# Patient Record
Sex: FEMALE | Race: WHITE | Employment: OTHER | URBAN - METROPOLITAN AREA
[De-identification: names, ages, dates, MRNs, and addresses within clinical notes are randomized per-mention and may not be internally consistent; named-entity substitution may affect disease eponyms.]

---

## 2017-01-11 RX ORDER — LEVOTHYROXINE SODIUM 0.1 MG/1
TABLET ORAL
Qty: 90 TABLET | Refills: 1 | Status: SHIPPED | OUTPATIENT
Start: 2017-01-11 | End: 2017-11-09 | Stop reason: SDUPTHER

## 2017-03-03 DIAGNOSIS — I10 ESSENTIAL HYPERTENSION, BENIGN: ICD-10-CM

## 2017-03-03 RX ORDER — LOSARTAN POTASSIUM 100 MG/1
TABLET ORAL
Qty: 90 TABLET | Refills: 1 | Status: SHIPPED | OUTPATIENT
Start: 2017-03-03 | End: 2017-11-09 | Stop reason: SDUPTHER

## 2017-07-11 RX ORDER — LEVOTHYROXINE SODIUM 0.1 MG/1
TABLET ORAL
Qty: 90 TABLET | Refills: 1 | OUTPATIENT
Start: 2017-07-11

## 2017-11-09 ENCOUNTER — OFFICE VISIT (OUTPATIENT)
Dept: FAMILY MEDICINE CLINIC | Age: 62
End: 2017-11-09
Payer: COMMERCIAL

## 2017-11-09 VITALS
SYSTOLIC BLOOD PRESSURE: 120 MMHG | HEIGHT: 66 IN | DIASTOLIC BLOOD PRESSURE: 80 MMHG | WEIGHT: 206 LBS | BODY MASS INDEX: 33.11 KG/M2

## 2017-11-09 DIAGNOSIS — G89.29 CHRONIC BILATERAL LOW BACK PAIN WITHOUT SCIATICA: ICD-10-CM

## 2017-11-09 DIAGNOSIS — G45.8 OTHER SPECIFIED TRANSIENT CEREBRAL ISCHEMIAS: ICD-10-CM

## 2017-11-09 DIAGNOSIS — B96.89 ACUTE BACTERIAL SINUSITIS: ICD-10-CM

## 2017-11-09 DIAGNOSIS — M54.50 CHRONIC BILATERAL LOW BACK PAIN WITHOUT SCIATICA: ICD-10-CM

## 2017-11-09 DIAGNOSIS — F41.1 ANXIETY STATE: ICD-10-CM

## 2017-11-09 DIAGNOSIS — J01.90 ACUTE BACTERIAL SINUSITIS: ICD-10-CM

## 2017-11-09 DIAGNOSIS — K21.9 GASTROESOPHAGEAL REFLUX DISEASE WITHOUT ESOPHAGITIS: ICD-10-CM

## 2017-11-09 DIAGNOSIS — Z12.31 VISIT FOR SCREENING MAMMOGRAM: ICD-10-CM

## 2017-11-09 DIAGNOSIS — I10 ESSENTIAL HYPERTENSION, BENIGN: Primary | ICD-10-CM

## 2017-11-09 DIAGNOSIS — E03.8 OTHER SPECIFIED HYPOTHYROIDISM: ICD-10-CM

## 2017-11-09 PROCEDURE — 99214 OFFICE O/P EST MOD 30 MIN: CPT | Performed by: FAMILY MEDICINE

## 2017-11-09 RX ORDER — MELOXICAM 15 MG/1
15 TABLET ORAL DAILY PRN
COMMUNITY
End: 2018-06-19 | Stop reason: ALTCHOICE

## 2017-11-09 RX ORDER — LOSARTAN POTASSIUM 100 MG/1
TABLET ORAL
Qty: 90 TABLET | Refills: 1 | Status: SHIPPED | OUTPATIENT
Start: 2017-11-09 | End: 2018-05-08 | Stop reason: SDUPTHER

## 2017-11-09 RX ORDER — LEVOTHYROXINE SODIUM 0.1 MG/1
TABLET ORAL
Qty: 90 TABLET | Refills: 1 | Status: SHIPPED | OUTPATIENT
Start: 2017-11-09 | End: 2018-05-08 | Stop reason: SDUPTHER

## 2017-11-09 RX ORDER — AMOXICILLIN AND CLAVULANATE POTASSIUM 500; 125 MG/1; MG/1
1 TABLET, FILM COATED ORAL 2 TIMES DAILY
Qty: 20 TABLET | Refills: 0 | Status: SHIPPED | OUTPATIENT
Start: 2017-11-09 | End: 2017-11-19

## 2017-11-09 RX ORDER — LORAZEPAM 0.5 MG/1
TABLET ORAL
Qty: 30 TABLET | Refills: 2 | Status: SHIPPED | OUTPATIENT
Start: 2017-11-09 | End: 2018-06-19 | Stop reason: SDUPTHER

## 2017-11-09 ASSESSMENT — ENCOUNTER SYMPTOMS
COUGH: 0
SINUS PRESSURE: 1
NAUSEA: 0
BACK PAIN: 1
CONSTIPATION: 0
EYE REDNESS: 0
EYE DISCHARGE: 0
SHORTNESS OF BREATH: 0
SORE THROAT: 0
HEARTBURN: 1

## 2017-11-09 ASSESSMENT — PATIENT HEALTH QUESTIONNAIRE - PHQ9
SUM OF ALL RESPONSES TO PHQ9 QUESTIONS 1 & 2: 0
SUM OF ALL RESPONSES TO PHQ QUESTIONS 1-9: 0
1. LITTLE INTEREST OR PLEASURE IN DOING THINGS: 0
2. FEELING DOWN, DEPRESSED OR HOPELESS: 0

## 2017-11-09 NOTE — PROGRESS NOTES
symptoms include headaches. Pertinent negatives include no chest pain, numbness, paresis, paresthesias or weight loss. Treatments tried: Pt uses a compound medication from [employer]. Anxiety - pt states she has had anxiety off and on for years. Pt states she has taken the ativan off and on for years. Pt does not take it every day but likes to have ativan \"on hand' because she just needs it at times. Like right now her mother was diagnosed with breast cancer and so pt is more stressed/anxious. Hypothyroidism- Chronic/stable, Patient denies changes in weight,bowels,palpitations. Energy is good Last TSH     TIA - pt had a small stroke in 2012 and 2014. When pt had both Episodes she had sudden loss of speech and Lt arm numbness and her BP gets high and irregular. Pt has had none of these symptoms since. No slurred speech, no numbness. Pt now on the plavix for ever. Screening mammogram - pt denies breast pain/mass. Needs order for mammogram  Past Medical History:     Past Medical History:   Diagnosis Date    Cerebrovascular disease     mini stroke may 2012    Chronic back pain     GERD (gastroesophageal reflux disease)     Hypertension     Hypothyroidism     Osteopenia 1/4/2016      Reviewed all health maintenance requirements and ordered appropriate tests  Health Maintenance Due   Topic Date Due    Hepatitis C screen  1955    HIV screen  11/30/1970    DTaP/Tdap/Td vaccine (1 - Tdap) 11/30/1974    Breast cancer screen  08/05/2017    Flu vaccine (1) 09/01/2017       Past Surgical History:     Past Surgical History:   Procedure Laterality Date    APPENDECTOMY      BREAST BIOPSY      CHOLECYSTECTOMY      HYSTERECTOMY      still Rt ovary present        Medications:       Prior to Admission medications    Medication Sig Start Date End Date Taking?  Authorizing Provider   RaNITidine HCl (ZANTAC 75 PO) Take by mouth daily   Yes Historical Provider, MD   meloxicam (MOBIC) 15 MG tablet Take 15 mg by Negative for difficulty urinating and hematuria. Musculoskeletal: Positive for back pain. Skin: Negative for pallor and rash. Neurological: Positive for headaches. Negative for numbness and paresthesias. Psychiatric/Behavioral: Negative for confusion. The patient is nervous/anxious. Physical Exam:     Physical Exam   Constitutional: She is oriented to person, place, and time. She appears well-developed and well-nourished. HENT:   Head: Normocephalic and atraumatic. Right Ear: Tympanic membrane normal.   Left Ear: Tympanic membrane normal.   Nose: Mucosal edema present. Right sinus exhibits frontal sinus tenderness. Left sinus exhibits frontal sinus tenderness. Mouth/Throat: Oropharynx is clear and moist and mucous membranes are normal.   Eyes: Conjunctivae are normal. Pupils are equal, round, and reactive to light. Right eye exhibits no discharge. Left eye exhibits no discharge. Neck: Neck supple. No thyromegaly present. Cardiovascular: Normal rate, regular rhythm and normal heart sounds. No murmur heard. Pulmonary/Chest: Effort normal and breath sounds normal. No respiratory distress. She has no wheezes. Abdominal: Soft. Bowel sounds are normal. She exhibits no distension. There is no tenderness. Musculoskeletal: She exhibits no edema. Lymphadenopathy:     She has no cervical adenopathy. Neurological: She is alert and oriented to person, place, and time. Skin: Skin is warm and dry. No rash noted. No erythema. Psychiatric: She has a normal mood and affect. Her behavior is normal.   Vitals reviewed.       Vitals:  /80   Ht 5' 5.5\" (1.664 m)   Wt 206 lb (93.4 kg)   BMI 33.76 kg/m²       Data:     Lab Results   Component Value Date     02/27/2016    K 4.3 02/27/2016     02/27/2016    CO2 26 02/27/2016    BUN 11 02/27/2016    CREATININE 0.68 02/27/2016    GLUCOSE 91 02/27/2016    GLUCOSE 97 01/25/2012    PROT 6.9 02/27/2016    LABALBU 4.1 02/27/2016 LABALBU 4.4 01/25/2012    BILITOT 0.38 02/27/2016    ALKPHOS 83 02/27/2016    AST 14 02/27/2016    ALT 11 02/27/2016     Lab Results   Component Value Date    WBC 4.8 02/21/2015    RBC 4.51 02/21/2015    RBC 4.92 01/25/2012    HGB 13.1 02/21/2015    HCT 38.9 02/21/2015    MCV 86.3 02/21/2015    MCH 29.1 02/21/2015    MCHC 33.7 02/21/2015    RDW 15.0 02/21/2015     02/21/2015     01/25/2012    MPV NOT REPORTED 02/21/2015     Lab Results   Component Value Date    TSH 2.59 02/27/2016     Lab Results   Component Value Date    CHOL 174 02/27/2016    HDL 67 02/27/2016          Assessment/Plan:       1. Essential hypertension, benign  Stable on the cozaar  - losartan (COZAAR) 100 MG tablet; TAKE 1 TABLET DAILY  Dispense: 90 tablet; Refill: 1  - Lipid Panel; Future  - Comprehensive Metabolic Panel; Future    2. Gastroesophageal reflux disease without esophagitis  Stable on zantac  - Lipid Panel; Future  - Comprehensive Metabolic Panel; Future    3. Acute bacterial sinusitis  Take all antibiotics (augmentin and pt states she can only take the 500mg), increase rest and fluids. F/U 4-5d if not better or sooner if worse. All questions answered. 4. Chronic bilateral low back pain without sciatica  Pt to continue compound cream    5. Anxiety state  Pt may have ativan to use only as needed. Patient counseled on the addictive nature of this medication. Patient has signed a medication contract. OAR[place] report reviewed every 3 months. 6. Other specified hypothyroidism  Stable on the synthroid  - TSH without Reflex; Future    7. Other specified transient cerebral ischemias  Stable on plavix and keep BP controlled. 8. Visit for screening mammogram  Order given  - TY DIGITAL SCREEN W CAD BILATERAL; Future        Venessa received counseling on the following healthy behaviors: nutrition and exercise  Reviewed prior labs and health maintenance  Continue current medications, diet and exercise.   Discussed use,

## 2017-11-09 NOTE — PATIENT INSTRUCTIONS
SURVEY:    You may be receiving a survey from [employer] regarding your visit today. Please complete the survey to enable us to provide the highest quality of care to you and your family. If you cannot score us a very good on any question, please call the office to discuss how we could of made your experience a very good one. Thank you. DATE: MEDICATIONS TAKEN TODAY? BLOOD PRESSURE READING: HEART RATE/PULSE: BLOOD SUGAR #/FASTING?  COMMENTS

## 2017-11-24 ENCOUNTER — HOSPITAL ENCOUNTER (OUTPATIENT)
Age: 62
Discharge: HOME | End: 2017-11-24
Payer: COMMERCIAL

## 2017-11-24 ENCOUNTER — HOSPITAL ENCOUNTER (OUTPATIENT)
Dept: MAMMOGRAPHY | Age: 62
Discharge: HOME | End: 2017-11-24
Payer: COMMERCIAL

## 2017-11-24 DIAGNOSIS — Z12.31 VISIT FOR SCREENING MAMMOGRAM: ICD-10-CM

## 2017-11-24 DIAGNOSIS — K21.9 GASTROESOPHAGEAL REFLUX DISEASE WITHOUT ESOPHAGITIS: ICD-10-CM

## 2017-11-24 DIAGNOSIS — E03.8 OTHER SPECIFIED HYPOTHYROIDISM: ICD-10-CM

## 2017-11-24 DIAGNOSIS — I10 ESSENTIAL HYPERTENSION, BENIGN: ICD-10-CM

## 2017-11-24 LAB
ALBUMIN SERPL-MCNC: 3.9 G/DL (ref 3.5–5.2)
ALBUMIN/GLOBULIN RATIO: NORMAL (ref 1–2.5)
ALP BLD-CCNC: 65 U/L (ref 35–104)
ALT SERPL-CCNC: 10 U/L (ref 5–33)
ANION GAP SERPL CALCULATED.3IONS-SCNC: 10 MMOL/L (ref 9–17)
AST SERPL-CCNC: 15 U/L
BILIRUB SERPL-MCNC: 0.39 MG/DL (ref 0.3–1.2)
BUN BLDV-MCNC: 13 MG/DL (ref 8–23)
BUN/CREAT BLD: 19 (ref 9–20)
CALCIUM SERPL-MCNC: 9.1 MG/DL (ref 8.6–10.4)
CHLORIDE BLD-SCNC: 105 MMOL/L (ref 98–107)
CHOLESTEROL/HDL RATIO: 2.2
CHOLESTEROL: 158 MG/DL
CO2: 26 MMOL/L (ref 20–31)
CREAT SERPL-MCNC: 0.67 MG/DL (ref 0.5–0.9)
GFR AFRICAN AMERICAN: >60 ML/MIN
GFR NON-AFRICAN AMERICAN: >60 ML/MIN
GFR SERPL CREATININE-BSD FRML MDRD: NORMAL ML/MIN/{1.73_M2}
GFR SERPL CREATININE-BSD FRML MDRD: NORMAL ML/MIN/{1.73_M2}
GLUCOSE BLD-MCNC: 95 MG/DL (ref 70–99)
HDLC SERPL-MCNC: 73 MG/DL
LDL CHOLESTEROL: 65 MG/DL (ref 0–130)
PATIENT FASTING?: YES
POTASSIUM SERPL-SCNC: 4.4 MMOL/L (ref 3.7–5.3)
SODIUM BLD-SCNC: 141 MMOL/L (ref 135–144)
TOTAL PROTEIN: 6.5 G/DL (ref 6.4–8.3)
TRIGL SERPL-MCNC: 99 MG/DL
TSH SERPL DL<=0.05 MIU/L-ACNC: 4.21 MIU/L (ref 0.3–5)
VLDLC SERPL CALC-MCNC: NORMAL MG/DL (ref 1–30)

## 2017-11-24 PROCEDURE — G0202 SCR MAMMO BI INCL CAD: HCPCS

## 2017-11-24 PROCEDURE — 80053 COMPREHEN METABOLIC PANEL: CPT

## 2017-11-24 PROCEDURE — 80061 LIPID PANEL: CPT

## 2017-11-24 PROCEDURE — 84443 ASSAY THYROID STIM HORMONE: CPT

## 2017-11-24 PROCEDURE — 36415 COLL VENOUS BLD VENIPUNCTURE: CPT

## 2018-02-07 ENCOUNTER — OFFICE VISIT (OUTPATIENT)
Dept: FAMILY MEDICINE CLINIC | Age: 63
End: 2018-02-07
Payer: COMMERCIAL

## 2018-02-07 VITALS
DIASTOLIC BLOOD PRESSURE: 82 MMHG | HEART RATE: 74 BPM | WEIGHT: 205 LBS | OXYGEN SATURATION: 96 % | TEMPERATURE: 97.6 F | SYSTOLIC BLOOD PRESSURE: 126 MMHG | BODY MASS INDEX: 33.59 KG/M2

## 2018-02-07 DIAGNOSIS — B34.9 ACUTE BRONCHOSPASM DUE TO VIRAL INFECTION: ICD-10-CM

## 2018-02-07 DIAGNOSIS — J98.01 ACUTE BRONCHOSPASM DUE TO VIRAL INFECTION: ICD-10-CM

## 2018-02-07 DIAGNOSIS — J06.9 VIRAL URI WITH COUGH: Primary | ICD-10-CM

## 2018-02-07 PROCEDURE — 99213 OFFICE O/P EST LOW 20 MIN: CPT | Performed by: NURSE PRACTITIONER

## 2018-02-07 RX ORDER — GUAIFENESIN 600 MG/1
600 TABLET, EXTENDED RELEASE ORAL 2 TIMES DAILY
Qty: 14 TABLET | Refills: 0 | Status: SHIPPED | OUTPATIENT
Start: 2018-02-07 | End: 2018-02-14

## 2018-02-07 RX ORDER — MONTELUKAST SODIUM 10 MG/1
TABLET ORAL
COMMUNITY
Start: 2018-01-22 | End: 2018-02-24

## 2018-02-07 RX ORDER — ALBUTEROL SULFATE 90 UG/1
2 AEROSOL, METERED RESPIRATORY (INHALATION) EVERY 6 HOURS PRN
Qty: 1 INHALER | Refills: 3 | Status: SHIPPED | OUTPATIENT
Start: 2018-02-07 | End: 2018-11-19

## 2018-02-07 ASSESSMENT — ENCOUNTER SYMPTOMS: COUGH: 1

## 2018-02-07 NOTE — PROGRESS NOTES
HPI Notes    Name: Kevin Ray  : 1955         Chief Complaint:     Chief Complaint   Patient presents with    Cough     Patient complains of cough that started 2 days ago, started as a dry cough now she it is more productive.  Pharyngitis     Patient complains of sore throat started yesterday, she thinks from the drainage.  Shortness of Breath     Patient complains of shortness of breath. History of Present Illness:        Cough   This is a new problem. The current episode started in the past 7 days (4 days ago). The problem has been waxing and waning. The cough is productive of sputum. Associated symptoms include ear congestion, nasal congestion and postnasal drip. Pertinent negatives include no chest pain, chills, fever or shortness of breath. The symptoms are aggravated by lying down. Past Medical History:     Past Medical History:   Diagnosis Date    Cerebrovascular disease     mini stroke may 2012    Chronic back pain     GERD (gastroesophageal reflux disease)     Hypertension     Hypothyroidism     Osteopenia 2016      Reviewed all health maintenance requirements and ordered appropriate tests  Health Maintenance Due   Topic Date Due    Hepatitis C screen  1955    HIV screen  1970    DTaP/Tdap/Td vaccine (1 - Tdap) 1974       Past Surgical History:     Past Surgical History:   Procedure Laterality Date    APPENDECTOMY      BREAST BIOPSY      CHOLECYSTECTOMY      HYSTERECTOMY      still Rt ovary present        Medications:       Prior to Admission medications    Medication Sig Start Date End Date Taking?  Authorizing Provider   albuterol sulfate HFA (PROAIR HFA) 108 (90 Base) MCG/ACT inhaler Inhale 2 puffs into the lungs every 6 hours as needed for Wheezing 18  Yes Kolton Lerma CNP   guaiFENesin (MUCINEX) 600 MG extended release tablet Take 1 tablet by mouth 2 times daily for 7 days 18 Yes Kolton Lerma CNP antibiotics. However, pt insisting on antibiotic. Will treat with albuterol inhaler, mucinex, and zpack. Increase rest and water intake  May use warm tea and honey for sore throat  May gargle salt water for sore throat  May use saline nose spray for nasal congestion    Patient verbalizes understanding and agreement with plan. All questions answered. If symptoms do not resolve or worsen, return to office. Completed Refills   Requested Prescriptions     Signed Prescriptions Disp Refills    albuterol sulfate HFA (PROAIR HFA) 108 (90 Base) MCG/ACT inhaler 1 Inhaler 3     Sig: Inhale 2 puffs into the lungs every 6 hours as needed for Wheezing    guaiFENesin (MUCINEX) 600 MG extended release tablet 14 tablet 0     Sig: Take 1 tablet by mouth 2 times daily for 7 days     Return if symptoms worsen or fail to improve. Orders Placed This Encounter   Medications    albuterol sulfate HFA (PROAIR HFA) 108 (90 Base) MCG/ACT inhaler     Sig: Inhale 2 puffs into the lungs every 6 hours as needed for Wheezing     Dispense:  1 Inhaler     Refill:  3    guaiFENesin (MUCINEX) 600 MG extended release tablet     Sig: Take 1 tablet by mouth 2 times daily for 7 days     Dispense:  14 tablet     Refill:  0     No orders of the defined types were placed in this encounter. Patient Instructions     SURVEY:    You may be receiving a survey from [employer] regarding your visit today. Please complete the survey to enable us to provide the highest quality of care to you and your family. If you cannot score us a very good on any question, please call the office to discuss how we could of made your experience a very good one. Thank you.       Electronically signed by Mabel Shepherd CNP on 2/8/2018 at 2:22 PM           Completed Refills   Requested Prescriptions     Signed Prescriptions Disp Refills    albuterol sulfate HFA (PROAIR HFA) 108 (90 Base) MCG/ACT inhaler 1 Inhaler 3     Sig: Inhale 2 puffs into the lungs every 6 hours as needed for Wheezing    guaiFENesin (MUCINEX) 600 MG extended release tablet 14 tablet 0     Sig: Take 1 tablet by mouth 2 times daily for 7 days         Venessa received counseling on the following healthy behaviors: nutrition and medication adherence  Reviewed prior labs and health maintenance. Continue current medications, diet and exercise. Discussed use, benefit, and side effects of prescribed medications. Barriers to medication compliance addressed. Patient given educational materials - see patient instructions. All patient questions answered. Patient voiced understanding.

## 2018-02-08 ENCOUNTER — TELEPHONE (OUTPATIENT)
Dept: FAMILY MEDICINE CLINIC | Age: 63
End: 2018-02-08

## 2018-02-08 RX ORDER — AZITHROMYCIN 250 MG/1
TABLET, FILM COATED ORAL
Qty: 1 PACKET | Refills: 0 | Status: SHIPPED | OUTPATIENT
Start: 2018-02-08 | End: 2018-02-24 | Stop reason: ALTCHOICE

## 2018-02-08 ASSESSMENT — ENCOUNTER SYMPTOMS
VOMITING: 0
NAUSEA: 0
SPUTUM PRODUCTION: 0
DIARRHEA: 0
SHORTNESS OF BREATH: 0

## 2018-02-08 NOTE — TELEPHONE ENCOUNTER
Last OV yesterday, said she had a viral infection and told to take sudafed. She is much worse today, no voice,feels horrible. She said she wonders if you could do a test to decide if it is viral or bacterial.  She thinks she needs something more than sudafed. Please advise.   VINCENT Beckham

## 2018-02-24 ENCOUNTER — OFFICE VISIT (OUTPATIENT)
Dept: PRIMARY CARE CLINIC | Age: 63
End: 2018-02-24
Payer: COMMERCIAL

## 2018-02-24 ENCOUNTER — TELEPHONE (OUTPATIENT)
Dept: PRIMARY CARE CLINIC | Age: 63
End: 2018-02-24

## 2018-02-24 VITALS
TEMPERATURE: 98.7 F | WEIGHT: 205 LBS | HEART RATE: 94 BPM | DIASTOLIC BLOOD PRESSURE: 84 MMHG | SYSTOLIC BLOOD PRESSURE: 126 MMHG | BODY MASS INDEX: 33.59 KG/M2 | OXYGEN SATURATION: 99 %

## 2018-02-24 DIAGNOSIS — J01.00 ACUTE MAXILLARY SINUSITIS, RECURRENCE NOT SPECIFIED: Primary | ICD-10-CM

## 2018-02-24 PROCEDURE — 99213 OFFICE O/P EST LOW 20 MIN: CPT | Performed by: NURSE PRACTITIONER

## 2018-02-24 RX ORDER — PREDNISONE 20 MG/1
20 TABLET ORAL DAILY
Qty: 3 TABLET | Refills: 0 | Status: SHIPPED | OUTPATIENT
Start: 2018-02-24 | End: 2018-02-27

## 2018-02-24 RX ORDER — ECHINACEA PURPUREA EXTRACT 125 MG
1 TABLET ORAL PRN
Qty: 1 BOTTLE | Refills: 0 | Status: SHIPPED | OUTPATIENT
Start: 2018-02-24 | End: 2018-06-19 | Stop reason: ALTCHOICE

## 2018-02-24 RX ORDER — AMOXICILLIN AND CLAVULANATE POTASSIUM 875; 125 MG/1; MG/1
1 TABLET, FILM COATED ORAL 2 TIMES DAILY
Qty: 20 TABLET | Refills: 0 | Status: SHIPPED | OUTPATIENT
Start: 2018-02-24 | End: 2018-03-06

## 2018-02-24 ASSESSMENT — ENCOUNTER SYMPTOMS
SORE THROAT: 0
COUGH: 1
STRIDOR: 0
FACIAL SWELLING: 0
CHEST TIGHTNESS: 0
SINUS PAIN: 1
WHEEZING: 0
TROUBLE SWALLOWING: 0
RHINORRHEA: 1
SINUS PRESSURE: 1

## 2018-02-24 NOTE — PROGRESS NOTES
tablet 1    losartan (COZAAR) 100 MG tablet TAKE 1 TABLET DAILY 90 tablet 1    UNABLE TO FIND Gabapentin-Ketoprofen amitriptyline HCL 10-10-2% neuroserum liquid  Apply small amt to affected area and rub for 10 minutes. Repeat 2-3 times daily 15 mL 1    LORazepam (ATIVAN) 0.5 MG tablet Take 1 tablet twice daily as needed for anxiety. . 30 tablet 2    meloxicam (MOBIC) 15 MG tablet Take 15 mg by mouth daily as needed for Pain      clopidogrel (PLAVIX) 75 MG tablet Take 1 tablet by mouth daily. 30 tablet 0    meclizine (ANTIVERT) 25 MG tablet Take 1 tablet by mouth 3 times daily as needed. 30 tablet 2    folic acid (FOLVITE) 1 MG tablet Take 1 mg by mouth daily.  Ergocalciferol (VITAMIN D2 PO) Take 1.25 mg by mouth twice a week.  albuterol sulfate HFA (PROAIR HFA) 108 (90 Base) MCG/ACT inhaler Inhale 2 puffs into the lungs every 6 hours as needed for Wheezing 1 Inhaler 3    Loratadine (CLARITIN) 5 MG CHEW Take 1 tablet by mouth daily 30 tablet 0     No current facility-administered medications for this visit. Allergies   Allergen Reactions    Flonase [Fluticasone Propionate]      Smelled smoke with medication.  Other Hives     Contrast for a stress test       Subjective:      Review of Systems   Constitutional: Positive for chills. Negative for activity change, fatigue and fever. HENT: Positive for congestion, postnasal drip, rhinorrhea, sinus pain and sinus pressure. Negative for ear discharge, ear pain, facial swelling, sore throat and trouble swallowing. Respiratory: Positive for cough. Negative for chest tightness, wheezing and stridor. Cardiovascular: Negative. Skin: Negative. Neurological: Negative. Hematological: Negative. Objective:     Physical Exam   Constitutional:   Appears to be of stated age with warm, dry skin; normal coloration without rash of the exposed skin.   Patient is well-appearing, well-hydrated, nontoxic, comfortable, alert and oriented, pleasant and talkative without apparent distress. They are oriented for age with age appropriate behavior. HENT:   Head: Normocephalic. Right Ear: Tympanic membrane normal.   Left Ear: Tympanic membrane is injected (and appears cloudy. There is no bulging. There is no erythema. ). Nose: Mucosal edema and rhinorrhea present. Left sinus exhibits maxillary sinus tenderness. Mouth/Throat: Uvula is midline and mucous membranes are normal. Oropharyngeal exudate (pale thin yellow noted to posterior oropharyngeal wall) present. Neck: Neck supple. Cardiovascular: Normal rate and regular rhythm. No lower extremity peripheral edema. Pulmonary/Chest: Effort normal and breath sounds normal. She has no wheezes. She has no rales. Frequent cough on exam that is congested and productive of mucus. Lymphadenopathy:     She has no cervical adenopathy. Nursing note and vitals reviewed. /84   Pulse 94   Temp 98.7 °F (37.1 °C) (Oral)   Wt 205 lb (93 kg)   SpO2 99%   BMI 33.59 kg/m²     Assessment:     1. Acute maxillary sinusitis, recurrence not specified  amoxicillin-clavulanate (AUGMENTIN) 875-125 MG per tablet    sodium chloride (ALTAMIST SPRAY) 0.65 % nasal spray       Plan:   Zak Aguirre was seen today for sinusitis. Diagnoses and all orders for this visit:    Acute maxillary sinusitis, recurrence not specified  -     amoxicillin-clavulanate (AUGMENTIN) 875-125 MG per tablet; Take 1 tablet by mouth 2 times daily for 10 days  -     sodium chloride (ALTAMIST SPRAY) 0.65 % nasal spray; 1 spray by Nasal route as needed for Congestion      Will recommend Augmentin and saline nasal spray and throat lozenges with supportive care including increased rest and hydration. NP reviewed  administration, expected effects and possible side effects and have encouraged a probiotic of choice. Questions answered. They verbalized understanding and agreement. Patient instructions written and verbal given.

## 2018-02-24 NOTE — TELEPHONE ENCOUNTER
Abiel De Leon called back wanting to know if Dave Ewa could call in a steroid for her also. She said she is leaving for [place] on Wednesday and her niece mentioned she might need a steroid. Her niece had the same thing she had and needed a steroid to get over it. Please let Abiel De Leon know. Shakeel    Next Visit Date:  No future appointments.     Health Maintenance   Topic Date Due    Hepatitis C screen  1955    HIV screen  11/30/1970    DTaP/Tdap/Td vaccine (1 - Tdap) 11/30/1974    Shingles Vaccine (1 of 2 - 2 Dose Series) 11/30/2005    TSH testing  11/24/2018    Potassium monitoring  11/24/2018    Creatinine monitoring  11/24/2018    Breast cancer screen  11/24/2019    Colon cancer screen colonoscopy  06/28/2021    Lipid screen  11/24/2022    Flu vaccine  Completed       No results found for: LABA1C          ( goal A1C is < 7)   No results found for: LABMICR  LDL Cholesterol (mg/dL)   Date Value   11/24/2017 65       (goal LDL is <100)   AST (U/L)   Date Value   11/24/2017 15     ALT (U/L)   Date Value   11/24/2017 10     BUN (mg/dL)   Date Value   11/24/2017 13     BP Readings from Last 3 Encounters:   02/24/18 126/84   02/07/18 126/82   11/09/17 120/80          (goal 120/80)    All Future Testing planned in [facility]  Lab Frequency Next Occurrence               Patient Active Problem List:     Anxiety state     Hypothyroidism     Essential hypertension, benign     Esophageal reflux     Carpal tunnel syndrome     TIA (transient ischemic attack)     Well woman exam with routine gynecological exam     Chronic back pain     Osteopenia

## 2018-02-24 NOTE — PATIENT INSTRUCTIONS
SURVEY:    You may be receiving a survey from [employer] regarding your visit today. Please complete the survey to enable us to provide the highest quality of care to you and your family. If you cannot score us a very good on any question, please call the office to discuss how we could of made your experience a very good one. Thank you. Patient Education        Sinusitis: Care Instructions  Your Care Instructions    Sinusitis is an infection of the lining of the sinus cavities in your head. Sinusitis often follows a cold. It causes pain and pressure in your head and face. In most cases, sinusitis gets better on its own in 1 to 2 weeks. But some mild symptoms may last for several weeks. Sometimes antibiotics are needed. Follow-up care is a key part of your treatment and safety. Be sure to make and go to all appointments, and call your doctor if you are having problems. It's also a good idea to know your test results and keep a list of the medicines you take. How can you care for yourself at home? · Take an over-the-counter pain medicine, such as acetaminophen (Tylenol), ibuprofen (Advil, Motrin), or naproxen (Aleve). Read and follow all instructions on the label. · If the doctor prescribed antibiotics, take them as directed. Do not stop taking them just because you feel better. You need to take the full course of antibiotics. · Be careful when taking over-the-counter cold or flu medicines and Tylenol at the same time. Many of these medicines have acetaminophen, which is Tylenol. Read the labels to make sure that you are not taking more than the recommended dose. Too much acetaminophen (Tylenol) can be harmful. · Breathe warm, moist air from a steamy shower, a hot bath, or a sink filled with hot water. Avoid cold, dry air. Using a humidifier in your home may help. Follow the directions for cleaning the machine.   · Use saline (saltwater) nasal washes to help keep your nasal passages open and wash is used to treat many different infections caused by bacteria, such as sinusitis, pneumonia, ear infections, bronchitis, urinary tract infections, and infections of the skin. Amoxicillin and clavulanate potassium may also be used for purposes not listed in this medication guide. What should I discuss with my healthcare provider before taking amoxicillin and clavulanate potassium? Do not use this medication if you are allergic to amoxicillin or clavulanate potassium, or if you have ever had liver problems caused by this medication. Do not use if you are allergic to any other penicillin antibiotic, such as amoxicillin (Amoxil, Augmentin, Dispermox, Moxatag), ampicillin (Principen, Unasyn), dicloxacillin (Dycill, Dynapen), oxacillin (Bactocill), or penicillin (Bicillin L-A, PC Pen VK, Pfizerpen)), and others. To make sure you can safely take this medicine, tell your doctor if you have any of these other conditions:  · liver disease (or a history of hepatitis or jaundice);  · kidney disease;  · mononucleosis; or  · if you are allergic to a cephalosporin antibiotic, such as cefdinir (Omnicef), cefprozil (Cefzil), cefuroxime (Ceftin), cephalexin (Keflex), and others. FDA pregnancy category B. This medication is not expected to be harmful to an unborn baby. Tell your doctor if you are pregnant or plan to become pregnant during treatment. Amoxicillin and clavulanate potassium can make birth control pills less effective. Ask your doctor about using a non-hormone method of birth control (such as a condom, diaphragm, spermicide) to prevent pregnancy while taking amoxicillin and clavulanate potassium. Amoxicillin and clavulanate potassium can pass into breast milk and may harm a nursing baby. Do not use this medication without telling your doctor if you are breast-feeding a baby. The liquid and chewable tablet forms of this medication may contain phenylalanine.  Talk to your doctor before using these forms of amoxicillin and clavulanate potassium if you have phenylketonuria (PKU). How should I take amoxicillin and clavulanate potassium? Take exactly as prescribed by your doctor. Do not take in larger or smaller amounts or for longer than recommended. Follow the directions on your prescription label. If you switch from one tablet form to another (regular, chewable, or extended-release tablet), take only the new tablet form and strength prescribed for you. The strength of clavulanate potassium is not the same among the different tablet forms, even though the amount of amoxicillin may be the same as in the tablet you were using before. This medicine may not be as effective or could be harmful if you do not use the exact tablet form your doctor has prescribed. Take this medicine with a full glass of water. Take the medicine at the start of a meal to reduce stomach upset. Take the medicine at the same time each day. The Augmentin tablet should be swallowed whole. The Augmentin Chewable tablet must be chewed before swallowing. Do not swallow a chewable tablet whole. Do not crush or chew the Augmentin XR (extended-release) tablet. Swallow the pill whole, or break the pill in half and take both halves one at a time. If you have trouble swallowing a whole or half pill, talk with your doctor about using another form of amoxicillin and clavulanate potassium. Shake the liquid form of this medicine well just before you measure a dose. To be sure you get the correct dose, measure the liquid with a marked measuring spoon or medicine cup, not with a regular table spoon. If you do not have a dose-measuring device, ask your pharmacist for one. Take this medication for the full prescribed length of time. Your symptoms may improve before the infection is completely cleared. Skipping doses may also increase your risk of further infection that is resistant to antibiotics.  Amoxicillin and clavulanate potassium will not treat a viral

## 2018-03-16 ENCOUNTER — TELEPHONE (OUTPATIENT)
Dept: FAMILY MEDICINE CLINIC | Age: 63
End: 2018-03-16

## 2018-03-16 NOTE — TELEPHONE ENCOUNTER
Patient called and stated that our office will be getting a refill request from [facility] on Gabapentin-Ketoprofen Amitriptline HCL 10-10-2% W/V Neuroserum Liquid. Patient is in need of this to be refilled. [employer].    Health Maintenance   Topic Date Due    Hepatitis C screen  1955    HIV screen  11/30/1970    DTaP/Tdap/Td vaccine (1 - Tdap) 11/30/1974    Shingles Vaccine (1 of 2 - 2 Dose Series) 11/30/2005    TSH testing  11/24/2018    Potassium monitoring  11/24/2018    Creatinine monitoring  11/24/2018    Breast cancer screen  11/24/2019    Colon cancer screen colonoscopy  06/28/2021    Lipid screen  11/24/2022    Flu vaccine  Completed             (applicable per patient's age: Cancer Screenings, Depression Screening, Fall Risk Screening, Immunizations)    LDL Cholesterol (mg/dL)   Date Value   11/24/2017 65     AST (U/L)   Date Value   11/24/2017 15     ALT (U/L)   Date Value   11/24/2017 10     BUN (mg/dL)   Date Value   11/24/2017 13      (goal A1C is < 7)   (goal LDL is <100) need 30-50% reduction from baseline     BP Readings from Last 3 Encounters:   02/24/18 126/84   02/07/18 126/82   11/09/17 120/80    (goal /80)      All Future Testing planned in [facility]:  Lab Frequency Next Occurrence       Next Visit Date:  No future appointments.          Patient Active Problem List:     Anxiety state     Hypothyroidism     Essential hypertension, benign     Esophageal reflux     Carpal tunnel syndrome     TIA (transient ischemic attack)     Well woman exam with routine gynecological exam     Chronic back pain     Osteopenia

## 2018-05-07 ENCOUNTER — TELEPHONE (OUTPATIENT)
Dept: FAMILY MEDICINE CLINIC | Age: 63
End: 2018-05-07

## 2018-05-07 DIAGNOSIS — I10 ESSENTIAL HYPERTENSION, BENIGN: ICD-10-CM

## 2018-05-08 RX ORDER — LEVOTHYROXINE SODIUM 0.1 MG/1
TABLET ORAL
Qty: 90 TABLET | Refills: 1 | Status: SHIPPED | OUTPATIENT
Start: 2018-05-08 | End: 2018-10-09 | Stop reason: SDUPTHER

## 2018-05-08 RX ORDER — LOSARTAN POTASSIUM 100 MG/1
TABLET ORAL
Qty: 90 TABLET | Refills: 1 | Status: SHIPPED | OUTPATIENT
Start: 2018-05-08 | End: 2018-10-09 | Stop reason: SDUPTHER

## 2018-06-19 ENCOUNTER — OFFICE VISIT (OUTPATIENT)
Dept: FAMILY MEDICINE CLINIC | Age: 63
End: 2018-06-19
Payer: COMMERCIAL

## 2018-06-19 VITALS
TEMPERATURE: 97.8 F | DIASTOLIC BLOOD PRESSURE: 70 MMHG | SYSTOLIC BLOOD PRESSURE: 112 MMHG | OXYGEN SATURATION: 98 % | HEART RATE: 78 BPM

## 2018-06-19 DIAGNOSIS — E03.8 OTHER SPECIFIED HYPOTHYROIDISM: ICD-10-CM

## 2018-06-19 DIAGNOSIS — I10 ESSENTIAL HYPERTENSION, BENIGN: ICD-10-CM

## 2018-06-19 DIAGNOSIS — K21.9 GASTROESOPHAGEAL REFLUX DISEASE WITHOUT ESOPHAGITIS: Primary | ICD-10-CM

## 2018-06-19 DIAGNOSIS — F41.1 ANXIETY STATE: ICD-10-CM

## 2018-06-19 PROCEDURE — 99214 OFFICE O/P EST MOD 30 MIN: CPT | Performed by: NURSE PRACTITIONER

## 2018-06-19 RX ORDER — LORAZEPAM 0.5 MG/1
TABLET ORAL
Qty: 30 TABLET | Refills: 2 | Status: SHIPPED | OUTPATIENT
Start: 2018-06-19 | End: 2019-06-24 | Stop reason: SDUPTHER

## 2018-06-19 ASSESSMENT — ENCOUNTER SYMPTOMS
HEARTBURN: 0
ABDOMINAL PAIN: 0
ORTHOPNEA: 0
SHORTNESS OF BREATH: 0
BELCHING: 0

## 2018-07-18 ENCOUNTER — OFFICE VISIT (OUTPATIENT)
Dept: FAMILY MEDICINE CLINIC | Age: 63
End: 2018-07-18
Payer: COMMERCIAL

## 2018-07-18 VITALS
SYSTOLIC BLOOD PRESSURE: 122 MMHG | DIASTOLIC BLOOD PRESSURE: 84 MMHG | HEART RATE: 88 BPM | BODY MASS INDEX: 35.4 KG/M2 | WEIGHT: 216 LBS | OXYGEN SATURATION: 96 %

## 2018-07-18 DIAGNOSIS — M25.561 CHRONIC PAIN OF RIGHT KNEE: ICD-10-CM

## 2018-07-18 DIAGNOSIS — Z01.818 PRE-OPERATIVE CLEARANCE: Primary | ICD-10-CM

## 2018-07-18 DIAGNOSIS — G89.29 CHRONIC PAIN OF RIGHT KNEE: ICD-10-CM

## 2018-07-18 DIAGNOSIS — R07.0 THROAT PAIN IN ADULT: ICD-10-CM

## 2018-07-18 DIAGNOSIS — F41.1 ANXIETY STATE: ICD-10-CM

## 2018-07-18 PROCEDURE — 99214 OFFICE O/P EST MOD 30 MIN: CPT | Performed by: FAMILY MEDICINE

## 2018-07-18 ASSESSMENT — ENCOUNTER SYMPTOMS
VOMITING: 0
DIARRHEA: 0
CONSTIPATION: 0
BLOOD IN STOOL: 0
SORE THROAT: 1
EYE REDNESS: 0
ABDOMINAL PAIN: 0
EYE DISCHARGE: 0
SHORTNESS OF BREATH: 0
SWOLLEN GLANDS: 0
NAUSEA: 0
COUGH: 0

## 2018-07-18 NOTE — PROGRESS NOTES
arthralgias. Negative for joint swelling, myalgias and neck pain. Knee pain   Skin: Negative for pallor and rash. Neurological: Negative for dizziness, tingling, tremors, light-headedness, numbness and headaches. Psychiatric/Behavioral: Negative for sleep disturbance. The patient is nervous/anxious. Physical Exam:     Physical Exam   Constitutional: She is oriented to person, place, and time. She appears well-developed and well-nourished. HENT:   Head: Normocephalic and atraumatic. Right Ear: Tympanic membrane and ear canal normal.   Left Ear: Tympanic membrane and ear canal normal.   Nose: No mucosal edema or sinus tenderness. Mouth/Throat: Oropharynx is clear and moist. No oropharyngeal exudate, posterior oropharyngeal edema, posterior oropharyngeal erythema or tonsillar abscesses. Eyes: Conjunctivae are normal. Pupils are equal, round, and reactive to light. Neck: Neck supple. No thyromegaly present. Cardiovascular: Normal rate and regular rhythm. No murmur heard. Pulmonary/Chest: Effort normal and breath sounds normal.   Abdominal: Soft. Bowel sounds are normal. There is no tenderness. Musculoskeletal: She exhibits no edema. Right knee: She exhibits no swelling and no effusion. Tenderness found. Medial joint line tenderness noted. Neurological: She is alert and oriented to person, place, and time. Skin: Skin is warm and dry. No rash noted. Vitals reviewed.       Vitals:  /84   Pulse 88   Wt 216 lb (98 kg)   SpO2 96%   BMI 35.40 kg/m²       Data:     Lab Results   Component Value Date     11/24/2017    K 4.4 11/24/2017     11/24/2017    CO2 26 11/24/2017    BUN 13 11/24/2017    CREATININE 0.67 11/24/2017    GLUCOSE 95 11/24/2017    GLUCOSE 97 01/25/2012    PROT 6.5 11/24/2017    LABALBU 3.9 11/24/2017    LABALBU 4.4 01/25/2012    BILITOT 0.39 11/24/2017    ALKPHOS 65 11/24/2017    AST 15 11/24/2017    ALT 10 11/24/2017     Lab Results Component Value Date    WBC 4.8 02/21/2015    RBC 4.51 02/21/2015    RBC 4.92 01/25/2012    HGB 13.1 02/21/2015    HCT 38.9 02/21/2015    MCV 86.3 02/21/2015    MCH 29.1 02/21/2015    MCHC 33.7 02/21/2015    RDW 15.0 02/21/2015     02/21/2015     01/25/2012    MPV NOT REPORTED 02/21/2015     Lab Results   Component Value Date    TSH 4.21 11/24/2017     Lab Results   Component Value Date    CHOL 158 11/24/2017    HDL 73 11/24/2017          Assessment/Plan:       1. Pre-operative clearance  Pt is cleared for surgery 8/7/18 for Rt Total knee with Dr Megan Benson in [place]. Pre op test tomorrow in [place] per Dr Emily Jones. 2. Chronic pain of right knee  See #1    3. Throat pain in adult  PE is WNL and may just be some post nasal drainage. Recommend elevate head of bed at night    4. Anxiety state  Pt may take ativan as needed until surgery. If pt continues with anxiety after surgery the will need to see me for daily medication              No Follow-up on file.       Electronically signed by Sher Pires MD on 7/18/2018 at 2:26 PM

## 2018-07-31 ENCOUNTER — NURSE ONLY (OUTPATIENT)
Dept: FAMILY MEDICINE CLINIC | Age: 63
End: 2018-07-31
Payer: COMMERCIAL

## 2018-07-31 DIAGNOSIS — R73.9 HYPERGLYCEMIA: Primary | ICD-10-CM

## 2018-07-31 LAB — HBA1C MFR BLD: 5.4 %

## 2018-07-31 PROCEDURE — 83036 HEMOGLOBIN GLYCOSYLATED A1C: CPT | Performed by: FAMILY MEDICINE

## 2018-08-20 ENCOUNTER — HOSPITAL ENCOUNTER (OUTPATIENT)
Dept: PHYSICAL THERAPY | Age: 63
Setting detail: THERAPIES SERIES
Discharge: HOME | End: 2018-08-20
Payer: COMMERCIAL

## 2018-08-20 PROCEDURE — 97110 THERAPEUTIC EXERCISES: CPT

## 2018-08-20 PROCEDURE — 97162 PT EVAL MOD COMPLEX 30 MIN: CPT

## 2018-08-20 ASSESSMENT — PAIN DESCRIPTION - ORIENTATION: ORIENTATION: RIGHT

## 2018-08-20 ASSESSMENT — PAIN DESCRIPTION - LOCATION: LOCATION: KNEE

## 2018-08-20 ASSESSMENT — PAIN SCALES - GENERAL: PAINLEVEL_OUTOF10: 5

## 2018-08-20 NOTE — OP NOTE
PT/OT INITIAL EVALUATION REPORT   Katherine Miller Date: 8/20/2018     Insurance Company: _____________  Patient Name: French Saleh                                           Patient ID #:  ____________________   Date of Birth / Age: 1955/ 58 y.o. Date Of Injury:  /  /   Date Of Surgery: 8 /11 / 18  ICD-9 Code(s): _________________ Diagnosis: S/P right TKA  Referring Practitioner: Dr Linda Camarena  Referring Physician ID #:_______________   Therapy Office: [address] [place] [address]           Discipline: [x]PT / []OT       OBJECTIVE FINDINGS    Involved Region: []Left / [x]Right / []N/A     Strength ( 0-5 )       Range of Motion   Motion  /  Grade     PROM    AROM   Strength RLE  Strength RLE: Exception  R Hip Flexion: 4-/5  R Hip ABduction: 4-/5  R Knee Flexion: 3-/5  R Knee Extension: 3-/5  R Ankle Dorsiflexion: 5/5 PROM RLE (degrees)  RLE PROM: Exceptions  R Knee Flexion 0-145: 3-90  R Knee Extension 0: -3                       AROM RLE (degrees)  RLE AROM: Exceptions  R Knee Flexion 0-145: 3-83  R Knee Extension 0: -3                         How / Where Injury Occurred:     Work Related? []Yes  [x]No      Pertinent History: Additional Pertinent Hx: 8/11/18 had [facility] right knee TKA. Home the same day. Had home care PT x2 wks. Prior to suregry had limp but no device. Hx- OA B knees, OA in right hip, low back chronic pain with sciatica, hysterectomt, appendectomy, gallbladder removed, breast surgery. 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     Pain:  Pain Scale:  5 /10  Nature: [x]constant / []intermittent / []localized / []radiating     Functional Deficits / Additional Information:   Exam: LEFS score 14/8= 83% impaired mobility , walking with w.walker with limp; off work    Specific Treatment Plan:  [] HP/CP     [] Electrical Stimulation   [x]  Therapeutic Exercise   [x]  Gait Training  [] Traction   [] Ultrasound                   [] Massage                        []  Work Conditioning   [] Aquatics  [] Back Education            []  Therapeutic Activity [x]  Manual Therapy  [x] Pt Education/HEP   []  Anodyne    Treatment Goals:   Short term goals  Time Frame for Short term goals: 9  Short term goal 1: Increase ROM right knee 100 degrees to get in and out of car without difficulty  Short term goal 2:  Increase strength right knee extension 4/5 to progress gait  Long term goals  Time Frame for Long term goals : 25  Long term goal 1: Gait independent without device with minimal gait deviation  Long term goal 2: Patient to have increase ROM right knee flexion 110 degrees to alternate feet up and down stairs WFL  Long term goal 3: Improve functional mobility 50/80  or better on LEFS    Projected Frequency / Duration of Treatment   Days: 3 times per week Weeks: 6 weeks    Therapist Signature:         Printed Therapist Name and License #:Tosha Walker, PT    Copyright 2012 Magalie Lopez        Created: 9/99 / Revised: 1/12

## 2018-08-20 NOTE — PROGRESS NOTES
Phone: [address] [employer] [employer]         Fax: [phone]                      Outpatient Physical Therapy                                                                      Evaluation    Date: 2018  Patient: Darwin Moreno  : 1955  ACCT #: [de-identified]    Referring Practitioner: Dr Kristopher Wise    Referral Date : 18    Diagnosis: S/P right TKA    Treatment Diagnosis: Right knee pain S/P TKA  Onset Date: 18  PT Insurance Information: [employer]/ [place]  Total # of Visits Approved: 18 Per Physician Order  Total # of Visits to Date: 1     Subjective     Additional Pertinent Hx: 18 had suregery right knee TKA. Home the same day. Had home care PT x2 wks. Prior to suregry had limp but no device. Hx- OA B knees, OA in right hip, low back chronic pain with sciatica, hysterectomt, appendectomy, gallbladder removed, breast surgery. 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   Pain Screening  Patient Currently in Pain: Yes  Pain Assessment  Pain Assessment: 0-10  Pain Level: 5 (with pain meds)  Pain Location: Knee  Pain Orientation: Right  Social/Functional History  Lives With: Spouse  Type of Home: House  Occupation: Full time employment  Type of occupation: office work       Objective           Strength RLE  Strength RLE: Exception  R Hip Flexion: 4-/5  R Hip ABduction: 4-/5  R Knee Flexion: 3-/5  R Knee Extension: 3-/5  R Ankle Dorsiflexion: 5/5  AROM RLE (degrees)  RLE AROM: Exceptions  R Knee Flexion 0-145: 3-83  R Knee Extension 0: -3       AROM RLE (degrees)  RLE AROM: Exceptions  R Knee Flexion 0-145: 3-83  R Knee Extension 0: -3        PROM RLE (degrees)  RLE PROM: Exceptions  R Knee Flexion 0-145: 3-90  R Knee Extension 0: -3                 Ambulation 1  Device: Rolling Walker  Quality of Gait: anyalgic, limp             Assessment  Body structures, Functions, Activity limitations: Decreased functional mobility , Decreased ROM, Decreased strength  Prognosis: Good  Decision Making: Medium Complexity  History: as above  Exam: LEFS score 14/8= 83% impaired mobility    Clinical Presentation:  [] Stable/Uncomplicated  [x] Evolving [] Unstable/Unpredictable  The Following Comorbities will impact the patients progression and Plan of Care:   [] Diabetes    [] Stroke  [] Cardiac Disease/Pacemaker [x] Previous Orthopedic Injury/Surgery  [] Seizure Disorder   [] WB Restrictions  [x] Obesity    [] Neuropathy        Education:   Patient Education: On POC and using cold/ ice pack on knee 3-4xdaily, and continue HEP 2-3xdaily       Goals  Short term goals  Time Frame for Short term goals: 9  Short term goal 1: Increase ROM right knee 100 degrees to get in and out of car without difficulty  Short term goal 2:  Increase strength right knee extension 4/5 to progress gait    Long term goals  Time Frame for Long term goals : 25  Long term goal 1: Gait independent without device with minimal gait deviation  Long term goal 2: Patient to have increase ROM right knee flexion 110 degrees to alternate feet up and down stairs WFL  Long term goal 3: Improve functional mobility 50/80  or better on LEFS    Patient's Goal:     regain normal walking and mobility with decrease knee pain    Timed Code Treatment Minutes: 15 Minutes  Total Treatment Time: 45     Time In: 10:00  Time Out: 10:45    Berna Gilbert  8/20/2018

## 2018-08-22 ENCOUNTER — HOSPITAL ENCOUNTER (OUTPATIENT)
Dept: PHYSICAL THERAPY | Age: 63
Setting detail: THERAPIES SERIES
Discharge: HOME | End: 2018-08-22
Payer: COMMERCIAL

## 2018-08-22 PROCEDURE — 97110 THERAPEUTIC EXERCISES: CPT

## 2018-08-22 PROCEDURE — 97140 MANUAL THERAPY 1/> REGIONS: CPT

## 2018-08-22 ASSESSMENT — PAIN SCALES - GENERAL: PAINLEVEL_OUTOF10: 4

## 2018-08-22 ASSESSMENT — PAIN DESCRIPTION - LOCATION: LOCATION: KNEE

## 2018-08-22 ASSESSMENT — PAIN DESCRIPTION - ORIENTATION: ORIENTATION: RIGHT

## 2018-08-24 ENCOUNTER — HOSPITAL ENCOUNTER (OUTPATIENT)
Dept: PHYSICAL THERAPY | Age: 63
Setting detail: THERAPIES SERIES
Discharge: HOME | End: 2018-08-24
Payer: COMMERCIAL

## 2018-08-24 PROCEDURE — 97110 THERAPEUTIC EXERCISES: CPT

## 2018-08-24 ASSESSMENT — PAIN DESCRIPTION - ORIENTATION: ORIENTATION: RIGHT

## 2018-08-24 ASSESSMENT — PAIN SCALES - GENERAL: PAINLEVEL_OUTOF10: 7

## 2018-08-24 ASSESSMENT — PAIN DESCRIPTION - LOCATION: LOCATION: KNEE

## 2018-08-24 NOTE — PROGRESS NOTES
Phone: [phone]      Fax: [phone]                            Outpatient Physical Therapy                                                                            Daily Note    Date: 2018  Patient Name: Domenic Magallon        MRN: [id number]   ACCT#:  [de-identified]  : 1955  (58 y.o.)    Referring Practitioner: Dr Jaren Schneider    Referral Date : 18    Diagnosis: S/P right TKA  Treatment Diagnosis: Right knee pain S/P TKA    Onset Date: 18  PT Insurance Information: BX[employer]/ [place]  Total # of Visits Approved: 18 Per Physician Order  Total # of Visits to Date: 3    Pre-Treatment Pain:  7/10     Assessment  Assessment: Patient arrived stating increased pain and swelling since last visit requesting hands off approach. Issued new POC for flexion with education. Patient reports physician instructed her not to have knee pulled on or pushed. Patient reports taking self off pain meds d/t side affects of shaking is and taking Tylenol to control pain. Suggested to pt to contact physician for a different pain medication. Did not complete PROM on pt as pt refused PROM. Chart Reviewed: Yes    Plan  Plan: Continue with current plan    Exercises/Modalities/Manual:  See DocFlow Sheet    Education:   Patient Education: On POC and using cold/ ice pack on knee 3-4xdaily, and continue HEP 2-3xdaily       Goals  (Total # of Visits to Date: 3)   Short Term Goals - Time Frame for Short term goals: 9  Short term goal 1: Increase ROM right knee 100 degrees to get in and out of car without difficulty  Short term goal 2:  Increase strength right knee extension 4/5 to progress gait             Long Term Goals - Time Frame for Long term goals : 25  Long term goal 1: Gait independent without device with minimal gait deviation  Long term goal 2: Patient to have increase ROM right knee flexion 110 degrees to alternate feet up and down stairs WFL  Long term goal 3: Improve functional mobility 50/80  or better on LEFS          Post Treatment Pain:  7/10      Time In: 1250  Time Out: 1325  Timed Code Treatment Minutes: 35 Minutes  Total Treatment Time: Alcides Lloyd Number: [id number]    Date: 8/24/2018

## 2018-08-27 ENCOUNTER — HOSPITAL ENCOUNTER (OUTPATIENT)
Dept: PHYSICAL THERAPY | Age: 63
Setting detail: THERAPIES SERIES
Discharge: HOME | End: 2018-08-27
Payer: COMMERCIAL

## 2018-08-27 PROCEDURE — 97110 THERAPEUTIC EXERCISES: CPT

## 2018-08-27 ASSESSMENT — PAIN DESCRIPTION - LOCATION: LOCATION: KNEE

## 2018-08-27 ASSESSMENT — PAIN SCALES - GENERAL: PAINLEVEL_OUTOF10: 5

## 2018-08-27 ASSESSMENT — PAIN DESCRIPTION - ORIENTATION: ORIENTATION: RIGHT

## 2018-08-27 NOTE — PROGRESS NOTES
Phone: [address]      Fax: [phone]                            Outpatient Physical Therapy                                                                            Daily Note    Date: 2018  Patient Name: Carmenza Lopez        MRN: [id number]   ACCT#:  [de-identified]  : 1955  (58 y.o.)    Referring Practitioner: Dr Jeremiah Ingram    Referral Date : 18    Diagnosis: S/P right TKA  Treatment Diagnosis: Right knee pain S/P TKA    Onset Date: 18  PT Insurance Information: BX[employer]/ [place]  Total # of Visits Approved: 18 Per Physician Order  Total # of Visits to Date: 4    Pre-Treatment Pain:  5/10     Assessment  Assessment: Patient arrived stating completing new HEP experiencing good tolerance and understanding. Continued with having pt complete stretching independantly. AAROM knee flexion with heel slide = 91 degrees. AROM knee extension = 4 degrees from neutral.  Chart Reviewed: Yes    Plan  Plan: Continue with current plan    Exercises/Modalities/Manual:  See DocFlow Sheet    Education:   Patient Education: On POC and using cold/ ice pack on knee 3-4xdaily, and continue HEP 2-3xdaily       Goals  (Total # of Visits to Date: 4)   Short Term Goals - Time Frame for Short term goals: 9  Short term goal 1: Increase ROM right knee 100 degrees to get in and out of car without difficulty  Short term goal 2:  Increase strength right knee extension 4/5 to progress gait             Long Term Goals - Time Frame for Long term goals : 25  Long term goal 1: Gait independent without device with minimal gait deviation  Long term goal 2: Patient to have increase ROM right knee flexion 110 degrees to alternate feet up and down stairs WFL  Long term goal 3: Improve functional mobility 50/80  or better on LEFS          Post Treatment Pain:  5/10    Time In: 1033  Time Out: 1109  Timed Code Treatment Minutes: 36 Minutes  Total Treatment Time: 35 Minutes    [facility] License Number: [id number]    Date: 8/27/2018

## 2018-08-29 ENCOUNTER — HOSPITAL ENCOUNTER (OUTPATIENT)
Dept: PHYSICAL THERAPY | Age: 63
Setting detail: THERAPIES SERIES
Discharge: HOME | End: 2018-08-29
Payer: COMMERCIAL

## 2018-08-29 PROCEDURE — 97110 THERAPEUTIC EXERCISES: CPT

## 2018-08-29 ASSESSMENT — PAIN DESCRIPTION - ORIENTATION: ORIENTATION: RIGHT

## 2018-08-29 ASSESSMENT — PAIN DESCRIPTION - LOCATION: LOCATION: KNEE

## 2018-08-29 ASSESSMENT — PAIN SCALES - GENERAL: PAINLEVEL_OUTOF10: 5

## 2018-08-30 ENCOUNTER — HOSPITAL ENCOUNTER (OUTPATIENT)
Dept: PHYSICAL THERAPY | Age: 63
Setting detail: THERAPIES SERIES
Discharge: HOME | End: 2018-08-30
Payer: COMMERCIAL

## 2018-08-30 PROCEDURE — 97110 THERAPEUTIC EXERCISES: CPT

## 2018-08-30 ASSESSMENT — PAIN DESCRIPTION - LOCATION: LOCATION: KNEE

## 2018-08-30 ASSESSMENT — PAIN SCALES - GENERAL: PAINLEVEL_OUTOF10: 5

## 2018-08-30 ASSESSMENT — PAIN DESCRIPTION - ORIENTATION: ORIENTATION: RIGHT

## 2018-08-30 NOTE — PROGRESS NOTES
Phone: [address]      Fax: [phone]                            Outpatient Physical Therapy                                                                            Daily Note    Date: 2018  Patient Name: Naya Dominguez        MRN: [id number]   ACCT#:  [de-identified]  : 1955  (58 y.o.)    Referring Practitioner: Dr Mike Winslow    Referral Date : 18    Diagnosis: S/P right TKA  Treatment Diagnosis: Right knee pain S/P TKA    Onset Date: 18  PT Insurance Information: BXCBS/ [place]  Total # of Visits Approved: 18 Per Physician Order  Total # of Visits to Date: 6    Pre-Treatment Pain:  5/10     Assessment  Assessment: Introduced ambulation with SC this date with pt completing step to gait pattern. Patient did well but is aprehensive. AAROM flexion with heel slide = 94 degrees. Chart Reviewed: Yes    Plan  Plan: Continue with current plan    Exercises/Modalities/Manual:  See DocFlow Sheet    Education:           Goals  (Total # of Visits to Date: 6)   Short Term Goals - Time Frame for Short term goals: 9  Short term goal 1: Increase ROM right knee 100 degrees to get in and out of car without difficulty  Short term goal 2:  Increase strength right knee extension 4/5 to progress gait             Long Term Goals - Time Frame for Long term goals : 25  Long term goal 1: Gait independent without device with minimal gait deviation  Long term goal 2: Patient to have increase ROM right knee flexion 110 degrees to alternate feet up and down stairs WFL  Long term goal 3: Improve functional mobility 50/80  or better on LEFS          Post Treatment Pain:  3/10    Time In: 1623  Time Out: 1709  Timed Code Treatment Minutes: 46 Minutes  Total Treatment Time: 2250 [facility] Number: PTA    Date: 2018

## 2018-08-31 ENCOUNTER — APPOINTMENT (OUTPATIENT)
Dept: PHYSICAL THERAPY | Age: 63
End: 2018-08-31
Payer: COMMERCIAL

## 2018-09-04 ENCOUNTER — HOSPITAL ENCOUNTER (OUTPATIENT)
Dept: PHYSICAL THERAPY | Age: 63
Setting detail: THERAPIES SERIES
Discharge: HOME | End: 2018-09-04
Payer: COMMERCIAL

## 2018-09-04 PROCEDURE — 97110 THERAPEUTIC EXERCISES: CPT

## 2018-09-04 ASSESSMENT — PAIN SCALES - GENERAL: PAINLEVEL_OUTOF10: 4

## 2018-09-05 ENCOUNTER — HOSPITAL ENCOUNTER (OUTPATIENT)
Dept: PHYSICAL THERAPY | Age: 63
Setting detail: THERAPIES SERIES
Discharge: HOME | End: 2018-09-05
Payer: COMMERCIAL

## 2018-09-05 PROCEDURE — 97110 THERAPEUTIC EXERCISES: CPT

## 2018-09-05 ASSESSMENT — PAIN DESCRIPTION - ORIENTATION: ORIENTATION: RIGHT

## 2018-09-05 ASSESSMENT — PAIN DESCRIPTION - LOCATION: LOCATION: KNEE

## 2018-09-05 ASSESSMENT — PAIN SCALES - GENERAL: PAINLEVEL_OUTOF10: 5

## 2018-09-07 ENCOUNTER — HOSPITAL ENCOUNTER (OUTPATIENT)
Dept: PHYSICAL THERAPY | Age: 63
Setting detail: THERAPIES SERIES
Discharge: HOME | End: 2018-09-07
Payer: COMMERCIAL

## 2018-09-07 PROCEDURE — 97110 THERAPEUTIC EXERCISES: CPT

## 2018-09-07 PROCEDURE — 97116 GAIT TRAINING THERAPY: CPT

## 2018-09-07 ASSESSMENT — PAIN DESCRIPTION - LOCATION: LOCATION: KNEE

## 2018-09-07 ASSESSMENT — PAIN SCALES - GENERAL: PAINLEVEL_OUTOF10: 4

## 2018-09-07 ASSESSMENT — PAIN DESCRIPTION - ORIENTATION: ORIENTATION: RIGHT

## 2018-09-07 NOTE — PROGRESS NOTES
Phone: [address]      Fax: [phone]                            Outpatient Physical Therapy                                                                            Daily Note    Date: 2018  Patient Name: Ludmila Silvestre        MRN: [id number]   ACCT#:  [de-identified]  : 1955  (58 y.o.)    Referring Practitioner: Dr Jose M Baker    Referral Date : 18    Diagnosis: S/P right TKA  Treatment Diagnosis: Right knee pain S/P TKA    Onset Date: 18  PT Insurance Information: BXCBS/ [place]  Total # of Visits Approved: 18 Per Physician Order  Total # of Visits to Date: 9    Pre-Treatment Pain:  4/10     Assessment  Assessment: AAROM heel slide knee flexion = 98 degrees,  0 degrees AROM extension Continued with working on use of SC as patient continues to be aprehensive. Patient with c/o hip pain this date which can interfer with progress. Chart Reviewed: Yes    Plan  Plan: Continue with current plan    Exercises/Modalities/Manual:  See DocFlow Sheet    Education:           Goals  (Total # of Visits to Date: 5)   Short Term Goals - Time Frame for Short term goals: 9  Short term goal 1: Increase ROM right knee 100 degrees to get in and out of car without difficulty  Short term goal 2:  Increase strength right knee extension 4/5 to progress gait Met             Long Term Goals - Time Frame for Long term goals : 25  Long term goal 1: Gait independent without device with minimal gait deviation  Long term goal 2: Patient to have increase ROM right knee flexion 110 degrees to alternate feet up and down stairs WFL  Long term goal 3: Improve functional mobility 50/80  or better on LEFS          Post Treatment Pain:  10    Time In: 1118  Time Out: 1159  Timed Code Treatment Minutes: 41 Minutes  Total Treatment Time: 41 Minutes    [facility] License Number: [id number]    Date: 2018

## 2018-09-10 ENCOUNTER — HOSPITAL ENCOUNTER (OUTPATIENT)
Dept: PHYSICAL THERAPY | Age: 63
Setting detail: THERAPIES SERIES
Discharge: HOME | End: 2018-09-10
Payer: COMMERCIAL

## 2018-09-10 PROCEDURE — 97110 THERAPEUTIC EXERCISES: CPT

## 2018-09-10 ASSESSMENT — PAIN DESCRIPTION - ORIENTATION: ORIENTATION: RIGHT

## 2018-09-10 ASSESSMENT — PAIN SCALES - GENERAL: PAINLEVEL_OUTOF10: 5

## 2018-09-10 ASSESSMENT — PAIN DESCRIPTION - LOCATION: LOCATION: KNEE

## 2018-09-12 ENCOUNTER — APPOINTMENT (OUTPATIENT)
Dept: PHYSICAL THERAPY | Age: 63
End: 2018-09-12
Payer: COMMERCIAL

## 2018-09-14 ENCOUNTER — HOSPITAL ENCOUNTER (OUTPATIENT)
Dept: PHYSICAL THERAPY | Age: 63
Setting detail: THERAPIES SERIES
Discharge: HOME | End: 2018-09-14
Payer: COMMERCIAL

## 2018-09-14 PROCEDURE — 97110 THERAPEUTIC EXERCISES: CPT

## 2018-09-14 ASSESSMENT — PAIN DESCRIPTION - LOCATION: LOCATION: KNEE

## 2018-09-14 ASSESSMENT — PAIN DESCRIPTION - ORIENTATION: ORIENTATION: RIGHT

## 2018-09-14 ASSESSMENT — PAIN SCALES - GENERAL: PAINLEVEL_OUTOF10: 4

## 2018-09-14 NOTE — PROGRESS NOTES
to have increase ROM right knee flexion 110 degrees to alternate feet up and down stairs WFL  Long term goal 3: Improve functional mobility 50/80  or better on LEFS          Post Treatment Pain: 3 /10    Time In: 10:25   Time Out : 11:10        Timed Code Treatment Minutes: 45 Minutes  Total Treatment Time: 1125 [facility] License Number: [id number]    Date: 9/14/2018

## 2018-09-14 NOTE — OP NOTE
F U N C T I O N A L   P R O G R E S S   C H A R T    Member: Ethan Sheppard    Member ID #    Diagnosis: S/P right TKA Insurance Company:   Referring Practitioner: Dr Juvenal Perry  Referring Physican ID#   Therapy Office:[facility]  Date fo Birth / Age: 1955/ 58 y.o. [] Clinical Update Treating Clinician: Angy Madrid  ICD-9 (s):   Discipline:  [x]PT / []OT  Diagnosis:Right knee pain, S/P right TKA  [x] Additional  Involved Side:  []Left / [x] Right / [] N/A Date of Injury:  __/__/___       Visits Request       Date of Surgery:  _8_/_11_/_18__       Date:   9/14/2018 Date:   Total Number of Visits To Date Total # of Visits to Date: 6  (Total Requested:9 additional visits)    Pain Scale      /10 4/10 right knee with medication    Gait  walking with w.walker with limp        AROM                        AROM RLE (degrees)  RLE AROM: Exceptions  R Knee Flexion 0-145: 0-100  R Knee Extension 0: 0                      Strength      Strength RLE  Strength RLE: Exception  R Hip Flexion: 4/5  R Hip ABduction: 4/5  R Knee Flexion: 4-/5  R Knee Extension: 4-/5  R Ankle Dorsiflexion: 5/5                       Proprioceptive/ Neurological Deficits     Functional Limitations / Additional Comments             Patient still using step to / non-alternating pattern on stair steps. Patient off work due to decrease mobility.       Copyright [address], [place]      Created: 9/99 / Revised 1/12

## 2018-09-17 ENCOUNTER — APPOINTMENT (OUTPATIENT)
Dept: PHYSICAL THERAPY | Age: 63
End: 2018-09-17
Payer: COMMERCIAL

## 2018-09-19 ENCOUNTER — APPOINTMENT (OUTPATIENT)
Dept: PHYSICAL THERAPY | Age: 63
End: 2018-09-19
Payer: COMMERCIAL

## 2018-09-20 ENCOUNTER — HOSPITAL ENCOUNTER (OUTPATIENT)
Dept: PHYSICAL THERAPY | Age: 63
Setting detail: THERAPIES SERIES
Discharge: HOME | End: 2018-09-20
Payer: COMMERCIAL

## 2018-09-20 PROCEDURE — 97110 THERAPEUTIC EXERCISES: CPT

## 2018-09-20 ASSESSMENT — PAIN SCALES - GENERAL: PAINLEVEL_OUTOF10: 3

## 2018-09-20 NOTE — DISCHARGE SUMMARY
Phone: [address]             Fax: [phone]                            Outpatient Physical Therapy                                                                    Discharge Summary    Patient: Jose Beckham  : 1955  ACCT #: [de-identified]   Referring Practitioner: Dr Junior Alex      Diagnosis: S/P right TKA  Date Treatment Initiated: 18  Date of Last Treatment: 18    PT Visit Information  Onset Date: 18  PT Insurance Information: BCBS/ [place]  Total # of Visits Approved: 18  Total # of Visits to Date: 12  Plan of Care/Certification Expiration Date: 18    Frequency/Duration  Days: 3 times per week  Weeks: 6 weeks    Treatment Received  [] HP/CP [] Electrical Stimulation   [x]  Therapeutic Exercise   [x]  Gait Training  [] Traction   [] Ultrasound                   []  Massage                        []  Work Conditioning   [] Aquatics  [] Back Education            [x]  Patient Education/HEP []  Manual Therapy    Assessment  Assessment: Rates pain today as 3-4/10. Patient is ambulating with rollator walker; has completed gait training with cane several times but patient does not feel confident with cane. Patient strength right knee extension 4/5. AROM Right knee is 0-102 degrees. LEFS score is 20/80 today. Per insurance coverage, this is patient's last visit. Pain Level: 3     Ambulation :  Device: Rolling Walker  Quality of Gait: antalgic    Reason for Discharge  [] Poor Follow Through [x] Completion of Prescribed Sessions/ No further insurance coverage    []  Optimal Function Achieved   []  Patient Discharged Self  [] Goals Achieved    Comments:   Thank you for this referral      Nahun Carnes, PT   Date: 2018

## 2018-09-20 NOTE — PROGRESS NOTES
Phone: [address]      Fax: [phone]                            Outpatient Physical Therapy                                                                            Daily Note    Date: 2018  Patient Name: Joseline To        MRN: [id number]   ACCT#:  [de-identified]  : 1955  (58 y.o.)    Referring Practitioner: Dr Roxy Novoa    Referral Date : 18    Diagnosis: S/P right TKA  Treatment Diagnosis: Right knee pain S/P TKA    Onset Date: 18  PT Insurance Information: BCBS/ [place]  Total # of Visits Approved: 18 Per Physician Order  Total # of Visits to Date: 12    Pre-Treatment Pain:  3-4/10     Assessment  Assessment: Rates pain today as 3-4/10. Ambulating with rollator walker. Patient able to tolerate session well but does c/o pain in her R hip with hurdles. AROM is 102 degrees. LEFS score is 20/80 today. Per insurance coverage, this is patient's last visit. She is scheduled to see physician tomorrow. Chart Reviewed: Yes    Plan  Plan: Continue with current plan    Exercises/Modalities/Manual:  See DocFlow Sheet    Education:          Goals  (Total # of Visits to Date: 15)  Short Term Goals - Time Frame for Short term goals: 9  Short term goal 1: Increase ROM right knee 100 degrees to get in and out of car without difficulty-MET  Short term goal 2:  Increase strength right knee extension 4/5 to progress gait -Met             Long Term Goals - Time Frame for Long term goals : 25  Long term goal 1: Gait independent without device with minimal gait deviation-NOT MET  Long term goal 2: Patient to have increase ROM right knee flexion 110 degrees to alternate feet up and down stairs WFL-NOT MET  Long term goal 3: Improve functional mobility 50/80  or better on LEFS-NOT MET          Post Treatment Pain:  2-3/10    Time In: 1117    Time Out : 1200        Timed Code Treatment Minutes: 43 Minutes  Total Treatment Time: Claire Tracy

## 2018-09-21 ENCOUNTER — APPOINTMENT (OUTPATIENT)
Dept: PHYSICAL THERAPY | Age: 63
End: 2018-09-21
Payer: COMMERCIAL

## 2018-10-09 DIAGNOSIS — I10 ESSENTIAL HYPERTENSION, BENIGN: ICD-10-CM

## 2018-10-09 RX ORDER — LOSARTAN POTASSIUM 100 MG/1
TABLET ORAL
Qty: 90 TABLET | Refills: 1 | Status: SHIPPED | OUTPATIENT
Start: 2018-10-09 | End: 2019-04-12 | Stop reason: SDUPTHER

## 2018-10-09 RX ORDER — LEVOTHYROXINE SODIUM 0.1 MG/1
TABLET ORAL
Qty: 90 TABLET | Refills: 1 | Status: SHIPPED | OUTPATIENT
Start: 2018-10-09 | End: 2019-04-12 | Stop reason: SDUPTHER

## 2018-11-15 ENCOUNTER — OFFICE VISIT (OUTPATIENT)
Dept: FAMILY MEDICINE CLINIC | Age: 63
End: 2018-11-15
Payer: COMMERCIAL

## 2018-11-15 VITALS
BODY MASS INDEX: 34.86 KG/M2 | HEART RATE: 78 BPM | HEIGHT: 66 IN | DIASTOLIC BLOOD PRESSURE: 80 MMHG | SYSTOLIC BLOOD PRESSURE: 130 MMHG | OXYGEN SATURATION: 98 %

## 2018-11-15 DIAGNOSIS — F32.89 OTHER DEPRESSION: Primary | ICD-10-CM

## 2018-11-15 PROCEDURE — 99213 OFFICE O/P EST LOW 20 MIN: CPT | Performed by: FAMILY MEDICINE

## 2018-11-15 RX ORDER — DULOXETIN HYDROCHLORIDE 30 MG/1
30 CAPSULE, DELAYED RELEASE ORAL DAILY
Qty: 30 CAPSULE | Refills: 1 | Status: SHIPPED | OUTPATIENT
Start: 2018-11-15 | End: 2018-11-21 | Stop reason: SINTOL

## 2018-11-15 RX ORDER — TRAMADOL HYDROCHLORIDE 50 MG/1
TABLET ORAL
Refills: 0 | COMMUNITY
Start: 2018-10-31 | End: 2018-12-13

## 2018-11-15 ASSESSMENT — ENCOUNTER SYMPTOMS
COUGH: 0
EYE DISCHARGE: 0
VOMITING: 0
SHORTNESS OF BREATH: 0
EYE REDNESS: 0
DIARRHEA: 0

## 2018-11-15 NOTE — PATIENT INSTRUCTIONS
SURVEY:    You may be receiving a survey from [employer] regarding your visit today. Please complete the survey to enable us to provide the highest quality of care to you and your family. If you cannot score us a very good on any question, please call the office to discuss how we could have made your experience a very good one. Thank you.

## 2018-11-15 NOTE — PROGRESS NOTES
HPI Notes    Name: Tricia Lemos  : 1955        Chief Complaint:     Chief Complaint   Patient presents with    Depression     Pt feeling down/depressed after surgery and recent passing of her father. History of Present Illness:     Tricia Lemos is a 58 y.o.  female who presents with Depression (Pt feeling down/depressed after surgery and recent passing of her father.)      HPI  Depression - Pt had her surgery to replace the Rt knee on . Pt still having pain in Rt knee. Pt is still having a lot of pain at night and only sleeps well if she takes her ultram at night. Pt usually takes the tylenol in daytime but pain is worse at night. Pt can't get comfortable. Pt also lost her dad in September. Pt will cry at the \"drop of a hat\". Pt has gone back to work which has helped to be around others but work is stressful too. Past Medical History:     Past Medical History:   Diagnosis Date    Cerebrovascular disease     mini stroke may 2012    Chronic back pain     GERD (gastroesophageal reflux disease)     Hypertension     Hypothyroidism     Osteoarthritis     knee and back    Osteopenia 2016      Reviewed all health maintenance requirements and ordered appropriate tests  Health Maintenance Due   Topic Date Due    Hepatitis C screen  1955    HIV screen  1970    DTaP/Tdap/Td vaccine (1 - Tdap) 1974    Shingles Vaccine (1 of 2 - 2 Dose Series) 2005    TSH testing  2018    Potassium monitoring  2018    Creatinine monitoring  2018       Past Surgical History:     Past Surgical History:   Procedure Laterality Date    APPENDECTOMY      BREAST BIOPSY      CHOLECYSTECTOMY      HYSTERECTOMY      still Rt ovary present    JOINT REPLACEMENT  2018    Rt knee        Medications:       Prior to Admission medications    Medication Sig Start Date End Date Taking?  Authorizing Provider   traMADol (ULTRAM) 50 MG tablet take 1 to 2 tablets by mouth every 4 to 6 hours if needed 10/31/18  Yes Historical Provider, MD   DULoxetine (CYMBALTA) 30 MG extended release capsule Take 1 capsule by mouth daily 11/15/18  Yes Ollie Baxter MD   losartan (COZAAR) 100 MG tablet TAKE 1 TABLET DAILY 10/9/18  Yes Ollie Baxter MD   levothyroxine (SYNTHROID) 100 MCG tablet take 1 tablet by mouth once daily 10/9/18  Yes Ollie Baxter MD   RaNITidine HCl (ZANTAC 75 PO) Take by mouth daily   Yes Historical Provider, MD   UNABLE TO FIND Gabapentin-Ketoprofen amitriptyline HCL 10-10-2% neuroserum liquid  Apply small amt to affected area and rub for 10 minutes. Repeat 2-3 times daily 11/9/17  Yes Ollie Baxter MD   clopidogrel (PLAVIX) 75 MG tablet Take 1 tablet by mouth daily. 2/21/15  Yes Kervin Braun MD   folic acid (FOLVITE) 1 MG tablet Take 1 mg by mouth daily. Yes Historical Provider, MD   Ergocalciferol (VITAMIN D2 PO) Take 1.25 mg by mouth twice a week. Yes Historical Provider, MD   albuterol sulfate HFA (PROAIR HFA) 108 (90 Base) MCG/ACT inhaler Inhale 2 puffs into the lungs every 6 hours as needed for Wheezing 2/7/18   JOSE Vargas CNP   Loratadine (CLARITIN) 5 MG CHEW Take 1 tablet by mouth daily 0/2/67   JOSE Nogueira CNP        Allergies:       Flonase [fluticasone propionate] and Other    Social History:     Tobacco:    reports that she has never smoked. She has never used smokeless tobacco.  Alcohol:      reports that she does not drink alcohol. Drug Use:  reports that she does not use drugs. Family History:     Family History   Problem Relation Age of Onset    Diabetes Mother     COPD Mother     Diabetes Father        Review of Systems:       Review of Systems   Constitutional: Negative for appetite change and fever. Eyes: Negative for discharge and redness. Respiratory: Negative for cough and shortness of breath. Gastrointestinal: Negative for diarrhea and vomiting. her  pt will try cymbalta to see if helps her moods and the achiness she still has in the Rt knee from surgery. Reviewed with pt some side effects of dizziness or fatigue. Ester in 3-4wks to see if helping or need to increase medication or continue the same or change    Return for depression.       Electronically signed by Rosalee Miramontes MD on 11/15/2018 at 9:31 AM

## 2018-11-19 ENCOUNTER — HOSPITAL ENCOUNTER (EMERGENCY)
Age: 63
Discharge: HOME | End: 2018-11-19
Attending: FAMILY MEDICINE
Payer: COMMERCIAL

## 2018-11-19 VITALS
DIASTOLIC BLOOD PRESSURE: 84 MMHG | HEART RATE: 106 BPM | RESPIRATION RATE: 18 BRPM | TEMPERATURE: 97.7 F | SYSTOLIC BLOOD PRESSURE: 150 MMHG | OXYGEN SATURATION: 100 %

## 2018-11-19 DIAGNOSIS — R51.9 ACUTE NONINTRACTABLE HEADACHE, UNSPECIFIED HEADACHE TYPE: ICD-10-CM

## 2018-11-19 DIAGNOSIS — I10 ACCELERATED HYPERTENSION: Primary | ICD-10-CM

## 2018-11-19 LAB
ABSOLUTE EOS #: 0.2 K/UL (ref 0–0.4)
ABSOLUTE IMMATURE GRANULOCYTE: ABNORMAL K/UL (ref 0–0.3)
ABSOLUTE LYMPH #: 1.2 K/UL (ref 1–4.8)
ABSOLUTE MONO #: 0.7 K/UL (ref 0–1)
ANION GAP SERPL CALCULATED.3IONS-SCNC: 12 MMOL/L (ref 9–17)
BASOPHILS # BLD: 1 % (ref 0–2)
BASOPHILS ABSOLUTE: 0 K/UL (ref 0–0.2)
BUN BLDV-MCNC: 11 MG/DL (ref 8–23)
BUN/CREAT BLD: 15 (ref 9–20)
CALCIUM SERPL-MCNC: 8.7 MG/DL (ref 8.6–10.4)
CHLORIDE BLD-SCNC: 101 MMOL/L (ref 98–107)
CO2: 23 MMOL/L (ref 20–31)
CREAT SERPL-MCNC: 0.74 MG/DL (ref 0.5–0.9)
DIFFERENTIAL TYPE: YES
EKG ATRIAL RATE: 89 BPM
EKG P AXIS: 71 DEGREES
EKG P-R INTERVAL: 146 MS
EKG Q-T INTERVAL: 390 MS
EKG QRS DURATION: 78 MS
EKG QTC CALCULATION (BAZETT): 474 MS
EKG R AXIS: 47 DEGREES
EKG T AXIS: 38 DEGREES
EKG VENTRICULAR RATE: 89 BPM
EOSINOPHILS RELATIVE PERCENT: 4 % (ref 0–5)
GFR AFRICAN AMERICAN: >60 ML/MIN
GFR NON-AFRICAN AMERICAN: >60 ML/MIN
GFR SERPL CREATININE-BSD FRML MDRD: ABNORMAL ML/MIN/{1.73_M2}
GFR SERPL CREATININE-BSD FRML MDRD: ABNORMAL ML/MIN/{1.73_M2}
GLUCOSE BLD-MCNC: 103 MG/DL (ref 70–99)
HCT VFR BLD CALC: 36.7 % (ref 36–46)
HEMOGLOBIN: 12.1 G/DL (ref 12–16)
IMMATURE GRANULOCYTES: ABNORMAL %
LYMPHOCYTES # BLD: 25 % (ref 15–40)
MCH RBC QN AUTO: 27.9 PG (ref 26–34)
MCHC RBC AUTO-ENTMCNC: 32.9 G/DL (ref 31–37)
MCV RBC AUTO: 84.9 FL (ref 80–100)
MONOCYTES # BLD: 14 % (ref 4–8)
NRBC AUTOMATED: ABNORMAL PER 100 WBC
PDW BLD-RTO: 15.3 % (ref 12.1–15.2)
PLATELET # BLD: 258 K/UL (ref 140–450)
PLATELET ESTIMATE: ABNORMAL
PMV BLD AUTO: ABNORMAL FL (ref 6–12)
POTASSIUM SERPL-SCNC: 4 MMOL/L (ref 3.7–5.3)
RBC # BLD: 4.32 M/UL (ref 4–5.2)
RBC # BLD: ABNORMAL 10*6/UL
SEG NEUTROPHILS: 56 % (ref 47–75)
SEGMENTED NEUTROPHILS ABSOLUTE COUNT: 2.8 K/UL (ref 2.5–7)
SODIUM BLD-SCNC: 136 MMOL/L (ref 135–144)
TROPONIN INTERP: NORMAL
TROPONIN T: <0.03 NG/ML
TSH SERPL DL<=0.05 MIU/L-ACNC: 4.51 MIU/L (ref 0.3–5)
WBC # BLD: 4.9 K/UL (ref 3.5–11)
WBC # BLD: ABNORMAL 10*3/UL

## 2018-11-19 PROCEDURE — 2500000003 HC RX 250 WO HCPCS: Performed by: FAMILY MEDICINE

## 2018-11-19 PROCEDURE — 96375 TX/PRO/DX INJ NEW DRUG ADDON: CPT

## 2018-11-19 PROCEDURE — 96374 THER/PROPH/DIAG INJ IV PUSH: CPT

## 2018-11-19 PROCEDURE — 6360000002 HC RX W HCPCS: Performed by: FAMILY MEDICINE

## 2018-11-19 PROCEDURE — 84443 ASSAY THYROID STIM HORMONE: CPT

## 2018-11-19 PROCEDURE — 99283 EMERGENCY DEPT VISIT LOW MDM: CPT

## 2018-11-19 PROCEDURE — 93005 ELECTROCARDIOGRAM TRACING: CPT

## 2018-11-19 PROCEDURE — 80048 BASIC METABOLIC PNL TOTAL CA: CPT

## 2018-11-19 PROCEDURE — 85025 COMPLETE CBC W/AUTO DIFF WBC: CPT

## 2018-11-19 PROCEDURE — 84484 ASSAY OF TROPONIN QUANT: CPT

## 2018-11-19 RX ORDER — LABETALOL HYDROCHLORIDE 5 MG/ML
10 INJECTION, SOLUTION INTRAVENOUS ONCE
Status: COMPLETED | OUTPATIENT
Start: 2018-11-19 | End: 2018-11-19

## 2018-11-19 RX ORDER — KETOROLAC TROMETHAMINE 30 MG/ML
15 INJECTION, SOLUTION INTRAMUSCULAR; INTRAVENOUS ONCE
Status: COMPLETED | OUTPATIENT
Start: 2018-11-19 | End: 2018-11-19

## 2018-11-19 RX ORDER — RANITIDINE 150 MG/1
150 TABLET ORAL 2 TIMES DAILY
Refills: 0 | COMMUNITY
Start: 2018-10-10 | End: 2018-12-13 | Stop reason: SDUPTHER

## 2018-11-19 RX ORDER — DIPHENHYDRAMINE HYDROCHLORIDE 50 MG/ML
12.5 INJECTION INTRAMUSCULAR; INTRAVENOUS ONCE
Status: COMPLETED | OUTPATIENT
Start: 2018-11-19 | End: 2018-11-19

## 2018-11-19 RX ADMIN — DIPHENHYDRAMINE HYDROCHLORIDE 12.5 MG: 50 INJECTION, SOLUTION INTRAMUSCULAR; INTRAVENOUS at 19:58

## 2018-11-19 RX ADMIN — LABETALOL HYDROCHLORIDE 10 MG: 5 INJECTION, SOLUTION INTRAVENOUS at 20:34

## 2018-11-19 RX ADMIN — KETOROLAC TROMETHAMINE 15 MG: 30 INJECTION INTRAMUSCULAR; INTRAVENOUS at 19:58

## 2018-11-19 ASSESSMENT — PAIN DESCRIPTION - LOCATION: LOCATION: HEAD

## 2018-11-19 ASSESSMENT — PAIN DESCRIPTION - FREQUENCY: FREQUENCY: INTERMITTENT

## 2018-11-19 ASSESSMENT — PAIN SCALES - GENERAL
PAINLEVEL_OUTOF10: 7
PAINLEVEL_OUTOF10: 7

## 2018-11-19 ASSESSMENT — PAIN DESCRIPTION - ORIENTATION: ORIENTATION: ANTERIOR

## 2018-11-19 ASSESSMENT — PAIN DESCRIPTION - DESCRIPTORS: DESCRIPTORS: ACHING

## 2018-11-19 ASSESSMENT — PAIN DESCRIPTION - PAIN TYPE: TYPE: ACUTE PAIN

## 2018-11-19 ASSESSMENT — PAIN DESCRIPTION - ONSET: ONSET: ON-GOING

## 2018-11-21 ENCOUNTER — TELEPHONE (OUTPATIENT)
Dept: FAMILY MEDICINE CLINIC | Age: 63
End: 2018-11-21

## 2018-11-21 ENCOUNTER — OFFICE VISIT (OUTPATIENT)
Dept: FAMILY MEDICINE CLINIC | Age: 63
End: 2018-11-21
Payer: COMMERCIAL

## 2018-11-21 VITALS
DIASTOLIC BLOOD PRESSURE: 100 MMHG | HEIGHT: 66 IN | BODY MASS INDEX: 35.03 KG/M2 | SYSTOLIC BLOOD PRESSURE: 160 MMHG | WEIGHT: 218 LBS | OXYGEN SATURATION: 99 % | HEART RATE: 93 BPM

## 2018-11-21 DIAGNOSIS — R51.9 FRONTAL HEADACHE: ICD-10-CM

## 2018-11-21 DIAGNOSIS — I10 ESSENTIAL HYPERTENSION, BENIGN: Primary | ICD-10-CM

## 2018-11-21 DIAGNOSIS — F41.1 ANXIETY STATE: ICD-10-CM

## 2018-11-21 PROCEDURE — 99214 OFFICE O/P EST MOD 30 MIN: CPT | Performed by: FAMILY MEDICINE

## 2018-11-21 RX ORDER — HYDROCHLOROTHIAZIDE 25 MG/1
25 TABLET ORAL EVERY MORNING
Qty: 30 TABLET | Refills: 5 | Status: SHIPPED | OUTPATIENT
Start: 2018-11-21 | End: 2018-12-13

## 2018-11-21 ASSESSMENT — PATIENT HEALTH QUESTIONNAIRE - PHQ9
2. FEELING DOWN, DEPRESSED OR HOPELESS: 0
SUM OF ALL RESPONSES TO PHQ QUESTIONS 1-9: 0
SUM OF ALL RESPONSES TO PHQ9 QUESTIONS 1 & 2: 0
SUM OF ALL RESPONSES TO PHQ QUESTIONS 1-9: 0
1. LITTLE INTEREST OR PLEASURE IN DOING THINGS: 0

## 2018-11-21 ASSESSMENT — ENCOUNTER SYMPTOMS
GASTROINTESTINAL NEGATIVE: 1
RESPIRATORY NEGATIVE: 1

## 2018-11-21 NOTE — PATIENT INSTRUCTIONS
SURVEY:    You may be receiving a survey from [employer] regarding your visit today. Please complete the survey to enable us to provide the highest quality of care to you and your family. If you cannot score us as very good on any question, please call the office to discuss how we could have made your experience exceptional.     Thank you.

## 2018-12-13 ENCOUNTER — OFFICE VISIT (OUTPATIENT)
Dept: FAMILY MEDICINE CLINIC | Age: 63
End: 2018-12-13
Payer: COMMERCIAL

## 2018-12-13 VITALS
HEART RATE: 88 BPM | WEIGHT: 218 LBS | SYSTOLIC BLOOD PRESSURE: 160 MMHG | DIASTOLIC BLOOD PRESSURE: 100 MMHG | OXYGEN SATURATION: 98 % | BODY MASS INDEX: 35.19 KG/M2

## 2018-12-13 DIAGNOSIS — K21.9 GASTROESOPHAGEAL REFLUX DISEASE WITHOUT ESOPHAGITIS: ICD-10-CM

## 2018-12-13 DIAGNOSIS — F41.1 ANXIETY STATE: ICD-10-CM

## 2018-12-13 DIAGNOSIS — I10 ESSENTIAL HYPERTENSION, BENIGN: Primary | ICD-10-CM

## 2018-12-13 DIAGNOSIS — E03.8 OTHER SPECIFIED HYPOTHYROIDISM: ICD-10-CM

## 2018-12-13 PROCEDURE — 99213 OFFICE O/P EST LOW 20 MIN: CPT | Performed by: FAMILY MEDICINE

## 2018-12-13 RX ORDER — RANITIDINE 150 MG/1
150 TABLET ORAL 2 TIMES DAILY
Qty: 180 TABLET | Refills: 1 | Status: SHIPPED | OUTPATIENT
Start: 2018-12-13 | End: 2019-05-27 | Stop reason: SDUPTHER

## 2018-12-13 ASSESSMENT — ENCOUNTER SYMPTOMS
VOMITING: 0
CHOKING: 0
EYE REDNESS: 0
NAUSEA: 0
BLOOD IN STOOL: 0
COUGH: 0
DIARRHEA: 0
SORE THROAT: 0
CONSTIPATION: 0
EYE DISCHARGE: 0
HEARTBURN: 0
ABDOMINAL PAIN: 0
SHORTNESS OF BREATH: 0

## 2018-12-13 NOTE — PROGRESS NOTES
HPI Notes    Name: Kia Morales  : 1955        Chief Complaint:     Chief Complaint   Patient presents with    Hypertension    Gastroesophageal Reflux    Hypothyroidism     18  TSH - 4.51    Anxiety     18 Pt saw Dr Cecy Jean Baptiste for ER follow up. Dx - HTN, Anxiety, Pt unable to tolerate HCTZ Pt will continue on Losartan       History of Present Illness:     Kia Morales is a 61 y.o.  female who presents with Hypertension; Gastroesophageal Reflux; Hypothyroidism (18  TSH - 4.51); and Anxiety (18 Pt saw Dr Cecy Jean Baptiste for ER follow up. Dx - HTN, Anxiety, Pt unable to tolerate HCTZ Pt will continue on Losartan)      Hypertension   This is a chronic problem. The current episode started more than 1 year ago. The problem has been waxing and waning since onset. Condition status: BP was high mid to late November. Pt states she thought she had a \"bug\" from  and about 10d after her BP was high. Then at home her BP seemed to go back to normal but she hasn't taken it at home for a few days b/c had gone normal &felt better. Pertinent negatives include no chest pain, headaches, neck pain, palpitations or shortness of breath. Agents associated with hypertension include thyroid hormones. Risk factors for coronary artery disease include stress (Pt is still stressed some. Pt has dentist appt today and taking her mom shopping today too. ). Past treatments include angiotensin blockers and diuretics (Pt never took the HCTZ as she felt better. ). Gastroesophageal Reflux   She reports no abdominal pain, no chest pain, no choking, no coughing, no dysphagia, no heartburn, no nausea or no sore throat. This is a chronic problem. The current episode started more than 1 year ago. The problem has been unchanged. Pertinent negatives include no fatigue, melena or weight loss. She has tried a histamine-2 antagonist for the symptoms.      Hypothyroidism- Chronic/stable, Patient denies round, and reactive to light. Conjunctivae are normal. Right eye exhibits no discharge. Left eye exhibits no discharge. Neck: Neck supple. No thyromegaly present. Cardiovascular: Normal rate, regular rhythm and normal heart sounds. No murmur heard. Pulmonary/Chest: Effort normal and breath sounds normal. No respiratory distress. She has no wheezes. Abdominal: Soft. Bowel sounds are normal. She exhibits no distension. There is no tenderness. Musculoskeletal: She exhibits no edema. Lymphadenopathy:     She has no cervical adenopathy. Neurological: She is alert and oriented to person, place, and time. Skin: Skin is warm and dry. No rash noted. Psychiatric: She has a normal mood and affect. Her behavior is normal.   Vitals reviewed. Vitals:  BP (!) 160/100 (Site: Left Upper Arm)   Pulse 88   Wt 218 lb (98.9 kg)   SpO2 98%   BMI 35.19 kg/m²       Data:     Lab Results   Component Value Date     11/19/2018    K 4.0 11/19/2018     11/19/2018    CO2 23 11/19/2018    BUN 11 11/19/2018    CREATININE 0.74 11/19/2018    GLUCOSE 103 11/19/2018    GLUCOSE 97 01/25/2012    PROT 6.5 11/24/2017    LABALBU 3.9 11/24/2017    LABALBU 4.4 01/25/2012    BILITOT 0.39 11/24/2017    ALKPHOS 65 11/24/2017    AST 15 11/24/2017    ALT 10 11/24/2017     Lab Results   Component Value Date    WBC 4.9 11/19/2018    RBC 4.32 11/19/2018    RBC 4.92 01/25/2012    HGB 12.1 11/19/2018    HCT 36.7 11/19/2018    MCV 84.9 11/19/2018    MCH 27.9 11/19/2018    MCHC 32.9 11/19/2018    RDW 15.3 11/19/2018     11/19/2018     01/25/2012    MPV NOT REPORTED 11/19/2018     Lab Results   Component Value Date    TSH 4.51 11/19/2018     Lab Results   Component Value Date    CHOL 158 11/24/2017    HDL 73 11/24/2017    LABA1C 5.4 07/31/2018          Assessment/Plan:       1. Essential hypertension, benign  Pt to continue on cozaar and ADD the HCTZ 25mg and higher potassium in diet and mirian in 1mos.  Pt to ck BP at

## 2019-01-14 ENCOUNTER — OFFICE VISIT (OUTPATIENT)
Dept: FAMILY MEDICINE CLINIC | Age: 64
End: 2019-01-14
Payer: COMMERCIAL

## 2019-01-14 VITALS
WEIGHT: 211 LBS | BODY MASS INDEX: 34.06 KG/M2 | DIASTOLIC BLOOD PRESSURE: 80 MMHG | OXYGEN SATURATION: 96 % | SYSTOLIC BLOOD PRESSURE: 118 MMHG | HEART RATE: 94 BPM | TEMPERATURE: 98.1 F

## 2019-01-14 DIAGNOSIS — I10 ESSENTIAL HYPERTENSION, BENIGN: Primary | ICD-10-CM

## 2019-01-14 DIAGNOSIS — H92.01 OTALGIA OF RIGHT EAR: ICD-10-CM

## 2019-01-14 PROCEDURE — 99213 OFFICE O/P EST LOW 20 MIN: CPT | Performed by: FAMILY MEDICINE

## 2019-01-14 RX ORDER — HYDROCHLOROTHIAZIDE 25 MG/1
25 TABLET ORAL EVERY MORNING
Qty: 90 TABLET | Refills: 1 | Status: SHIPPED | OUTPATIENT
Start: 2019-01-14 | End: 2019-06-25 | Stop reason: SDUPTHER

## 2019-01-14 ASSESSMENT — ENCOUNTER SYMPTOMS
WHEEZING: 0
ABDOMINAL PAIN: 0
VOMITING: 0
SORE THROAT: 0
SHORTNESS OF BREATH: 0
EYE DISCHARGE: 0
EYE REDNESS: 0
FACIAL SWELLING: 0
COUGH: 0
DIARRHEA: 0

## 2019-02-05 ENCOUNTER — OFFICE VISIT (OUTPATIENT)
Dept: PRIMARY CARE CLINIC | Age: 64
End: 2019-02-05
Payer: COMMERCIAL

## 2019-02-05 VITALS
HEART RATE: 82 BPM | OXYGEN SATURATION: 100 % | SYSTOLIC BLOOD PRESSURE: 124 MMHG | TEMPERATURE: 97.9 F | WEIGHT: 215 LBS | DIASTOLIC BLOOD PRESSURE: 78 MMHG | BODY MASS INDEX: 34.7 KG/M2

## 2019-02-05 DIAGNOSIS — H65.93 MIDDLE EAR EFFUSION, BILATERAL: Primary | ICD-10-CM

## 2019-02-05 PROCEDURE — 99213 OFFICE O/P EST LOW 20 MIN: CPT | Performed by: NURSE PRACTITIONER

## 2019-02-05 RX ORDER — ERGOCALCIFEROL 1.25 MG/1
50000 CAPSULE ORAL
COMMUNITY
Start: 2019-01-24

## 2019-02-05 RX ORDER — DULOXETIN HYDROCHLORIDE 30 MG/1
CAPSULE, DELAYED RELEASE ORAL
Refills: 0 | COMMUNITY
Start: 2018-11-15 | End: 2019-02-16 | Stop reason: ALTCHOICE

## 2019-02-05 ASSESSMENT — ENCOUNTER SYMPTOMS
RHINORRHEA: 0
SORE THROAT: 0
DIARRHEA: 0
SINUS PAIN: 0
SINUS PRESSURE: 0
VOMITING: 0
COUGH: 0

## 2019-02-06 ASSESSMENT — ENCOUNTER SYMPTOMS: NAUSEA: 0

## 2019-02-16 ENCOUNTER — OFFICE VISIT (OUTPATIENT)
Dept: PRIMARY CARE CLINIC | Age: 64
End: 2019-02-16
Payer: COMMERCIAL

## 2019-02-16 VITALS
HEART RATE: 90 BPM | DIASTOLIC BLOOD PRESSURE: 80 MMHG | SYSTOLIC BLOOD PRESSURE: 130 MMHG | OXYGEN SATURATION: 100 % | TEMPERATURE: 98.1 F

## 2019-02-16 DIAGNOSIS — J01.40 ACUTE NON-RECURRENT PANSINUSITIS: Primary | ICD-10-CM

## 2019-02-16 LAB
INFLUENZA A ANTIBODY: NORMAL
INFLUENZA B ANTIBODY: NORMAL

## 2019-02-16 PROCEDURE — 87804 INFLUENZA ASSAY W/OPTIC: CPT | Performed by: NURSE PRACTITIONER

## 2019-02-16 PROCEDURE — 99213 OFFICE O/P EST LOW 20 MIN: CPT | Performed by: NURSE PRACTITIONER

## 2019-02-16 RX ORDER — AMOXICILLIN AND CLAVULANATE POTASSIUM 875; 125 MG/1; MG/1
1 TABLET, FILM COATED ORAL 2 TIMES DAILY
Qty: 20 TABLET | Refills: 0 | Status: SHIPPED | OUTPATIENT
Start: 2019-02-16 | End: 2019-02-26

## 2019-02-16 RX ORDER — PREDNISONE 20 MG/1
TABLET ORAL
Qty: 6 TABLET | Refills: 0 | Status: SHIPPED | OUTPATIENT
Start: 2019-02-16 | End: 2019-02-22 | Stop reason: ALTCHOICE

## 2019-02-16 RX ORDER — BENZONATATE 100 MG/1
100 CAPSULE ORAL 3 TIMES DAILY PRN
Qty: 21 CAPSULE | Refills: 0 | Status: SHIPPED | OUTPATIENT
Start: 2019-02-16 | End: 2019-02-23

## 2019-02-16 ASSESSMENT — ENCOUNTER SYMPTOMS
WHEEZING: 0
SINUS PRESSURE: 1
SINUS PAIN: 1
VOMITING: 0
NAUSEA: 0
SHORTNESS OF BREATH: 0
DIARRHEA: 0
COUGH: 1
SORE THROAT: 1
RHINORRHEA: 1

## 2019-02-22 ENCOUNTER — OFFICE VISIT (OUTPATIENT)
Dept: PRIMARY CARE CLINIC | Age: 64
End: 2019-02-22
Payer: COMMERCIAL

## 2019-02-22 ENCOUNTER — HOSPITAL ENCOUNTER (EMERGENCY)
Age: 64
Discharge: HOME | End: 2019-02-22
Attending: FAMILY MEDICINE
Payer: COMMERCIAL

## 2019-02-22 ENCOUNTER — APPOINTMENT (OUTPATIENT)
Dept: GENERAL RADIOLOGY | Age: 64
End: 2019-02-22
Payer: COMMERCIAL

## 2019-02-22 VITALS
WEIGHT: 215 LBS | SYSTOLIC BLOOD PRESSURE: 138 MMHG | OXYGEN SATURATION: 100 % | TEMPERATURE: 98.1 F | RESPIRATION RATE: 18 BRPM | HEART RATE: 94 BPM | HEIGHT: 66 IN | DIASTOLIC BLOOD PRESSURE: 82 MMHG | BODY MASS INDEX: 34.55 KG/M2

## 2019-02-22 VITALS
DIASTOLIC BLOOD PRESSURE: 87 MMHG | WEIGHT: 221.1 LBS | SYSTOLIC BLOOD PRESSURE: 163 MMHG | TEMPERATURE: 98.3 F | HEART RATE: 113 BPM | BODY MASS INDEX: 35.53 KG/M2 | RESPIRATION RATE: 17 BRPM | OXYGEN SATURATION: 100 % | HEIGHT: 66 IN

## 2019-02-22 DIAGNOSIS — J06.9 UPPER RESPIRATORY TRACT INFECTION, UNSPECIFIED TYPE: Primary | ICD-10-CM

## 2019-02-22 DIAGNOSIS — H65.93 MIDDLE EAR EFFUSION, BILATERAL: ICD-10-CM

## 2019-02-22 DIAGNOSIS — R06.02 SOB (SHORTNESS OF BREATH): ICD-10-CM

## 2019-02-22 DIAGNOSIS — J20.9 SUBACUTE BRONCHITIS: Primary | ICD-10-CM

## 2019-02-22 PROCEDURE — 6360000002 HC RX W HCPCS: Performed by: FAMILY MEDICINE

## 2019-02-22 PROCEDURE — 71046 X-RAY EXAM CHEST 2 VIEWS: CPT

## 2019-02-22 PROCEDURE — 99284 EMERGENCY DEPT VISIT MOD MDM: CPT

## 2019-02-22 PROCEDURE — 99213 OFFICE O/P EST LOW 20 MIN: CPT | Performed by: NURSE PRACTITIONER

## 2019-02-22 RX ORDER — DEXAMETHASONE SODIUM PHOSPHATE 10 MG/ML
10 INJECTION INTRAMUSCULAR; INTRAVENOUS ONCE
Status: COMPLETED | OUTPATIENT
Start: 2019-02-22 | End: 2019-02-22

## 2019-02-22 RX ADMIN — DEXAMETHASONE SODIUM PHOSPHATE 10 MG: 10 INJECTION, SOLUTION INTRAMUSCULAR; INTRAVENOUS at 19:47

## 2019-02-22 ASSESSMENT — ENCOUNTER SYMPTOMS
CHEST TIGHTNESS: 0
HEMOPTYSIS: 0
SORE THROAT: 0
WHEEZING: 0
SHORTNESS OF BREATH: 1
RHINORRHEA: 0

## 2019-04-01 ENCOUNTER — OFFICE VISIT (OUTPATIENT)
Dept: FAMILY MEDICINE CLINIC | Age: 64
End: 2019-04-01
Payer: COMMERCIAL

## 2019-04-01 VITALS — BODY MASS INDEX: 35.35 KG/M2 | WEIGHT: 219 LBS | DIASTOLIC BLOOD PRESSURE: 80 MMHG | SYSTOLIC BLOOD PRESSURE: 120 MMHG

## 2019-04-01 DIAGNOSIS — R25.2 LEG CRAMPS: ICD-10-CM

## 2019-04-01 DIAGNOSIS — R42 DIZZINESS: Primary | ICD-10-CM

## 2019-04-01 PROCEDURE — 99213 OFFICE O/P EST LOW 20 MIN: CPT | Performed by: FAMILY MEDICINE

## 2019-04-01 ASSESSMENT — ENCOUNTER SYMPTOMS
FACIAL SWELLING: 0
EYE REDNESS: 0
DIARRHEA: 0
ABDOMINAL PAIN: 0
SHORTNESS OF BREATH: 0
EYE DISCHARGE: 0
VOMITING: 0
COUGH: 0
NAUSEA: 0

## 2019-04-01 ASSESSMENT — PATIENT HEALTH QUESTIONNAIRE - PHQ9
1. LITTLE INTEREST OR PLEASURE IN DOING THINGS: 0
SUM OF ALL RESPONSES TO PHQ QUESTIONS 1-9: 0
SUM OF ALL RESPONSES TO PHQ9 QUESTIONS 1 & 2: 0
SUM OF ALL RESPONSES TO PHQ QUESTIONS 1-9: 0
2. FEELING DOWN, DEPRESSED OR HOPELESS: 0

## 2019-04-01 NOTE — PROGRESS NOTES
HPI Notes    Name: Mei Chadwick  : 1955        Chief Complaint:     Chief Complaint   Patient presents with    Dizziness     Pt c/o dizziness that comes and goes over the past couple months. Pt states it comes on suddenly. Pt takes Claritin daily. Pt noted her ears feel like they have fluid. Pt seen at walk in clinic for c/o ear pain/fullness Pt treated with  Augmentin and prednisone taper. History of Present Illness:     Mei Chadwick is a 61 y.o.  female who presents with Dizziness (Pt c/o dizziness that comes and goes over the past couple months. Pt states it comes on suddenly. Pt takes Claritin daily. Pt noted her ears feel like they have fluid. Pt seen at walk in clinic for c/o ear pain/fullness Pt treated with  Augmentin and prednisone taper.)      HPI  Dizziness - Pt states she has had the sensation she is \"off balance\" since Feb. Pt had episodes where she feels off but no passing out. No lightheadedness. Pt states she has had vertigo in past and this is not the same. NO N/V/F/C. No chest pain. No SOB. No ear pain. Pt does feel like this started in Feb when she had \"sinus\" problems. Pt has been taking claritin 74IG daily and certain days it does help. Pt hasn't tired any nasal spray as she has allergy to flonase. Pt states it occurs suddenly. Pt feels like fluid in her ears. Pt seen in walk in in Feb  3times and treated with Augmetnin and prednisone. No other new meds. Leg cramps - pt having cramping more at nights. No swelling or pain just the cramping in both lower legs and feet. Pt has been on a HCTZ since November and so wondering if cramping due to leg cramps and low potassium?   Past Medical History:     Past Medical History:   Diagnosis Date    Cerebrovascular disease     mini stroke may 2012    Chronic back pain     GERD (gastroesophageal reflux disease)     Hypertension     Hypothyroidism     Osteoarthritis     knee and back    Osteopenia 2016 Reviewed all health maintenance requirements and ordered appropriate tests  Health Maintenance Due   Topic Date Due    Hepatitis C screen  1955    HIV screen  11/30/1970    DTaP/Tdap/Td vaccine (1 - Tdap) 11/30/1974    Shingles Vaccine (1 of 2) 11/30/2005       Past Surgical History:     Past Surgical History:   Procedure Laterality Date    APPENDECTOMY      BREAST BIOPSY      CHOLECYSTECTOMY      HYSTERECTOMY      still Rt ovary present    JOINT REPLACEMENT  08/07/2018    Rt knee        Medications:       Prior to Admission medications    Medication Sig Start Date End Date Taking? Authorizing Provider   NONFORMULARY Col-B colchicine 0.04% Baclofen 2%  Pyridoxine HCL 2% DMSO 25% cream   Yes Historical Provider, MD   vitamin D (ERGOCALCIFEROL) 99177 units CAPS capsule Take 50,000 Units by mouth Twice a Week  1/24/19  Yes Historical Provider, MD   UNABLE TO FIND Gabapentin-Ketoprofen amitriptyline HCL 10-10-2% neuroserum liquid  Apply small amt to affected area and rub for 10 minutes. Repeat 2-3 times daily 1/17/19  Yes Michelle Roberto MD   hydrochlorothiazide (HYDRODIURIL) 25 MG tablet Take 1 tablet by mouth every morning 1/14/19  Yes Michelle Roberto MD   ranitidine (ZANTAC) 150 MG tablet Take 1 tablet by mouth 2 times daily 12/13/18  Yes Michelle Roberto MD   Vitamin Mixture (VITAMIN E COMPLETE PO) Po qd   Yes Historical Provider, MD   losartan (COZAAR) 100 MG tablet TAKE 1 TABLET DAILY 10/9/18  Yes Michelle Roberto MD   levothyroxine (SYNTHROID) 100 MCG tablet take 1 tablet by mouth once daily 10/9/18  Yes Michelle Roberto MD   Loratadine (CLARITIN) 5 MG CHEW Take 1 tablet by mouth daily  Patient taking differently: Take 10 mg by mouth daily  3/1/42  Yes JOSE Golden - CNP   clopidogrel (PLAVIX) 75 MG tablet Take 1 tablet by mouth daily. 2/21/15  Yes Ivonne Don MD   folic acid (FOLVITE) 1 MG tablet Take 1 mg by mouth daily.      Yes Historical Provider, MD        Allergies: Flonase [fluticasone propionate] and Other    Social History:     Tobacco:    reports that she has never smoked. She has never used smokeless tobacco.  Alcohol:      reports that she does not drink alcohol. Drug Use:  reports that she does not use drugs. Family History:     Family History   Problem Relation Age of Onset    Diabetes Mother     COPD Mother     Diabetes Father        Review of Systems:       Review of Systems   Constitutional: Negative for chills and fever. HENT: Negative for congestion and facial swelling. Eyes: Negative for discharge and redness. Respiratory: Negative for cough and shortness of breath. Cardiovascular: Negative for chest pain, palpitations and leg swelling. Gastrointestinal: Negative for abdominal pain, diarrhea, nausea and vomiting. Genitourinary: Negative for difficulty urinating. Musculoskeletal: Negative for joint swelling. Leg and feet cramps. Skin: Negative for pallor and rash. Neurological: Positive for dizziness. Negative for facial asymmetry, light-headedness and headaches. Psychiatric/Behavioral: Negative for confusion and sleep disturbance. Physical Exam:     Physical Exam   Constitutional: She appears well-developed and well-nourished. HENT:   Head: Normocephalic and atraumatic. TMs --dull and slightly bulging bilateral and no erythema. Eyes: Pupils are equal, round, and reactive to light. Conjunctivae are normal. Right eye exhibits no discharge. Left eye exhibits no discharge. Neck: Neck supple. No thyromegaly present. Cardiovascular: Normal rate and regular rhythm. No murmur heard. No leg swelling bilateral   Pulmonary/Chest: Effort normal and breath sounds normal.   Abdominal: Soft. Bowel sounds are normal. She exhibits no distension. There is no tenderness. Musculoskeletal: She exhibits no edema. +5/5 LE strength   Lymphadenopathy:     She has no cervical adenopathy. Neurological: She is alert.  No

## 2019-04-01 NOTE — PATIENT INSTRUCTIONS
SURVEY:    You may be receiving a survey from [employer] regarding your visit today. Please complete the survey to enable us to provide the highest quality of care to you and your family. If you cannot score us a very good on any question, please call the office to discuss how we could have made your experience a very good one. Thank you.

## 2019-04-08 ENCOUNTER — TELEPHONE (OUTPATIENT)
Dept: FAMILY MEDICINE CLINIC | Age: 64
End: 2019-04-08

## 2019-04-08 DIAGNOSIS — M17.12 ARTHRITIS OF LEFT KNEE: Primary | ICD-10-CM

## 2019-04-08 NOTE — TELEPHONE ENCOUNTER
Patient is asking if you will refer to ortho in [facility] for left knee pain/arthritis. She does not want a knee replacement done yet and would like to try injections. Hailey Huerta is name of doctor  Her friend went to this doctor and got like a synvisc injection. Referral pending if ok?

## 2019-04-12 DIAGNOSIS — I10 ESSENTIAL HYPERTENSION, BENIGN: ICD-10-CM

## 2019-04-12 RX ORDER — LEVOTHYROXINE SODIUM 0.1 MG/1
TABLET ORAL
Qty: 90 TABLET | Refills: 1 | Status: SHIPPED | OUTPATIENT
Start: 2019-04-12 | End: 2019-10-09 | Stop reason: SDUPTHER

## 2019-04-12 RX ORDER — LOSARTAN POTASSIUM 100 MG/1
TABLET ORAL
Qty: 90 TABLET | Refills: 1 | Status: SHIPPED | OUTPATIENT
Start: 2019-04-12 | End: 2019-10-09 | Stop reason: SDUPTHER

## 2019-05-28 RX ORDER — RANITIDINE 150 MG/1
TABLET ORAL
Qty: 180 TABLET | Refills: 1 | Status: SHIPPED | OUTPATIENT
Start: 2019-05-28 | End: 2019-10-21 | Stop reason: SDUPTHER

## 2019-06-24 DIAGNOSIS — F41.1 ANXIETY STATE: ICD-10-CM

## 2019-06-24 RX ORDER — LORAZEPAM 0.5 MG/1
TABLET ORAL
Qty: 30 TABLET | Refills: 2 | Status: SHIPPED | OUTPATIENT
Start: 2019-06-24 | End: 2020-01-08 | Stop reason: SDUPTHER

## 2019-06-24 NOTE — TELEPHONE ENCOUNTER
Last OV: 4/1/2019  Last RX: ativan 6-19-18   Next scheduled apt: 7/17/2019  Medication pending   Also put the form to reorder the serum for patient on your computer

## 2019-06-24 NOTE — TELEPHONE ENCOUNTER
Patient is asking for a refill on gabapentin-ketoprofen [facility]riptyline [facility] and a refill on Lorazepam 0.5 mg to [facility] Maintenance   Topic Date Due    Hepatitis C screen  1955    HIV screen  11/30/1970    DTaP/Tdap/Td vaccine (1 - Tdap) 11/30/1974    Shingles Vaccine (1 of 2) 11/30/2005    TSH testing  11/19/2019    Potassium monitoring  11/19/2019    Creatinine monitoring  11/19/2019    Breast cancer screen  11/24/2019    Colon cancer screen colonoscopy  06/28/2021    Diabetes screen  07/31/2021    Lipid screen  11/24/2022    Flu vaccine  Completed    Pneumococcal 0-64 years Vaccine  Aged Out             (applicable per patient's age: Cancer Screenings, Depression Screening, Fall Risk Screening, Immunizations)    Hemoglobin A1C (%)   Date Value   07/31/2018 5.4     LDL Cholesterol (mg/dL)   Date Value   11/24/2017 65     AST (U/L)   Date Value   11/24/2017 15     ALT (U/L)   Date Value   11/24/2017 10     BUN (mg/dL)   Date Value   11/19/2018 11      (goal A1C is < 7)   (goal LDL is <100) need 30-50% reduction from baseline     BP Readings from Last 3 Encounters:   04/01/19 120/80   02/22/19 (!) 163/87   02/22/19 138/82    (goal /80)      All Future Testing planned in [facility]:  Lab Frequency Next Occurrence   XR CHEST STANDARD (2 VW) Once 02/22/2019   CBC With Auto Differential Once 02/22/2019       Next Visit Date:  Future Appointments   Date Time Provider Claire Garcia   7/17/2019  8:00 AM Lucie Jacob MD jay[facility]            Patient Active Problem List:     Anxiety state     Hypothyroidism     Essential hypertension, benign     Esophageal reflux     Carpal tunnel syndrome     TIA (transient ischemic attack)     Chronic back pain     Osteopenia

## 2019-06-25 RX ORDER — HYDROCHLOROTHIAZIDE 25 MG/1
TABLET ORAL
Qty: 90 TABLET | Refills: 1 | Status: SHIPPED | OUTPATIENT
Start: 2019-06-25 | End: 2019-12-23

## 2019-06-27 ENCOUNTER — OFFICE VISIT (OUTPATIENT)
Dept: FAMILY MEDICINE CLINIC | Age: 64
End: 2019-06-27
Payer: COMMERCIAL

## 2019-06-27 VITALS
BODY MASS INDEX: 35.35 KG/M2 | DIASTOLIC BLOOD PRESSURE: 84 MMHG | WEIGHT: 219 LBS | HEART RATE: 88 BPM | SYSTOLIC BLOOD PRESSURE: 130 MMHG | OXYGEN SATURATION: 98 %

## 2019-06-27 DIAGNOSIS — M54.50 ACUTE BILATERAL LOW BACK PAIN WITHOUT SCIATICA: Primary | ICD-10-CM

## 2019-06-27 PROCEDURE — 99213 OFFICE O/P EST LOW 20 MIN: CPT | Performed by: FAMILY MEDICINE

## 2019-06-27 RX ORDER — CYCLOBENZAPRINE HCL 5 MG
5 TABLET ORAL NIGHTLY PRN
Qty: 14 TABLET | Refills: 0 | Status: SHIPPED | OUTPATIENT
Start: 2019-06-27 | End: 2019-07-07

## 2019-06-27 ASSESSMENT — ENCOUNTER SYMPTOMS
DIARRHEA: 0
BOWEL INCONTINENCE: 0
ABDOMINAL PAIN: 0
FACIAL SWELLING: 0
BACK PAIN: 1
VOMITING: 0

## 2019-06-27 NOTE — PATIENT INSTRUCTIONS
SURVEY:    You may be receiving a survey from [employer] regarding your visit today. Please complete the survey to enable us to provide the highest quality of care to you and your family. If you cannot score us a very good on any question, please call the office to discuss how we could have made your experience a very good one. Thank you.

## 2019-06-27 NOTE — PROGRESS NOTES
HPI Notes    Name: Claudia Frederick  : 1955        Chief Complaint:     Chief Complaint   Patient presents with    Back Pain     Pt c/o low back pain over the past 4 days. Pt picked up 27lb nephew  on Friday , Pt thinks this may have started back pain. Pt is taking EX Tylenol and ice . History of Present Illness:     Claudia Frederick is a 61 y.o.  female who presents with Back Pain (Pt c/o low back pain over the past 4 days. Pt picked up 27lb nephew  on Friday , Pt thinks this may have started back pain. Pt is taking EX Tylenol and ice .)      Back Pain   This is a new problem. The current episode started in the past 7 days (So about 6d ago pt lifted up her >20# nephew who was crying  and so she lifted him up. Then 2d later pt started to have low back . Pt was also out of her topical pain medication from [facility] for about 1wk. ). The problem occurs constantly. The problem is unchanged. The pain is present in the lumbar spine. The quality of the pain is described as aching. The pain does not radiate. The symptoms are aggravated by bending and twisting. Stiffness is present at night. Pertinent negatives include no abdominal pain, bladder incontinence, bowel incontinence, fever, leg pain, numbness, paresis, paresthesias, tingling or weakness. (Across the whole lower back) Risk factors: just lifting >20# and nornally pt doesn't lift anything >5# She has tried heat (pt has compound liquid from [employer] with neurontin-ketoprofen and elavil in it but has NOT been using as just got supply in the mail last night. ) for the symptoms.        Past Medical History:     Past Medical History:   Diagnosis Date    Cerebrovascular disease     mini stroke may 2012    Chronic back pain     GERD (gastroesophageal reflux disease)     Hypertension     Hypothyroidism     Osteoarthritis     knee and back    Osteopenia 2016      Reviewed all health maintenance requirements and ordered appropriate tests  Health Maintenance Due   Topic Date Due    Hepatitis C screen  1955    HIV screen  11/30/1970    DTaP/Tdap/Td vaccine (1 - Tdap) 11/30/1974    Shingles Vaccine (1 of 2) 11/30/2005       Past Surgical History:     Past Surgical History:   Procedure Laterality Date    APPENDECTOMY      BREAST BIOPSY      CHOLECYSTECTOMY      HYSTERECTOMY      still Rt ovary present    JOINT REPLACEMENT  08/07/2018    Rt knee        Medications:       Prior to Admission medications    Medication Sig Start Date End Date Taking? Authorizing Provider   cyclobenzaprine (FLEXERIL) 5 MG tablet Take 1 tablet by mouth nightly as needed for Muscle spasms 6/27/19 7/7/19 Yes Leonor Hendrickson MD   hydrochlorothiazide (HYDRODIURIL) 25 MG tablet TAKE 1 TABLET EVERY MORNING 6/25/19  Yes Leonor Hendrickson MD   LORazepam (ATIVAN) 0.5 MG tablet Take 1 tablet twice daily as needed for anxiety. 6/24/19 9/24/19 Yes Leonor Hendrickson MD   ranitidine (ZANTAC) 150 MG tablet TAKE 1 TABLET TWICE A DAY 5/28/19  Yes Leonor Hendrickson MD   levothyroxine (SYNTHROID) 100 MCG tablet TAKE 1 TABLET DAILY 4/12/19  Yes Leonor Hendrickson MD   losartan (COZAAR) 100 MG tablet TAKE 1 TABLET DAILY 4/12/19  Yes Leonor Hendrickson MD   NONFORMULARY Col-B colchicine 0.04% Baclofen 2%  Pyridoxine HCL 2% DMSO 25% cream   Yes Historical Provider, MD   vitamin D (ERGOCALCIFEROL) 93159 units CAPS capsule Take 50,000 Units by mouth Twice a Week  1/24/19  Yes Historical Provider, MD   UNABLE TO FIND Gabapentin-Ketoprofen amitriptyline HCL 10-10-2% neuroserum liquid  Apply small amt to affected area and rub for 10 minutes.  Repeat 2-3 times daily 1/17/19  Yes Leonor Hendrickson MD   Vitamin Mixture (VITAMIN E COMPLETE PO) Po qd   Yes Historical Provider, MD   Loratadine (CLARITIN) 5 MG CHEW Take 1 tablet by mouth daily  Patient taking differently: Take 10 mg by mouth daily  8/5/22  Yes JOSE Matthews - CNP   clopidogrel (PLAVIX) 75 MG tablet Take 1 tablet by mouth 11/24/2017    LABALBU 3.9 11/24/2017    LABALBU 4.4 01/25/2012    BILITOT 0.39 11/24/2017    ALKPHOS 65 11/24/2017    AST 15 11/24/2017    ALT 10 11/24/2017     Lab Results   Component Value Date    WBC 4.9 11/19/2018    RBC 4.32 11/19/2018    RBC 4.92 01/25/2012    HGB 12.1 11/19/2018    HCT 36.7 11/19/2018    MCV 84.9 11/19/2018    MCH 27.9 11/19/2018    MCHC 32.9 11/19/2018    RDW 15.3 11/19/2018     11/19/2018     01/25/2012    MPV NOT REPORTED 11/19/2018     Lab Results   Component Value Date    TSH 4.51 11/19/2018     Lab Results   Component Value Date    CHOL 158 11/24/2017    HDL 73 11/24/2017    LABA1C 5.4 07/31/2018          Assessment/Plan:        1. Acute bilateral low back pain without sciatica  Pt can't take NSAIDS so continue on tylenol ES 2 every 6hrs and add more heat 20min 4-5 times per day then reviewed gentle stretching after heat. Pt will start back on her topical pain med from [facility] today and also may have flexeril 5mg to take only at night as pt can't drive with the medication. Return if symptoms worsen or fail to improve.       Electronically signed by Noni Marvin MD on 6/27/2019 at 9:01 AM

## 2019-10-21 RX ORDER — RANITIDINE 150 MG/1
TABLET ORAL
Qty: 180 TABLET | Refills: 1 | Status: SHIPPED | OUTPATIENT
Start: 2019-10-21 | End: 2019-11-27

## 2019-10-29 RX ORDER — FAMOTIDINE 20 MG/1
20 TABLET, FILM COATED ORAL 2 TIMES DAILY
Qty: 180 TABLET | Refills: 1 | Status: SHIPPED | OUTPATIENT
Start: 2019-10-29 | End: 2020-07-02

## 2019-11-21 ENCOUNTER — TELEPHONE (OUTPATIENT)
Dept: FAMILY MEDICINE CLINIC | Age: 64
End: 2019-11-21

## 2019-11-27 ENCOUNTER — OFFICE VISIT (OUTPATIENT)
Dept: FAMILY MEDICINE CLINIC | Age: 64
End: 2019-11-27
Payer: COMMERCIAL

## 2019-11-27 VITALS
WEIGHT: 234 LBS | BODY MASS INDEX: 37.77 KG/M2 | SYSTOLIC BLOOD PRESSURE: 110 MMHG | HEART RATE: 76 BPM | OXYGEN SATURATION: 97 % | DIASTOLIC BLOOD PRESSURE: 80 MMHG

## 2019-11-27 DIAGNOSIS — K21.9 GASTROESOPHAGEAL REFLUX DISEASE WITHOUT ESOPHAGITIS: Primary | ICD-10-CM

## 2019-11-27 PROCEDURE — 99213 OFFICE O/P EST LOW 20 MIN: CPT | Performed by: FAMILY MEDICINE

## 2019-11-27 RX ORDER — OMEPRAZOLE 20 MG/1
20 CAPSULE, DELAYED RELEASE ORAL
Qty: 90 CAPSULE | Refills: 1 | Status: SHIPPED | OUTPATIENT
Start: 2019-11-27 | End: 2020-05-26

## 2019-11-27 RX ORDER — FAMOTIDINE 20 MG/1
20 TABLET, FILM COATED ORAL
Qty: 90 TABLET | Refills: 1 | Status: SHIPPED | OUTPATIENT
Start: 2019-11-27 | End: 2020-01-06

## 2019-11-27 ASSESSMENT — ENCOUNTER SYMPTOMS
CONSTIPATION: 0
SHORTNESS OF BREATH: 0
BLOOD IN STOOL: 0
DIARRHEA: 0
EYE DISCHARGE: 0
EYE REDNESS: 0
GLOBUS SENSATION: 1
ABDOMINAL PAIN: 0
CHOKING: 0
VOMITING: 0
FACIAL SWELLING: 0
BELCHING: 0
HEARTBURN: 1
NAUSEA: 0

## 2020-01-06 ENCOUNTER — OFFICE VISIT (OUTPATIENT)
Dept: PRIMARY CARE CLINIC | Age: 65
End: 2020-01-06
Payer: COMMERCIAL

## 2020-01-06 VITALS
SYSTOLIC BLOOD PRESSURE: 130 MMHG | HEART RATE: 82 BPM | BODY MASS INDEX: 36.8 KG/M2 | DIASTOLIC BLOOD PRESSURE: 89 MMHG | TEMPERATURE: 97.8 F | OXYGEN SATURATION: 100 % | WEIGHT: 228 LBS

## 2020-01-06 LAB
INFLUENZA A ANTIBODY: NEGATIVE
INFLUENZA B ANTIBODY: NEGATIVE

## 2020-01-06 PROCEDURE — 99213 OFFICE O/P EST LOW 20 MIN: CPT | Performed by: NURSE PRACTITIONER

## 2020-01-06 PROCEDURE — 87804 INFLUENZA ASSAY W/OPTIC: CPT | Performed by: NURSE PRACTITIONER

## 2020-01-06 RX ORDER — PREDNISONE 20 MG/1
40 TABLET ORAL DAILY
Qty: 10 TABLET | Refills: 0 | Status: SHIPPED | OUTPATIENT
Start: 2020-01-06 | End: 2020-01-11

## 2020-01-06 RX ORDER — AMOXICILLIN 500 MG/1
CAPSULE ORAL
COMMUNITY
Start: 2019-12-06 | End: 2021-01-20

## 2020-01-06 RX ORDER — DOXYCYCLINE 100 MG/1
100 CAPSULE ORAL 2 TIMES DAILY
Qty: 14 CAPSULE | Refills: 0 | Status: SHIPPED | OUTPATIENT
Start: 2020-01-06 | End: 2020-01-13

## 2020-01-06 ASSESSMENT — ENCOUNTER SYMPTOMS: COUGH: 1

## 2020-01-06 NOTE — PATIENT INSTRUCTIONS
infections, eye infections, gonorrhea, chlamydia, periodontitis (gum disease), and others. Doxycycline is also used to treat blemishes, bumps, and acne-like lesions caused by rosacea. Doxycycline will not treat facial redness caused by rosacea. Some forms of doxycycline are used to prevent malaria, to treat anthrax, or to treat infections caused by mites, ticks, or lice. Doxycycline may also be used for purposes not listed in this medication guide. What should I discuss with my healthcare provider before taking doxycycline? You should not take this medicine if you are allergic to doxycycline or other tetracycline antibiotics such as demeclocycline, minocycline, tetracycline, or tigecycline. Tell your doctor if you have ever had:  · liver disease;  · kidney disease;  · asthma or sulfite allergy;  · increased pressure inside your skull; or  · if you also take isotretinoin, seizure medicine, or a blood thinner such as warfarin (Coumadin). If you are using doxycycline to treat gonorrhea, your doctor may test you to make sure you do not also have syphilis, another sexually transmitted disease. Taking this medicine during pregnancy may affect tooth and bone development in the unborn baby. Taking doxycycline during the last half of pregnancy can cause permanent tooth discoloration later in the baby's life. Tell your doctor if you are pregnant or if you become pregnant. Doxycycline can make birth control pills less effective. Ask your doctor about using a non-hormonal birth control (condom, diaphragm with spermicide) to prevent pregnancy. Doxycycline can pass into breast milk and may affect bone and tooth development in a nursing infant. Do not breast-feed while you are taking doxycycline. Doxycycline can cause permanent yellowing or graying of the teeth in children younger than 6years old.  Children should use doxycycline only in cases of severe or life-threatening conditions such as anthrax or [facility] dose?  Take the medicine as soon as you can, but skip the missed dose if it is almost time for your next dose. Do not take two doses at one time. What happens if I overdose? Seek emergency medical attention or call the Poison Help line at 1-385.960.9927. What should I avoid while taking doxycycline? Do not take iron supplements, multivitamins, calcium supplements, antacids, or laxatives within 2 hours before or after taking doxycycline. Avoid taking any other antibiotics with doxycycline unless your doctor has told you to. Doxycycline could make you sunburn more easily. Avoid sunlight or tanning beds. Wear protective clothing and use sunscreen (SPF 30 or higher) when you are outdoors. Antibiotic medicines can cause diarrhea, which may be a sign of a new infection. If you have diarrhea that is watery or bloody, call your doctor. Do not use anti-diarrhea medicine unless your doctor tells you to. What are the possible side effects of doxycycline? Get emergency medical help if you have signs of an allergic reaction (hives, difficult breathing, swelling in your face or throat) or a severe skin reaction (fever, sore throat, burning in your eyes, skin pain, red or purple skin rash that spreads and causes blistering and peeling). Seek medical treatment if you have a serious drug reaction that can affect many parts of your body. Symptoms may include: skin rash, fever, swollen glands, flu-like symptoms, muscle aches, severe weakness, unusual bruising, or yellowing of your skin or eyes. This reaction may occur several weeks after you began using doxycycline.   Call your doctor at once if you have:  · severe stomach pain, diarrhea that is watery or bloody;  · throat irritation, trouble swallowing;  · chest pain, irregular heart rhythm, feeling short of breath;  · little or no urination;  · low white blood cell counts --fever, chills, swollen glands, body aches, weakness, pale skin, easy bruising or bleeding;  · increased pressure inside the skull --severe headaches, ringing in your ears, dizziness, nausea, vision problems, pain behind your eyes; or  · signs of liver or pancreas problems --loss of appetite, upper stomach pain (that may spread to your back), tiredness, nausea or vomiting, fast heart rate, dark urine, jaundice (yellowing of the skin or eyes). Common side effects may include:  · nausea, vomiting, upset stomach, loss of appetite;  · mild diarrhea;  · skin rash or itching;  · darkened skin color; or  · vaginal itching or discharge. This is not a complete list of side effects and others may occur. Call your doctor for medical advice about side effects. You may report side effects to FDA at 4-896-FDA-4315. What other drugs will affect doxycycline? Sometimes it is not safe to use certain medications at the same time. Some drugs can affect your blood levels of other drugs you take, which may increase side effects or make the medications less effective. Other drugs may affect doxycycline, including prescription and over-the-counter medicines, vitamins, and herbal products. Tell your doctor about all your current medicines and any medicine you start or stop using. Where can I get more information? Your pharmacist can provide more information about doxycycline. Remember, keep this and all other medicines out of the reach of children, never share your medicines with others, and use this medication only for the indication prescribed. Every effort has been made to ensure that the information provided by [address] [address] is accurate, up-to-date, and complete, but no guarantee is made to that effect. Drug information contained herein may be time sensitive.  [employer] information has been compiled for use by healthcare practitioners and consumers in the [place] and therefore [employer] does not warrant that uses outside of the [place] are appropriate, unless specifically indicated or have infections. Do not receive a \"live\" vaccine while using prednisone. Call your doctor at once if you have shortness of breath, severe pain in your upper stomach, bloody or tarry stools, severe depression, changes in personality or behavior, vision problems, or eye pain. You should not stop using prednisone suddenly. Follow your doctor's instructions about tapering your dose. What is prednisone? Prednisone is a steroid. Prednisone prevents the release of substances in the body that cause inflammation. Prednisone also suppresses the immune system. Prednisone is used as an anti-inflammatory or an immunosuppressant medication. Prednisone treats many different conditions such as allergic disorders, skin conditions, ulcerative colitis, arthritis, lupus, psoriasis, or breathing disorders. Prednisone may also be used for purposes not listed in this medication guide. What should I discuss with my healthcare provider before taking prednisone? You should not use this medication if you are allergic to prednisone, or if you have a fungal infection anywhere in your body. Steroid medication can weaken your immune system, making it easier for you to get an infection or worsening an infection you already have or have recently had. Tell your doctor about any illness or infection you have had within the past several weeks. To make sure prednisone is safe for you, tell your doctor if you have:  · any illness that causes diarrhea;  · liver disease (such as cirrhosis);  · kidney disease;  · heart disease, high blood pressure, low levels of potassium in your blood;  · a thyroid disorder;  · diabetes;  · a history of malaria;  · tuberculosis;  · osteoporosis;  · glaucoma, cataracts, or herpes infection of the eyes;  · stomach ulcers, ulcerative colitis, or a history of stomach bleeding;  · a muscle disorder such as myasthenia gravis; or  · depression or mental illness.   Long-term use of steroids may lead to bone loss (osteoporosis), especially if you smoke, if you do not exercise, if you do not get enough vitamin D or calcium in your diet, or if you have a family history of osteoporosis. Talk with your doctor about your risk of osteoporosis. Prednisone can cause low birth weight or birth defects if you take the medicine during your first trimester. Tell your doctor if you are pregnant or plan to become pregnant while using this medication. Use effective birth control. Prednisone can pass into breast milk and may harm a nursing baby. Tell your doctor if you are breast-feeding a baby. Steroids can affect growth in children. Talk with your doctor if you think your child is not growing at a normal rate while using this medication. How should I take prednisone? Follow all directions on your prescription label. Your doctor may occasionally change your dose to make sure you get the best results. Do not take this medicine in larger or smaller amounts or for longer than recommended. Take with food. Your dosage needs may change if you have any unusual stress such as a serious illness, fever or infection, or if you have surgery or a medical emergency. Do not change your medication dose or schedule without your doctor's advice. Measure liquid medicine with a special dose-measuring spoon or medicine cup. If you do not have a dose-measuring device, ask your pharmacist for one. Do not crush, chew, or break a delayed-release tablet. Swallow it whole. While using prednisone, you may need frequent blood tests at your doctor's office. Your blood pressure may also need to be checked. This medication can cause unusual results with certain medical tests. Tell any doctor who treats you that you are using prednisone. You should not stop using prednisone suddenly. Follow your doctor's instructions about tapering your dose. Wear a medical alert tag or carry an ID card stating that you take prednisone.  Any medical care provider who treats you should know that you are using a steroid. Store at room temperature away from moisture and heat. What happens if I miss a dose? Take the missed dose as soon as you remember. Skip the missed dose if it is almost time for your next scheduled dose. Do not take extra medicine to make up the missed dose. What happens if I overdose? Seek emergency medical attention or call the Poison Help line at 1-241.232.8877. An overdose of prednisone is not expected to produce life threatening symptoms. However, long term use of high steroid doses can lead to symptoms such as thinning skin, easy bruising, changes in the shape or location of body fat (especially in your face, neck, back, and waist), increased acne or facial hair, menstrual problems, impotence, or loss of interest in sex. What should I avoid while taking prednisone? Avoid being near people who are sick or have infections. Call your doctor for preventive treatment if you are exposed to chicken pox or measles. These conditions can be serious or even fatal in people who are using a steroid. Do not receive a \"live\" vaccine while using prednisone. Prednisone may increase your risk of harmful effects from a live vaccine. Live vaccines include measles, mumps, rubella (MMR), rotavirus, typhoid, yellow fever, varicella (chickenpox), zoster (shingles), and nasal flu (influenza) vaccine. Avoid drinking alcohol while you are taking prednisone. What are the possible side effects of prednisone? Get emergency medical help if you have any of these signs of an allergic reaction: hives; difficult breathing; swelling of your face, lips, tongue, or throat.   Call your doctor at once if you have:  · blurred vision, eye pain, or seeing halos around lights;  · swelling, rapid weight gain, feeling short of breath;  · severe depression, feelings of extreme happiness or sadness, changes in personality or behavior, seizure (convulsions);  · bloody or tarry stools, coughing up aspect of healthcare administered with the aid of information [employer] provides. The information contained herein is not intended to cover all possible uses, directions, precautions, warnings, drug interactions, allergic reactions, or adverse effects. If you have questions about the drugs you are taking, check with your doctor, nurse or pharmacist.  Copyright 7339-2627 [address]. Version: 9.01. Revision date: 2/13/2013. Care instructions adapted under license by [facility] ([facility]). If you have questions about a medical condition or this instruction, always ask your healthcare professional. [place] [address] any warranty or liability for your use of this information.

## 2020-01-06 NOTE — PROGRESS NOTES
Take 1 tablet by mouth 2 times daily 180 tablet 1    losartan (COZAAR) 100 MG tablet TAKE 1 TABLET DAILY 90 tablet 1    levothyroxine (SYNTHROID) 100 MCG tablet TAKE 1 TABLET DAILY 90 tablet 1    UNABLE TO FIND Gabapentin-Ketoprofen amitriptyline HCL 10-10-2% neuroserum liquid  Apply small amt to affected area and rub for 10 minutes. Repeat 2-3 times daily 15 mL 11    vitamin D (ERGOCALCIFEROL) 07123 units CAPS capsule Take 50,000 Units by mouth Twice a Week       Vitamin Mixture (VITAMIN E COMPLETE PO) Po qd      Loratadine (CLARITIN) 5 MG CHEW Take 1 tablet by mouth daily (Patient taking differently: Take 10 mg by mouth daily ) 30 tablet 0    clopidogrel (PLAVIX) 75 MG tablet Take 1 tablet by mouth daily. 30 tablet 0    folic acid (FOLVITE) 1 MG tablet Take 1 mg by mouth daily.  amoxicillin (AMOXIL) 500 MG capsule take 4 capsules by mouth 1 hour prior to dental appointment and 3...  (REFER TO PRESCRIPTION NOTES).  NONFORMULARY Col-B colchicine 0.04% Baclofen 2%  Pyridoxine HCL 2% DMSO 25% cream       No current facility-administered medications for this visit. Allergies   Allergen Reactions    Flonase [Fluticasone Propionate]      Smelled smoke with medication.  Other Hives     Contrast for a stress test       Subjective:      Review of Systems   Constitutional: Positive for fever (tactile with chilling). HENT: Positive for ear pain. Respiratory: Positive for cough. Neurological: Positive for headaches (improves after claratin and tylenol once sinuses drain). Objective:     Physical Exam  Vitals signs and nursing note reviewed. Constitutional:       Comments: Appears to be of stated age with warm, dry skin; normal coloration without rash of the exposed skin. Patient is well-appearing, well-hydrated, and appears nontoxic without apparent distress. HENT:      Head: Normocephalic. Right Ear: Tympanic membrane normal.      Left Ear: No drainage.  A middle ear

## 2020-01-08 RX ORDER — LORAZEPAM 0.5 MG/1
TABLET ORAL
Qty: 30 TABLET | Refills: 2 | Status: SHIPPED | OUTPATIENT
Start: 2020-01-08 | End: 2020-07-27 | Stop reason: SDUPTHER

## 2020-01-08 NOTE — TELEPHONE ENCOUNTER
Patient asking for a new script for Lorazepam - [facility] - told patient to check the pharmacy tomorrow    Health Maintenance   Topic Date Due    Hepatitis C screen  1955    DTaP/Tdap/Td vaccine (1 - Tdap) 11/30/1966    HIV screen  11/30/1970    Shingles Vaccine (1 of 2) 11/30/2005    TSH testing  11/19/2019    Potassium monitoring  11/19/2019    Creatinine monitoring  11/19/2019    Breast cancer screen  11/24/2019    Colon cancer screen colonoscopy  06/28/2021    Diabetes screen  07/31/2021    Lipid screen  11/24/2022    Flu vaccine  Completed    Pneumococcal 0-64 years Vaccine  Aged Out             (applicable per patient's age: Cancer Screenings, Depression Screening, Fall Risk Screening, Immunizations)    Hemoglobin A1C (%)   Date Value   07/31/2018 5.4     LDL Cholesterol (mg/dL)   Date Value   11/24/2017 65     AST (U/L)   Date Value   11/24/2017 15     ALT (U/L)   Date Value   11/24/2017 10     BUN (mg/dL)   Date Value   11/19/2018 11      (goal A1C is < 7)   (goal LDL is <100) need 30-50% reduction from baseline     BP Readings from Last 3 Encounters:   01/06/20 130/89   11/27/19 110/80   06/27/19 130/84    (goal /80)      All Future Testing planned in [facility]:  Lab Frequency Next Occurrence   XR CHEST STANDARD (2 VW) Once 02/22/2019   CBC With Auto Differential Once 02/22/2019       Next Visit Date:  Future Appointments   Date Time Provider Claire Garcia   4/21/2020  8:00 AM Uche Queen MD [facility]            Patient Active Problem List:     Anxiety state     Hypothyroidism     Essential hypertension, benign     Esophageal reflux     Carpal tunnel syndrome     TIA (transient ischemic attack)     Chronic back pain     Osteopenia

## 2020-01-17 ENCOUNTER — OFFICE VISIT (OUTPATIENT)
Dept: PRIMARY CARE CLINIC | Age: 65
End: 2020-01-17
Payer: COMMERCIAL

## 2020-01-17 VITALS
OXYGEN SATURATION: 99 % | TEMPERATURE: 98 F | WEIGHT: 228 LBS | BODY MASS INDEX: 36.8 KG/M2 | SYSTOLIC BLOOD PRESSURE: 138 MMHG | DIASTOLIC BLOOD PRESSURE: 80 MMHG | HEART RATE: 87 BPM

## 2020-01-17 PROCEDURE — 99213 OFFICE O/P EST LOW 20 MIN: CPT | Performed by: NURSE PRACTITIONER

## 2020-01-17 RX ORDER — AMOXICILLIN AND CLAVULANATE POTASSIUM 875; 125 MG/1; MG/1
1 TABLET, FILM COATED ORAL 2 TIMES DAILY
Qty: 20 TABLET | Refills: 0 | Status: SHIPPED | OUTPATIENT
Start: 2020-01-17 | End: 2020-01-27

## 2020-01-17 NOTE — PATIENT INSTRUCTIONS
SURVEY:    You may be receiving a survey from [employer] regarding your visit today. Please complete the survey to enable us to provide the highest quality of care to you and your family. If you cannot score us a very good on any question, please call the office to discuss how we could of made your experience a very good one. Thank you. Patient Education        Ear Infection (Otitis Media): Care Instructions  Your Care Instructions    An ear infection may start with a cold and affect the middle ear (otitis media). It can hurt a lot. Most ear infections clear up on their own in a couple of days. Most often you will not need antibiotics. This is because many ear infections are caused by a virus. Antibiotics don't work against a virus. Regular doses of pain medicines are the best way to reduce your fever and help you feel better. Follow-up care is a key part of your treatment and safety. Be sure to make and go to all appointments, and call your doctor if you are having problems. It's also a good idea to know your test results and keep a list of the medicines you take. How can you care for yourself at home? · Take pain medicines exactly as directed. ? If the doctor gave you a prescription medicine for pain, take it as prescribed. ? If you are not taking a prescription pain medicine, take an over-the-counter medicine, such as acetaminophen (Tylenol), ibuprofen (Advil, Motrin), or naproxen (Aleve). Read and follow all instructions on the label. ? Do not take two or more pain medicines at the same time unless the doctor told you to. Many pain medicines have acetaminophen, which is Tylenol. Too much acetaminophen (Tylenol) can be harmful. · Plan to take a full dose of pain reliever before bedtime. Getting enough sleep will help you get better. · Try a warm, moist washcloth on the ear. It may help relieve pain. · If your doctor prescribed antibiotics, take them as directed.  Do not stop taking them

## 2020-01-17 NOTE — PROGRESS NOTES
[facility]  [id number] [place] [id number]  Dept: [phone]  Dept Fax: [phone]    Lauren Gonzalez is a 59 y.o. female who presents to the [address] [place] [address] in Care today for her medical conditions/complaints as noted below. Lauren Gonzalez is c/o of Ear Fullness (Patient presents today with bilateral ear fullness that began on tuesday. Patient just finised antibiotic and prednisone on Monday )      HPI:    Lauren Gonzalez is a 59 y.o. female who presents with  Ear fullness. Took all her medication she was prescribed on 1/6/20 Prednisone and Doxycycline. She states she took all her medication. She states she was somewhat better after that. Her cold symptoms have improved. Last night symptoms in ears worsened and can feel pain in bilateral ears but right side is worse. No fever this week. Takes a Claritin everyday. Ear Fullness            Past Medical History:   Diagnosis Date    Cerebrovascular disease     mini stroke may 2012    Chronic back pain     GERD (gastroesophageal reflux disease)     Hypertension     Hypothyroidism     Osteoarthritis     knee and back    Osteopenia 1/4/2016        Current Outpatient Medications   Medication Sig Dispense Refill    amoxicillin-clavulanate (AUGMENTIN) 875-125 MG per tablet Take 1 tablet by mouth 2 times daily for 10 days 20 tablet 0    LORazepam (ATIVAN) 0.5 MG tablet Take 1 tablet twice daily as needed for anxiety. 30 tablet 2    amoxicillin (AMOXIL) 500 MG capsule take 4 capsules by mouth 1 hour prior to dental appointment and 3...  (REFER TO PRESCRIPTION NOTES).       hydrochlorothiazide (HYDRODIURIL) 25 MG tablet TAKE 1 TABLET EVERY MORNING 90 tablet 1    omeprazole (PRILOSEC) 20 MG delayed release capsule Take 1 capsule by mouth every morning (before breakfast) 90 capsule 1    famotidine (PEPCID) 20 MG tablet Take 1 tablet by mouth 2 times daily 180 tablet 1    CNP on 1/17/2020 at 4:53 PM

## 2020-01-27 ENCOUNTER — HOSPITAL ENCOUNTER (OUTPATIENT)
Age: 65
Discharge: HOME | End: 2020-01-27
Payer: COMMERCIAL

## 2020-01-27 ENCOUNTER — OFFICE VISIT (OUTPATIENT)
Dept: FAMILY MEDICINE CLINIC | Age: 65
End: 2020-01-27
Payer: COMMERCIAL

## 2020-01-27 VITALS
WEIGHT: 224 LBS | BODY MASS INDEX: 36.15 KG/M2 | OXYGEN SATURATION: 98 % | SYSTOLIC BLOOD PRESSURE: 120 MMHG | DIASTOLIC BLOOD PRESSURE: 80 MMHG | HEART RATE: 100 BPM

## 2020-01-27 LAB
ABSOLUTE EOS #: 0.3 K/UL (ref 0–0.4)
ABSOLUTE IMMATURE GRANULOCYTE: ABNORMAL K/UL (ref 0–0.3)
ABSOLUTE LYMPH #: 0.7 K/UL (ref 1–4.8)
ABSOLUTE MONO #: 0.6 K/UL (ref 0–1)
ANION GAP SERPL CALCULATED.3IONS-SCNC: 14 MMOL/L (ref 9–17)
BASOPHILS # BLD: 0 % (ref 0–2)
BASOPHILS ABSOLUTE: 0 K/UL (ref 0–0.2)
BUN BLDV-MCNC: 20 MG/DL (ref 8–23)
BUN/CREAT BLD: 17 (ref 9–20)
CALCIUM SERPL-MCNC: 10.4 MG/DL (ref 8.6–10.4)
CHLORIDE BLD-SCNC: 97 MMOL/L (ref 98–107)
CO2: 24 MMOL/L (ref 20–31)
CREAT SERPL-MCNC: 1.21 MG/DL (ref 0.5–0.9)
DIFFERENTIAL TYPE: YES
EOSINOPHILS RELATIVE PERCENT: 7 % (ref 0–5)
GFR AFRICAN AMERICAN: 54 ML/MIN
GFR NON-AFRICAN AMERICAN: 45 ML/MIN
GFR SERPL CREATININE-BSD FRML MDRD: ABNORMAL ML/MIN/{1.73_M2}
GFR SERPL CREATININE-BSD FRML MDRD: ABNORMAL ML/MIN/{1.73_M2}
GLUCOSE BLD-MCNC: 118 MG/DL (ref 70–99)
HCT VFR BLD CALC: 40.5 % (ref 36–46)
HEMOGLOBIN: 13.6 G/DL (ref 12–16)
IMMATURE GRANULOCYTES: ABNORMAL %
LYMPHOCYTES # BLD: 13 % (ref 15–40)
MCH RBC QN AUTO: 28.6 PG (ref 26–34)
MCHC RBC AUTO-ENTMCNC: 33.6 G/DL (ref 31–37)
MCV RBC AUTO: 85 FL (ref 80–100)
MONOCYTES # BLD: 12 % (ref 4–8)
NRBC AUTOMATED: ABNORMAL PER 100 WBC
PDW BLD-RTO: 15 % (ref 12.1–15.2)
PLATELET # BLD: 232 K/UL (ref 140–450)
PLATELET ESTIMATE: ABNORMAL
PMV BLD AUTO: ABNORMAL FL (ref 6–12)
POTASSIUM SERPL-SCNC: 4.3 MMOL/L (ref 3.7–5.3)
RBC # BLD: 4.77 M/UL (ref 4–5.2)
RBC # BLD: ABNORMAL 10*6/UL
SEG NEUTROPHILS: 68 % (ref 47–75)
SEGMENTED NEUTROPHILS ABSOLUTE COUNT: 3.6 K/UL (ref 2.5–7)
SODIUM BLD-SCNC: 135 MMOL/L (ref 135–144)
TSH SERPL DL<=0.05 MIU/L-ACNC: 1.82 MIU/L (ref 0.3–5)
WBC # BLD: 5.2 K/UL (ref 3.5–11)
WBC # BLD: ABNORMAL 10*3/UL

## 2020-01-27 PROCEDURE — 99214 OFFICE O/P EST MOD 30 MIN: CPT | Performed by: FAMILY MEDICINE

## 2020-01-27 PROCEDURE — 84443 ASSAY THYROID STIM HORMONE: CPT

## 2020-01-27 PROCEDURE — 80048 BASIC METABOLIC PNL TOTAL CA: CPT

## 2020-01-27 PROCEDURE — 36415 COLL VENOUS BLD VENIPUNCTURE: CPT

## 2020-01-27 PROCEDURE — 85025 COMPLETE CBC W/AUTO DIFF WBC: CPT

## 2020-01-27 RX ORDER — LORATADINE 10 MG/1
10 TABLET ORAL DAILY
COMMUNITY

## 2020-01-27 ASSESSMENT — ENCOUNTER SYMPTOMS
HEARTBURN: 0
ORTHOPNEA: 0
SORE THROAT: 0
EYE DISCHARGE: 0
SHORTNESS OF BREATH: 1
BLOOD IN STOOL: 0
COUGH: 0
ABDOMINAL PAIN: 0
VOMITING: 0
EYE REDNESS: 0
CONSTIPATION: 0
NAUSEA: 0
FACIAL SWELLING: 0
DIARRHEA: 0

## 2020-01-27 ASSESSMENT — PATIENT HEALTH QUESTIONNAIRE - PHQ9
SUM OF ALL RESPONSES TO PHQ QUESTIONS 1-9: 0
1. LITTLE INTEREST OR PLEASURE IN DOING THINGS: 0
SUM OF ALL RESPONSES TO PHQ9 QUESTIONS 1 & 2: 0
SUM OF ALL RESPONSES TO PHQ QUESTIONS 1-9: 0
2. FEELING DOWN, DEPRESSED OR HOPELESS: 0

## 2020-01-27 NOTE — PROGRESS NOTES
more tired. Anxiety - chronic and somewhat stable. Pt has had some anxiety about her SOB. Pt states her mom has CHF and cancer so she worries about those things too. Pt does take an occ ativan. Past Medical History:     Past Medical History:   Diagnosis Date    Cerebrovascular disease     mini stroke may 2012    Chronic back pain     GERD (gastroesophageal reflux disease)     Hypertension     Hypothyroidism     Osteoarthritis     knee and back    Osteopenia 1/4/2016      Reviewed all health maintenance requirements and ordered appropriate tests  Health Maintenance Due   Topic Date Due    Hepatitis C screen  1955    DTaP/Tdap/Td vaccine (1 - Tdap) 11/30/1966    HIV screen  11/30/1970    Shingles Vaccine (1 of 2) 11/30/2005    TSH testing  11/19/2019    Potassium monitoring  11/19/2019    Creatinine monitoring  11/19/2019    Breast cancer screen  11/24/2019       Past Surgical History:     Past Surgical History:   Procedure Laterality Date    APPENDECTOMY      BREAST BIOPSY      CHOLECYSTECTOMY      HYSTERECTOMY      still Rt ovary present    JOINT REPLACEMENT  08/07/2018    Rt knee        Medications:       Prior to Admission medications    Medication Sig Start Date End Date Taking? Authorizing Provider   loratadine (CLARITIN) 10 MG tablet Take 10 mg by mouth daily   Yes Historical Provider, MD   amoxicillin-clavulanate (AUGMENTIN) 875-125 MG per tablet Take 1 tablet by mouth 2 times daily for 10 days 1/17/20 1/27/20 Yes JOSE Castro - CNP   LORazepam (ATIVAN) 0.5 MG tablet Take 1 tablet twice daily as needed for anxiety.  1/8/20 4/9/20 Yes Alesha Pate MD   hydrochlorothiazide (HYDRODIURIL) 25 MG tablet TAKE 1 TABLET EVERY MORNING  Patient taking differently: Take 1/2 tablet daiy 12/23/19  Yes Alesha Pate MD   omeprazole (PRILOSEC) 20 MG delayed release capsule Take 1 capsule by mouth every morning (before breakfast) 11/27/19  Yes Alesha Pate MD   losartan 11/19/2018    CO2 23 11/19/2018    BUN 11 11/19/2018    CREATININE 0.74 11/19/2018    GLUCOSE 103 11/19/2018    GLUCOSE 97 01/25/2012    PROT 6.5 11/24/2017    LABALBU 3.9 11/24/2017    LABALBU 4.4 01/25/2012    BILITOT 0.39 11/24/2017    ALKPHOS 65 11/24/2017    AST 15 11/24/2017    ALT 10 11/24/2017     Lab Results   Component Value Date    WBC 4.9 11/19/2018    RBC 4.32 11/19/2018    RBC 4.92 01/25/2012    HGB 12.1 11/19/2018    HCT 36.7 11/19/2018    MCV 84.9 11/19/2018    MCH 27.9 11/19/2018    MCHC 32.9 11/19/2018    RDW 15.3 11/19/2018     11/19/2018     01/25/2012    MPV NOT REPORTED 11/19/2018     Lab Results   Component Value Date    TSH 4.51 11/19/2018     Lab Results   Component Value Date    CHOL 158 11/24/2017    HDL 73 11/24/2017    LABA1C 5.4 07/31/2018          Assessment/Plan:        1. Essential hypertension, benign  Stable on cozaar and HCTZ and labs today  - Basic Metabolic Panel; Future  - CBC Auto Differential; Future    2. Gastroesophageal reflux disease without esophagitis  Stable and labs today  - Basic Metabolic Panel; Future  - CBC Auto Differential; Future    3. Other specified hypothyroidism  Stable on synthroid but labs today.   - TSH without Reflex; Future  - Basic Metabolic Panel; Future  - CBC Auto Differential; Future    4. Anxiety state  Stable on ativan and only as needed    5. SOB (shortness of breath)  Pt feels maybe related to the recent cold but if not better in 1 wk gave order for CXR and if all labs and CXR normal then consider heart echo. - XR CHEST STANDARD (2 VW); Future    6. Visit for screening mammogram  Ordered test  - TY DIGITAL SCREEN W CAD BILATERAL; Future        Venessa received counseling on the following healthy behaviors: nutrition and exercise  Reviewed prior labs and health maintenance  Continue current medications, diet and exercise. Discussed use, benefit, and side effects of prescribed medications.  Barriers to medication compliance addressed. Patient given educational materials - see patient instructions  Was a self-tracking handout given in paper form or via [employer]? Yes    Requested Prescriptions      No prescriptions requested or ordered in this encounter       All patient questions answered. Patient voiced understanding. Quality Measures    Body mass index is 36.15 kg/m². Elevated. Weight control planned discussed Healthy diet and regular exercise. BP: 120/80 Blood pressure is normal. Treatment plan consists of No treatment change needed. Lab Results   Component Value Date    LDLCHOLESTEROL 65 11/24/2017    (goal LDL reduction with dx if diabetes is 50% LDL reduction)      PHQ Scores 1/27/2020 4/1/2019 11/21/2018 11/9/2017   PHQ2 Score 0 0 0 0   PHQ9 Score 0 0 0 0     Interpretation of Total Score Depression Severity: 1-4 = Minimal depression, 5-9 = Mild depression, 10-14 = Moderate depression, 15-19 = Moderately severe depression, 20-27 = Severe depression      Return in about 6 months (around 7/27/2020) for HTN, gerd, Hypothyroid, Anxiety.       Electronically signed by Alesha Pate MD on 1/27/2020 at 8:41 AM

## 2020-01-28 ENCOUNTER — TELEPHONE (OUTPATIENT)
Dept: FAMILY MEDICINE CLINIC | Age: 65
End: 2020-01-28

## 2020-01-28 NOTE — TELEPHONE ENCOUNTER
----- Message from Wellington Cole MD sent at 1/28/2020  8:30 AM EST -----  Please tell pt her TSH is normal at 1.82 and CBC showed normal white count and NO anemia. Her sugars 118 so make sure eating lower carb and sugar diet and sodium and potassium ok. Kidney test a little up 1.21 and like <1.0 means make sure she is drinking more water 3-4 8ozs glasses daily at least and make sure not taking a lot of Advil/motrin/alleve OTC. Ester BMP in 6mos.

## 2020-01-28 NOTE — TELEPHONE ENCOUNTER
----- Message from Greer Sibley MD sent at 1/28/2020  8:30 AM EST -----  Please tell pt her TSH is normal at 1.82 and CBC showed normal white count and NO anemia. Her sugars 118 so make sure eating lower carb and sugar diet and sodium and potassium ok. Kidney test a little up 1.21 and like <1.0 means make sure she is drinking more water 3-4 8ozs glasses daily at least and make sure not taking a lot of Advil/motrin/alleve OTC. Ester BMP in 6mos.

## 2020-01-31 ENCOUNTER — HOSPITAL ENCOUNTER (OUTPATIENT)
Dept: MAMMOGRAPHY | Age: 65
Discharge: HOME | End: 2020-02-02
Payer: COMMERCIAL

## 2020-01-31 PROCEDURE — 77067 SCR MAMMO BI INCL CAD: CPT

## 2020-02-24 ENCOUNTER — TELEPHONE (OUTPATIENT)
Dept: FAMILY MEDICINE CLINIC | Age: 65
End: 2020-02-24

## 2020-02-25 ENCOUNTER — HOSPITAL ENCOUNTER (OUTPATIENT)
Age: 65
Setting detail: SPECIMEN
Discharge: HOME | End: 2020-02-25
Payer: COMMERCIAL

## 2020-02-25 ENCOUNTER — OFFICE VISIT (OUTPATIENT)
Dept: FAMILY MEDICINE CLINIC | Age: 65
End: 2020-02-25
Payer: COMMERCIAL

## 2020-02-25 VITALS
OXYGEN SATURATION: 97 % | TEMPERATURE: 97.8 F | DIASTOLIC BLOOD PRESSURE: 82 MMHG | BODY MASS INDEX: 36.15 KG/M2 | SYSTOLIC BLOOD PRESSURE: 122 MMHG | WEIGHT: 224 LBS | HEART RATE: 80 BPM

## 2020-02-25 LAB
BILIRUBIN, POC: ABNORMAL
BLOOD URINE, POC: ABNORMAL
CLARITY, POC: CLEAR
COLOR, POC: YELLOW
GLUCOSE URINE, POC: ABNORMAL
KETONES, POC: ABNORMAL
LEUKOCYTE EST, POC: ABNORMAL
NITRITE, POC: ABNORMAL
PH, POC: 6.5
PROTEIN, POC: 100
SPECIFIC GRAVITY, POC: 1.02
UROBILINOGEN, POC: 0.2

## 2020-02-25 PROCEDURE — 87186 SC STD MICRODIL/AGAR DIL: CPT

## 2020-02-25 PROCEDURE — 87086 URINE CULTURE/COLONY COUNT: CPT

## 2020-02-25 PROCEDURE — 87088 URINE BACTERIA CULTURE: CPT

## 2020-02-25 PROCEDURE — 81002 URINALYSIS NONAUTO W/O SCOPE: CPT | Performed by: NURSE PRACTITIONER

## 2020-02-25 PROCEDURE — 99213 OFFICE O/P EST LOW 20 MIN: CPT | Performed by: NURSE PRACTITIONER

## 2020-02-25 RX ORDER — NITROFURANTOIN 25; 75 MG/1; MG/1
100 CAPSULE ORAL 2 TIMES DAILY
Qty: 10 CAPSULE | Refills: 0 | Status: SHIPPED | OUTPATIENT
Start: 2020-02-25 | End: 2020-03-01

## 2020-02-25 RX ORDER — EFINACONAZOLE 100 MG/ML
SOLUTION TOPICAL
COMMUNITY
Start: 2020-01-17 | End: 2021-01-20 | Stop reason: ALTCHOICE

## 2020-02-25 ASSESSMENT — ENCOUNTER SYMPTOMS
DIARRHEA: 0
COUGH: 0
NAUSEA: 0
SHORTNESS OF BREATH: 0
VOMITING: 0

## 2020-02-25 NOTE — PROGRESS NOTES
Urine HPI Notes    Name: Will Garcia  : 1955         Chief Complaint:     Chief Complaint   Patient presents with    Urinary Tract Infection     Patient here today with urgency, frequency. Started 4 days ago. History of Present Illness:        Urinary Tract Infection    This is a new problem. The current episode started in the past 7 days. The problem has been waxing and waning. The quality of the pain is described as aching and burning. The pain is mild. There has been no fever. There is no history of pyelonephritis. Associated symptoms include hesitancy and urgency. Pertinent negatives include no chills, flank pain, hematuria, nausea or vomiting. She has tried nothing for the symptoms. Past Medical History:     Past Medical History:   Diagnosis Date    Cerebrovascular disease     mini stroke may 2012    Chronic back pain     GERD (gastroesophageal reflux disease)     Hypertension     Hypothyroidism     Osteoarthritis     knee and back    Osteopenia 2016      Reviewed all health maintenance requirements and ordered appropriate tests  Health Maintenance Due   Topic Date Due    Hepatitis C screen  1955    DTaP/Tdap/Td vaccine (1 - Tdap) 1966    HIV screen  1970    Shingles Vaccine (1 of 2) 2005       Past Surgical History:     Past Surgical History:   Procedure Laterality Date    APPENDECTOMY      BREAST BIOPSY      CHOLECYSTECTOMY      HYSTERECTOMY      still Rt ovary present    JOINT REPLACEMENT  2018    Rt knee        Medications:       Prior to Admission medications    Medication Sig Start Date End Date Taking?  Authorizing Provider   AYAZ ROMO COLLINS EXT AA D 20  Yes Historical Provider, MD   nitrofurantoin, macrocrystal-monohydrate, (MACROBID) 100 MG capsule Take 1 capsule by mouth 2 times daily for 5 days 2/25/20 3/1/20 Yes JOSE Lazar - CNP   loratadine (CLARITIN) 10 MG tablet Take 10 mg by mouth daily   Yes Historical Provider, MD   LORazepam (ATIVAN) 0.5 MG tablet Take 1 tablet twice daily as needed for anxiety. 1/8/20 4/9/20 Yes Billy Sun MD   hydrochlorothiazide (HYDRODIURIL) 25 MG tablet TAKE 1 TABLET EVERY MORNING  Patient taking differently: Take 1/2 tablet daiy 12/23/19  Yes Billy Sun MD   omeprazole (PRILOSEC) 20 MG delayed release capsule Take 1 capsule by mouth every morning (before breakfast) 11/27/19  Yes Billy Sun MD   famotidine (PEPCID) 20 MG tablet Take 1 tablet by mouth 2 times daily  Patient taking differently: Take 20 mg by mouth daily as needed  10/29/19  Yes Billy Sun MD   losartan (COZAAR) 100 MG tablet TAKE 1 TABLET DAILY 10/9/19  Yes Billy Sun MD   levothyroxine (SYNTHROID) 100 MCG tablet TAKE 1 TABLET DAILY 10/9/19  Yes Billy Sun MD   UNABLE TO FIND Gabapentin-Ketoprofen amitriptyline HCL 10-10-2% neuroserum liquid  Apply small amt to affected area and rub for 10 minutes. Repeat 2-3 times daily 6/27/19  Yes Billy Sun MD   vitamin D (ERGOCALCIFEROL) 02840 units CAPS capsule Take 50,000 Units by mouth Twice a Week  1/24/19  Yes Historical Provider, MD   Vitamin Mixture (VITAMIN E COMPLETE PO) Po qd   Yes Historical Provider, MD   clopidogrel (PLAVIX) 75 MG tablet Take 1 tablet by mouth daily. 2/21/15  Yes Norma Handy MD   folic acid (FOLVITE) 1 MG tablet Take 1 mg by mouth daily. Yes Historical Provider, MD   amoxicillin (AMOXIL) 500 MG capsule take 4 capsules by mouth 1 hour prior to dental appointment and 3...  (REFER TO PRESCRIPTION NOTES). 12/6/19   Historical Provider, MD        Allergies:       Flonase [fluticasone propionate] and Other    Social History:     Tobacco:    reports that she has never smoked. She has never used smokeless tobacco.  Alcohol:      reports no history of alcohol use. Drug Use:  reports no history of drug use.     Family History:        Family History   Problem Relation Age of Onset    Diabetes Mother  01/27/2020     01/25/2012    MPV NOT REPORTED 01/27/2020     Lab Results   Component Value Date    TSH 1.82 01/27/2020     Lab Results   Component Value Date    CHOL 158 11/24/2017    HDL 73 11/24/2017    LABA1C 5.4 07/31/2018          Assessment & Plan        Diagnosis Orders   1. Acute cystitis with hematuria  POCT Urinalysis no Micro     +Leuk, blood, and protein in urine. Will treat with macrobid. Will send urine for culture. Pt educated about medication. Pt educated to increase water intake. Patient verbalizes understanding and agreement with plan. All questions answered. If symptoms do not resolve or worsen, return to office. Completed Refills   Requested Prescriptions     Signed Prescriptions Disp Refills    nitrofurantoin, macrocrystal-monohydrate, (MACROBID) 100 MG capsule 10 capsule 0     Sig: Take 1 capsule by mouth 2 times daily for 5 days     No follow-ups on file. Orders Placed This Encounter   Medications    nitrofurantoin, macrocrystal-monohydrate, (MACROBID) 100 MG capsule     Sig: Take 1 capsule by mouth 2 times daily for 5 days     Dispense:  10 capsule     Refill:  0     Orders Placed This Encounter   Procedures    POCT Urinalysis no Micro         There are no Patient Instructions on file for this visit. Electronically signed by JOSE Toledo CNP on 2/25/2020 at 7:49 AM           Completed Refills      Requested Prescriptions     Signed Prescriptions Disp Refills    nitrofurantoin, macrocrystal-monohydrate, (MACROBID) 100 MG capsule 10 capsule 0     Sig: Take 1 capsule by mouth 2 times daily for 5 days         Venessa received counseling on the following healthy behaviors: nutrition and medication adherence  Reviewed prior labs and health maintenance. Continue current medications, diet and exercise. Discussed use, benefit, and side effects of prescribed medications. Barriers to medication compliance addressed.    Patient given educational

## 2020-02-26 ENCOUNTER — TELEPHONE (OUTPATIENT)
Dept: FAMILY MEDICINE CLINIC | Age: 65
End: 2020-02-26

## 2020-02-26 LAB
CULTURE: ABNORMAL
Lab: ABNORMAL
SPECIMEN DESCRIPTION: ABNORMAL

## 2020-02-26 RX ORDER — CEPHALEXIN 500 MG/1
500 CAPSULE ORAL 2 TIMES DAILY
Qty: 10 CAPSULE | Refills: 0 | Status: SHIPPED | OUTPATIENT
Start: 2020-02-26 | End: 2020-03-02

## 2020-02-26 RX ORDER — CEPHALEXIN 500 MG/1
500 CAPSULE ORAL 2 TIMES DAILY
Qty: 10 CAPSULE | Refills: 0 | Status: CANCELLED | OUTPATIENT
Start: 2020-02-26 | End: 2020-03-02

## 2020-02-26 NOTE — TELEPHONE ENCOUNTER
Patient states the Montse Ramos is making her sick. She would like to know if Migue [place] can change it to something else? She states she feels very sick to her stomach and she has a horrible headache. Please let patient know.     [facility] [address] Maintenance   Topic Date Due    Hepatitis C screen  1955    DTaP/Tdap/Td vaccine (1 - Tdap) 11/30/1966    HIV screen  11/30/1970    Shingles Vaccine (1 of 2) 11/30/2005    TSH testing  01/27/2021    Potassium monitoring  01/27/2021    Creatinine monitoring  01/27/2021    Colon cancer screen colonoscopy  06/28/2021    Breast cancer screen  01/31/2022    Lipid screen  11/24/2022    Flu vaccine  Completed    Hepatitis A vaccine  Aged Out    Hepatitis B vaccine  Aged Out    Hib vaccine  Aged Out    Meningococcal (ACWY) vaccine  Aged Out    Pneumococcal 0-64 years Vaccine  Aged Out             (applicable per patient's age: Cancer Screenings, Depression Screening, Fall Risk Screening, Immunizations)    Hemoglobin A1C (%)   Date Value   07/31/2018 5.4     LDL Cholesterol (mg/dL)   Date Value   11/24/2017 65     AST (U/L)   Date Value   11/24/2017 15     ALT (U/L)   Date Value   11/24/2017 10     BUN (mg/dL)   Date Value   01/27/2020 20      (goal A1C is < 7)   (goal LDL is <100) need 30-50% reduction from baseline     BP Readings from Last 3 Encounters:   02/25/20 122/82   01/27/20 120/80   01/17/20 138/80    (goal /80)      All Future Testing planned in [facility]:  Lab Frequency Next Occurrence   XR CHEST STANDARD (2 VW) Once 40/70/3862   Basic Metabolic Panel Once 31/78/0763       Next Visit Date:  Future Appointments   Date Time Provider Claire Garcia   7/27/2020  7:00 AM [facility]            Patient Active Problem List:     Anxiety state     Hypothyroidism     Essential hypertension, benign     Esophageal reflux     Carpal tunnel syndrome     TIA (transient ischemic attack)     Chronic back pain     Osteopenia

## 2020-03-23 ENCOUNTER — TELEPHONE (OUTPATIENT)
Dept: FAMILY MEDICINE CLINIC | Age: 65
End: 2020-03-23

## 2020-03-23 RX ORDER — CIPROFLOXACIN 500 MG/1
500 TABLET, FILM COATED ORAL 2 TIMES DAILY
Qty: 14 TABLET | Refills: 0 | Status: SHIPPED | OUTPATIENT
Start: 2020-03-23 | End: 2020-03-30

## 2020-03-23 NOTE — TELEPHONE ENCOUNTER
I will tell her that I usually give cipro 500mg one po BID for 7d. (she has had that medication in the past).

## 2020-03-23 NOTE — TELEPHONE ENCOUNTER
Patient left a message that she has another UTI - asking if she can just get antibiotics    Health Maintenance   Topic Date Due    Hepatitis C screen  1955    HIV screen  11/30/1970    DTaP/Tdap/Td vaccine (1 - Tdap) 11/30/1974    Shingles Vaccine (1 of 2) 11/30/2005    TSH testing  01/27/2021    Potassium monitoring  01/27/2021    Creatinine monitoring  01/27/2021    Colon cancer screen colonoscopy  06/28/2021    Breast cancer screen  01/31/2022    Lipid screen  11/24/2022    Flu vaccine  Completed    Hepatitis A vaccine  Aged Out    Hepatitis B vaccine  Aged Out    Hib vaccine  Aged Out    Meningococcal (ACWY) vaccine  Aged Out    Pneumococcal 0-64 years Vaccine  Aged Out             (applicable per patient's age: Cancer Screenings, Depression Screening, Fall Risk Screening, Immunizations)    Hemoglobin A1C (%)   Date Value   07/31/2018 5.4     LDL Cholesterol (mg/dL)   Date Value   11/24/2017 65     AST (U/L)   Date Value   11/24/2017 15     ALT (U/L)   Date Value   11/24/2017 10     BUN (mg/dL)   Date Value   01/27/2020 20      (goal A1C is < 7)   (goal LDL is <100) need 30-50% reduction from baseline     BP Readings from Last 3 Encounters:   02/25/20 122/82   01/27/20 120/80   01/17/20 138/80    (goal /80)      All Future Testing planned in [facility]:  Lab Frequency Next Occurrence   XR CHEST STANDARD (2 VW) Once 10/73/5650   Basic Metabolic Panel Once 68/57/2232       Next Visit Date:  Future Appointments   Date Time Provider Claire Garcia   7/27/2020  7:00 AM MD Andrea Reed [facility]            Patient Active Problem List:     Anxiety state     Hypothyroidism     Essential hypertension, benign     Esophageal reflux     Carpal tunnel syndrome     TIA (transient ischemic attack)     Chronic back pain     Osteopenia

## 2020-03-23 NOTE — TELEPHONE ENCOUNTER
Pt in to see [facility] on 2/25/20 Dx - Acute cystitis with hematuria. Pt treated with Macrobid 100 mg take I capsule 2 times daily x 5 days.

## 2020-03-25 ENCOUNTER — TELEPHONE (OUTPATIENT)
Dept: FAMILY MEDICINE CLINIC | Age: 65
End: 2020-03-25

## 2020-03-25 RX ORDER — CEPHALEXIN 500 MG/1
500 CAPSULE ORAL 3 TIMES DAILY
Qty: 15 CAPSULE | Refills: 0 | Status: SHIPPED | OUTPATIENT
Start: 2020-03-25 | End: 2020-03-30

## 2020-07-27 ENCOUNTER — OFFICE VISIT (OUTPATIENT)
Dept: FAMILY MEDICINE CLINIC | Age: 65
End: 2020-07-27
Payer: COMMERCIAL

## 2020-07-27 ENCOUNTER — HOSPITAL ENCOUNTER (OUTPATIENT)
Age: 65
Discharge: HOME | End: 2020-07-27
Payer: COMMERCIAL

## 2020-07-27 VITALS
WEIGHT: 227 LBS | HEIGHT: 66 IN | OXYGEN SATURATION: 96 % | BODY MASS INDEX: 36.48 KG/M2 | DIASTOLIC BLOOD PRESSURE: 80 MMHG | SYSTOLIC BLOOD PRESSURE: 120 MMHG | HEART RATE: 88 BPM

## 2020-07-27 LAB
ANION GAP SERPL CALCULATED.3IONS-SCNC: 12 MMOL/L (ref 9–17)
BUN BLDV-MCNC: 18 MG/DL (ref 8–23)
BUN/CREAT BLD: 17 (ref 9–20)
CALCIUM SERPL-MCNC: 10.1 MG/DL (ref 8.6–10.4)
CHLORIDE BLD-SCNC: 102 MMOL/L (ref 98–107)
CO2: 26 MMOL/L (ref 20–31)
CREAT SERPL-MCNC: 1.08 MG/DL (ref 0.5–0.9)
ESTIMATED AVERAGE GLUCOSE: 114 MG/DL
GFR AFRICAN AMERICAN: >60 ML/MIN
GFR NON-AFRICAN AMERICAN: 51 ML/MIN
GFR SERPL CREATININE-BSD FRML MDRD: ABNORMAL ML/MIN/{1.73_M2}
GFR SERPL CREATININE-BSD FRML MDRD: ABNORMAL ML/MIN/{1.73_M2}
GLUCOSE BLD-MCNC: 111 MG/DL (ref 70–99)
HBA1C MFR BLD: 5.6 % (ref 4–6)
POTASSIUM SERPL-SCNC: 3.9 MMOL/L (ref 3.7–5.3)
SODIUM BLD-SCNC: 140 MMOL/L (ref 135–144)

## 2020-07-27 PROCEDURE — 80048 BASIC METABOLIC PNL TOTAL CA: CPT

## 2020-07-27 PROCEDURE — 83036 HEMOGLOBIN GLYCOSYLATED A1C: CPT

## 2020-07-27 PROCEDURE — 99214 OFFICE O/P EST MOD 30 MIN: CPT | Performed by: FAMILY MEDICINE

## 2020-07-27 PROCEDURE — 36415 COLL VENOUS BLD VENIPUNCTURE: CPT

## 2020-07-27 RX ORDER — OMEPRAZOLE 20 MG/1
CAPSULE, DELAYED RELEASE ORAL
Qty: 90 CAPSULE | Refills: 1 | Status: SHIPPED | OUTPATIENT
Start: 2020-07-27 | End: 2020-12-01 | Stop reason: SDUPTHER

## 2020-07-27 RX ORDER — LEVOTHYROXINE SODIUM 0.1 MG/1
TABLET ORAL
Qty: 90 TABLET | Refills: 1 | Status: SHIPPED | OUTPATIENT
Start: 2020-07-27 | End: 2020-12-01 | Stop reason: SDUPTHER

## 2020-07-27 RX ORDER — LORAZEPAM 0.5 MG/1
TABLET ORAL
Qty: 30 TABLET | Refills: 1 | Status: SHIPPED | OUTPATIENT
Start: 2020-07-27 | End: 2020-10-27

## 2020-07-27 RX ORDER — FAMOTIDINE 20 MG/1
TABLET, FILM COATED ORAL
Qty: 90 TABLET | Refills: 1 | Status: SHIPPED | OUTPATIENT
Start: 2020-07-27

## 2020-07-27 RX ORDER — LOSARTAN POTASSIUM 100 MG/1
TABLET ORAL
Qty: 90 TABLET | Refills: 1 | Status: SHIPPED | OUTPATIENT
Start: 2020-07-27 | End: 2020-12-01 | Stop reason: SDUPTHER

## 2020-07-27 ASSESSMENT — ENCOUNTER SYMPTOMS
EYE REDNESS: 0
VOMITING: 0
CHOKING: 0
COUGH: 0
BLURRED VISION: 0
DIARRHEA: 0
SHORTNESS OF BREATH: 0
BLOOD IN STOOL: 0
ABDOMINAL PAIN: 0
HEARTBURN: 0
SORE THROAT: 0
EYE DISCHARGE: 0
FACIAL SWELLING: 0
CONSTIPATION: 0
NAUSEA: 0

## 2020-07-27 NOTE — PROGRESS NOTES
HPI Notes    Name: Alessandra Miller  : 1955        Chief Complaint:     Chief Complaint   Patient presents with    Hypertension    Gastroesophageal Reflux    Hypothyroidism     20  TSH - 1.82    Anxiety     chronic but stable, Pt takes Ativan BID prn       History of Present Illness:     Alessandra Miller is a 59 y.o.  female who presents with Hypertension; Gastroesophageal Reflux; Hypothyroidism (20  TSH - 1.82); and Anxiety (chronic but stable, Pt takes Ativan BID prn)      Hypertension   This is a chronic problem. The current episode started more than 1 year ago. The problem is unchanged. The problem is controlled. Pertinent negatives include no blurred vision, chest pain, headaches, neck pain, palpitations, peripheral edema or shortness of breath. There are no associated agents to hypertension. Risk factors for coronary artery disease include obesity and post-menopausal state. The current treatment provides significant improvement. Gastroesophageal Reflux   She reports no abdominal pain, no chest pain, no choking, no coughing, no dysphagia, no heartburn, no nausea or no sore throat. This is a chronic problem. The current episode started more than 1 year ago. The problem has been unchanged. Pertinent negatives include no fatigue, melena or weight loss. She has tried a PPI and a histamine-2 antagonist (pepcid is only if needed) for the symptoms. The treatment provided significant relief. Hypothyroidism- Chronic/stable, Patient denies changes in weight,bowels,palpitations. Energy is good Last TSH 1.82 20. Anxiety - chronic but stable. Pt is doing well and pt only takes the Ativan once or twice per month. No concerns.     Hyperglycemia - chronic but stable and pt has had elevated sugars fasting in the past.   Past Medical History:     Past Medical History:   Diagnosis Date    Cerebrovascular disease     mini stroke may 2012    Chronic back pain     GERD (gastroesophageal reflux disease)     Hypertension     Hypothyroidism     Osteoarthritis     knee and back    Osteopenia 1/4/2016      Reviewed all health maintenance requirements and ordered appropriate tests  Health Maintenance Due   Topic Date Due    Hepatitis C screen  1955    HIV screen  11/30/1970    DTaP/Tdap/Td vaccine (1 - Tdap) 11/30/1974    Shingles Vaccine (1 of 2) 11/30/2005       Past Surgical History:     Past Surgical History:   Procedure Laterality Date    APPENDECTOMY      BREAST BIOPSY      CHOLECYSTECTOMY      HYSTERECTOMY      still Rt ovary present    JOINT REPLACEMENT  08/07/2018    Rt knee        Medications:       Prior to Admission medications    Medication Sig Start Date End Date Taking? Authorizing Provider   famotidine (PEPCID) 20 MG tablet TAKE 1 TABLET DAILY WITH SUPPER 7/27/20  Yes Angelito Camarena MD   levothyroxine (SYNTHROID) 100 MCG tablet TAKE 1 TABLET DAILY 7/27/20  Yes Angelito Camarena MD   losartan (COZAAR) 100 MG tablet TAKE 1 TABLET DAILY 7/27/20  Yes Angelito Camarena MD   omeprazole (PRILOSEC) 20 MG delayed release capsule TAKE 1 CAPSULE EVERY MORNING BEFORE BREAKFAST 7/27/20  Yes Angelito Camarena MD   UNABLE TO FIND Gabapentin-Ketoprofen amitriptyline HCL 10-10-2% neuroserum liquid  Apply small amt to affected area and rub for 10 minutes. Repeat 2-3 times daily 7/27/20  Yes Angelito Camarena MD   LORazepam (ATIVAN) 0.5 MG tablet Take 1 tablet twice daily as needed for anxiety.  7/27/20 10/27/20 Yes Angelito Camarena MD   hydroCHLOROthiazide (HYDRODIURIL) 25 MG tablet Take 1/2 tab po daily 6/20/20  Yes Angelito Camarena MD   loratadine (CLARITIN) 10 MG tablet Take 10 mg by mouth daily   Yes Historical Provider, MD   vitamin D (ERGOCALCIFEROL) 43394 units CAPS capsule Take 50,000 Units by mouth Twice a Week  1/24/19  Yes Historical Provider, MD   Vitamin Mixture (VITAMIN E COMPLETE PO) Po qd   Yes Historical Provider, MD   clopidogrel (PLAVIX) 75 MG tablet Take 1 tablet by mouth daily. 2/21/15  Yes Susan Candelario MD   folic acid (FOLVITE) 1 MG tablet Take 1 mg by mouth daily. Yes Historical Provider, MD JACOME 10 % SOLN COLLINS EXT AA D 1/17/20   Historical Provider, MD   amoxicillin (AMOXIL) 500 MG capsule take 4 capsules by mouth 1 hour prior to dental appointment and 3...  (REFER TO PRESCRIPTION NOTES). 12/6/19   Historical Provider, MD        Allergies:       Flonase [fluticasone propionate] and Other    Social History:     Tobacco:    reports that she has never smoked. She has never used smokeless tobacco.  Alcohol:      reports no history of alcohol use. Drug Use:  reports no history of drug use. Family History:     Family History   Problem Relation Age of Onset    Diabetes Mother     COPD Mother     Diabetes Father        Review of Systems:       Review of Systems   Constitutional: Negative for chills, fatigue, fever, unexpected weight change and weight loss. HENT: Negative for facial swelling and sore throat. Eyes: Negative for blurred vision, discharge, redness and visual disturbance. Respiratory: Negative for cough, choking and shortness of breath. Cardiovascular: Negative for chest pain and palpitations. Gastrointestinal: Negative for abdominal pain, blood in stool, constipation, diarrhea, dysphagia, heartburn, melena, nausea and vomiting. Genitourinary: Negative for dysuria and hematuria. Musculoskeletal: Negative for joint swelling and neck pain. Skin: Negative for pallor and rash. Neurological: Negative for dizziness, light-headedness and headaches. Psychiatric/Behavioral: Negative for confusion and sleep disturbance. Physical Exam:     Physical Exam  Vitals signs reviewed. Constitutional:       General: She is not in acute distress. Appearance: Normal appearance. She is well-developed. She is not ill-appearing. HENT:      Head: Normocephalic and atraumatic.    Eyes:      General:         Right eye: No discharge. Left eye: No discharge. Conjunctiva/sclera: Conjunctivae normal.      Pupils: Pupils are equal, round, and reactive to light. Neck:      Musculoskeletal: Neck supple. Thyroid: No thyromegaly. Vascular: No carotid bruit. Cardiovascular:      Rate and Rhythm: Normal rate and regular rhythm. Heart sounds: Normal heart sounds. No murmur. Pulmonary:      Effort: Pulmonary effort is normal.      Breath sounds: Normal breath sounds. Abdominal:      General: Bowel sounds are normal.      Palpations: Abdomen is soft. Tenderness: There is no abdominal tenderness. Musculoskeletal:      Right lower leg: No edema. Left lower leg: No edema. Lymphadenopathy:      Cervical: No cervical adenopathy. Skin:     Findings: No erythema or rash. Neurological:      General: No focal deficit present. Mental Status: She is alert and oriented to person, place, and time.    Psychiatric:         Mood and Affect: Mood normal.         Behavior: Behavior normal.         Vitals:  /80   Pulse 88   Ht 5' 6\" (1.676 m)   Wt 227 lb (103 kg)   SpO2 96%   BMI 36.64 kg/m²       Data:     Lab Results   Component Value Date     01/27/2020    K 4.3 01/27/2020    CL 97 01/27/2020    CO2 24 01/27/2020    BUN 20 01/27/2020    CREATININE 1.21 01/27/2020    GLUCOSE 118 01/27/2020    GLUCOSE 97 01/25/2012    PROT 6.5 11/24/2017    LABALBU 3.9 11/24/2017    LABALBU 4.4 01/25/2012    BILITOT 0.39 11/24/2017    ALKPHOS 65 11/24/2017    AST 15 11/24/2017    ALT 10 11/24/2017     Lab Results   Component Value Date    WBC 5.2 01/27/2020    RBC 4.77 01/27/2020    RBC 4.92 01/25/2012    HGB 13.6 01/27/2020    HCT 40.5 01/27/2020    MCV 85.0 01/27/2020    MCH 28.6 01/27/2020    MCHC 33.6 01/27/2020    RDW 15.0 01/27/2020     01/27/2020     01/25/2012    MPV NOT REPORTED 01/27/2020     Lab Results   Component Value Date    TSH 1.82 01/27/2020     Lab Results   Component Value Date    CHOL 158 11/24/2017    HDL 73 11/24/2017    LABA1C 5.4 07/31/2018          Assessment/Plan:        1. Essential hypertension, benign  Stable on cozaar  - losartan (COZAAR) 100 MG tablet; TAKE 1 TABLET DAILY  Dispense: 90 tablet; Refill: 1    2. Gastroesophageal reflux disease without esophagitis  Stable on prilosec and PRN pepcid     3. Other specified hypothyroidism  Stable on synthroid  - levothyroxine (SYNTHROID) 100 MCG tablet; TAKE 1 TABLET DAILY  Dispense: 90 tablet; Refill: 1    4. Anxiety state  Uses the ativan PRN -- maybe 2 times per month  - LORazepam (ATIVAN) 0.5 MG tablet; Take 1 tablet twice daily as needed for anxiety. Dispense: 30 tablet; Refill: 1    5. Hyperglycemia  Pt to ck hgba1c and fasting glucose  - Hemoglobin A1C; Future        Venessa received counseling on the following healthy behaviors: nutrition and exercise  Reviewed prior labs and health maintenance  Continue current medications, diet and exercise. Discussed use, benefit, and side effects of prescribed medications. Barriers to medication compliance addressed. Patient given educational materials - see patient instructions  Was a self-tracking handout given in paper form or via [employer]? Yes    Requested Prescriptions     Signed Prescriptions Disp Refills    famotidine (PEPCID) 20 MG tablet 90 tablet 1     Sig: TAKE 1 TABLET DAILY WITH SUPPER    levothyroxine (SYNTHROID) 100 MCG tablet 90 tablet 1     Sig: TAKE 1 TABLET DAILY    losartan (COZAAR) 100 MG tablet 90 tablet 1     Sig: TAKE 1 TABLET DAILY    omeprazole (PRILOSEC) 20 MG delayed release capsule 90 capsule 1     Sig: TAKE 1 CAPSULE EVERY MORNING BEFORE BREAKFAST    UNABLE TO FIND 15 mL 11     Sig: Gabapentin-Ketoprofen amitriptyline HCL 10-10-2% neuroserum liquid  Apply small amt to affected area and rub for 10 minutes. Repeat 2-3 times daily    LORazepam (ATIVAN) 0.5 MG tablet 30 tablet 1     Sig: Take 1 tablet twice daily as needed for anxiety.        All

## 2020-08-05 ENCOUNTER — TELEPHONE (OUTPATIENT)
Dept: FAMILY MEDICINE CLINIC | Age: 65
End: 2020-08-05

## 2020-08-05 NOTE — TELEPHONE ENCOUNTER
Truong Brown said she has been dealing with a yeast infection for about a month. She has been trying to self medicate herself. It worked for a little bit but started bothering her again. She tried monistat 7 and she said it stung and burned her. She would like to know if something could be called in for her. Please let Truong Brown know.     [facility] Maintenance   Topic Date Due    Hepatitis C screen  1955    HIV screen  11/30/1970    DTaP/Tdap/Td vaccine (1 - Tdap) 11/30/1974    Shingles Vaccine (1 of 2) 11/30/2005    Flu vaccine (1) 09/01/2020    TSH testing  01/27/2021    Colon cancer screen colonoscopy  06/28/2021    Potassium monitoring  07/27/2021    Creatinine monitoring  07/27/2021    Breast cancer screen  01/31/2022    Lipid screen  11/24/2022    Diabetes screen  07/27/2023    Hepatitis A vaccine  Aged Out    Hepatitis B vaccine  Aged Out    Hib vaccine  Aged Out    Meningococcal (ACWY) vaccine  Aged Out    Pneumococcal 0-64 years Vaccine  Aged Out             (applicable per patient's age: Cancer Screenings, Depression Screening, Fall Risk Screening, Immunizations)    Hemoglobin A1C (%)   Date Value   07/27/2020 5.6   07/31/2018 5.4     LDL Cholesterol (mg/dL)   Date Value   11/24/2017 65     AST (U/L)   Date Value   11/24/2017 15     ALT (U/L)   Date Value   11/24/2017 10     BUN (mg/dL)   Date Value   07/27/2020 18      (goal A1C is < 7)   (goal LDL is <100) need 30-50% reduction from baseline     BP Readings from Last 3 Encounters:   07/27/20 120/80   02/25/20 122/82   01/27/20 120/80    (goal /80)      All Future Testing planned in [facility]:  Lab Frequency Next Occurrence   XR CHEST STANDARD (2 VW) Once 01/26/2021       Next Visit Date:  Future Appointments   Date Time Provider Claire Garcia   1/27/2021 10:00 AM MD Yohana Escalera [facility]            Patient Active Problem List:     Anxiety state     Hypothyroidism     Essential hypertension, benign     Esophageal reflux     Carpal tunnel syndrome     TIA (transient ischemic attack)     Chronic back pain     Osteopenia

## 2020-08-06 ENCOUNTER — HOSPITAL ENCOUNTER (OUTPATIENT)
Age: 65
Setting detail: SPECIMEN
Discharge: HOME | End: 2020-08-06
Payer: COMMERCIAL

## 2020-08-06 ENCOUNTER — OFFICE VISIT (OUTPATIENT)
Dept: FAMILY MEDICINE CLINIC | Age: 65
End: 2020-08-06
Payer: COMMERCIAL

## 2020-08-06 VITALS — SYSTOLIC BLOOD PRESSURE: 140 MMHG | DIASTOLIC BLOOD PRESSURE: 90 MMHG

## 2020-08-06 PROCEDURE — 87086 URINE CULTURE/COLONY COUNT: CPT

## 2020-08-06 PROCEDURE — 99213 OFFICE O/P EST LOW 20 MIN: CPT | Performed by: FAMILY MEDICINE

## 2020-08-06 PROCEDURE — 81002 URINALYSIS NONAUTO W/O SCOPE: CPT | Performed by: FAMILY MEDICINE

## 2020-08-06 RX ORDER — FLUCONAZOLE 150 MG/1
150 TABLET ORAL ONCE
Qty: 1 TABLET | Refills: 1 | Status: SHIPPED | OUTPATIENT
Start: 2020-08-06 | End: 2020-09-08 | Stop reason: SDUPTHER

## 2020-08-06 RX ORDER — CEPHALEXIN 500 MG/1
500 CAPSULE ORAL 3 TIMES DAILY
Qty: 21 CAPSULE | Refills: 0 | Status: SHIPPED | OUTPATIENT
Start: 2020-08-06 | End: 2020-08-13

## 2020-08-06 ASSESSMENT — ENCOUNTER SYMPTOMS
EYE DISCHARGE: 0
CONSTIPATION: 0
EYE REDNESS: 0
ABDOMINAL PAIN: 0
DIARRHEA: 0
VOMITING: 0

## 2020-08-06 NOTE — PROGRESS NOTES
HPI Notes    Name: Mila Garibay  : 1955        Chief Complaint:     Chief Complaint   Patient presents with    Urinary Frequency     Pt thought she had yeast infection. Pt used Monistat suppositories for 3 days, vaginal itching cleared but it returned a couple days later. Pt tried Monsitat 7 Pt states it coused severe burning. Yesterday Pt felt like she had to urinate all the time. History of Present Illness:     Mila Garibay is a 59 y.o.  female who presents with Urinary Frequency (Pt thought she had yeast infection. Pt used Monistat suppositories for 3 days, vaginal itching cleared but it returned a couple days later. Pt tried Monsitat 7 Pt states it coused severe burning. Yesterday Pt felt like she had to urinate all the time.)      HPI  Urinary frequency - pt has some itching but no burning about a week or so ago. Pt tried some OTC monistat supp which helped some. The itching was better but then returned and she tried Monistat 7 which didn't help and Yesterday started with urinary frequency and the lower abdominal pressure. To me she denies and burning sensation. No F/C/N/V. No back pain.     Past Medical History:     Past Medical History:   Diagnosis Date    Cerebrovascular disease     mini stroke may 2012    Chronic back pain     GERD (gastroesophageal reflux disease)     Hypertension     Hypothyroidism     Osteoarthritis     knee and back    Osteopenia 2016      Reviewed all health maintenance requirements and ordered appropriate tests  Health Maintenance Due   Topic Date Due    Hepatitis C screen  1955    HIV screen  1970    DTaP/Tdap/Td vaccine (1 - Tdap) 1974    Shingles Vaccine (1 of 2) 2005       Past Surgical History:     Past Surgical History:   Procedure Laterality Date    APPENDECTOMY      BREAST BIOPSY      CHOLECYSTECTOMY      HYSTERECTOMY      still Rt ovary present    JOINT REPLACEMENT  2018    Rt knee Medications:       Prior to Admission medications    Medication Sig Start Date End Date Taking? Authorizing Provider   cephALEXin (KEFLEX) 500 MG capsule Take 1 capsule by mouth 3 times daily for 7 days 8/6/20 8/13/20 Yes Kiera Gaston MD   fluconazole (DIFLUCAN) 150 MG tablet Take 1 tablet by mouth once for 1 dose 8/6/20 8/6/20 Yes Kiera Gaston MD   famotidine (PEPCID) 20 MG tablet TAKE 1 TABLET DAILY WITH SUPPER 7/27/20  Yes Kiera Gaston MD   levothyroxine (SYNTHROID) 100 MCG tablet TAKE 1 TABLET DAILY 7/27/20  Yes Kiera Gaston MD   losartan (COZAAR) 100 MG tablet TAKE 1 TABLET DAILY 7/27/20  Yes Kiera Gaston MD   omeprazole (PRILOSEC) 20 MG delayed release capsule TAKE 1 CAPSULE EVERY MORNING BEFORE BREAKFAST 7/27/20  Yes Kiera Gaston MD   UNABLE TO FIND Gabapentin-Ketoprofen amitriptyline HCL 10-10-2% neuroserum liquid  Apply small amt to affected area and rub for 10 minutes. Repeat 2-3 times daily 7/27/20  Yes Kiera Gaston MD   LORazepam (ATIVAN) 0.5 MG tablet Take 1 tablet twice daily as needed for anxiety. 7/27/20 10/27/20 Yes Kiera Gaston MD   hydroCHLOROthiazide (HYDRODIURIL) 25 MG tablet Take 1/2 tab po daily 6/20/20  Yes Kiera Gaston MD   loratadine (CLARITIN) 10 MG tablet Take 10 mg by mouth daily   Yes Historical Provider, MD   vitamin D (ERGOCALCIFEROL) 08363 units CAPS capsule Take 50,000 Units by mouth Twice a Week  1/24/19  Yes Historical Provider, MD   Vitamin Mixture (VITAMIN E COMPLETE PO) Po qd   Yes Historical Provider, MD   clopidogrel (PLAVIX) 75 MG tablet Take 1 tablet by mouth daily. 2/21/15  Yes Jen Almendarez MD   folic acid (FOLVITE) 1 MG tablet Take 1 mg by mouth daily. Yes Historical Provider, MD JACOME 10 % SOLN COLLINS EXT AA D 1/17/20   Historical Provider, MD   amoxicillin (AMOXIL) 500 MG capsule take 4 capsules by mouth 1 hour prior to dental appointment and 3...  (REFER TO PRESCRIPTION NOTES).  12/6/19   Historical Provider, MD Allergies:       Flonase [fluticasone propionate] and Other    Social History:     Tobacco:    reports that she has never smoked. She has never used smokeless tobacco.  Alcohol:      reports no history of alcohol use. Drug Use:  reports no history of drug use. Family History:     Family History   Problem Relation Age of Onset    Diabetes Mother     COPD Mother     Diabetes Father        Review of Systems:       Review of Systems   Constitutional: Negative for chills and fever. Eyes: Negative for discharge and redness. Gastrointestinal: Negative for abdominal pain, constipation, diarrhea and vomiting. Genitourinary: Positive for frequency. Negative for decreased urine volume, difficulty urinating, dysuria, hematuria and vaginal discharge. Physical Exam:     Physical Exam  Vitals signs reviewed. Constitutional:       General: She is not in acute distress. Appearance: Normal appearance. She is not ill-appearing. HENT:      Head: Atraumatic. Neck:      Musculoskeletal: Neck supple. Pulmonary:      Effort: Pulmonary effort is normal.   Abdominal:      General: There is no distension. Tenderness: There is no abdominal tenderness. There is no right CVA tenderness or left CVA tenderness. Comments: Pressure but non tender when palpating over suprapubic region. Neurological:      Mental Status: She is alert.          Vitals:  BP (!) 140/90       Data:     Lab Results   Component Value Date     07/27/2020    K 3.9 07/27/2020     07/27/2020    CO2 26 07/27/2020    BUN 18 07/27/2020    CREATININE 1.08 07/27/2020    GLUCOSE 111 07/27/2020    GLUCOSE 97 01/25/2012    PROT 6.5 11/24/2017    LABALBU 3.9 11/24/2017    LABALBU 4.4 01/25/2012    BILITOT 0.39 11/24/2017    ALKPHOS 65 11/24/2017    AST 15 11/24/2017    ALT 10 11/24/2017     Lab Results   Component Value Date    WBC 5.2 01/27/2020    RBC 4.77 01/27/2020    RBC 4.92 01/25/2012    HGB 13.6 01/27/2020    HCT 40.5

## 2020-08-07 LAB
BILIRUBIN, POC: NORMAL
BLOOD URINE, POC: NORMAL
CLARITY, POC: CLEAR
COLOR, POC: YELLOW
CULTURE: NORMAL
GLUCOSE URINE, POC: NORMAL
KETONES, POC: NORMAL
LEUKOCYTE EST, POC: NORMAL
Lab: NORMAL
NITRITE, POC: NORMAL
PH, POC: 6.5
PROTEIN, POC: NORMAL
SPECIFIC GRAVITY, POC: 1.02
SPECIMEN DESCRIPTION: NORMAL
UROBILINOGEN, POC: 0.2

## 2020-08-17 ENCOUNTER — TELEPHONE (OUTPATIENT)
Dept: FAMILY MEDICINE CLINIC | Age: 65
End: 2020-08-17

## 2020-08-17 NOTE — TELEPHONE ENCOUNTER
The Diflucan can slightly decrease the efficacy of Plavix, but since it is just 1 dose it's ok to use
notified
by mouth daily.       Historical Provider, MD       Allergies:  Flonase [fluticasone propionate] and Other    Hemoglobin A1C (%)   Date Value   07/27/2020 5.6   07/31/2018 5.4             ( goal A1C is < 7)   No results found for: LABMICR  LDL Cholesterol (mg/dL)   Date Value   11/24/2017 65   02/27/2016 87       (goal LDL is <100)   AST (U/L)   Date Value   11/24/2017 15     ALT (U/L)   Date Value   11/24/2017 10     BUN (mg/dL)   Date Value   07/27/2020 18     BP Readings from Last 3 Encounters:   08/06/20 (!) 140/90   07/27/20 120/80   02/25/20 122/82          (goal 120/80)

## 2020-09-02 ENCOUNTER — TELEPHONE (OUTPATIENT)
Dept: FAMILY MEDICINE CLINIC | Age: 65
End: 2020-09-02

## 2020-09-02 NOTE — TELEPHONE ENCOUNTER
Patient wanting to know if she could get another Diflucan - patient uses [facility] Maintenance   Topic Date Due    Hepatitis C screen  1955    HIV screen  11/30/1970    DTaP/Tdap/Td vaccine (1 - Tdap) 11/30/1974    Shingles Vaccine (1 of 2) 11/30/2005    Flu vaccine (1) 09/01/2020    TSH testing  01/27/2021    Colon cancer screen colonoscopy  06/28/2021    Potassium monitoring  07/27/2021    Creatinine monitoring  07/27/2021    Breast cancer screen  01/31/2022    Lipid screen  11/24/2022    Diabetes screen  07/27/2023    Hepatitis A vaccine  Aged Out    Hepatitis B vaccine  Aged Out    Hib vaccine  Aged Out    Meningococcal (ACWY) vaccine  Aged Out    Pneumococcal 0-64 years Vaccine  Aged Out             (applicable per patient's age: Cancer Screenings, Depression Screening, Fall Risk Screening, Immunizations)    Hemoglobin A1C (%)   Date Value   07/27/2020 5.6   07/31/2018 5.4     LDL Cholesterol (mg/dL)   Date Value   11/24/2017 65     AST (U/L)   Date Value   11/24/2017 15     ALT (U/L)   Date Value   11/24/2017 10     BUN (mg/dL)   Date Value   07/27/2020 18      (goal A1C is < 7)   (goal LDL is <100) need 30-50% reduction from baseline     BP Readings from Last 3 Encounters:   08/06/20 (!) 140/90   07/27/20 120/80   02/25/20 122/82    (goal /80)      All Future Testing planned in [facility]:  Lab Frequency Next Occurrence   XR CHEST STANDARD (2 VW) Once 01/26/2021       Next Visit Date:  Future Appointments   Date Time Provider Claire Garcia   1/27/2021 10:00 AM German Hernandez MD [facility]            Patient Active Problem List:     Anxiety state     Hypothyroidism     Essential hypertension, benign     Esophageal reflux     Carpal tunnel syndrome     TIA (transient ischemic attack)     Chronic back pain     Osteopenia

## 2020-09-08 ENCOUNTER — TELEPHONE (OUTPATIENT)
Dept: FAMILY MEDICINE CLINIC | Age: 65
End: 2020-09-08

## 2020-09-08 RX ORDER — FLUCONAZOLE 150 MG/1
150 TABLET ORAL ONCE
Qty: 1 TABLET | Refills: 1 | Status: SHIPPED | OUTPATIENT
Start: 2020-09-08 | End: 2020-09-08

## 2020-09-08 NOTE — TELEPHONE ENCOUNTER
Patient asking for another Diflucan - patient uses [facility] - or does patient need to be seen - please let patient know    Health Maintenance   Topic Date Due    Hepatitis C screen  1955    HIV screen  11/30/1970    DTaP/Tdap/Td vaccine (1 - Tdap) 11/30/1974    Shingles Vaccine (1 of 2) 11/30/2005    Flu vaccine (1) 09/01/2020    TSH testing  01/27/2021    Colon cancer screen colonoscopy  06/28/2021    Potassium monitoring  07/27/2021    Creatinine monitoring  07/27/2021    Breast cancer screen  01/31/2022    Lipid screen  11/24/2022    Diabetes screen  07/27/2023    Hepatitis A vaccine  Aged Out    Hepatitis B vaccine  Aged Out    Hib vaccine  Aged Out    Meningococcal (ACWY) vaccine  Aged Out    Pneumococcal 0-64 years Vaccine  Aged Out             (applicable per patient's age: Cancer Screenings, Depression Screening, Fall Risk Screening, Immunizations)    Hemoglobin A1C (%)   Date Value   07/27/2020 5.6   07/31/2018 5.4     LDL Cholesterol (mg/dL)   Date Value   11/24/2017 65     AST (U/L)   Date Value   11/24/2017 15     ALT (U/L)   Date Value   11/24/2017 10     BUN (mg/dL)   Date Value   07/27/2020 18      (goal A1C is < 7)   (goal LDL is <100) need 30-50% reduction from baseline     BP Readings from Last 3 Encounters:   08/06/20 (!) 140/90   07/27/20 120/80   02/25/20 122/82    (goal /80)      All Future Testing planned in [facility]:  Lab Frequency Next Occurrence   XR CHEST STANDARD (2 VW) Once 01/26/2021       Next Visit Date:  Future Appointments   Date Time Provider Claire Garcia   1/27/2021 10:00 AM [facility]            Patient Active Problem List:     Anxiety state     Hypothyroidism     Essential hypertension, benign     Esophageal reflux     Carpal tunnel syndrome     TIA (transient ischemic attack)     Chronic back pain     Osteopenia

## 2020-12-01 RX ORDER — LOSARTAN POTASSIUM 100 MG/1
TABLET ORAL
Qty: 90 TABLET | Refills: 1 | Status: SHIPPED | OUTPATIENT
Start: 2020-12-01 | End: 2021-08-05

## 2020-12-01 RX ORDER — LEVOTHYROXINE SODIUM 0.1 MG/1
TABLET ORAL
Qty: 90 TABLET | Refills: 1 | Status: SHIPPED | OUTPATIENT
Start: 2020-12-01 | End: 2021-10-11

## 2020-12-01 RX ORDER — OMEPRAZOLE 20 MG/1
CAPSULE, DELAYED RELEASE ORAL
Qty: 90 CAPSULE | Refills: 1 | Status: SHIPPED | OUTPATIENT
Start: 2020-12-01 | End: 2021-08-05

## 2020-12-01 NOTE — TELEPHONE ENCOUNTER
Last OV: 8/6/2020 Urinary frequency. Last RX:   Next scheduled apt: 1/27/2021        Patient called in requesting refills of Levothyroxine, Losartan, and Omeprazole. Rx's pending.

## 2020-12-29 ENCOUNTER — TELEPHONE (OUTPATIENT)
Dept: FAMILY MEDICINE CLINIC | Age: 65
End: 2020-12-29

## 2020-12-29 RX ORDER — FLUCONAZOLE 150 MG/1
150 TABLET ORAL ONCE
Qty: 2 TABLET | Refills: 0 | Status: SHIPPED | OUTPATIENT
Start: 2020-12-29 | End: 2020-12-29

## 2020-12-29 NOTE — TELEPHONE ENCOUNTER
Patient would like to know if something can be called in for her for yeast infection? Patient last seen 8/06/20. Please let patient know.     [address] Maintenance   Topic Date Due    Hepatitis C screen  1955    HIV screen  11/30/1970    DTaP/Tdap/Td vaccine (1 - Tdap) 11/30/1974    Shingles Vaccine (1 of 2) 11/30/2005    DEXA (modify frequency per FRAX score)  11/30/2010    Pneumococcal 65+ years Vaccine (1 of 1 - PPSV23) 11/30/2020    TSH testing  01/27/2021    Colon cancer screen colonoscopy  06/28/2021    Potassium monitoring  07/27/2021    Creatinine monitoring  07/27/2021    Breast cancer screen  01/31/2022    Lipid screen  11/24/2022    Diabetes screen  07/27/2023    Flu vaccine  Completed    Hepatitis A vaccine  Aged Out    Hepatitis B vaccine  Aged Out    Hib vaccine  Aged Out    Meningococcal (ACWY) vaccine  Aged Out             (applicable per patient's age: Cancer Screenings, Depression Screening, Fall Risk Screening, Immunizations)    Hemoglobin A1C (%)   Date Value   07/27/2020 5.6   07/31/2018 5.4     LDL Cholesterol (mg/dL)   Date Value   11/24/2017 65     AST (U/L)   Date Value   11/24/2017 15     ALT (U/L)   Date Value   11/24/2017 10     BUN (mg/dL)   Date Value   07/27/2020 18      (goal A1C is < 7)   (goal LDL is <100) need 30-50% reduction from baseline     BP Readings from Last 3 Encounters:   08/06/20 (!) 140/90   07/27/20 120/80   02/25/20 122/82    (goal /80)      All Future Testing planned in [facility]:  Lab Frequency Next Occurrence   XR CHEST STANDARD (2 VW) Once 01/26/2021       Next Visit Date:  Future Appointments   Date Time Provider Claire Garcia   1/27/2021 10:00 AM [facility]            Patient Active Problem List:     Anxiety state     Hypothyroidism     Essential hypertension, benign     Esophageal reflux     Carpal tunnel syndrome     TIA (transient ischemic attack)     Chronic back pain

## 2021-01-20 ENCOUNTER — OFFICE VISIT (OUTPATIENT)
Dept: FAMILY MEDICINE CLINIC | Age: 66
End: 2021-01-20
Payer: MEDICARE

## 2021-01-20 VITALS
HEIGHT: 66 IN | WEIGHT: 224 LBS | HEART RATE: 84 BPM | DIASTOLIC BLOOD PRESSURE: 70 MMHG | OXYGEN SATURATION: 98 % | BODY MASS INDEX: 36 KG/M2 | SYSTOLIC BLOOD PRESSURE: 112 MMHG

## 2021-01-20 DIAGNOSIS — E28.39 OVARIAN FAILURE: ICD-10-CM

## 2021-01-20 DIAGNOSIS — E03.8 OTHER SPECIFIED HYPOTHYROIDISM: ICD-10-CM

## 2021-01-20 DIAGNOSIS — I10 ESSENTIAL HYPERTENSION, BENIGN: Primary | ICD-10-CM

## 2021-01-20 DIAGNOSIS — M19.90 ARTHRITIS: ICD-10-CM

## 2021-01-20 DIAGNOSIS — Z23 NEED FOR PNEUMOCOCCAL VACCINATION: ICD-10-CM

## 2021-01-20 DIAGNOSIS — K21.9 GASTROESOPHAGEAL REFLUX DISEASE WITHOUT ESOPHAGITIS: ICD-10-CM

## 2021-01-20 PROCEDURE — G8427 DOCREV CUR MEDS BY ELIG CLIN: HCPCS | Performed by: FAMILY MEDICINE

## 2021-01-20 PROCEDURE — G8482 FLU IMMUNIZE ORDER/ADMIN: HCPCS | Performed by: FAMILY MEDICINE

## 2021-01-20 PROCEDURE — 4040F PNEUMOC VAC/ADMIN/RCVD: CPT | Performed by: FAMILY MEDICINE

## 2021-01-20 PROCEDURE — 3288F FALL RISK ASSESSMENT DOCD: CPT | Performed by: FAMILY MEDICINE

## 2021-01-20 PROCEDURE — 3017F COLORECTAL CA SCREEN DOC REV: CPT | Performed by: FAMILY MEDICINE

## 2021-01-20 PROCEDURE — 90732 PPSV23 VACC 2 YRS+ SUBQ/IM: CPT | Performed by: FAMILY MEDICINE

## 2021-01-20 PROCEDURE — G8400 PT W/DXA NO RESULTS DOC: HCPCS | Performed by: FAMILY MEDICINE

## 2021-01-20 PROCEDURE — 99214 OFFICE O/P EST MOD 30 MIN: CPT | Performed by: FAMILY MEDICINE

## 2021-01-20 PROCEDURE — 1123F ACP DISCUSS/DSCN MKR DOCD: CPT | Performed by: FAMILY MEDICINE

## 2021-01-20 PROCEDURE — G8510 SCR DEP NEG, NO PLAN REQD: HCPCS | Performed by: FAMILY MEDICINE

## 2021-01-20 PROCEDURE — G0009 ADMIN PNEUMOCOCCAL VACCINE: HCPCS | Performed by: FAMILY MEDICINE

## 2021-01-20 PROCEDURE — 1090F PRES/ABSN URINE INCON ASSESS: CPT | Performed by: FAMILY MEDICINE

## 2021-01-20 PROCEDURE — 1036F TOBACCO NON-USER: CPT | Performed by: FAMILY MEDICINE

## 2021-01-20 PROCEDURE — G8417 CALC BMI ABV UP PARAM F/U: HCPCS | Performed by: FAMILY MEDICINE

## 2021-01-20 RX ORDER — LORAZEPAM 0.5 MG/1
TABLET ORAL
COMMUNITY
Start: 2020-11-30 | End: 2021-07-21 | Stop reason: SDUPTHER

## 2021-01-20 RX ORDER — HYDROCHLOROTHIAZIDE 25 MG/1
TABLET ORAL
Qty: 90 TABLET | Refills: 1 | Status: SHIPPED | OUTPATIENT
Start: 2021-01-20 | End: 2022-02-07

## 2021-01-20 ASSESSMENT — ENCOUNTER SYMPTOMS
FACIAL SWELLING: 0
SORE THROAT: 0
CHOKING: 0
NAUSEA: 0
SHORTNESS OF BREATH: 0
COUGH: 0
VOMITING: 0
EYE DISCHARGE: 0
HEARTBURN: 0
DIARRHEA: 0
EYE REDNESS: 0
ABDOMINAL PAIN: 0

## 2021-01-20 ASSESSMENT — PATIENT HEALTH QUESTIONNAIRE - PHQ9
SUM OF ALL RESPONSES TO PHQ QUESTIONS 1-9: 0
1. LITTLE INTEREST OR PLEASURE IN DOING THINGS: 0
SUM OF ALL RESPONSES TO PHQ9 QUESTIONS 1 & 2: 0

## 2021-01-20 NOTE — PROGRESS NOTES
HPI Notes    Name: Alba Johnston  : 1955        Chief Complaint:     Chief Complaint   Patient presents with    Hypertension    Gastroesophageal Reflux    Arthritis     follows with rheumatology, Dr Alec Li, rx tramadol    Hypothyroidism       History of Present Illness:     Alba Johnston is a 72 y.o.  female who presents with Hypertension, Gastroesophageal Reflux, Arthritis (follows with rheumatology, Dr Alec Li, rx tramadol), and Hypothyroidism      Hypertension  This is a chronic problem. The current episode started more than 1 year ago. The problem is controlled. Pertinent negatives include no chest pain, palpitations, peripheral edema or shortness of breath. There are no associated agents to hypertension. The current treatment provides significant improvement. Gastroesophageal Reflux  She reports no abdominal pain, no chest pain, no choking, no coughing, no dysphagia, no heartburn, no nausea or no sore throat. This is a chronic problem. The current episode started more than 1 year ago. The problem has been unchanged. Pertinent negatives include no fatigue, melena or weight loss. She has tried a PPI (occ pt takes a pepcid ) for the symptoms. The treatment provided significant relief. Hypothyroidism- Chronic/stable, Patient denies changes in weight,bowels,palpitations. Energy is good Last TSH was One year ago in 2020. Arthritis - pt is having the Lt knee replaced on  down in [address] [place] [address] facility. Pt will stay overnight. Pt still see Dr Alec Li, rheumatology and has some tramadol which she takes only about once a month when can't sleep due to pain.      Past Medical History:     Past Medical History:   Diagnosis Date    Cerebrovascular disease     mini stroke may 2012    Chronic back pain     GERD (gastroesophageal reflux disease)     Hypertension     Hypothyroidism     Osteoarthritis     knee and back    Osteopenia 2016      Reviewed all health maintenance requirements and ordered appropriate tests  Health Maintenance Due   Topic Date Due    Hepatitis C screen  1955    HIV screen  11/30/1970    DTaP/Tdap/Td vaccine (1 - Tdap) 11/30/1974    Shingles Vaccine (1 of 2) 11/30/2005    DEXA (modify frequency per FRAX score)  11/30/2010    TSH testing  01/27/2021       Past Surgical History:     Past Surgical History:   Procedure Laterality Date    APPENDECTOMY      BREAST BIOPSY      CHOLECYSTECTOMY      HYSTERECTOMY      still Rt ovary present    JOINT REPLACEMENT  08/07/2018    Rt knee        Medications:       Prior to Admission medications    Medication Sig Start Date End Date Taking? Authorizing Provider   hydroCHLOROthiazide (HYDRODIURIL) 25 MG tablet Take 1/2 tab po daily 1/20/21  Yes Violeta Singh MD   levothyroxine (SYNTHROID) 100 MCG tablet TAKE 1 TABLET DAILY 12/1/20  Yes Violeta Singh MD   losartan (COZAAR) 100 MG tablet TAKE 1 TABLET DAILY 12/1/20  Yes Violeta Singh MD   omeprazole (PRILOSEC) 20 MG delayed release capsule TAKE 1 CAPSULE EVERY MORNING BEFORE BREAKFAST 12/1/20  Yes Violeta Singh MD   UNABLE TO FIND Gabapentin-Ketoprofen amitriptyline HCL 10-10-2% neuroserum liquid  Apply small amt to affected area and rub for 10 minutes. Repeat 2-3 times daily 7/27/20  Yes Violeta Singh MD   loratadine (CLARITIN) 10 MG tablet Take 10 mg by mouth daily   Yes Historical Provider, MD   vitamin D (ERGOCALCIFEROL) 33567 units CAPS capsule Take 50,000 Units by mouth Twice a Week  1/24/19  Yes Historical Provider, MD   Vitamin Mixture (VITAMIN E COMPLETE PO) Po qd   Yes Historical Provider, MD   clopidogrel (PLAVIX) 75 MG tablet Take 1 tablet by mouth daily.  2/21/15  Yes Randall Leger MD   LORazepam (ATIVAN) 0.5 MG tablet TAKE 1 TABLET BY MOUTH TWICE DAILY AS NEEDED FOR ANXIETY 11/30/20   Historical Provider, MD   famotidine (PEPCID) 20 MG tablet TAKE 1 TABLET DAILY WITH SUPPER 7/27/20   Violeta Singh MD folic acid (FOLVITE) 1 MG tablet Take 1 mg by mouth daily. Historical Provider, MD        Allergies:       Flonase [fluticasone propionate], Other, and Oxycodone    Social History:     Tobacco:    reports that she has never smoked. She has never used smokeless tobacco.  Alcohol:      reports no history of alcohol use. Drug Use:  reports no history of drug use. Family History:     Family History   Problem Relation Age of Onset    Diabetes Mother     COPD Mother     Diabetes Father        Review of Systems:       Review of Systems   Constitutional: Negative for chills, fatigue, fever and weight loss. HENT: Negative for facial swelling and sore throat. Eyes: Negative for discharge and redness. Respiratory: Negative for cough, choking and shortness of breath. Cardiovascular: Negative for chest pain, palpitations and leg swelling. Gastrointestinal: Negative for abdominal pain, diarrhea, dysphagia, heartburn, melena, nausea and vomiting. Genitourinary: Negative for difficulty urinating and hematuria. Musculoskeletal: Positive for arthralgias. Knee pain -- LT replacement on Feb 4th   Skin: Negative for pallor and rash. Neurological: Negative for dizziness and facial asymmetry. Psychiatric/Behavioral: Negative for sleep disturbance. Physical Exam:     Physical Exam  Vitals signs reviewed. Constitutional:       General: She is not in acute distress. Appearance: Normal appearance. She is well-developed. She is not ill-appearing. HENT:      Head: Normocephalic and atraumatic. Eyes:      General:         Right eye: No discharge. Left eye: No discharge. Conjunctiva/sclera: Conjunctivae normal.      Pupils: Pupils are equal, round, and reactive to light. Neck:      Musculoskeletal: Neck supple. Thyroid: No thyromegaly. Vascular: No carotid bruit. Cardiovascular:      Rate and Rhythm: Normal rate and regular rhythm.       Heart sounds: Normal heart sounds. No murmur. Pulmonary:      Effort: Pulmonary effort is normal. No respiratory distress. Breath sounds: Normal breath sounds. No wheezing. Abdominal:      General: Bowel sounds are normal. There is no distension. Palpations: Abdomen is soft. Tenderness: There is no abdominal tenderness. Musculoskeletal:         General: Tenderness present. Right lower leg: No edema. Left lower leg: No edema. Comments: Lt knee    Lymphadenopathy:      Cervical: No cervical adenopathy. Skin:     Findings: No erythema or rash. Neurological:      General: No focal deficit present. Mental Status: She is alert and oriented to person, place, and time. Psychiatric:         Mood and Affect: Mood normal.         Behavior: Behavior normal.         Vitals:  /70   Pulse 84   Ht 5' 6\" (1.676 m)   Wt 224 lb (101.6 kg)   SpO2 98%   BMI 36.15 kg/m²       Data:     Lab Results   Component Value Date     07/27/2020    K 3.9 07/27/2020     07/27/2020    CO2 26 07/27/2020    BUN 18 07/27/2020    CREATININE 1.08 07/27/2020    GLUCOSE 111 07/27/2020    GLUCOSE 97 01/25/2012    PROT 6.5 11/24/2017    LABALBU 3.9 11/24/2017    LABALBU 4.4 01/25/2012    BILITOT 0.39 11/24/2017    ALKPHOS 65 11/24/2017    AST 15 11/24/2017    ALT 10 11/24/2017     Lab Results   Component Value Date    WBC 5.2 01/27/2020    RBC 4.77 01/27/2020    RBC 4.92 01/25/2012    HGB 13.6 01/27/2020    HCT 40.5 01/27/2020    MCV 85.0 01/27/2020    MCH 28.6 01/27/2020    MCHC 33.6 01/27/2020    RDW 15.0 01/27/2020     01/27/2020     01/25/2012    MPV NOT REPORTED 01/27/2020     Lab Results   Component Value Date    TSH 1.82 01/27/2020     Lab Results   Component Value Date    CHOL 158 11/24/2017    HDL 73 11/24/2017    LABA1C 5.6 07/27/2020          Assessment/Plan:        1. Essential hypertension, benign  Stable on the HCTZ and Cozaar and mirian 6mos    2.  Gastroesophageal reflux disease without esophagitis  Stable on prilosec and diet and pepcid as needed    3. Other specified hypothyroidism  Pt needs TSH and so will ck and for now continue on Synthroid  - TSH without Reflex; Future    4. Arthritis  Pt to have Lt knee replace on Feb 4th in [place]    5. Need for pneumococcal vaccination  Shot given   - PNEUMOVAX 23 subcutaneous/IM (Pneumococcal polysaccharide vaccine 23-valent >= 1yo)    6. Ovarian failure  DEXA order  - DEXA BONE DENSITY 2 SITES; Future        Venessa received counseling on the following healthy behaviors: nutrition and exercise  Reviewed prior labs and health maintenance  Continue current medications, diet and exercise. Discussed use, benefit, and side effects of prescribed medications. Barriers to medication compliance addressed. Patient given educational materials - see patient instructions  Was a self-tracking handout given in paper form or via [employer]? Yes    Requested Prescriptions     Signed Prescriptions Disp Refills    hydroCHLOROthiazide (HYDRODIURIL) 25 MG tablet 90 tablet 1     Sig: Take 1/2 tab po daily       All patient questions answered. Patient voiced understanding. Quality Measures    Body mass index is 36.15 kg/m². Elevated. Weight control planned discussed Healthy diet and regular exercise. BP: 112/70. Blood pressure is normal. Treatment plan consists of No treatment change needed. Fall Risk 1/20/2021   2 or more falls in past year? no   Fall with injury in past year? no     The patient does not have a history of falls. I did not - not indicated , complete a risk assessment for falls.  A plan of care for falls No Treatment plan indicated    Lab Results   Component Value Date    LDLCHOLESTEROL 65 11/24/2017    (goal LDL reduction with dx if diabetes is 50% LDL reduction)    PHQ Scores 1/20/2021 1/27/2020 4/1/2019 11/21/2018 11/9/2017   PHQ2 Score 0 0 0 0 0   PHQ9 Score 0 0 0 0 0     Interpretation of Total Score Depression Severity: 1-4 = Minimal depression, 5-9 = Mild depression, 10-14 = Moderate depression, 15-19 = Moderately severe depression, 20-27 = Severe depression        Return in about 6 months (around 7/20/2021) for ck up .       Electronically signed by Moraima Hawkins MD on 1/20/2021 at 9:11 AM

## 2021-01-26 ENCOUNTER — TELEPHONE (OUTPATIENT)
Dept: FAMILY MEDICINE CLINIC | Age: 66
End: 2021-01-26

## 2021-01-26 NOTE — TELEPHONE ENCOUNTER
Okay for patient to leave urine.  ???? Please advise
Patient notified . States she will get it done with lab tomorrow morning due to her having labs needing done.
Yes she could come in tomorrow AM and give urine sample here or if she wants I can put in an order for her to take to the hospital lab either way---- let me know
List:     Anxiety state     Hypothyroidism     Essential hypertension, benign     Esophageal reflux     Carpal tunnel syndrome     TIA (transient ischemic attack)     Chronic back pain     Osteopenia

## 2021-01-27 ENCOUNTER — HOSPITAL ENCOUNTER (OUTPATIENT)
Dept: BONE DENSITY | Age: 66
Discharge: HOME | End: 2021-01-29
Payer: MEDICARE

## 2021-01-27 ENCOUNTER — HOSPITAL ENCOUNTER (OUTPATIENT)
Age: 66
Discharge: HOME | End: 2021-01-27
Payer: MEDICARE

## 2021-01-27 DIAGNOSIS — E03.8 OTHER SPECIFIED HYPOTHYROIDISM: ICD-10-CM

## 2021-01-27 DIAGNOSIS — R39.9 UTI SYMPTOMS: Primary | ICD-10-CM

## 2021-01-27 DIAGNOSIS — R39.9 UTI SYMPTOMS: ICD-10-CM

## 2021-01-27 DIAGNOSIS — R31.9 URINARY TRACT INFECTION WITH HEMATURIA, SITE UNSPECIFIED: ICD-10-CM

## 2021-01-27 DIAGNOSIS — N39.0 URINARY TRACT INFECTION WITH HEMATURIA, SITE UNSPECIFIED: Primary | ICD-10-CM

## 2021-01-27 DIAGNOSIS — R31.9 URINARY TRACT INFECTION WITH HEMATURIA, SITE UNSPECIFIED: Primary | ICD-10-CM

## 2021-01-27 DIAGNOSIS — E28.39 OVARIAN FAILURE: ICD-10-CM

## 2021-01-27 DIAGNOSIS — N39.0 URINARY TRACT INFECTION WITH HEMATURIA, SITE UNSPECIFIED: ICD-10-CM

## 2021-01-27 LAB
-: ABNORMAL
AMORPHOUS: ABNORMAL
BACTERIA: ABNORMAL
BILIRUBIN URINE: NEGATIVE
CASTS UA: ABNORMAL /LPF
COLOR: YELLOW
COMMENT UA: ABNORMAL
CRYSTALS, UA: ABNORMAL /HPF
EPITHELIAL CELLS UA: ABNORMAL /HPF
GLUCOSE URINE: NEGATIVE
KETONES, URINE: NEGATIVE
LEUKOCYTE ESTERASE, URINE: ABNORMAL
MUCUS: ABNORMAL
NITRITE, URINE: NEGATIVE
OTHER OBSERVATIONS UA: ABNORMAL
PH UA: 6 (ref 5–8)
PROTEIN UA: ABNORMAL
RBC UA: ABNORMAL /HPF (ref 0–2)
RENAL EPITHELIAL, UA: ABNORMAL /HPF
SPECIFIC GRAVITY UA: 1.01 (ref 1–1.03)
TRICHOMONAS: ABNORMAL
TSH SERPL DL<=0.05 MIU/L-ACNC: 2.44 MIU/L (ref 0.3–5)
TURBIDITY: ABNORMAL
URINE HGB: ABNORMAL
UROBILINOGEN, URINE: NORMAL
WBC UA: ABNORMAL /HPF
YEAST: ABNORMAL

## 2021-01-27 PROCEDURE — 87086 URINE CULTURE/COLONY COUNT: CPT

## 2021-01-27 PROCEDURE — 87077 CULTURE AEROBIC IDENTIFY: CPT

## 2021-01-27 PROCEDURE — 36415 COLL VENOUS BLD VENIPUNCTURE: CPT

## 2021-01-27 PROCEDURE — 84443 ASSAY THYROID STIM HORMONE: CPT

## 2021-01-27 PROCEDURE — 87186 SC STD MICRODIL/AGAR DIL: CPT

## 2021-01-27 PROCEDURE — 81001 URINALYSIS AUTO W/SCOPE: CPT

## 2021-01-27 PROCEDURE — 86403 PARTICLE AGGLUT ANTBDY SCRN: CPT

## 2021-01-27 PROCEDURE — 77080 DXA BONE DENSITY AXIAL: CPT

## 2021-01-27 RX ORDER — CEPHALEXIN 500 MG/1
500 CAPSULE ORAL 3 TIMES DAILY
Qty: 21 CAPSULE | Refills: 0 | Status: SHIPPED | OUTPATIENT
Start: 2021-01-27 | End: 2021-02-03

## 2021-01-29 LAB
CULTURE: ABNORMAL
CULTURE: ABNORMAL
Lab: ABNORMAL
SPECIMEN DESCRIPTION: ABNORMAL

## 2021-02-02 ENCOUNTER — TELEPHONE (OUTPATIENT)
Dept: FAMILY MEDICINE CLINIC | Age: 66
End: 2021-02-02

## 2021-02-02 DIAGNOSIS — R35.0 FREQUENCY OF URINATION: Primary | ICD-10-CM

## 2021-02-02 DIAGNOSIS — N39.0 CHRONIC UTI: ICD-10-CM

## 2021-02-02 NOTE — TELEPHONE ENCOUNTER
Patient states she is still have urinary issues - wants to know if she needs to be seen again or does she need referred to urology - thought maybe [facility] in [place] or I gave her Dr Elaine Kern name    Health Maintenance   Topic Date Due    Hepatitis C screen  1955    HIV screen  11/30/1970    DTaP/Tdap/Td vaccine (1 - Tdap) 11/30/1974    Shingles Vaccine (1 of 2) 11/30/2005    Annual Wellness Visit (AWV)  01/20/2021    Colon cancer screen colonoscopy  06/28/2021    Potassium monitoring  07/27/2021    Creatinine monitoring  07/27/2021    TSH testing  01/27/2022    Breast cancer screen  01/31/2022    Lipid screen  11/24/2022    Diabetes screen  07/27/2023    DEXA (modify frequency per FRAX score)  Completed    Flu vaccine  Completed    Pneumococcal 65+ years Vaccine  Completed    Hepatitis A vaccine  Aged Out    Hepatitis B vaccine  Aged Out    Hib vaccine  Aged Out    Meningococcal (ACWY) vaccine  Aged Out             (applicable per patient's age: Cancer Screenings, Depression Screening, Fall Risk Screening, Immunizations)    Hemoglobin A1C (%)   Date Value   07/27/2020 5.6   07/31/2018 5.4     LDL Cholesterol (mg/dL)   Date Value   11/24/2017 65     AST (U/L)   Date Value   11/24/2017 15     ALT (U/L)   Date Value   11/24/2017 10     BUN (mg/dL)   Date Value   07/27/2020 18      (goal A1C is < 7)   (goal LDL is <100) need 30-50% reduction from baseline     BP Readings from Last 3 Encounters:   01/20/21 112/70   08/06/20 (!) 140/90   07/27/20 120/80    (goal /80)      All Future Testing planned in [facility]:  Lab Frequency Next Occurrence       Next Visit Date:  Future Appointments   Date Time Provider Claire Garcia   7/20/2021  8:00 AM MD Eliane Bautista [facility]            Patient Active Problem List:     Anxiety state     Hypothyroidism     Essential hypertension, benign     Esophageal reflux     Carpal tunnel syndrome     TIA (transient ischemic attack) Chronic back pain     Osteopenia

## 2021-02-04 ENCOUNTER — HOSPITAL ENCOUNTER (OUTPATIENT)
Age: 66
Setting detail: SPECIMEN
Discharge: HOME | End: 2021-02-04
Payer: MEDICARE

## 2021-02-04 ENCOUNTER — OFFICE VISIT (OUTPATIENT)
Dept: UROLOGY | Age: 66
End: 2021-02-04
Payer: MEDICARE

## 2021-02-04 VITALS
SYSTOLIC BLOOD PRESSURE: 118 MMHG | WEIGHT: 225 LBS | HEIGHT: 66 IN | DIASTOLIC BLOOD PRESSURE: 80 MMHG | BODY MASS INDEX: 36.16 KG/M2

## 2021-02-04 DIAGNOSIS — N94.10 DYSPAREUNIA IN FEMALE: ICD-10-CM

## 2021-02-04 DIAGNOSIS — N95.2 VAGINAL ATROPHY: ICD-10-CM

## 2021-02-04 DIAGNOSIS — R35.1 NOCTURIA: ICD-10-CM

## 2021-02-04 DIAGNOSIS — N39.0 RECURRENT UTI: Primary | ICD-10-CM

## 2021-02-04 DIAGNOSIS — K59.09 OTHER CONSTIPATION: ICD-10-CM

## 2021-02-04 DIAGNOSIS — N39.3 STRESS INCONTINENCE: ICD-10-CM

## 2021-02-04 LAB
-: ABNORMAL
AMORPHOUS: ABNORMAL
BACTERIA: ABNORMAL
BILIRUBIN URINE: NEGATIVE
CASTS UA: ABNORMAL /LPF
CASTS UA: ABNORMAL /LPF
COLOR: YELLOW
COMMENT UA: ABNORMAL
CRYSTALS, UA: ABNORMAL /HPF
EPITHELIAL CELLS UA: ABNORMAL /HPF
GLUCOSE URINE: NEGATIVE
KETONES, URINE: NEGATIVE
LEUKOCYTE ESTERASE, URINE: ABNORMAL
MUCUS: ABNORMAL
NITRITE, URINE: NEGATIVE
OTHER OBSERVATIONS UA: ABNORMAL
PH UA: 6 (ref 5–8)
PROTEIN UA: ABNORMAL
RBC UA: ABNORMAL /HPF (ref 0–2)
RENAL EPITHELIAL, UA: ABNORMAL /HPF
SPECIFIC GRAVITY UA: 1.01 (ref 1–1.03)
TRICHOMONAS: ABNORMAL
TURBIDITY: CLEAR
URINE HGB: ABNORMAL
UROBILINOGEN, URINE: NORMAL
WBC UA: ABNORMAL /HPF
YEAST: ABNORMAL

## 2021-02-04 PROCEDURE — 99204 OFFICE O/P NEW MOD 45 MIN: CPT | Performed by: PHYSICIAN ASSISTANT

## 2021-02-04 PROCEDURE — G8399 PT W/DXA RESULTS DOCUMENT: HCPCS | Performed by: PHYSICIAN ASSISTANT

## 2021-02-04 PROCEDURE — G8427 DOCREV CUR MEDS BY ELIG CLIN: HCPCS | Performed by: PHYSICIAN ASSISTANT

## 2021-02-04 PROCEDURE — 1123F ACP DISCUSS/DSCN MKR DOCD: CPT | Performed by: PHYSICIAN ASSISTANT

## 2021-02-04 PROCEDURE — G8482 FLU IMMUNIZE ORDER/ADMIN: HCPCS | Performed by: PHYSICIAN ASSISTANT

## 2021-02-04 PROCEDURE — 51798 US URINE CAPACITY MEASURE: CPT | Performed by: PHYSICIAN ASSISTANT

## 2021-02-04 PROCEDURE — G8417 CALC BMI ABV UP PARAM F/U: HCPCS | Performed by: PHYSICIAN ASSISTANT

## 2021-02-04 PROCEDURE — 4040F PNEUMOC VAC/ADMIN/RCVD: CPT | Performed by: PHYSICIAN ASSISTANT

## 2021-02-04 PROCEDURE — 87086 URINE CULTURE/COLONY COUNT: CPT

## 2021-02-04 PROCEDURE — 1090F PRES/ABSN URINE INCON ASSESS: CPT | Performed by: PHYSICIAN ASSISTANT

## 2021-02-04 PROCEDURE — 1036F TOBACCO NON-USER: CPT | Performed by: PHYSICIAN ASSISTANT

## 2021-02-04 PROCEDURE — 81001 URINALYSIS AUTO W/SCOPE: CPT

## 2021-02-04 PROCEDURE — 3017F COLORECTAL CA SCREEN DOC REV: CPT | Performed by: PHYSICIAN ASSISTANT

## 2021-02-04 RX ORDER — ESTRADIOL 0.1 MG/G
CREAM VAGINAL
Qty: 1 TUBE | Refills: 2 | Status: SHIPPED | OUTPATIENT
Start: 2021-02-04 | End: 2021-07-20 | Stop reason: ALTCHOICE

## 2021-02-04 ASSESSMENT — ENCOUNTER SYMPTOMS
WHEEZING: 0
NAUSEA: 0
COLOR CHANGE: 0
COUGH: 0
BACK PAIN: 0
EYE REDNESS: 0
VOMITING: 0
CONSTIPATION: 1
ABDOMINAL PAIN: 0
SHORTNESS OF BREATH: 0

## 2021-02-04 NOTE — PATIENT INSTRUCTIONS
FOR CONSTIPATION    It is very important to have regular, soft, daily bowel movements, because it WILL improve your urinary symptoms. Take Miralax (or generic equivalent) 17g once daily (one capful). Take every day, DO NOT skip days, as it must be taken daily in order to be effective. DO NOT take just \"as needed\". It is safe to take long term and is recommended for your symptoms. If one dose daily causes loose stools/diarrhea, decrease to 1/2 capful or 1/4 capful. If you cannot tolerate this medication, please notify our office. If one dose daily of Miralax is not sufficient to produce a soft, easy to pass daily stool, you may also add an over-the-counter stool softener capsule. For example: colace (docusate). For Miralax to have maximal effectiveness, be sure to increase your water intake - aim for 80 oz daily unless you are on a fluid-restriction from another provider. BLADDER IRRITANTS     There are several changes you can make to your diet to help improve your urinary symptoms. The following have been shown to cause irritation to the bladder and should be AVOIDED if possible:  ~ Coffee (including decaffeinated)   ~ ALL Tea (including green teas and decaffeinated)  ~ Soda/Pop/carbonated beverages/Energy drinks (especially dark dyed lupe, root beers, mountain dew, etc)  ~These are MUCH worse if they have caffeine, but can also be irritative to the bladder even without caffeine  ~ Alcoholic beverages  ~ Spicy foods (peppers contain capsaicin, which is very irritating to the bladder)  ~ Acidic foods (for example: tomato based foods, orange juice, etc)    We encourage increased water intake, unless you have been placed on a fluid restriction by another provider.

## 2021-02-04 NOTE — PROGRESS NOTES
HPI:    Patient is a 72 y.o. female in no acute distress. She is alert and oriented to person, place, and time. New patient being seen here with the complaint of recurrent urinary tract infection. Referral from Dr. Cyndy Valencia. She believes that she had 3-4 infections over the past year. She notes that she was to have a knee replacement today but it got canceled due to UTI. She states that she was not having any symptoms but as part of preadmission testing they found she had a urinary tract infection. Her urine culture was positive for Klebsiella. He was given a course of Keflex -5 days- by preadmission testing. He then developed urinary symptoms including dysuria and sensation of incomplete bladder emptying. PCP did reorder urine culture and grew beta-hemolytic strep and Klebsiella. This was treated with course of culture specific Keflex. Patient has now complete this course of antibiotics and is currently not having any dysuria. She states that she does not have a daily soft bowel movement. She states that she has been under a lot of stress over the last several months due to the death of her mother. She does take some Metamucil but it is not always successful. She drinks about 40 ounces of water a day. She has an occasional Dr. Jessica Kumar but this is not frequent. She does get up to urinate 2-3 times at night. She does have some occasional urinary frequency and stress incontinence. She states that she does have some leaking at night as well. She does states she has vaginal dryness. She had a hysterectomy over 20 years ago which did spare one ovary. She states that she went through menopause shortly thereafter. She did have 2 vaginal births which were uncomplicated. She does state that she has painful intercourse. Pt voided - 10 mL, PVR - 0 mL.     Urine cultures:  1/2021 - beta heme strep and klebsiella   1/2021 - klebsiella  8/2020 - no growth  2/2020 Dequan Brady      Past Medical History: Diagnosis Date    Cerebrovascular disease     mini stroke may 2012    Chronic back pain     GERD (gastroesophageal reflux disease)     Hypertension     Hypothyroidism     Osteoarthritis     knee and back    Osteopenia 1/4/2016     Past Surgical History:   Procedure Laterality Date    APPENDECTOMY      BREAST BIOPSY      CHOLECYSTECTOMY      HYSTERECTOMY      still Rt ovary present    JOINT REPLACEMENT  08/07/2018    Rt knee     Outpatient Encounter Medications as of 2/4/2021   Medication Sig Dispense Refill    estradiol (ESTRACE VAGINAL) 0.1 MG/GM vaginal cream pea-sized amount vaginally daily x 2 wks, then 3 x per week. Use finger, wash hands after application. DO NOT use plastic applicator. 1 Tube 2    LORazepam (ATIVAN) 0.5 MG tablet TAKE 1 TABLET BY MOUTH TWICE DAILY AS NEEDED FOR ANXIETY      hydroCHLOROthiazide (HYDRODIURIL) 25 MG tablet Take 1/2 tab po daily 90 tablet 1    levothyroxine (SYNTHROID) 100 MCG tablet TAKE 1 TABLET DAILY 90 tablet 1    losartan (COZAAR) 100 MG tablet TAKE 1 TABLET DAILY 90 tablet 1    omeprazole (PRILOSEC) 20 MG delayed release capsule TAKE 1 CAPSULE EVERY MORNING BEFORE BREAKFAST 90 capsule 1    famotidine (PEPCID) 20 MG tablet TAKE 1 TABLET DAILY WITH SUPPER (Patient taking differently: Take by mouth as needed TAKE 1 TABLET DAILY WITH SUPPER) 90 tablet 1    UNABLE TO FIND Gabapentin-Ketoprofen amitriptyline HCL 10-10-2% neuroserum liquid  Apply small amt to affected area and rub for 10 minutes. Repeat 2-3 times daily 15 mL 11    loratadine (CLARITIN) 10 MG tablet Take 10 mg by mouth daily Prn      vitamin D (ERGOCALCIFEROL) 48771 units CAPS capsule Take 50,000 Units by mouth Twice a Week       Vitamin Mixture (VITAMIN E COMPLETE PO) Po qd      clopidogrel (PLAVIX) 75 MG tablet Take 1 tablet by mouth daily. 30 tablet 0    folic acid (FOLVITE) 1 MG tablet Take 1 mg by mouth daily.          No facility-administered encounter medications on file as of 2/4/2021. Current Outpatient Medications on File Prior to Visit   Medication Sig Dispense Refill    LORazepam (ATIVAN) 0.5 MG tablet TAKE 1 TABLET BY MOUTH TWICE DAILY AS NEEDED FOR ANXIETY      hydroCHLOROthiazide (HYDRODIURIL) 25 MG tablet Take 1/2 tab po daily 90 tablet 1    levothyroxine (SYNTHROID) 100 MCG tablet TAKE 1 TABLET DAILY 90 tablet 1    losartan (COZAAR) 100 MG tablet TAKE 1 TABLET DAILY 90 tablet 1    omeprazole (PRILOSEC) 20 MG delayed release capsule TAKE 1 CAPSULE EVERY MORNING BEFORE BREAKFAST 90 capsule 1    famotidine (PEPCID) 20 MG tablet TAKE 1 TABLET DAILY WITH SUPPER (Patient taking differently: Take by mouth as needed TAKE 1 TABLET DAILY WITH SUPPER) 90 tablet 1    UNABLE TO FIND Gabapentin-Ketoprofen amitriptyline HCL 10-10-2% neuroserum liquid  Apply small amt to affected area and rub for 10 minutes. Repeat 2-3 times daily 15 mL 11    loratadine (CLARITIN) 10 MG tablet Take 10 mg by mouth daily Prn      vitamin D (ERGOCALCIFEROL) 17564 units CAPS capsule Take 50,000 Units by mouth Twice a Week       Vitamin Mixture (VITAMIN E COMPLETE PO) Po qd      clopidogrel (PLAVIX) 75 MG tablet Take 1 tablet by mouth daily. 30 tablet 0    folic acid (FOLVITE) 1 MG tablet Take 1 mg by mouth daily. No current facility-administered medications on file prior to visit. Ciprofloxacin, Flonase [fluticasone propionate], Macrobid [nitrofurantoin], Other, and Oxycodone  Family History   Problem Relation Age of Onset    Diabetes Mother     COPD Mother     Diabetes Father      Social History     Tobacco Use   Smoking Status Former Smoker    Years: 10.00    Types: Cigarettes   Smokeless Tobacco Never Used       Social History     Substance and Sexual Activity   Alcohol Use No       Review of Systems   Constitutional: Negative for appetite change, chills and fever. Eyes: Negative for redness and visual disturbance.    Respiratory: Negative for cough, shortness of breath and wheezing. Cardiovascular: Negative for chest pain and leg swelling. Gastrointestinal: Positive for constipation. Negative for abdominal pain, nausea and vomiting. Genitourinary: Positive for dyspareunia. Negative for difficulty urinating, dysuria, flank pain, frequency, hematuria, pelvic pain, urgency, vaginal bleeding and vaginal discharge. Positive for recurrent urinary tract infection   Musculoskeletal: Negative for back pain, joint swelling and myalgias. Skin: Negative for color change, rash and wound. Neurological: Negative for dizziness, tremors and numbness. Hematological: Negative for adenopathy. Does not bruise/bleed easily. /80 (Site: Right Upper Arm, Position: Sitting, Cuff Size: Medium Adult)   Ht 5' 6\" (1.676 m)   Wt 225 lb (102.1 kg)   BMI 36.32 kg/m²       PHYSICAL EXAM:  Constitutional: Patient resting comfortably, in no acute distress. Neuro: Alert and oriented to person place and time. Cranial nerves grossly intact. Psych: Mood and affect normal.  Skin: Warm, dry  HEENT: normocephalic, atraumatic  Lymphatics: No palpable lymphadenopathy  Lungs: Respiratory effort normal, unlabored  Cardiovascular:  Normal peripheral pulses  Abdomen: Soft, non-tender, non-distended with no organomegaly or palpable masses. : No CVA tenderness. Bladder non-tender and not distended. Pelvic:deferred     Lab Results   Component Value Date    BUN 18 07/27/2020     Lab Results   Component Value Date    CREATININE 1.08 (H) 07/27/2020       ASSESSMENT:   Diagnosis Orders   1. Recurrent UTI     2. Vaginal atrophy  estradiol (ESTRACE VAGINAL) 0.1 MG/GM vaginal cream   3. Nocturia  NM MEASUREMENT,POST-VOID RESIDUAL VOLUME BY US,NON-IMAGING    Culture, Urine    Urinalysis with Microscopic   4. Dyspareunia in female     5. Other constipation     6.  Stress incontinence             PLAN:  Stop Metamucil    Start MiraLAX daily-instructions given    Patient has requested her urine be rechecked so she can be cleared for knee surgery. We will check a urinalysis and culture. We will call her with the results. We will start vaginal estrogen with her. We did give her instructions on how to use this medication. She is aware that when she initially starts medication she may develop some inflammation of her vaginal tissue. We encouraged her to increase her water intake. We will follow up with her in 2 to 3 months to reassess her symptoms. If her symptoms do not improve we may need to perform a cystoscopy pelvic    Once she gets her constipation under control she may be a candidate for anticholinergics and or pelvic floor rehabilitation.

## 2021-02-05 ENCOUNTER — OFFICE VISIT (OUTPATIENT)
Dept: FAMILY MEDICINE CLINIC | Age: 66
End: 2021-02-05
Payer: MEDICARE

## 2021-02-05 VITALS
BODY MASS INDEX: 36.16 KG/M2 | HEART RATE: 86 BPM | HEIGHT: 66 IN | OXYGEN SATURATION: 98 % | WEIGHT: 225 LBS | DIASTOLIC BLOOD PRESSURE: 80 MMHG | SYSTOLIC BLOOD PRESSURE: 120 MMHG

## 2021-02-05 DIAGNOSIS — Z00.00 MEDICARE WELCOME VISIT: Primary | ICD-10-CM

## 2021-02-05 LAB
CULTURE: NORMAL
Lab: NORMAL
SPECIMEN DESCRIPTION: NORMAL

## 2021-02-05 PROCEDURE — G8482 FLU IMMUNIZE ORDER/ADMIN: HCPCS | Performed by: FAMILY MEDICINE

## 2021-02-05 PROCEDURE — 1123F ACP DISCUSS/DSCN MKR DOCD: CPT | Performed by: FAMILY MEDICINE

## 2021-02-05 PROCEDURE — 3017F COLORECTAL CA SCREEN DOC REV: CPT | Performed by: FAMILY MEDICINE

## 2021-02-05 PROCEDURE — 4040F PNEUMOC VAC/ADMIN/RCVD: CPT | Performed by: FAMILY MEDICINE

## 2021-02-05 PROCEDURE — G0402 INITIAL PREVENTIVE EXAM: HCPCS | Performed by: FAMILY MEDICINE

## 2021-02-05 ASSESSMENT — PATIENT HEALTH QUESTIONNAIRE - PHQ9
SUM OF ALL RESPONSES TO PHQ9 QUESTIONS 1 & 2: 0
SUM OF ALL RESPONSES TO PHQ QUESTIONS 1-9: 0
2. FEELING DOWN, DEPRESSED OR HOPELESS: 0
SUM OF ALL RESPONSES TO PHQ QUESTIONS 1-9: 0

## 2021-02-05 ASSESSMENT — LIFESTYLE VARIABLES: HOW MANY STANDARD DRINKS CONTAINING ALCOHOL DO YOU HAVE ON A TYPICAL DAY: 0

## 2021-02-05 NOTE — PROGRESS NOTES
Medicare Annual Wellness Visit       Chelsey Hanson  YOB: 1955    Date of Service:  2/5/2021    Patient Active Problem List   Diagnosis    Anxiety state    Hypothyroidism    Essential hypertension, benign    Esophageal reflux    Carpal tunnel syndrome    TIA (transient ischemic attack)    Chronic back pain    Osteopenia       Allergies   Allergen Reactions    Ciprofloxacin Other (See Comments)     Headaches    Flonase [Fluticasone Propionate]      Smelled smoke with medication.     Macrobid [Nitrofurantoin] Nausea Only    Other Hives     Contrast for a stress test    Oxycodone Rash     hallucinations     No outpatient medications have been marked as taking for the 2/5/21 encounter (Office Visit) with Jackie Plascencia MD.       Past Medical History:   Diagnosis Date    Cerebrovascular disease     mini stroke may 2012    Chronic back pain     GERD (gastroesophageal reflux disease)     Hypertension     Hypothyroidism     Osteoarthritis     knee and back    Osteopenia 1/4/2016     Past Surgical History:   Procedure Laterality Date    APPENDECTOMY      BREAST BIOPSY      CHOLECYSTECTOMY      HYSTERECTOMY      still Rt ovary present    JOINT REPLACEMENT  08/07/2018    Rt knee     Family History   Problem Relation Age of Onset    Diabetes Mother     COPD Mother     Diabetes Father      Social History     Socioeconomic History    Marital status:      Spouse name: Not on file    Number of children: Not on file    Years of education: Not on file    Highest education level: Not on file   Occupational History    Not on file   Social Needs    Financial resource strain: Not on file    Food insecurity     Worry: Not on file     Inability: Not on file    Transportation needs     Medical: Not on file     Non-medical: Not on file   Tobacco Use    Smoking status: Former Smoker     Packs/day: 1.00     Years: 15.00     Pack years: 15.00     Types: Cigarettes Quit date: 12     Years since quittin.1    Smokeless tobacco: Never Used   Substance and Sexual Activity    Alcohol use: No    Drug use: No    Sexual activity: Not on file   Lifestyle    Physical activity     Days per week: Not on file     Minutes per session: Not on file    Stress: Not on file   Relationships    Social connections     Talks on phone: Not on file     Gets together: Not on file     Attends Tenriism service: Not on file     Active member of club or organization: Not on file     Attends meetings of clubs or organizations: Not on file     Relationship status: Not on file    Intimate partner violence     Fear of current or ex partner: Not on file     Emotionally abused: Not on file     Physically abused: Not on file     Forced sexual activity: Not on file   Other Topics Concern    Not on file   Social History Narrative    Not on file       Consultants:   Patient Care Team:  Char Gastelum MD as PCP - General (Family Medicine)  Char Gastelum [facility] PCP [facility]  Naresh Dos Santos MD (Rheumatology)    Wt Readings from Last 3 Encounters:   21 225 lb (102.1 kg)   21 225 lb (102.1 kg)   21 224 lb (101.6 kg)     BP Readings from Last 3 Encounters:   21 120/80   21 118/80   21 112/70       Pertinent Physical Exam    Vitals:    21 0818   BP: 120/80   Pulse: 86   SpO2: 98%   Weight: 225 lb (102.1 kg)   Height: 5' 6\" (1.676 m)     Body mass index is 36.32 kg/m². ROS (information related to health maintenance and screening)  10 systems reviewed and only positive for the knee pain (having replacement in near future).  No  symptoms now -- completed the antibx for a recent UTI    Physical Exam (Minimal exam needed for wellness visit)  General Appearance: alert and oriented to person, place and time, well-developed and well-nourished, in no acute distress  Skin: warm and dry, no rash or erythema  Head: normocephalic and atraumatic Eyes: pupils equal, conjunctivae normal  ENT: bilateral tympanic membrane normal, bilateral external ear normal and bilateral ear canal normal  Neck: neck supple and non tender without mass, no thyromegaly, no cervical lymphadenopathy   Pulmonary/Chest: clear to auscultation bilaterally- no wheezes, rales or rhonchi, normal air movement  Cardiovascular: normal rate, regular rhythm, no murmurs and no carotid bruits  Abdomen: soft, non-tender, non-distended, normal bowels sounds, and no masses  Pelvic: examination not indicated  Breast: not examined  Extremities: no erythema, swelling or tenderness of extremities  Musculoskeletal: normal range of motion, no joint swelling, deformity.  Lt knee pain to palpate  Neurologic: no cranial nerve deficit, gait and coordination normal, speech normal and reflexes normal and symmetric, no cranial nerve deficit, gait, normal speech normal    Current Health Maintenance Status  Health Maintenance   Topic Date Due    Hepatitis C screen  1955    HIV screen  11/30/1970    DTaP/Tdap/Td vaccine (1 - Tdap) 11/30/1974    Shingles Vaccine (1 of 2) 11/30/2005    Annual Wellness Visit (AWV)  01/20/2021    Colon cancer screen colonoscopy  06/28/2021    Potassium monitoring  07/27/2021    Creatinine monitoring  07/27/2021    TSH testing  01/27/2022    Breast cancer screen  01/31/2022    Lipid screen  11/24/2022    Diabetes screen  07/27/2023    DEXA (modify frequency per FRAX score)  Completed    Flu vaccine  Completed    Pneumococcal 65+ years Vaccine  Completed    Hepatitis A vaccine  Aged Out    Hepatitis B vaccine  Aged Out    Hib vaccine  Aged Out    Meningococcal (ACWY) vaccine  Aged [employer] AWV Workflow and Risk Assessment    Review Medicare Health Risk Assessment form completed by patient  Document vitals, height included (3 vital signs minimum)      Update Allergies and Medications    complete  Update Family and Social History (HRA #2-6) complete  Update Health Maintenance (HRA #7-13)    complete  Update Vaccines in Historical Immunizations (HRA #9-10)    complete  Update Patient Care Team- in [place] ([place] #14)     complete  Has patient seen an eye specialist within the past 2 years (HRA #7)? no     Has patient seen a dentist within the past year (HRA #11)? yes    Medical Suppliers Used (diabetic supplies, 02, medical equipment, etc.) (HRA #14):  none    End of Life Planning (HRA # 15): Advanced Directive:  yes,   copy requested      Health Risk Assessment   Health Risk Assessment form completed by patient, reviewed and scanned into chart. HRA interpretation guide- enter risks identified through screenings into table below    1. Behavioral Risks:    Tobacco:  If patient responded \"yes\" to HRA question #6, screen is positive. Result - negative  Alcohol use: Add up scores of alcohol use questions (HRA # 3-5)- positive result is 3 or above for women and 4 or above for men. Result - negative  Physical activity:  If patient responded \"no\" to HRA question #16, screen is positive. Result - positive-- having knee replaced soon so walking abilities are decreased  Nutrition:  Body mass index is 36.32 kg/m². [employer] If patient responded \"yes\" to HRA question #17, or BMI <18 or >30, screen is positive. Result - positive    BEHAVIORAL RISKS IDENTIFIED:   Inadequate physical activity      2. Psychosocial Risks:  Anxiety:  Add up scores of anxiety questions (HRA #18-19)- positive result is 3 or above, or if patient has current or past diagnosis of anxiety. Result - negative  Depression:  Add up scores of PHQ-2 (HRA #20-21): positive result is 3 or above, or if patient has current or past diagnosis of depression. Result - negative  Social/Emotional support:  If patient responded \"sometimes,\" or \"rarely/never\" to HRA question #22, screen is positive.    Result - negative Pain:  If patient responded \"a lot\" to HRA question #24, screen is positive. Result - negative    PSYCHOSOCIAL RISKS IDENTIFIED:   None identified      3. Functional/Safety Risks:    Memory:  Mini-Cognitive Exam   A. Mini-Cog Screen:  Give patient the following words to remember and ask to repeat- advise patient that she will be asked to recall items later:  Abena [address] [facility]. Clock Drawing Test (CDT): Have patient draw the face of a clock and put the numbers in the correct positions. Then draw in the hands at ten minutes after eleven. Score clock drawing test as \"normal\" if the patient places the correct time and the clock appears grossly normal, or \"abnormal.\"    CDT: Not Indicated      C. The patient is then asked to recall the three words. (This should be done after one minute, or after the Clock Drawing Test is complete)(if remembers all 3 or none of the words, then do not need to perform CDT)   Mini-Cog Scoring: 3/3 words recalled       MINI-COG INTERPRETATION: 3- Negative screen for dementia     Living situation:  If patient responded \"lives alone\" to HRA question #23, screen is positive. Result - negative  Hearing: If patient responded \"yes\" to HRA question #25, screen is positive. Result - negative  Safety:  If patient responded \"no\" to any of the HRA questions #26-31, screen is positive. Result - negative  ADLs:  If patient responded \"yes\" to HRA questions #32 or #33, screen is positive. Result - negative  Fall risk:  Per MA note, If timed Get Up & Go test unsteady or longer than 20 seconds, or falls reported per HRA question #34, screen is positive. Complete outpatient fall risk assessment in document flow sheet     Result - negative    Vision:  (corrected vision)    Left eye  Right eye  Both eyes      20/    20/    20/       SAFETY RISKS IDENTIFIED:   None identified      Scan HRA form into media section of chart.       Perform Vision Screening at Visit not performed Personalized Preventive Plan   This plan is provided to the patient in written form.        Preventive plan of care for Kandace Nice        2021           Preventive Measures Status       Recommendations for screening   Colon Cancer Screen   Last colonoscopy: *** Screening covered every 10 years  {Screenin:p}   Breast Cancer Screen  Last mammogram: ***  Covered annually  {Screenin:p}   Cervical Cancer Screen   Last PAP smear: *** Covered every 2 years, annually if high risk  {Screenin:p}   Osteoporosis Screen   Last DXA scan: *** Covered every 2 years  {Screenin:p}   Diabetes Screen  Glucose (mg/dL)   Date Value   2020 111 (H)   2012 97    Screening covered annually, every 6 months if pre-diabetic  Repeat yearly   Cholesterol Screen  Lab Results   Component Value Date    CHOL 158 2017    TRIG 99 2017    HDL 73 2017    LDLCHOLESTEROL 65 2017    Screening covered every 5 years   Repeat yearly   Abdominal Aneurysm Screen  No Screening covered once between the ages of 72 and 76 for any male who has smoked at least 100 cigarettes in his lifetime or with FH of aneurysm (must be ordered during Welcome to Medicare Physical- IPPE, not covered with AWV)   This test is not clinically indicated   Aspirin for Cardiovascular Prevention   No Not indicated    Recommended Immunizations    Immunization History   Administered Date(s) Administered    Influenza 10/31/2011, 10/18/2012    Influenza Vaccine, unspecified formulation 10/08/2015, 2016    Influenza Virus Vaccine 2013, 10/19/2014, 2020    Influenza Whole 2013, 10/19/2014    Influenza, MDCK Quadv, IM, PF (Flucelvax 4 yrs and older) 2017    Influenza, MDCK Quadv, with preserv IM (Flucelvax 4 yrs and older) 2017    Influenza, Quadv, IM, PF (6 mo and older Fluzone, Flulaval, Fluarix, and 3 yrs and older Afluria) 10/09/2018, 10/14/2019, 2020  Pneumococcal Polysaccharide ([id number]) 01/20/2021      Influenza vaccine: recommended every fall  Pneumonia vaccine: no need to repeat  Shingles vaccine: recommended, but declined  Tetanus vaccine: recommended but declined        Risk Factors and Conditions Identified During Visit  (Provider completes sections below)   Risks Identified Treatment options   Behavioral Risks  · Inadequate physical activity   · Pt having knee replaced soon   Psychosocial Risks  · None identified   · No treatment is necessary    Functional/Safety Risks  · None identified   · No treatment is necessary     Other Recommendations/Plans ·   pt to see eye Dr and consider shingles vaccine in future. Visit Diagnosis  {No diagnosis found.  (Refresh or delete this SmartLink)}

## 2021-02-05 NOTE — PROGRESS NOTES
Medicare Annual Wellness Visit       Lorraine Reveles  YOB: 1955    Date of Service:  2/5/2021    Patient Active Problem List   Diagnosis    Anxiety state    Hypothyroidism    Essential hypertension, benign    Esophageal reflux    Carpal tunnel syndrome    TIA (transient ischemic attack)    Chronic back pain    Osteopenia       Allergies   Allergen Reactions    Ciprofloxacin Other (See Comments)     Headaches    Flonase [Fluticasone Propionate]      Smelled smoke with medication.     Macrobid [Nitrofurantoin] Nausea Only    Other Hives     Contrast for a stress test    Oxycodone Rash     hallucinations     No outpatient medications have been marked as taking for the 2/5/21 encounter (Office Visit) with Fernando Coronado MD.       Past Medical History:   Diagnosis Date    Cerebrovascular disease     mini stroke may 2012    Chronic back pain     GERD (gastroesophageal reflux disease)     Hypertension     Hypothyroidism     Osteoarthritis     knee and back    Osteopenia 1/4/2016     Past Surgical History:   Procedure Laterality Date    APPENDECTOMY      BREAST BIOPSY      CHOLECYSTECTOMY      HYSTERECTOMY      still Rt ovary present    JOINT REPLACEMENT  08/07/2018    Rt knee     Family History   Problem Relation Age of Onset    Diabetes Mother     COPD Mother     Diabetes Father      Social History     Socioeconomic History    Marital status:      Spouse name: Not on file    Number of children: Not on file    Years of education: Not on file    Highest education level: Not on file   Occupational History    Not on file   Social Needs    Financial resource strain: Not on file    Food insecurity     Worry: Not on file     Inability: Not on file    Transportation needs     Medical: Not on file     Non-medical: Not on file   Tobacco Use    Smoking status: Former Smoker     Packs/day: 1.00     Years: 15.00     Pack years: 15.00     Types: Cigarettes     Quit date: 12     Years since quittin.1    Smokeless tobacco: Never Used   Substance and Sexual Activity    Alcohol use: No    Drug use: No    Sexual activity: Not on file   Lifestyle    Physical activity     Days per week: Not on file     Minutes per session: Not on file    Stress: Not on file   Relationships    Social connections     Talks on phone: Not on file     Gets together: Not on file     Attends Jain service: Not on file     Active member of club or organization: Not on file     Attends meetings of clubs or organizations: Not on file     Relationship status: Not on file    Intimate partner violence     Fear of current or ex partner: Not on file     Emotionally abused: Not on file     Physically abused: Not on file     Forced sexual activity: Not on file   Other Topics Concern    Not on file   Social History Narrative    Not on file       Consultants:   Patient Care Team:  Carie eWaver MD as PCP - General (Family Medicine)  [facility] Weaver [facility]  Maddy Norman MD (Rheumatology)    Wt Readings from Last 3 Encounters:   21 225 lb (102.1 kg)   21 225 lb (102.1 kg)   21 224 lb (101.6 kg)     BP Readings from Last 3 Encounters:   21 120/80   21 118/80   21 112/70       Pertinent Physical Exam    Vitals:    21 0818   BP: 120/80   Pulse: 86   SpO2: 98%   Weight: 225 lb (102.1 kg)   Height: 5' 6\" (1.676 m)     Body mass index is 36.32 kg/m². ROS (information related to health maintenance and screening)  10 systems reviewed and WNL except Lt knee pain (having surgery soon). Pt has no  symptoms and cleared from recent UTi.      Physical Exam (Minimal exam needed for wellness visit)  General Appearance: alert and oriented to person, place and time, well-developed and well-nourished, in no acute distress  Skin: warm and dry, no rash or erythema  Head: normocephalic and atraumatic  Eyes: pupils equal, conjunctivae normal  ENT: bilateral tympanic membrane normal, bilateral external ear normal and bilateral ear canal normal  Neck: neck supple and non tender without mass, no thyromegaly, no cervical lymphadenopathy   Pulmonary/Chest: clear to auscultation bilaterally- no wheezes, rales or rhonchi, normal air movement  Cardiovascular: normal rate, regular rhythm, no murmurs and no carotid bruits  Abdomen: soft, non-tender, non-distended, normal bowels sounds, and no masses  Pelvic: examination not indicated  Breast: not examined  Extremities: no erythema, swelling or tenderness of extremities  Musculoskeletal: normal range of motion, no joint swelling, deformity.  Lt knee tenderness  Neurologic: no cranial nerve deficit, gait and coordination normal, speech normal and reflexes normal and symmetric, no cranial nerve deficit, gait, normal speech normal    Current Health Maintenance Status  Health Maintenance   Topic Date Due    Hepatitis C screen  1955    HIV screen  11/30/1970    DTaP/Tdap/Td vaccine (1 - Tdap) 11/30/1974    Shingles Vaccine (1 of 2) 11/30/2005    Annual Wellness Visit (AWV)  01/20/2021    Colon cancer screen colonoscopy  06/28/2021    Potassium monitoring  07/27/2021    Creatinine monitoring  07/27/2021    TSH testing  01/27/2022    Breast cancer screen  01/31/2022    Lipid screen  11/24/2022    Diabetes screen  07/27/2023    DEXA (modify frequency per FRAX score)  Completed    Flu vaccine  Completed    Pneumococcal 65+ years Vaccine  Completed    Hepatitis A vaccine  Aged Out    Hepatitis B vaccine  Aged Out    Hib vaccine  Aged Out    Meningococcal (ACWY) vaccine  Aged [employer] AWV Workflow and Risk Assessment    Review Medicare Health Risk Assessment form completed by patient  Document vitals, height included (3 vital signs minimum)      Update Allergies and Medications    complete  Update Family and Social History (HRA #2-6)    complete  Update Health Maintenance (HRA identified      3. Functional/Safety Risks:    Memory:  Mini-Cognitive Exam   A. Mini-Cog Screen:  Give patient the following words to remember and ask to repeat- advise patient that she will be asked to recall items later:  [address] [facility]. Clock Drawing Test (CDT): Have patient draw the face of a clock and put the numbers in the correct positions. Then draw in the hands at ten minutes after eleven. Score clock drawing test as \"normal\" if the patient places the correct time and the clock appears grossly normal, or \"abnormal.\"    CDT: Not Indicated      C. The patient is then asked to recall the three words. (This should be done after one minute, or after the Clock Drawing Test is complete)(if remembers all 3 or none of the words, then do not need to perform CDT)   Mini-Cog Scoring: 3/3 words recalled       MINI-COG INTERPRETATION: 3- Negative screen for dementia     Living situation:  If patient responded \"lives alone\" to HRA question #23, screen is positive. Result - negative  Hearing: If patient responded \"yes\" to HRA question #25, screen is positive. Result - negative  Safety:  If patient responded \"no\" to any of the HRA questions #26-31, screen is positive. Result - negative  ADLs:  If patient responded \"yes\" to HRA questions #32 or #33, screen is positive. Result - negative  Fall risk:  Per MA note, If timed Get Up & Go test unsteady or longer than 20 seconds, or falls reported per HRA question #34, screen is positive. Complete outpatient fall risk assessment in document flow sheet     Result - negative    Vision:  (corrected vision)    Left eye  Right eye  Both eyes      20/    20/    20/       SAFETY RISKS IDENTIFIED:   None identified      Scan HRA form into media section of chart. Perform Vision Screening at Visit not performed      Personalized Preventive Plan   This plan is provided to the patient in written form.        Preventive plan of care for Domenic Correa 2/5/2021           Preventive Measures Status       Recommendations for screening   Colon Cancer Screen   Last colonoscopy: 6/28/11 Screening covered every 10 years  Repeat in 10 years   Breast Cancer Screen  Last mammogram: 1/31/20 Covered annually  Repeat every 2 years   Cervical Cancer Screen   Last PAP smear: unknown Covered every 2 years, annually if high risk  This test is not clinically indicated   Osteoporosis Screen   Last DXA scan: 1/27/21 Covered every 2 years  Repeat every 3 years   Diabetes Screen  Glucose (mg/dL)   Date Value   07/27/2020 111 (H)   01/25/2012 97    Screening covered annually, every 6 months if pre-diabetic  Repeat yearly   Cholesterol Screen  Lab Results   Component Value Date    CHOL 158 11/24/2017    TRIG 99 11/24/2017    HDL 73 11/24/2017    LDLCHOLESTEROL 65 11/24/2017    Screening covered every 5 years   Repeat yearly   Abdominal Aneurysm Screen  No Screening covered once between the ages of 72 and 76 for any male who has smoked at least 100 cigarettes in his lifetime or with FH of aneurysm (must be ordered during Welcome to Medicare Physical- IPPE, not covered with AWV)   This test is not clinically indicated   Aspirin for Cardiovascular Prevention   No Not indicated    Recommended Immunizations    Immunization History   Administered Date(s) Administered    Influenza 10/31/2011, 10/18/2012    Influenza Vaccine, unspecified formulation 10/08/2015, 11/01/2016    Influenza Virus Vaccine 12/11/2013, 10/19/2014, 09/20/2020    Influenza Whole 12/11/2013, 10/19/2014    Influenza, MDCK Quadv, IM, PF (Flucelvax 4 yrs and older) 11/20/2017    Influenza, MDCK Quadv, with preserv IM (Flucelvax 4 yrs and older) 11/20/2017    Influenza, Chato Jessica, IM, PF (6 mo and older Fluzone, Flulaval, Fluarix, and 3 yrs and older Afluria) 10/09/2018, 10/14/2019, 09/20/2020    Pneumococcal Polysaccharide ([id number]) 01/20/2021      Influenza vaccine: recommended every fall  Pneumonia vaccine: no need to repeat  Shingles vaccine: recommended, but declined  Tetanus vaccine: recommended but declined        Risk Factors and Conditions Identified During Visit  (Provider completes sections below)   Risks Identified Treatment options   Behavioral Risks  · Inadequate physical activity   · Pt having Lt knee replaced soone   Psychosocial Risks  · None identified   · No treatment is necessary    Functional/Safety Risks  · None identified   · No treatment is necessary     Other Recommendations/Plans ·   in future pt to consider shingles vaccine and go to eye Dr               Visit Diagnosis   Diagnosis Orders   1.  Medicare welcome visit

## 2021-02-08 ENCOUNTER — TELEPHONE (OUTPATIENT)
Dept: UROLOGY | Age: 66
End: 2021-02-08

## 2021-02-08 NOTE — TELEPHONE ENCOUNTER
----- Message from JOSE Linton CNP sent at 2/8/2021 11:16 AM EST -----  Call pt - urine cx reviewed and negative for UTI, but shows significant microhematuria. Please schedule her an office visit or VV to discuss the needed blood in the urine work-up.

## 2021-02-08 NOTE — RESULT ENCOUNTER NOTE
Call pt - urine cx reviewed and negative for UTI, but shows significant microhematuria. Please schedule her an office visit or VV to discuss the needed blood in the urine work-up.

## 2021-02-10 ENCOUNTER — VIRTUAL VISIT (OUTPATIENT)
Dept: UROLOGY | Age: 66
End: 2021-02-10
Payer: MEDICARE

## 2021-02-10 DIAGNOSIS — R31.29 MICROSCOPIC HEMATURIA: Primary | ICD-10-CM

## 2021-02-10 PROCEDURE — G8399 PT W/DXA RESULTS DOCUMENT: HCPCS | Performed by: PHYSICIAN ASSISTANT

## 2021-02-10 PROCEDURE — 1036F TOBACCO NON-USER: CPT | Performed by: PHYSICIAN ASSISTANT

## 2021-02-10 PROCEDURE — 99213 OFFICE O/P EST LOW 20 MIN: CPT | Performed by: PHYSICIAN ASSISTANT

## 2021-02-10 PROCEDURE — G8482 FLU IMMUNIZE ORDER/ADMIN: HCPCS | Performed by: PHYSICIAN ASSISTANT

## 2021-02-10 PROCEDURE — 1090F PRES/ABSN URINE INCON ASSESS: CPT | Performed by: PHYSICIAN ASSISTANT

## 2021-02-10 PROCEDURE — G8428 CUR MEDS NOT DOCUMENT: HCPCS | Performed by: PHYSICIAN ASSISTANT

## 2021-02-10 PROCEDURE — G8417 CALC BMI ABV UP PARAM F/U: HCPCS | Performed by: PHYSICIAN ASSISTANT

## 2021-02-10 PROCEDURE — 4040F PNEUMOC VAC/ADMIN/RCVD: CPT | Performed by: PHYSICIAN ASSISTANT

## 2021-02-10 PROCEDURE — 3017F COLORECTAL CA SCREEN DOC REV: CPT | Performed by: PHYSICIAN ASSISTANT

## 2021-02-10 PROCEDURE — 1123F ACP DISCUSS/DSCN MKR DOCD: CPT | Performed by: PHYSICIAN ASSISTANT

## 2021-02-10 ASSESSMENT — ENCOUNTER SYMPTOMS
ABDOMINAL PAIN: 0
BACK PAIN: 0
VOMITING: 0
SHORTNESS OF BREATH: 0
WHEEZING: 0
COLOR CHANGE: 0
EYE REDNESS: 0
CONSTIPATION: 0
COUGH: 0
NAUSEA: 0

## 2021-02-10 NOTE — PROGRESS NOTES
2/10/2021    TELEHEALTH EVALUATION -- Audio/Visual (During IMYAO-48 public health emergency)    HPI:    Domingo Garcia (:  1955) has requested an audio/video evaluation for the following concern(s):    History  New patient being seen here with the complaint of recurrent urinary tract infection. Referral from Dr. Amanda Dominguez. She believes that she had 3-4 infections over the past year. She notes that she was to have a knee replacement today but it got canceled due to UTI. She states that she was not having any symptoms but as part of preadmission testing they found she had a urinary tract infection. Her urine culture was positive for Klebsiella. He was given a course of Keflex -5 days- by preadmission testing. He then developed urinary symptoms including dysuria and sensation of incomplete bladder emptying. PCP did reorder urine culture and grew beta-hemolytic strep and Klebsiella. This was treated with course of culture specific Keflex. Patient has now complete this course of antibiotics and is currently not having any dysuria. She states that she does not have a daily soft bowel movement. She states that she has been under a lot of stress over the last several months due to the death of her mother. She does take some Metamucil but it is not always successful. She drinks about 40 ounces of water a day. She has an occasional Dr. Salguero Show but this is not frequent. She does get up to urinate 2-3 times at night. She does have some occasional urinary frequency and stress incontinence. She states that she does have some leaking at night as well. She does states she has vaginal dryness. She had a hysterectomy over 20 years ago which did spare one ovary. She states that she went through menopause shortly thereafter. She did have 2 vaginal births which were uncomplicated. She does state that she has painful intercourse. Pt voided - 10 mL, PVR - 0 mL.     Urine cultures:  2021 - beta heme strep and klebsiella   1/2021 - klebsiella  8/2020 - no growth  2/2020 - ecoli    Today:  Patient returns today via virtual visit to discuss microscopic hematuria. After last visit patient did have a negative urine culture. However she did have 2-5 RBCs per high-powered field on urinalysis. Patient denies any gross hematuria. She currently does not have any urinary symptoms. She denies any fever, chills, dysuria, flank pain. Reports that she has not started her vaginal estrogen yet. Review of Systems   Constitutional: Negative for appetite change, chills and fever. Eyes: Negative for redness and visual disturbance. Respiratory: Negative for cough, shortness of breath and wheezing. Cardiovascular: Negative for chest pain and leg swelling. Gastrointestinal: Negative for abdominal pain, constipation, nausea and vomiting. Genitourinary: Negative for difficulty urinating, dysuria, flank pain, frequency, hematuria, pelvic pain, urgency, vaginal bleeding and vaginal discharge. Musculoskeletal: Negative for back pain, joint swelling and myalgias. Skin: Negative for color change, rash and wound. Neurological: Negative for dizziness, tremors and numbness. Hematological: Negative for adenopathy. Does not bruise/bleed easily. Allergies   Allergen Reactions    Ciprofloxacin Other (See Comments)     Headaches    Flonase [Fluticasone Propionate]      Smelled smoke with medication.  Macrobid [Nitrofurantoin] Nausea Only    Other Hives     Contrast for a stress test    Oxycodone Rash     hallucinations       Prior to Visit Medications    Medication Sig Taking? Authorizing Provider   estradiol (ESTRACE VAGINAL) 0.1 MG/GM vaginal cream pea-sized amount vaginally daily x 2 wks, then 3 x per week. Use finger, wash hands after application. DO NOT use plastic applicator.   Denise Patel PA-C   LORazepam (ATIVAN) 0.5 MG tablet TAKE 1 TABLET BY MOUTH TWICE DAILY AS NEEDED FOR ANXIETY  Historical and benefits of cystoscopy including bleeding and infection. She is amenable to proceed with her microscopic hematuria work-up. Start vaginal estrogen    We will see her in the office for cystoscopy, we will discuss her CT urogram at the time. Madeleine Knapp is a 72 y.o. female being evaluated by a Virtual Visit (video visit) encounter to address concerns as mentioned above. A caregiver was present when appropriate. Due to this being a TeleHealth encounter (During [facility] public [facility] emergency), evaluation of the following organ systems was limited: Vitals/Constitutional/EENT/Resp/CV/GI//MS/Neuro/Skin/Heme-Lymph-Imm. Pursuant to the emergency declaration under the [address] [place] [address] authority and the [employer] and [employer] Act, this Virtual Visit was conducted with patient's (and/or legal guardian's) consent, to reduce the patient's risk of exposure to COVID-19 and provide necessary medical care. The patient (and/or legal guardian) has also been advised to contact this office for worsening conditions or problems, and seek emergency medical treatment and/or call 911 if deemed necessary. Services were provided through a video synchronous discussion virtually to substitute for in-person clinic visit. The patient was located in their home. The provider was located in the office.     --Cayla Musa PA-C on 2/10/2021 at 9:33 AM    An electronic signature was used to authenticate this note.

## 2021-02-11 ENCOUNTER — HOSPITAL ENCOUNTER (OUTPATIENT)
Age: 66
Discharge: HOME | End: 2021-02-11
Payer: MEDICARE

## 2021-02-11 ENCOUNTER — HOSPITAL ENCOUNTER (OUTPATIENT)
Dept: CT IMAGING | Age: 66
Discharge: HOME | End: 2021-02-13
Payer: MEDICARE

## 2021-02-11 DIAGNOSIS — R31.29 MICROSCOPIC HEMATURIA: ICD-10-CM

## 2021-02-11 LAB
BUN BLDV-MCNC: 20 MG/DL (ref 8–23)
CREAT SERPL-MCNC: 1.12 MG/DL (ref 0.5–0.9)
GFR AFRICAN AMERICAN: 59 ML/MIN
GFR NON-AFRICAN AMERICAN: 49 ML/MIN
GFR SERPL CREATININE-BSD FRML MDRD: ABNORMAL ML/MIN/{1.73_M2}
GFR SERPL CREATININE-BSD FRML MDRD: ABNORMAL ML/MIN/{1.73_M2}

## 2021-02-11 PROCEDURE — 82565 ASSAY OF CREATININE: CPT

## 2021-02-11 PROCEDURE — 36415 COLL VENOUS BLD VENIPUNCTURE: CPT

## 2021-02-11 PROCEDURE — 84520 ASSAY OF UREA NITROGEN: CPT

## 2021-02-11 PROCEDURE — 76376 3D RENDER W/INTRP POSTPROCES: CPT

## 2021-02-11 PROCEDURE — 74178 CT ABD&PLV WO CNTR FLWD CNTR: CPT

## 2021-02-11 PROCEDURE — 6360000004 HC RX CONTRAST MEDICATION: Performed by: PHYSICIAN ASSISTANT

## 2021-02-11 RX ADMIN — IOPAMIDOL 125 ML: 755 INJECTION, SOLUTION INTRAVENOUS at 14:41

## 2021-02-12 ENCOUNTER — TELEPHONE (OUTPATIENT)
Dept: UROLOGY | Age: 66
End: 2021-02-12

## 2021-02-12 NOTE — TELEPHONE ENCOUNTER
Patient had a CT. She does not want to wait until March for the results. That is too long. I told her I would call her back Monday and let her know what you said.

## 2021-02-15 NOTE — TELEPHONE ENCOUNTER
Call pt - CT reviewed and shows no stones or hydronephrosis, no masses or filling defects to suggest malignancy. Will discuss further at next appt.  Needs cystoscopy

## 2021-02-18 ENCOUNTER — HOSPITAL ENCOUNTER (OUTPATIENT)
Age: 66
Setting detail: SPECIMEN
Discharge: HOME | End: 2021-02-18
Payer: MEDICARE

## 2021-02-18 ENCOUNTER — OFFICE VISIT (OUTPATIENT)
Dept: FAMILY MEDICINE CLINIC | Age: 66
End: 2021-02-18
Payer: MEDICARE

## 2021-02-18 VITALS — DIASTOLIC BLOOD PRESSURE: 78 MMHG | OXYGEN SATURATION: 96 % | SYSTOLIC BLOOD PRESSURE: 124 MMHG | HEART RATE: 90 BPM

## 2021-02-18 DIAGNOSIS — N39.44 NOCTURNAL ENURESIS: ICD-10-CM

## 2021-02-18 DIAGNOSIS — N89.8 VAGINAL ITCHING: ICD-10-CM

## 2021-02-18 DIAGNOSIS — R21 RASH OF GENITAL AREA: ICD-10-CM

## 2021-02-18 DIAGNOSIS — N89.8 VAGINAL ITCHING: Primary | ICD-10-CM

## 2021-02-18 LAB
BILIRUBIN, POC: NEGATIVE
BLOOD URINE, POC: NEGATIVE
CLARITY, POC: CLEAR
COLOR, POC: YELLOW
GLUCOSE URINE, POC: NEGATIVE
KETONES, POC: NEGATIVE
LEUKOCYTE EST, POC: NORMAL
NITRITE, POC: NEGATIVE
PH, POC: 7
PROTEIN, POC: NEGATIVE
SPECIFIC GRAVITY, POC: 1.01
UROBILINOGEN, POC: 0.2

## 2021-02-18 PROCEDURE — G8417 CALC BMI ABV UP PARAM F/U: HCPCS | Performed by: FAMILY MEDICINE

## 2021-02-18 PROCEDURE — G8399 PT W/DXA RESULTS DOCUMENT: HCPCS | Performed by: FAMILY MEDICINE

## 2021-02-18 PROCEDURE — 99213 OFFICE O/P EST LOW 20 MIN: CPT | Performed by: FAMILY MEDICINE

## 2021-02-18 PROCEDURE — 0509F URINE INCON PLAN DOCD: CPT | Performed by: FAMILY MEDICINE

## 2021-02-18 PROCEDURE — 1123F ACP DISCUSS/DSCN MKR DOCD: CPT | Performed by: FAMILY MEDICINE

## 2021-02-18 PROCEDURE — G8427 DOCREV CUR MEDS BY ELIG CLIN: HCPCS | Performed by: FAMILY MEDICINE

## 2021-02-18 PROCEDURE — G8482 FLU IMMUNIZE ORDER/ADMIN: HCPCS | Performed by: FAMILY MEDICINE

## 2021-02-18 PROCEDURE — 87086 URINE CULTURE/COLONY COUNT: CPT

## 2021-02-18 PROCEDURE — 81002 URINALYSIS NONAUTO W/O SCOPE: CPT | Performed by: FAMILY MEDICINE

## 2021-02-18 PROCEDURE — 1036F TOBACCO NON-USER: CPT | Performed by: FAMILY MEDICINE

## 2021-02-18 PROCEDURE — 4040F PNEUMOC VAC/ADMIN/RCVD: CPT | Performed by: FAMILY MEDICINE

## 2021-02-18 PROCEDURE — 3017F COLORECTAL CA SCREEN DOC REV: CPT | Performed by: FAMILY MEDICINE

## 2021-02-18 PROCEDURE — 1090F PRES/ABSN URINE INCON ASSESS: CPT | Performed by: FAMILY MEDICINE

## 2021-02-18 PROCEDURE — 86403 PARTICLE AGGLUT ANTBDY SCRN: CPT

## 2021-02-18 NOTE — PROGRESS NOTES
HPI Notes    Name: Dayne Tejada  : 1955        Chief Complaint:     Chief Complaint   Patient presents with    Vaginal Itching     Pt c/o vaginal itching over the past couple months. Pt denies discharge. Pt is c/o foul smell like urine in the morning. History of Present Illness:     Dayne Tejada is a 72 y.o.  female who presents with Vaginal Itching (Pt c/o vaginal itching over the past couple months. Pt denies discharge. Pt is c/o foul smell like urine in the morning.)      HPI  Vaginal itchy - Pt states she has had vaginal itching for several months. Hard for her to see the area to check for any rash or lesions. The only time she has vaginal burning is after intercourse which last 1-2d. She hasn't has intercourse for awhile because of this. Pt denies vaginal discharge. Pt admits she leaks urine but only at night. She does not wear a pad but gets up and changes underwear or PJ bottoms at night due to the urine leaking and then wakes up with urine smell. Pt denies leaking during day. Pt has been seeing urology for the frequent UTIs. She had a fairly normal CT abd/pelvis recently and soon a cystoscope. Pt also concerned as over 37yrs ago before her current marriage and after her 1st  cheated on her she by was told she had genital herpes one GYN and then another GYN said No. Pt has never had (in 37yrs) any known genital lesions and no pain until recently so concerned.      Past Medical History:     Past Medical History:   Diagnosis Date    Cerebrovascular disease     mini stroke may 2012    Chronic back pain     GERD (gastroesophageal reflux disease)     Hypertension     Hypothyroidism     Osteoarthritis     knee and back    Osteopenia 2016      Reviewed all health maintenance requirements and ordered appropriate tests  Health Maintenance Due   Topic Date Due    Hepatitis C screen  1955    HIV screen  1970    COVID-19 Vaccine (1 of 2) 1971    DTaP/Tdap/Td vaccine (1 - Tdap) 11/30/1974    Shingles Vaccine (1 of 2) 11/30/2005       Past Surgical History:     Past Surgical History:   Procedure Laterality Date    APPENDECTOMY      BREAST BIOPSY      CHOLECYSTECTOMY      HYSTERECTOMY      still Rt ovary present    JOINT REPLACEMENT  08/07/2018    Rt knee        Medications:       Prior to Admission medications    Medication Sig Start Date End Date Taking? Authorizing Provider   LORazepam (ATIVAN) 0.5 MG tablet TAKE 1 TABLET BY MOUTH TWICE DAILY AS NEEDED FOR ANXIETY 11/30/20  Yes Historical Provider, MD   hydroCHLOROthiazide (HYDRODIURIL) 25 MG tablet Take 1/2 tab po daily 1/20/21  Yes Millicent Meyers MD   levothyroxine (SYNTHROID) 100 MCG tablet TAKE 1 TABLET DAILY 12/1/20  Yes Millicent Meyers MD   losartan (COZAAR) 100 MG tablet TAKE 1 TABLET DAILY 12/1/20  Yes Millicent Meyers MD   omeprazole (PRILOSEC) 20 MG delayed release capsule TAKE 1 CAPSULE EVERY MORNING BEFORE BREAKFAST 12/1/20  Yes Millicent Meyers MD   famotidine (PEPCID) 20 MG tablet TAKE 1 TABLET DAILY WITH SUPPER  Patient taking differently: Take by mouth as needed TAKE 1 TABLET DAILY WITH SUPPER 7/27/20  Yes Millicent Meyers MD   UNABLE TO FIND Gabapentin-Ketoprofen amitriptyline HCL 10-10-2% neuroserum liquid  Apply small amt to affected area and rub for 10 minutes. Repeat 2-3 times daily 7/27/20  Yes Millicent Meyers MD   loratadine (CLARITIN) 10 MG tablet Take 10 mg by mouth daily Prn   Yes Historical Provider, MD   vitamin D (ERGOCALCIFEROL) 02149 units CAPS capsule Take 50,000 Units by mouth Twice a Week  1/24/19  Yes Historical Provider, MD   Vitamin Mixture (VITAMIN E COMPLETE PO) Po qd   Yes Historical Provider, MD   clopidogrel (PLAVIX) 75 MG tablet Take 1 tablet by mouth daily. 2/21/15  Yes Jeff Franco MD   folic acid (FOLVITE) 1 MG tablet Take 1 mg by mouth daily.      Yes Historical Provider, MD   estradiol (ESTRACE VAGINAL) 0.1 MG/GM vaginal cream pea-sized amount vaginally daily x 2 wks, then 3 x per week. Use finger, wash hands after application. DO NOT use plastic applicator. Patient not taking: Reported on 2/18/2021 2/4/21   Silvana Patel PA-C        Allergies:       Ciprofloxacin, Flonase [fluticasone propionate], Macrobid [nitrofurantoin], Other, and Oxycodone    Social History:     Tobacco:    reports that she quit smoking about 35 years ago. Her smoking use included cigarettes. She has a 15.00 pack-year smoking history. She has never used smokeless tobacco.  Alcohol:      reports no history of alcohol use. Drug Use:  reports no history of drug use. Family History:     Family History   Problem Relation Age of Onset    Diabetes Mother     COPD Mother     Diabetes Father        Review of Systems:       Review of Systems   Constitutional: Negative for chills and fever. Eyes: Negative for discharge and redness. Gastrointestinal: Negative for abdominal pain, diarrhea and vomiting. Genitourinary: Negative for difficulty urinating, dysuria, hematuria, vaginal bleeding and vaginal discharge. Vaginal itching. Urinary incontinence at night only with urine odor in AM. Vaginal burning after intercourse. Skin:        Pt can't see genital area so unsure of rash. Physical Exam:     Physical Exam  Constitutional:       General: She is not in acute distress. Appearance: Normal appearance. She is not ill-appearing. HENT:      Head: Normocephalic and atraumatic. Genitourinary:     Labia:         Right: Rash present. No lesion or injury. Left: Rash present. No lesion or injury. Vagina: No erythema or bleeding. Uterus: With uterine prolapse. Comments: A - erythematous patch with dry edges. Skin:     Findings: Erythema and rash present. No bruising.          Vitals:  /78   Pulse 90   SpO2 96%       Data:     Lab Results   Component Value Date     07/27/2020    K 3.9 07/27/2020     07/27/2020 CO2 26 07/27/2020    BUN 20 02/11/2021    CREATININE 1.12 02/11/2021    GLUCOSE 111 07/27/2020    GLUCOSE 97 01/25/2012    PROT 6.5 11/24/2017    LABALBU 3.9 11/24/2017    LABALBU 4.4 01/25/2012    BILITOT 0.39 11/24/2017    ALKPHOS 65 11/24/2017    AST 15 11/24/2017    ALT 10 11/24/2017     Lab Results   Component Value Date    WBC 5.2 01/27/2020    RBC 4.77 01/27/2020    RBC 4.92 01/25/2012    HGB 13.6 01/27/2020    HCT 40.5 01/27/2020    MCV 85.0 01/27/2020    MCH 28.6 01/27/2020    MCHC 33.6 01/27/2020    RDW 15.0 01/27/2020     01/27/2020     01/25/2012    MPV NOT REPORTED 01/27/2020     Lab Results   Component Value Date    TSH 2.44 01/27/2021     Lab Results   Component Value Date    CHOL 158 11/24/2017    HDL 73 11/24/2017    LABA1C 5.6 07/27/2020          Assessment/Plan:        1. Vaginal itching  Seems related to the rash and most likely from the urine leaking at night. D/w pt UA and urine cx again. Pt to try a pad at night and change it as well. Pt to try Aquaphor nightly and once during day. Keep area dry as possible  - Culture, Urine; Future    2. Nocturnal enuresis  UA and culture and pt to discuss options of surgery or medication at her next urology appt. - Culture, Urine; Future  - POCT Urinalysis no Micro    3. Rash of genital area  See #1 as rash secondary to urine leakage at night    Addendum - pt called 2d later and rash a little better still itching so will try lotrimin cream too. No follow-ups on file.       Electronically signed by Renato Fletcher MD on 2/18/2021 at 1:59 PM

## 2021-02-18 NOTE — PATIENT INSTRUCTIONS
SURVEY:    You may be receiving a survey from [employer] regarding your visit today. Please complete the survey to enable us to provide the highest quality of care to you and your family. If you cannot score us a very good (5 stars) on any question, please call the office to discuss how we could have made your experience a very good one. Thank you.     Clinical Care Team:  MD Yonis Andersen LPN    Clerical Team:  Viri Bishop

## 2021-02-19 LAB
CULTURE: ABNORMAL
Lab: ABNORMAL
SPECIMEN DESCRIPTION: ABNORMAL

## 2021-02-20 ENCOUNTER — TELEPHONE (OUTPATIENT)
Dept: FAMILY MEDICINE CLINIC | Age: 66
End: 2021-02-20

## 2021-02-20 RX ORDER — CEPHALEXIN 500 MG/1
500 CAPSULE ORAL 3 TIMES DAILY
Qty: 15 CAPSULE | Refills: 0 | Status: SHIPPED | OUTPATIENT
Start: 2021-02-20 | End: 2021-02-25

## 2021-02-20 RX ORDER — CLOTRIMAZOLE 1 %
CREAM (GRAM) TOPICAL
Qty: 60 G | Refills: 1 | Status: SHIPPED | OUTPATIENT
Start: 2021-02-20 | End: 2021-06-15 | Stop reason: SDUPTHER

## 2021-02-20 NOTE — TELEPHONE ENCOUNTER
Pt called to stay she still has some bacteria in the urine so will call in more keflex. Pt still genital itching so will also call in lotrimine cream to [facility].

## 2021-02-21 ASSESSMENT — ENCOUNTER SYMPTOMS
DIARRHEA: 0
EYE DISCHARGE: 0
EYE REDNESS: 0
ABDOMINAL PAIN: 0
VOMITING: 0

## 2021-02-23 ENCOUNTER — TELEPHONE (OUTPATIENT)
Dept: UROLOGY | Age: 66
End: 2021-02-23

## 2021-02-23 NOTE — TELEPHONE ENCOUNTER
Patient UAC/S on 2/18/21 was positive. Treated by Dr. Timoteo Clark. Does urine need to be checked again prior to upcoming cysto scheduled 3/5/21?

## 2021-03-01 ENCOUNTER — TELEPHONE (OUTPATIENT)
Dept: FAMILY MEDICINE CLINIC | Age: 66
End: 2021-03-01

## 2021-03-01 RX ORDER — FLUCONAZOLE 150 MG/1
TABLET ORAL
Qty: 2 TABLET | Refills: 0 | Status: SHIPPED | OUTPATIENT
Start: 2021-03-01 | End: 2021-03-15 | Stop reason: ALTCHOICE

## 2021-03-01 NOTE — TELEPHONE ENCOUNTER
José Correa left a message on the machine. She is c/o having a yeast infection. She would like to know if something could be called in for her. Please let José Correa know.     DM-caryn    Health Maintenance   Topic Date Due    Hepatitis C screen  1955    HIV screen  11/30/1970    COVID-19 Vaccine (1 of 2) 11/30/1971    DTaP/Tdap/Td vaccine (1 - Tdap) 11/30/1974    Shingles Vaccine (1 of 2) 11/30/2005    Colon cancer screen colonoscopy  06/28/2021    Potassium monitoring  07/27/2021    TSH testing  01/27/2022    Breast cancer screen  01/31/2022    Annual Wellness Visit (AWV)  02/06/2022    Creatinine monitoring  02/11/2022    Lipid screen  11/24/2022    Diabetes screen  07/27/2023    DEXA (modify frequency per FRAX score)  Completed    Flu vaccine  Completed    Pneumococcal 65+ years Vaccine  Completed    Hepatitis A vaccine  Aged Out    Hepatitis B vaccine  Aged Out    Hib vaccine  Aged Out    Meningococcal (ACWY) vaccine  Aged Out             (applicable per patient's age: Cancer Screenings, Depression Screening, Fall Risk Screening, Immunizations)    Hemoglobin A1C (%)   Date Value   07/27/2020 5.6   07/31/2018 5.4     LDL Cholesterol (mg/dL)   Date Value   11/24/2017 65     AST (U/L)   Date Value   11/24/2017 15     ALT (U/L)   Date Value   11/24/2017 10     BUN (mg/dL)   Date Value   02/11/2021 20      (goal A1C is < 7)   (goal LDL is <100) need 30-50% reduction from baseline     BP Readings from Last 3 Encounters:   02/18/21 124/78   02/05/21 120/80   02/04/21 118/80    (goal /80)      All Future Testing planned in [facility]:  Lab Frequency Next Occurrence       Next Visit Date:  Future Appointments   Date Time Provider Claire Garcia   3/5/2021  9:15 AM Janet Park MD [facility]   5/6/2021  9:00 AM ARVIN Smith urol [facility]   7/20/2021  8:00 AM MD Thu Garcia MED [facility]            Patient Active Problem List:     Anxiety state

## 2021-03-05 ENCOUNTER — PROCEDURE VISIT (OUTPATIENT)
Dept: UROLOGY | Age: 66
End: 2021-03-05
Payer: MEDICARE

## 2021-03-05 VITALS
WEIGHT: 224 LBS | SYSTOLIC BLOOD PRESSURE: 114 MMHG | BODY MASS INDEX: 36 KG/M2 | HEART RATE: 87 BPM | TEMPERATURE: 97.7 F | DIASTOLIC BLOOD PRESSURE: 79 MMHG | HEIGHT: 66 IN

## 2021-03-05 DIAGNOSIS — R31.29 MICROSCOPIC HEMATURIA: Primary | ICD-10-CM

## 2021-03-05 DIAGNOSIS — B37.31 YEAST VAGINITIS: ICD-10-CM

## 2021-03-05 DIAGNOSIS — D17.71 ANGIOMYOLIPOMA OF KIDNEY: ICD-10-CM

## 2021-03-05 PROCEDURE — G8399 PT W/DXA RESULTS DOCUMENT: HCPCS | Performed by: UROLOGY

## 2021-03-05 PROCEDURE — G8427 DOCREV CUR MEDS BY ELIG CLIN: HCPCS | Performed by: UROLOGY

## 2021-03-05 PROCEDURE — 1090F PRES/ABSN URINE INCON ASSESS: CPT | Performed by: UROLOGY

## 2021-03-05 PROCEDURE — 99214 OFFICE O/P EST MOD 30 MIN: CPT | Performed by: UROLOGY

## 2021-03-05 PROCEDURE — 52000 CYSTOURETHROSCOPY: CPT | Performed by: UROLOGY

## 2021-03-05 PROCEDURE — G8417 CALC BMI ABV UP PARAM F/U: HCPCS | Performed by: UROLOGY

## 2021-03-05 PROCEDURE — 1036F TOBACCO NON-USER: CPT | Performed by: UROLOGY

## 2021-03-05 PROCEDURE — 1123F ACP DISCUSS/DSCN MKR DOCD: CPT | Performed by: UROLOGY

## 2021-03-05 PROCEDURE — 3017F COLORECTAL CA SCREEN DOC REV: CPT | Performed by: UROLOGY

## 2021-03-05 PROCEDURE — G8482 FLU IMMUNIZE ORDER/ADMIN: HCPCS | Performed by: UROLOGY

## 2021-03-05 PROCEDURE — 4040F PNEUMOC VAC/ADMIN/RCVD: CPT | Performed by: UROLOGY

## 2021-03-05 RX ORDER — MULTIVIT WITH MINERALS/LUTEIN
250 TABLET ORAL DAILY
COMMUNITY

## 2021-03-05 RX ORDER — FLUCONAZOLE 100 MG/1
100 TABLET ORAL DAILY
Qty: 5 TABLET | Refills: 0 | Status: SHIPPED | OUTPATIENT
Start: 2021-03-05 | End: 2021-03-10

## 2021-03-05 ASSESSMENT — ENCOUNTER SYMPTOMS
NAUSEA: 0
EYE PAIN: 0
ABDOMINAL PAIN: 0
COLOR CHANGE: 0
SHORTNESS OF BREATH: 0
VOMITING: 0
WHEEZING: 0
BACK PAIN: 0
EYE REDNESS: 0
COUGH: 0

## 2021-03-05 NOTE — PROGRESS NOTES
HPI:        Patient is a 72 y.o. female in no acute distress. She is alert and oriented to person, place, and time. History  New patient being seen here with the complaint of recurrent urinary tract infection. Referral from Dr. Jayro Arreola. She believes that she had 3-4 infections over the past year. She notes that she was to have a knee replacement today but it got canceled due to UTI. She states that she was not having any symptoms but as part of preadmission testing they found she had a urinary tract infection. Her urine culture was positive for Klebsiella. He was given a course of Keflex -5 days- by preadmission testing. He then developed urinary symptoms including dysuria and sensation of incomplete bladder emptying. PCP did reorder urine culture and grew beta-hemolytic strep and Klebsiella. This was treated with course of culture specific Keflex. Patient has now complete this course of antibiotics and is currently not having any dysuria. She states that she does not have a daily soft bowel movement. She states that she has been under a lot of stress over the last several months due to the death of her mother. She does take some Metamucil but it is not always successful. She drinks about 40 ounces of water a day. She has an occasional Dr. Kristyn Martinez but this is not frequent. She does get up to urinate 2-3 times at night. She does have some occasional urinary frequency and stress incontinence. She states that she does have some leaking at night as well. She does states she has vaginal dryness. She had a hysterectomy over 20 years ago which did spare one ovary. She states that she went through menopause shortly thereafter. She did have 2 vaginal births which were uncomplicated. She does state that she has painful intercourse.  Pt voided - 10 mL, PVR - 0 mL.     Urine cultures:  1/2021 - beta heme strep and klebsiella   1/2021 - klebsiella  8/2020 - no growth  2/2020 - halle         Currently  Patient is here today for lower check visualization secondary to microscopic hematuria. Patient did get a recent CT urogram.  This film was independently reviewed today. 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 This is currently measuring 1.2 cm. Patient was recently being treated for yeast vaginitis. She did take 2 courses of Diflucan. There is really 150 mg tablets. Cystoscopy Procedure Note    Pre-operative Diagnosis: microhematuria    Post-operative Diagnosis: Same     Surgeon: Faiza Crowell    Assistants: None    Anesthesia : Local    Procedure Details   The risks, benefits, complications, treatment options, and expected outcomes were discussed with the patient. The patient concurred with the proposed plan, giving informed consent. Cystoscopy was performed today under local anesthesia, using sterile technique. The patient was placed in the dorsal lithotomy position, prepped with CHG, and draped in the usual sterile fashion. A 14 Occitan flexible cystoscope was used to systematically inspect both the urethra and bladder in their entirety. Findings:  Anterior urethra: normal without strictures  Hyperplasia: na  Bladder: Normal mucosa, without lesions. Ureteral orifice(s) was/were seen in the normal position and effluxing clear urine  Trabeculations No  Diverticulum No  Description: Normal anatomy         Specimens: Cytology/urine culture No                 Complications:  None; patient tolerated the procedure well. Disposition: home           Condition: stable      .     Past Medical History:   Diagnosis Date    Cerebrovascular disease     mini stroke may 2012    Chronic back pain     GERD (gastroesophageal reflux disease)     Hypertension     Hypothyroidism     Osteoarthritis     knee and back    Osteopenia 1/4/2016     Past Surgical History:   Procedure Laterality Date    APPENDECTOMY      BREAST BIOPSY      CHOLECYSTECTOMY      HYSTERECTOMY still Rt ovary present    JOINT REPLACEMENT  08/07/2018    Rt knee     Outpatient Encounter Medications as of 3/5/2021   Medication Sig Dispense Refill    fluconazole (DIFLUCAN) 150 MG tablet Take one day one then repeat in 3-4 days 2 tablet 0    estradiol (ESTRACE VAGINAL) 0.1 MG/GM vaginal cream pea-sized amount vaginally daily x 2 wks, then 3 x per week. Use finger, wash hands after application. DO NOT use plastic applicator. (Patient not taking: Reported on 2/18/2021) 1 Tube 2    LORazepam (ATIVAN) 0.5 MG tablet TAKE 1 TABLET BY MOUTH TWICE DAILY AS NEEDED FOR ANXIETY      hydroCHLOROthiazide (HYDRODIURIL) 25 MG tablet Take 1/2 tab po daily 90 tablet 1    levothyroxine (SYNTHROID) 100 MCG tablet TAKE 1 TABLET DAILY 90 tablet 1    losartan (COZAAR) 100 MG tablet TAKE 1 TABLET DAILY 90 tablet 1    omeprazole (PRILOSEC) 20 MG delayed release capsule TAKE 1 CAPSULE EVERY MORNING BEFORE BREAKFAST 90 capsule 1    famotidine (PEPCID) 20 MG tablet TAKE 1 TABLET DAILY WITH SUPPER (Patient taking differently: Take by mouth as needed TAKE 1 TABLET DAILY WITH SUPPER) 90 tablet 1    UNABLE TO FIND Gabapentin-Ketoprofen amitriptyline HCL 10-10-2% neuroserum liquid  Apply small amt to affected area and rub for 10 minutes. Repeat 2-3 times daily 15 mL 11    loratadine (CLARITIN) 10 MG tablet Take 10 mg by mouth daily Prn      vitamin D (ERGOCALCIFEROL) 94582 units CAPS capsule Take 50,000 Units by mouth Twice a Week       Vitamin Mixture (VITAMIN E COMPLETE PO) Po qd      clopidogrel (PLAVIX) 75 MG tablet Take 1 tablet by mouth daily. 30 tablet 0    folic acid (FOLVITE) 1 MG tablet Take 1 mg by mouth daily. No facility-administered encounter medications on file as of 3/5/2021.        Current Outpatient Medications on File Prior to Visit   Medication Sig Dispense Refill    fluconazole (DIFLUCAN) 150 MG tablet Take one day one then repeat in 3-4 days 2 tablet 0    estradiol (ESTRACE VAGINAL) 0.1 MG/GM vaginal cream pea-sized amount vaginally daily x 2 wks, then 3 x per week. Use finger, wash hands after application. DO NOT use plastic applicator. (Patient not taking: Reported on 2021) 1 Tube 2    LORazepam (ATIVAN) 0.5 MG tablet TAKE 1 TABLET BY MOUTH TWICE DAILY AS NEEDED FOR ANXIETY      hydroCHLOROthiazide (HYDRODIURIL) 25 MG tablet Take 1/2 tab po daily 90 tablet 1    levothyroxine (SYNTHROID) 100 MCG tablet TAKE 1 TABLET DAILY 90 tablet 1    losartan (COZAAR) 100 MG tablet TAKE 1 TABLET DAILY 90 tablet 1    omeprazole (PRILOSEC) 20 MG delayed release capsule TAKE 1 CAPSULE EVERY MORNING BEFORE BREAKFAST 90 capsule 1    famotidine (PEPCID) 20 MG tablet TAKE 1 TABLET DAILY WITH SUPPER (Patient taking differently: Take by mouth as needed TAKE 1 TABLET DAILY WITH SUPPER) 90 tablet 1    UNABLE TO FIND Gabapentin-Ketoprofen amitriptyline HCL 10-10-2% neuroserum liquid  Apply small amt to affected area and rub for 10 minutes. Repeat 2-3 times daily 15 mL 11    loratadine (CLARITIN) 10 MG tablet Take 10 mg by mouth daily Prn      vitamin D (ERGOCALCIFEROL) 10798 units CAPS capsule Take 50,000 Units by mouth Twice a Week       Vitamin Mixture (VITAMIN E COMPLETE PO) Po qd      clopidogrel (PLAVIX) 75 MG tablet Take 1 tablet by mouth daily. 30 tablet 0    folic acid (FOLVITE) 1 MG tablet Take 1 mg by mouth daily. No current facility-administered medications on file prior to visit.       Ciprofloxacin, Flonase [fluticasone propionate], Macrobid [nitrofurantoin], Other, and Oxycodone  Family History   Problem Relation Age of Onset    Diabetes Mother     COPD Mother     Diabetes Father      Social History     Tobacco Use   Smoking Status Former Smoker    Packs/day: 1.00    Years: 15.00    Pack years: 15.00    Types: Cigarettes    Quit date: Azam [place] Years since quittin.1   Smokeless Tobacco Never Used       Social History     Substance and Sexual Activity   Alcohol Use No       Review with her knee replacement surgery.

## 2021-03-11 ENCOUNTER — TELEPHONE (OUTPATIENT)
Dept: FAMILY MEDICINE CLINIC | Age: 66
End: 2021-03-11

## 2021-03-11 DIAGNOSIS — M25.562 CHRONIC PAIN OF LEFT KNEE: Primary | ICD-10-CM

## 2021-03-11 DIAGNOSIS — G89.29 CHRONIC PAIN OF LEFT KNEE: Primary | ICD-10-CM

## 2021-03-11 NOTE — TELEPHONE ENCOUNTER
Patient would like a referral to [address] [place] [address] - would like to see Dr Peterson [email or web address] - regarding knee replacement    Health Maintenance   Topic Date Due    Hepatitis C screen  Never done    HIV screen  Never done    COVID-19 Vaccine (1 of 2) Never done    DTaP/Tdap/Td vaccine (1 - Tdap) Never done    Shingles Vaccine (1 of 2) Never done    Colon cancer screen colonoscopy  06/28/2021    Potassium monitoring  07/27/2021    TSH testing  01/27/2022    Breast cancer screen  01/31/2022    Annual Wellness Visit (AWV)  02/06/2022    Creatinine monitoring  02/11/2022    Lipid screen  11/24/2022    Diabetes screen  07/27/2023    DEXA (modify frequency per FRAX score)  Completed    Flu vaccine  Completed    Pneumococcal 65+ years Vaccine  Completed    Hepatitis A vaccine  Aged Out    Hepatitis B vaccine  Aged Out    Hib vaccine  Aged Out    Meningococcal (ACWY) vaccine  Aged Out             (applicable per patient's age: Cancer Screenings, Depression Screening, Fall Risk Screening, Immunizations)    Hemoglobin A1C (%)   Date Value   07/27/2020 5.6   07/31/2018 5.4     LDL Cholesterol (mg/dL)   Date Value   11/24/2017 65     AST (U/L)   Date Value   11/24/2017 15     ALT (U/L)   Date Value   11/24/2017 10     BUN (mg/dL)   Date Value   02/11/2021 20      (goal A1C is < 7)   (goal LDL is <100) need 30-50% reduction from baseline     BP Readings from Last 3 Encounters:   03/05/21 114/79   02/18/21 124/78   02/05/21 120/80    (goal /80)      All Future Testing planned in [facility]:  Lab Frequency Next Occurrence   US RENAL LIMITED Once 03/05/2022       Next Visit Date:  Future Appointments   Date Time Provider Claire Garcia   7/20/2021  8:00 AM [facility]   2/21/2022  9:00 AM [address] [place] [address] Rad   3/2/2022  9:00 AM Vlad Grey MD [place] [facility]            Patient Active Problem List:     Anxiety state     Hypothyroidism     Essential hypertension, benign     Esophageal reflux     Carpal tunnel syndrome     TIA (transient ischemic attack)     Chronic back pain     Osteopenia

## 2021-03-15 ENCOUNTER — HOSPITAL ENCOUNTER (OUTPATIENT)
Age: 66
Setting detail: SPECIMEN
Discharge: HOME | End: 2021-03-15
Payer: MEDICARE

## 2021-03-15 ENCOUNTER — OFFICE VISIT (OUTPATIENT)
Dept: UROLOGY | Age: 66
End: 2021-03-15
Payer: MEDICARE

## 2021-03-15 VITALS
DIASTOLIC BLOOD PRESSURE: 78 MMHG | BODY MASS INDEX: 35.03 KG/M2 | WEIGHT: 218 LBS | SYSTOLIC BLOOD PRESSURE: 112 MMHG | TEMPERATURE: 98.6 F | HEIGHT: 66 IN | HEART RATE: 86 BPM

## 2021-03-15 DIAGNOSIS — R31.29 MICROSCOPIC HEMATURIA: ICD-10-CM

## 2021-03-15 DIAGNOSIS — N95.2 VAGINAL ATROPHY: ICD-10-CM

## 2021-03-15 DIAGNOSIS — B37.31 YEAST VAGINITIS: ICD-10-CM

## 2021-03-15 DIAGNOSIS — N95.2 VAGINAL ATROPHY: Primary | ICD-10-CM

## 2021-03-15 LAB
-: ABNORMAL
AMORPHOUS: ABNORMAL
BACTERIA: ABNORMAL
BILIRUBIN URINE: NEGATIVE
CASTS UA: ABNORMAL /LPF
COLOR: YELLOW
COMMENT UA: ABNORMAL
CRYSTALS, UA: ABNORMAL /HPF
EPITHELIAL CELLS UA: ABNORMAL /HPF (ref 0–25)
GLUCOSE URINE: NEGATIVE
KETONES, URINE: NEGATIVE
LEUKOCYTE ESTERASE, URINE: ABNORMAL
MUCUS: ABNORMAL
NITRITE, URINE: NEGATIVE
OTHER OBSERVATIONS UA: ABNORMAL
PH UA: 6 (ref 5–9)
PROTEIN UA: NEGATIVE
RBC UA: ABNORMAL /HPF (ref 0–2)
RENAL EPITHELIAL, UA: ABNORMAL /HPF
SPECIFIC GRAVITY UA: 1.01 (ref 1.01–1.02)
TRICHOMONAS: ABNORMAL
TURBIDITY: CLEAR
URINE HGB: NEGATIVE
UROBILINOGEN, URINE: NORMAL
WBC UA: ABNORMAL /HPF (ref 0–5)
YEAST: ABNORMAL

## 2021-03-15 PROCEDURE — 1123F ACP DISCUSS/DSCN MKR DOCD: CPT | Performed by: UROLOGY

## 2021-03-15 PROCEDURE — 3017F COLORECTAL CA SCREEN DOC REV: CPT | Performed by: UROLOGY

## 2021-03-15 PROCEDURE — 1090F PRES/ABSN URINE INCON ASSESS: CPT | Performed by: UROLOGY

## 2021-03-15 PROCEDURE — G8482 FLU IMMUNIZE ORDER/ADMIN: HCPCS | Performed by: UROLOGY

## 2021-03-15 PROCEDURE — G8427 DOCREV CUR MEDS BY ELIG CLIN: HCPCS | Performed by: UROLOGY

## 2021-03-15 PROCEDURE — G8399 PT W/DXA RESULTS DOCUMENT: HCPCS | Performed by: UROLOGY

## 2021-03-15 PROCEDURE — 99214 OFFICE O/P EST MOD 30 MIN: CPT | Performed by: UROLOGY

## 2021-03-15 PROCEDURE — G8417 CALC BMI ABV UP PARAM F/U: HCPCS | Performed by: UROLOGY

## 2021-03-15 PROCEDURE — 1036F TOBACCO NON-USER: CPT | Performed by: UROLOGY

## 2021-03-15 PROCEDURE — 86403 PARTICLE AGGLUT ANTBDY SCRN: CPT

## 2021-03-15 PROCEDURE — 81001 URINALYSIS AUTO W/SCOPE: CPT

## 2021-03-15 PROCEDURE — 99211 OFF/OP EST MAY X REQ PHY/QHP: CPT

## 2021-03-15 PROCEDURE — 4040F PNEUMOC VAC/ADMIN/RCVD: CPT | Performed by: UROLOGY

## 2021-03-15 PROCEDURE — 87086 URINE CULTURE/COLONY COUNT: CPT

## 2021-03-15 RX ORDER — FLUCONAZOLE 100 MG/1
100 TABLET ORAL DAILY
Qty: 10 TABLET | Refills: 0 | Status: SHIPPED | OUTPATIENT
Start: 2021-03-15 | End: 2021-03-25

## 2021-03-15 ASSESSMENT — ENCOUNTER SYMPTOMS
EYE PAIN: 0
COLOR CHANGE: 0
NAUSEA: 0
EYE REDNESS: 0
ABDOMINAL PAIN: 0
COUGH: 0
SHORTNESS OF BREATH: 0
BACK PAIN: 0
WHEEZING: 0
VOMITING: 0

## 2021-03-15 NOTE — PATIENT INSTRUCTIONS
Schedule a Vaccine  When you qualify to receive the vaccine per the [address] guidelines, call the [facility]) COVID-19 Vaccination Hotline to schedule your appointment or to get additional information about the [facility]) locations which are offering the COVID-19 vaccine. To be most effective, it's important that you receive two doses of one of the COVID-19 vaccines. -If you are receiving the Daugherty Peter vaccine, your second shot will be scheduled as close to 21 days after the first shot as possible. -If you are receiving the Moderna vaccine, your second shot will be scheduled as close to 28 days after the first shot as possible. [address] [address] Vaccination Hotline: [phone]    In partnership with [facility] and [facility] HEALTH Departments, patients can call [phone], Monday-Friday 8:00am-4:00pm for scheduling at our Hospitals. Or visit the General acute hospital websites for additional information of vaccine administration locations. Links to [facility]) website and Health Department websites:    [email or web address]-[email or web address]-[email or web address]-19-vaccine/[email or web address]    [email or web address]    [email or web address]

## 2021-03-15 NOTE — PROGRESS NOTES
HPI:        Patient is a 72 y.o. female in no acute distress. She is alert and oriented to person, place, and time. History  New patient being seen here with the complaint of recurrent urinary tract infection.  Referral from Dr. Isabel Vargsa. She believes that she had 3-4 infections over the past year. She notes that she was to have a knee replacement today but it got canceled due to UTI.  She states that she was not having any symptoms but as part of preadmission testing they found she had a urinary tract infection.  Her urine culture was positive for Silvana Peels was given a course of Keflex -5 days- by preadmission testing. Darvin Handler then developed urinary symptoms including dysuria and sensation of incomplete bladder emptying.  PCP did reorder urine culture and grew beta-hemolytic strep and Klebsiella.  This was treated with course of culture specific Hanh Ruiz has now complete this course of antibiotics and is currently not having any dysuria.  She states that she does not have a daily soft bowel movement.  She states that she has been under a lot of stress over the last several months due to the death of her mother. Ulysses Cruz does take some Metamucil but it is not always successful.  She drinks about 40 ounces of water a day.  She has an occasional Dr. Paula Meraz but this is not frequent.  She does get up to urinate 2-3 times at night.  She does have some occasional urinary frequency and stress incontinence.  She states that she does have some leaking at night as well.  She does states she has vaginal dryness.  She had a hysterectomy over 20 years ago which did spare one ovary.  She states that she went through menopause shortly thereafter.  She did have 2 vaginal births which were uncomplicated. Ulysses Cruz does state that she has painful intercourse.  Pt voided - 10 mL, PVR - 0 mL.     Urine cultures:  1/2021 - beta heme strep and klebsiella   1/2021 - klebsiella  8/2020 - no growth  2/2020 - halle    Currently  Patient is here today complaining of itching and tingling in the vaginal region. Patient has no gross hematuria. She does have occasional urge incontinence. No flank pain nausea vomiting today. She did use 5 days of Diflucan. She did state that she only was somewhat better. Patient does state that she is worse since stopping the Diflucan. Patient did state that she went back to the pool for exercising. Pain is moderate today. Past Medical History:   Diagnosis Date    Cerebrovascular disease     mini stroke may 2012    Chronic back pain     GERD (gastroesophageal reflux disease)     Hypertension     Hypothyroidism     Osteoarthritis     knee and back    Osteopenia 1/4/2016     Past Surgical History:   Procedure Laterality Date    APPENDECTOMY      BREAST BIOPSY      CHOLECYSTECTOMY      HYSTERECTOMY      still Rt ovary present    JOINT REPLACEMENT  08/07/2018    Rt knee     Outpatient Encounter Medications as of 3/15/2021   Medication Sig Dispense Refill    Ascorbic Acid (VITAMIN C) 250 MG tablet Take 250 mg by mouth daily      fluconazole (DIFLUCAN) 150 MG tablet Take one day one then repeat in 3-4 days 2 tablet 0    estradiol (ESTRACE VAGINAL) 0.1 MG/GM vaginal cream pea-sized amount vaginally daily x 2 wks, then 3 x per week. Use finger, wash hands after application. DO NOT use plastic applicator.  (Patient not taking: Reported on 2/18/2021) 1 Tube 2    LORazepam (ATIVAN) 0.5 MG tablet TAKE 1 TABLET BY MOUTH TWICE DAILY AS NEEDED FOR ANXIETY      hydroCHLOROthiazide (HYDRODIURIL) 25 MG tablet Take 1/2 tab po daily 90 tablet 1    levothyroxine (SYNTHROID) 100 MCG tablet TAKE 1 TABLET DAILY 90 tablet 1    losartan (COZAAR) 100 MG tablet TAKE 1 TABLET DAILY 90 tablet 1    omeprazole (PRILOSEC) 20 MG delayed release capsule TAKE 1 CAPSULE EVERY MORNING BEFORE BREAKFAST 90 capsule 1    famotidine (PEPCID) 20 MG tablet TAKE 1 TABLET DAILY WITH SUPPER (Patient taking differently: Take by mouth as needed TAKE 1 TABLET DAILY WITH SUPPER) 90 tablet 1    UNABLE TO FIND Gabapentin-Ketoprofen amitriptyline HCL 10-10-2% neuroserum liquid  Apply small amt to affected area and rub for 10 minutes. Repeat 2-3 times daily 15 mL 11    loratadine (CLARITIN) 10 MG tablet Take 10 mg by mouth daily Prn      vitamin D (ERGOCALCIFEROL) 11402 units CAPS capsule Take 50,000 Units by mouth Twice a Week       Vitamin Mixture (VITAMIN E COMPLETE PO) Po qd      clopidogrel (PLAVIX) 75 MG tablet Take 1 tablet by mouth daily. 30 tablet 0    folic acid (FOLVITE) 1 MG tablet Take 1 mg by mouth daily. No facility-administered encounter medications on file as of 3/15/2021. Current Outpatient Medications on File Prior to Visit   Medication Sig Dispense Refill    Ascorbic Acid (VITAMIN C) 250 MG tablet Take 250 mg by mouth daily      fluconazole (DIFLUCAN) 150 MG tablet Take one day one then repeat in 3-4 days 2 tablet 0    estradiol (ESTRACE VAGINAL) 0.1 MG/GM vaginal cream pea-sized amount vaginally daily x 2 wks, then 3 x per week. Use finger, wash hands after application. DO NOT use plastic applicator.  (Patient not taking: Reported on 2/18/2021) 1 Tube 2    LORazepam (ATIVAN) 0.5 MG tablet TAKE 1 TABLET BY MOUTH TWICE DAILY AS NEEDED FOR ANXIETY      hydroCHLOROthiazide (HYDRODIURIL) 25 MG tablet Take 1/2 tab po daily 90 tablet 1    levothyroxine (SYNTHROID) 100 MCG tablet TAKE 1 TABLET DAILY 90 tablet 1    losartan (COZAAR) 100 MG tablet TAKE 1 TABLET DAILY 90 tablet 1    omeprazole (PRILOSEC) 20 MG delayed release capsule TAKE 1 CAPSULE EVERY MORNING BEFORE BREAKFAST 90 capsule 1    famotidine (PEPCID) 20 MG tablet TAKE 1 TABLET DAILY WITH SUPPER (Patient taking differently: Take by mouth as needed TAKE 1 TABLET DAILY WITH SUPPER) 90 tablet 1    UNABLE TO FIND Gabapentin-Ketoprofen amitriptyline HCL 10-10-2% neuroserum liquid  Apply small amt to affected area and rub for 10 minutes. Repeat 2-3 times daily 15 mL 11    loratadine (CLARITIN) 10 MG tablet Take 10 mg by mouth daily Prn      vitamin D (ERGOCALCIFEROL) 87644 units CAPS capsule Take 50,000 Units by mouth Twice a Week       Vitamin Mixture (VITAMIN E COMPLETE PO) Po qd      clopidogrel (PLAVIX) 75 MG tablet Take 1 tablet by mouth daily. 30 tablet 0    folic acid (FOLVITE) 1 MG tablet Take 1 mg by mouth daily. No current facility-administered medications on file prior to visit. Ciprofloxacin, Flonase [fluticasone propionate], Macrobid [nitrofurantoin], Other, and Oxycodone  Family History   Problem Relation Age of Onset    Diabetes Mother     COPD Mother     Diabetes Father      Social History     Tobacco Use   Smoking Status Former Smoker    Packs/day: 1.00    Years: 15.00    Pack years: 15.00    Types: Cigarettes    Quit date: 12    Years since quittin.2   Smokeless Tobacco Never Used       Social History     Substance and Sexual Activity   Alcohol Use No       Review of Systems   Constitutional: Negative for appetite change, chills and fever. Eyes: Negative for pain, redness and visual disturbance. Respiratory: Negative for cough, shortness of breath and wheezing. Cardiovascular: Negative for chest pain and leg swelling. Gastrointestinal: Negative for abdominal pain, nausea and vomiting. Genitourinary: Negative for difficulty urinating, dysuria, flank pain, frequency, hematuria, pelvic pain, vaginal bleeding and vaginal discharge. Musculoskeletal: Negative for back pain, joint swelling and myalgias. Skin: Negative for color change, rash and wound. Neurological: Negative for dizziness, tremors and numbness. Hematological: Negative for adenopathy. Does not bruise/bleed easily. Temp 98.6 °F (37 °C)   Ht 5' 6\" (1.676 m)   Wt 218 lb (98.9 kg)   BMI 35.19 kg/m²       PHYSICAL EXAM:  Constitutional: Patient resting comfortably, in no acute distress.    Neuro: Alert and oriented to person place and time. Cranial nerves grossly intact. Psych: Mood and affect normal.  Skin: Warm, dry  HEENT: normocephalic, atraumatic  Lymphatics: No palpable lymphadenopathy  Lungs: Respiratory effort normal, unlabored  Cardiovascular:  Normal peripheral pulses  Abdomen: Soft, non-tender, non-distended with no organomegaly or palpable masses. : No CVA tenderness. Bladder non-tender and not distended. Pelvic: deferred    Lab Results   Component Value Date    BUN 20 02/11/2021     Lab Results   Component Value Date    CREATININE 1.12 (H) 02/11/2021       ASSESSMENT:  This is a 72 y.o. female with the following diagnoses:   Diagnosis Orders   1. Vaginal atrophy  Urinalysis with Microscopic    Culture, Urine    fluconazole (DIFLUCAN) 100 MG tablet   2. Microscopic hematuria  Urinalysis with Microscopic    Culture, Urine    fluconazole (DIFLUCAN) 100 MG tablet   3. Yeast vaginitis  fluconazole (DIFLUCAN) 100 MG tablet         PLAN:  We will check UA C&S today. We did start her on a 10-day course of Diflucan. If she is positive for infection she will discontinue the Diflucan and finish the course when she is done. I do think that ultimately she will benefit from vaginal estrogen. She was reluctant to restart this today. We will see her back in 2 weeks.

## 2021-03-16 LAB
CULTURE: ABNORMAL
CULTURE: ABNORMAL
Lab: ABNORMAL
SPECIMEN DESCRIPTION: ABNORMAL

## 2021-03-17 ENCOUNTER — TELEPHONE (OUTPATIENT)
Dept: UROLOGY | Age: 66
End: 2021-03-17

## 2021-03-17 RX ORDER — AMOXICILLIN 500 MG/1
500 CAPSULE ORAL 3 TIMES DAILY
Qty: 21 CAPSULE | Refills: 0 | Status: SHIPPED | OUTPATIENT
Start: 2021-03-17 | End: 2021-03-24

## 2021-03-17 NOTE — TELEPHONE ENCOUNTER
Patient made aware of positive urine culture results and to start antibiotics, take probiotics as well. Patient voiced understanding.

## 2021-03-17 NOTE — TELEPHONE ENCOUNTER
UACS is positive for infection. I sent in culture specific amoxicillin. Take this antibiotic until gone. Take this with food and eat yogurt once per day to prevent GI upset. If you develop nausea, vomiting, or fevers call the office or go to the ER. If your urinary symptoms do not improve once completing the antibiotics call our office. We recommend taking a daily over-the-counter probiotic.     We sent script to [facility]

## 2021-03-29 ENCOUNTER — OFFICE VISIT (OUTPATIENT)
Dept: UROLOGY | Age: 66
End: 2021-03-29
Payer: MEDICARE

## 2021-03-29 ENCOUNTER — HOSPITAL ENCOUNTER (OUTPATIENT)
Age: 66
Setting detail: SPECIMEN
Discharge: HOME | End: 2021-03-29
Payer: MEDICARE

## 2021-03-29 VITALS
TEMPERATURE: 97.7 F | WEIGHT: 224 LBS | HEIGHT: 66 IN | HEART RATE: 81 BPM | BODY MASS INDEX: 36 KG/M2 | DIASTOLIC BLOOD PRESSURE: 72 MMHG | SYSTOLIC BLOOD PRESSURE: 104 MMHG

## 2021-03-29 DIAGNOSIS — N39.0 RECURRENT UTI: Primary | ICD-10-CM

## 2021-03-29 DIAGNOSIS — B37.31 YEAST VAGINITIS: ICD-10-CM

## 2021-03-29 DIAGNOSIS — N95.2 VAGINAL ATROPHY: ICD-10-CM

## 2021-03-29 DIAGNOSIS — N39.0 RECURRENT UTI: ICD-10-CM

## 2021-03-29 DIAGNOSIS — D17.71 ANGIOMYOLIPOMA OF KIDNEY: ICD-10-CM

## 2021-03-29 LAB
-: ABNORMAL
AMORPHOUS: ABNORMAL
BACTERIA: ABNORMAL
BILIRUBIN URINE: NEGATIVE
CASTS UA: ABNORMAL /LPF
COLOR: YELLOW
COMMENT UA: ABNORMAL
CRYSTALS, UA: ABNORMAL /HPF
EPITHELIAL CELLS UA: ABNORMAL /HPF (ref 0–25)
GLUCOSE URINE: NEGATIVE
KETONES, URINE: NEGATIVE
LEUKOCYTE ESTERASE, URINE: ABNORMAL
MUCUS: ABNORMAL
NITRITE, URINE: NEGATIVE
OTHER OBSERVATIONS UA: ABNORMAL
PH UA: 7.5 (ref 5–9)
PROTEIN UA: NEGATIVE
RBC UA: ABNORMAL /HPF (ref 0–2)
RENAL EPITHELIAL, UA: ABNORMAL /HPF
SPECIFIC GRAVITY UA: 1.01 (ref 1.01–1.02)
TRICHOMONAS: ABNORMAL
TURBIDITY: CLEAR
URINE HGB: NEGATIVE
UROBILINOGEN, URINE: NORMAL
WBC UA: ABNORMAL /HPF (ref 0–5)
YEAST: ABNORMAL

## 2021-03-29 PROCEDURE — 4040F PNEUMOC VAC/ADMIN/RCVD: CPT | Performed by: PHYSICIAN ASSISTANT

## 2021-03-29 PROCEDURE — G8482 FLU IMMUNIZE ORDER/ADMIN: HCPCS | Performed by: PHYSICIAN ASSISTANT

## 2021-03-29 PROCEDURE — G8417 CALC BMI ABV UP PARAM F/U: HCPCS | Performed by: PHYSICIAN ASSISTANT

## 2021-03-29 PROCEDURE — 99213 OFFICE O/P EST LOW 20 MIN: CPT | Performed by: PHYSICIAN ASSISTANT

## 2021-03-29 PROCEDURE — 1036F TOBACCO NON-USER: CPT | Performed by: PHYSICIAN ASSISTANT

## 2021-03-29 PROCEDURE — G8427 DOCREV CUR MEDS BY ELIG CLIN: HCPCS | Performed by: PHYSICIAN ASSISTANT

## 2021-03-29 PROCEDURE — 87077 CULTURE AEROBIC IDENTIFY: CPT

## 2021-03-29 PROCEDURE — 81001 URINALYSIS AUTO W/SCOPE: CPT

## 2021-03-29 PROCEDURE — 87086 URINE CULTURE/COLONY COUNT: CPT

## 2021-03-29 PROCEDURE — 1090F PRES/ABSN URINE INCON ASSESS: CPT | Performed by: PHYSICIAN ASSISTANT

## 2021-03-29 PROCEDURE — 1123F ACP DISCUSS/DSCN MKR DOCD: CPT | Performed by: PHYSICIAN ASSISTANT

## 2021-03-29 PROCEDURE — 99211 OFF/OP EST MAY X REQ PHY/QHP: CPT

## 2021-03-29 PROCEDURE — G8399 PT W/DXA RESULTS DOCUMENT: HCPCS | Performed by: PHYSICIAN ASSISTANT

## 2021-03-29 PROCEDURE — 3017F COLORECTAL CA SCREEN DOC REV: CPT | Performed by: PHYSICIAN ASSISTANT

## 2021-03-29 ASSESSMENT — ENCOUNTER SYMPTOMS
ABDOMINAL PAIN: 0
NAUSEA: 0
CONSTIPATION: 0
COLOR CHANGE: 0
VOMITING: 0
BACK PAIN: 0
WHEEZING: 0
COUGH: 0
EYE REDNESS: 0
SHORTNESS OF BREATH: 0

## 2021-03-29 NOTE — PROGRESS NOTES
Osteoarthritis     knee and back    Osteopenia 1/4/2016     Past Surgical History:   Procedure Laterality Date    APPENDECTOMY      BREAST BIOPSY      CHOLECYSTECTOMY      HYSTERECTOMY      still Rt ovary present    JOINT REPLACEMENT  08/07/2018    Rt knee     Outpatient Encounter Medications as of 3/29/2021   Medication Sig Dispense Refill    NONFORMULARY GoLo release-take 1 tablet twice a day.  Ascorbic Acid (VITAMIN C) 250 MG tablet Take 250 mg by mouth daily      LORazepam (ATIVAN) 0.5 MG tablet TAKE 1 TABLET BY MOUTH TWICE DAILY AS NEEDED FOR ANXIETY      hydroCHLOROthiazide (HYDRODIURIL) 25 MG tablet Take 1/2 tab po daily 90 tablet 1    levothyroxine (SYNTHROID) 100 MCG tablet TAKE 1 TABLET DAILY 90 tablet 1    losartan (COZAAR) 100 MG tablet TAKE 1 TABLET DAILY 90 tablet 1    omeprazole (PRILOSEC) 20 MG delayed release capsule TAKE 1 CAPSULE EVERY MORNING BEFORE BREAKFAST 90 capsule 1    famotidine (PEPCID) 20 MG tablet TAKE 1 TABLET DAILY WITH SUPPER (Patient taking differently: Take by mouth as needed TAKE 1 TABLET DAILY WITH SUPPER) 90 tablet 1    UNABLE TO FIND Gabapentin-Ketoprofen amitriptyline HCL 10-10-2% neuroserum liquid  Apply small amt to affected area and rub for 10 minutes. Repeat 2-3 times daily 15 mL 11    loratadine (CLARITIN) 10 MG tablet Take 10 mg by mouth daily Prn      vitamin D (ERGOCALCIFEROL) 78707 units CAPS capsule Take 50,000 Units by mouth Twice a Week       Vitamin Mixture (VITAMIN E COMPLETE PO) Po qd      clopidogrel (PLAVIX) 75 MG tablet Take 1 tablet by mouth daily. 30 tablet 0    folic acid (FOLVITE) 1 MG tablet Take 1 mg by mouth daily.  estradiol (ESTRACE VAGINAL) 0.1 MG/GM vaginal cream pea-sized amount vaginally daily x 2 wks, then 3 x per week. Use finger, wash hands after application. DO NOT use plastic applicator.  (Patient not taking: Reported on 2/18/2021) 1 Tube 2     No facility-administered encounter medications on file as of 3/29/2021. Current Outpatient Medications on File Prior to Visit   Medication Sig Dispense Refill    NONFORMULARY GoLo release-take 1 tablet twice a day.  Ascorbic Acid (VITAMIN C) 250 MG tablet Take 250 mg by mouth daily      LORazepam (ATIVAN) 0.5 MG tablet TAKE 1 TABLET BY MOUTH TWICE DAILY AS NEEDED FOR ANXIETY      hydroCHLOROthiazide (HYDRODIURIL) 25 MG tablet Take 1/2 tab po daily 90 tablet 1    levothyroxine (SYNTHROID) 100 MCG tablet TAKE 1 TABLET DAILY 90 tablet 1    losartan (COZAAR) 100 MG tablet TAKE 1 TABLET DAILY 90 tablet 1    omeprazole (PRILOSEC) 20 MG delayed release capsule TAKE 1 CAPSULE EVERY MORNING BEFORE BREAKFAST 90 capsule 1    famotidine (PEPCID) 20 MG tablet TAKE 1 TABLET DAILY WITH SUPPER (Patient taking differently: Take by mouth as needed TAKE 1 TABLET DAILY WITH SUPPER) 90 tablet 1    UNABLE TO FIND Gabapentin-Ketoprofen amitriptyline HCL 10-10-2% neuroserum liquid  Apply small amt to affected area and rub for 10 minutes. Repeat 2-3 times daily 15 mL 11    loratadine (CLARITIN) 10 MG tablet Take 10 mg by mouth daily Prn      vitamin D (ERGOCALCIFEROL) 78794 units CAPS capsule Take 50,000 Units by mouth Twice a Week       Vitamin Mixture (VITAMIN E COMPLETE PO) Po qd      clopidogrel (PLAVIX) 75 MG tablet Take 1 tablet by mouth daily. 30 tablet 0    folic acid (FOLVITE) 1 MG tablet Take 1 mg by mouth daily.  estradiol (ESTRACE VAGINAL) 0.1 MG/GM vaginal cream pea-sized amount vaginally daily x 2 wks, then 3 x per week. Use finger, wash hands after application. DO NOT use plastic applicator. (Patient not taking: Reported on 2/18/2021) 1 Tube 2     No current facility-administered medications on file prior to visit.       Ciprofloxacin, Flonase [fluticasone propionate], Macrobid [nitrofurantoin], Other, and Oxycodone  Family History   Problem Relation Age of Onset    Diabetes Mother     COPD Mother     Diabetes Father      Social History     Tobacco Use Smoking Status Former Smoker    Packs/day: 1.00    Years: 15.00    Pack years: 15.00    Types: Cigarettes    Quit date: 12    Years since quittin.2   Smokeless Tobacco Never Used       Social History     Substance and Sexual Activity   Alcohol Use No       Review of Systems   Constitutional: Negative for appetite change, chills and fever. Eyes: Negative for redness and visual disturbance. Respiratory: Negative for cough, shortness of breath and wheezing. Cardiovascular: Negative for chest pain and leg swelling. Gastrointestinal: Negative for abdominal pain, constipation, nausea and vomiting. Genitourinary: Positive for dysuria. Negative for difficulty urinating, flank pain, frequency, hematuria, pelvic pain, urgency, vaginal bleeding and vaginal discharge. Positive for vaginal itching   Musculoskeletal: Negative for back pain, joint swelling and myalgias. Skin: Negative for color change, rash and wound. Neurological: Negative for dizziness, tremors and numbness. Hematological: Negative for adenopathy. Does not bruise/bleed easily. /72 (Site: Left Upper Arm, Position: Sitting, Cuff Size: Large Adult)   Pulse 81   Temp 97.7 °F (36.5 °C)   Ht 5' 6\" (1.676 m)   Wt 224 lb (101.6 kg)   BMI 36.15 kg/m²       PHYSICAL EXAM:  Constitutional: Patient resting comfortably, in no acute distress. Neuro: Alert and oriented to person place and time. Psych: Mood and affect normal.  HEENT: normocephalic, atraumatic  Lungs: Respiratory effort normal, unlabored  Abdomen: Soft, non-tender  : No CVA tenderness. Pelvic:deferred     Lab Results   Component Value Date    BUN 20 2021     Lab Results   Component Value Date    CREATININE 1.12 (H) 2021       ASSESSMENT:   Diagnosis Orders   1. Recurrent UTI     2. Vaginal atrophy     3. Yeast vaginitis     4.  Angiomyolipoma of kidney       PLAN:  Encourage water intake    We will check a urinalysis and culture as some of her symptoms have returned. We will call her with the results. Complete course of Diflucan, if her vaginal itching continues she is to notify our office and we will extend her course of Diflucan. Follow-up 3/2022 with renal ultrasound as planned unless patient needs a sooner than that.

## 2021-03-31 ENCOUNTER — TELEPHONE (OUTPATIENT)
Dept: FAMILY MEDICINE CLINIC | Age: 66
End: 2021-03-31

## 2021-03-31 LAB
CULTURE: ABNORMAL
Lab: ABNORMAL
SPECIMEN DESCRIPTION: ABNORMAL

## 2021-03-31 RX ORDER — CEFDINIR 300 MG/1
300 CAPSULE ORAL 2 TIMES DAILY
Qty: 20 CAPSULE | Refills: 0 | Status: SHIPPED | OUTPATIENT
Start: 2021-03-31 | End: 2021-04-10

## 2021-03-31 RX ORDER — FLUCONAZOLE 100 MG/1
100 TABLET ORAL DAILY
Qty: 5 TABLET | Refills: 0 | Status: SHIPPED | OUTPATIENT
Start: 2021-04-11 | End: 2021-04-16

## 2021-03-31 NOTE — TELEPHONE ENCOUNTER
UACS is positive for infection. I sent in culture specific omnicef. Take this antibiotic until gone. Take this with food and eat yogurt once per day to prevent GI upset. If you develop nausea, vomiting, or fevers call the office or go to the ER. If your urinary symptoms do not improve once completing the antibiotics call our office.      If she completes course of antibiotic I do want her to take 5 days of Diflucan

## 2021-04-01 NOTE — TELEPHONE ENCOUNTER
Patrick Rankin of her culture results. She is concerned about taking the medication because she has never taken it before and is concerned about an interaction with all of her other medications. She is also concerned whether it is ok for her to get her 2nd COVID vaccine Friday evening. She is going to discuss taking the ATB with her .  She is also debating on waiting until Monday to start it for fear of feeling really bad over the Easter Weekend

## 2021-04-01 NOTE — TELEPHONE ENCOUNTER
Patient was made aware by not taking the ATB now would only prolong the infection. Also ntfd to take it with food and to eat a yogurt daily.     She was also advised to go ahead and get the COVID vaccine

## 2021-04-01 NOTE — TELEPHONE ENCOUNTER
Please let her know that there is no contraindication for her getting her second Covid vaccine at this time. Do let her know that if she is very concerned about not feeling ill over Easter weekend that the second Covid vaccine can have more side effects than the initial one. We do recommend her starting the antibiotic as she does have a urinary tract infection. Please let her know that the Gaynel Starch is a newer generation of the same class that Keflex is sent in. There is minimal interactions with other medications. Make sure she takes the medication with food and that she takes an over-the-counter probiotic while she is on the antibiotic.

## 2021-04-07 ENCOUNTER — OFFICE VISIT (OUTPATIENT)
Dept: FAMILY MEDICINE CLINIC | Age: 66
End: 2021-04-07
Payer: MEDICARE

## 2021-04-07 VITALS
HEART RATE: 100 BPM | BODY MASS INDEX: 36.15 KG/M2 | TEMPERATURE: 97.5 F | WEIGHT: 224 LBS | OXYGEN SATURATION: 98 % | DIASTOLIC BLOOD PRESSURE: 70 MMHG | SYSTOLIC BLOOD PRESSURE: 120 MMHG

## 2021-04-07 DIAGNOSIS — M99.00 SOMATIC DYSFUNCTION OF HEAD REGION: ICD-10-CM

## 2021-04-07 DIAGNOSIS — J34.89 SINUS PRESSURE: Primary | ICD-10-CM

## 2021-04-07 DIAGNOSIS — R51.9 SINUS HEADACHE: ICD-10-CM

## 2021-04-07 PROCEDURE — G8399 PT W/DXA RESULTS DOCUMENT: HCPCS | Performed by: STUDENT IN AN ORGANIZED HEALTH CARE EDUCATION/TRAINING PROGRAM

## 2021-04-07 PROCEDURE — G8417 CALC BMI ABV UP PARAM F/U: HCPCS | Performed by: STUDENT IN AN ORGANIZED HEALTH CARE EDUCATION/TRAINING PROGRAM

## 2021-04-07 PROCEDURE — 3017F COLORECTAL CA SCREEN DOC REV: CPT | Performed by: STUDENT IN AN ORGANIZED HEALTH CARE EDUCATION/TRAINING PROGRAM

## 2021-04-07 PROCEDURE — 1090F PRES/ABSN URINE INCON ASSESS: CPT | Performed by: STUDENT IN AN ORGANIZED HEALTH CARE EDUCATION/TRAINING PROGRAM

## 2021-04-07 PROCEDURE — 98925 OSTEOPATH MANJ 1-2 REGIONS: CPT | Performed by: STUDENT IN AN ORGANIZED HEALTH CARE EDUCATION/TRAINING PROGRAM

## 2021-04-07 PROCEDURE — G8427 DOCREV CUR MEDS BY ELIG CLIN: HCPCS | Performed by: STUDENT IN AN ORGANIZED HEALTH CARE EDUCATION/TRAINING PROGRAM

## 2021-04-07 PROCEDURE — 4040F PNEUMOC VAC/ADMIN/RCVD: CPT | Performed by: STUDENT IN AN ORGANIZED HEALTH CARE EDUCATION/TRAINING PROGRAM

## 2021-04-07 PROCEDURE — 1036F TOBACCO NON-USER: CPT | Performed by: STUDENT IN AN ORGANIZED HEALTH CARE EDUCATION/TRAINING PROGRAM

## 2021-04-07 PROCEDURE — 99213 OFFICE O/P EST LOW 20 MIN: CPT | Performed by: STUDENT IN AN ORGANIZED HEALTH CARE EDUCATION/TRAINING PROGRAM

## 2021-04-07 PROCEDURE — 1123F ACP DISCUSS/DSCN MKR DOCD: CPT | Performed by: STUDENT IN AN ORGANIZED HEALTH CARE EDUCATION/TRAINING PROGRAM

## 2021-04-07 ASSESSMENT — ENCOUNTER SYMPTOMS
SINUS PRESSURE: 1
VOMITING: 0
NAUSEA: 0
ABDOMINAL PAIN: 0
DIARRHEA: 0
RHINORRHEA: 0
WHEEZING: 0
COUGH: 0
SINUS PAIN: 1

## 2021-04-07 NOTE — PROGRESS NOTES
End Date Taking? Authorizing Provider   cefdinir (OMNICEF) 300 MG capsule Take 1 capsule by mouth 2 times daily for 10 days 3/31/21 4/10/21 Yes Jamie Patel PA-C   Ascorbic Acid (VITAMIN C) 250 MG tablet Take 250 mg by mouth daily   Yes Historical Provider, MD   hydroCHLOROthiazide (HYDRODIURIL) 25 MG tablet Take 1/2 tab po daily 1/20/21  Yes Jordan Fisher MD   levothyroxine (SYNTHROID) 100 MCG tablet TAKE 1 TABLET DAILY 12/1/20  Yes Jordan Fisher MD   losartan (COZAAR) 100 MG tablet TAKE 1 TABLET DAILY 12/1/20  Yes Jordan Fisher MD   omeprazole (PRILOSEC) 20 MG delayed release capsule TAKE 1 CAPSULE EVERY MORNING BEFORE BREAKFAST 12/1/20  Yes Jordan Fisher MD   UNABLE TO FIND Gabapentin-Ketoprofen amitriptyline HCL 10-10-2% neuroserum liquid  Apply small amt to affected area and rub for 10 minutes. Repeat 2-3 times daily 7/27/20  Yes Jordan Fisher MD   loratadine (CLARITIN) 10 MG tablet Take 10 mg by mouth daily Prn   Yes Historical Provider, MD   vitamin D (ERGOCALCIFEROL) 11230 units CAPS capsule Take 50,000 Units by mouth Twice a Week  1/24/19  Yes Historical Provider, MD   Vitamin Mixture (VITAMIN E COMPLETE PO) Po qd   Yes Historical Provider, MD   clopidogrel (PLAVIX) 75 MG tablet Take 1 tablet by mouth daily. 2/21/15  Yes Justin Solano MD   folic acid (FOLVITE) 1 MG tablet Take 1 mg by mouth daily. Yes Historical Provider, MD   fluconazole (DIFLUCAN) 100 MG tablet Take 1 tablet by mouth daily for 5 days  Patient not taking: Reported on 4/7/2021 4/11/21 4/16/21  Nilam Patel PA-C   NONFORMULARY GoLo release-take 1 tablet twice a day. Historical Provider, MD   estradiol (ESTRACE VAGINAL) 0.1 MG/GM vaginal cream pea-sized amount vaginally daily x 2 wks, then 3 x per week. Use finger, wash hands after application. DO NOT use plastic applicator.   Patient not taking: Reported on 2/18/2021 2/4/21   NAHED Armstrong-C   LORazepam (ATIVAN) 0.5 MG tablet TAKE 1 TABLET BY MOUTH TWICE DAILY AS NEEDED FOR ANXIETY 11/30/20   Historical Provider, MD   famotidine (PEPCID) 20 MG tablet TAKE 1 TABLET DAILY WITH SUPPER  Patient not taking: Reported on 4/7/2021 7/27/20   Ktaey Ray MD        Allergies:       Ciprofloxacin, Flonase [fluticasone propionate], Macrobid [nitrofurantoin], Other, and Oxycodone    Social History:     Tobacco:    reports that she quit smoking about 35 years ago. Her smoking use included cigarettes. She has a 15.00 pack-year smoking history. She has never used smokeless tobacco.  Alcohol:      reports no history of alcohol use. Drug Use:  reports no history of drug use. Family History:     Family History   Problem Relation Age of Onset    Diabetes Mother     COPD Mother     Diabetes Father        Review of Systems:         Review of Systems   Constitutional: Negative for chills and fever. HENT: Positive for congestion, dental problem, postnasal drip, sinus pressure and sinus pain. Negative for ear discharge, nosebleeds, rhinorrhea and sneezing. Nasal discharge   Respiratory: Negative for cough and wheezing. Cardiovascular: Negative for chest pain. Gastrointestinal: Negative for abdominal pain, diarrhea, nausea and vomiting. Neurological: Positive for headaches. Physical Exam:     Vitals:  /70   Pulse 100   Temp 97.5 °F (36.4 °C) (Temporal)   Wt 224 lb (101.6 kg)   SpO2 98%   BMI 36.15 kg/m²       Physical Exam  Vitals signs and nursing note reviewed. Constitutional:       General: She is not in acute distress. Appearance: Normal appearance. She is obese. HENT:      Right Ear: Tympanic membrane, ear canal and external ear normal. There is no impacted cerumen. Left Ear: Tympanic membrane, ear canal and external ear normal. There is no impacted cerumen. Nose: Nose normal. No congestion or rhinorrhea.       Comments: Narrow nasal passages past vestibule     Mouth/Throat:      Mouth: Mucous membranes are moist.      Pharynx: Oropharynx is clear. No oropharyngeal exudate or posterior oropharyngeal erythema. Skin:     General: Skin is warm and dry. Capillary Refill: Capillary refill takes less than 2 seconds. Neurological:      General: No focal deficit present. Mental Status: She is alert and oriented to person, place, and time. Mental status is at baseline. Cranial Nerves: No cranial nerve deficit. Psychiatric:         Mood and Affect: Mood normal.         Behavior: Behavior normal.         Thought Content: Thought content normal.     Osteopathic: Frontal, ethmoid, maxillary sinuses boggy, tender. Data:     Lab Results   Component Value Date     07/27/2020    K 3.9 07/27/2020     07/27/2020    CO2 26 07/27/2020    BUN 20 02/11/2021    CREATININE 1.12 02/11/2021    GLUCOSE 111 07/27/2020    GLUCOSE 97 01/25/2012    PROT 6.5 11/24/2017    LABALBU 3.9 11/24/2017    LABALBU 4.4 01/25/2012    BILITOT 0.39 11/24/2017    ALKPHOS 65 11/24/2017    AST 15 11/24/2017    ALT 10 11/24/2017     Lab Results   Component Value Date    WBC 5.2 01/27/2020    RBC 4.77 01/27/2020    RBC 4.92 01/25/2012    HGB 13.6 01/27/2020    HCT 40.5 01/27/2020    MCV 85.0 01/27/2020    MCH 28.6 01/27/2020    MCHC 33.6 01/27/2020    RDW 15.0 01/27/2020     01/27/2020     01/25/2012    MPV NOT REPORTED 01/27/2020     Lab Results   Component Value Date    TSH 2.44 01/27/2021     Lab Results   Component Value Date    CHOL 158 11/24/2017    HDL 73 11/24/2017    LABA1C 5.6 07/27/2020          Assessment & Plan        Diagnosis Orders   1. Sinus pressure  SD OSTEOPATHIC MANIP,1-2 BODY REGN   2. Sinus headache  SD OSTEOPATHIC MANIP,1-2 BODY REGN   3. Somatic dysfunction of head region  SD OSTEOPATHIC MANIP,1-2 BODY REGN       1. I would not recommend treating what appears to be subacute sinusitis with further antibiotics, as she is currently on cefdinir.   Her previous treatment with prednisone is somewhat puzzling, as I would not expect a bacterial sinus infection to improve with oral glucocorticoids. I would not recommend oral glucocorticoids at this time. She does have con commitment somatic dysfunction, which may explain some of her current symptoms. I would recommend treating with OMT today, if not improved in 5 days, will consider facial CT and possible referral to ENT for discussion of management of recurrent sinusitis. 3. After history taking and examination of patient, it was determined that a beneficial treatment modality for their complaint would be osteopathic manipulative therapy (OMT) the nature of OMT, treatment goals, mechanism of action, and side effects were extensively discussed with this patient, who did wish to proceed with the procedure. The following body systems were treated with osteopathy during our office visit today: Head region. Osteopathic findings include restriction, fullness with boggy end feel of the frontal, ethmoid, maxillary sinuses. After treatment, the patient was reevaluated and the following physical exam findings were appreciated: Improvement in tissue texture and feel, restoration of movement of the facial bones to anatomic motion. We determined that the next best time for the patient to return to the office for additional treatment would be in 5 days. At this point, her treatment was concluded, there were no complications, there were no further questions. Patient tolerated this procedure very well. Completed Refills   Requested Prescriptions      No prescriptions requested or ordered in this encounter     Return in about 5 days (around 4/12/2021) for OMT head region. No orders of the defined types were placed in this encounter. Orders Placed This Encounter   Procedures    CT OSTEOPATHIC MANIP,1-2 BODY REGN         Patient Instructions   Treated with osteopathic manipulative therapy (OMT).   Depending on how many sections of your body addressed. Patient given educational materials - see patient instructions. All patient questions answered. Patient voiced understanding.

## 2021-04-12 ENCOUNTER — OFFICE VISIT (OUTPATIENT)
Dept: FAMILY MEDICINE CLINIC | Age: 66
End: 2021-04-12
Payer: MEDICARE

## 2021-04-12 VITALS
HEART RATE: 76 BPM | TEMPERATURE: 97.1 F | OXYGEN SATURATION: 97 % | DIASTOLIC BLOOD PRESSURE: 80 MMHG | SYSTOLIC BLOOD PRESSURE: 120 MMHG

## 2021-04-12 DIAGNOSIS — M99.00 SOMATIC DYSFUNCTION OF HEAD REGION: ICD-10-CM

## 2021-04-12 DIAGNOSIS — R51.9 SINUS HEADACHE: Primary | ICD-10-CM

## 2021-04-12 DIAGNOSIS — J34.89 SINUS PRESSURE: ICD-10-CM

## 2021-04-12 DIAGNOSIS — M99.01 SOMATIC DYSFUNCTION OF SPINE, CERVICAL: ICD-10-CM

## 2021-04-12 PROCEDURE — 98925 OSTEOPATH MANJ 1-2 REGIONS: CPT | Performed by: STUDENT IN AN ORGANIZED HEALTH CARE EDUCATION/TRAINING PROGRAM

## 2021-04-12 NOTE — PATIENT INSTRUCTIONS
You were Treated with osteopathic manipulative therapy (OMT). Depending on how many sections of your body we have worked on, and how long we have worked, it is not uncommon for patients to feel somewhat sore, dizzy, or lightheaded. Is very important that you drink plenty of water over the next 24 hours. Your muscles and body tissues are like a sponge: Once a sponge has been hydrated, it cannot shrink up and become tight and spasmed again. However, if you do not hydrate, tissues will return to their original state before treatment. Additionally, muscles which have been tight for a long time can release some cellular waste products into the bloodstream which can cause people to feel poorly for several hours. The additional water helps dilute these and helps you feel better. All this being said, the side effects are extremely uncommon among patients on whom I performed this therapy. At your next visit for osteopathic therapy, please be sure to wear comfortable clothes, such as a T-shirt, athletic shorts, sweatpants, or leggings. Please do not wear ashlie jeans, a belt, or work boots. If you have any additional questions about OMT, please call my office. Dr. Pollo Mancera:    Jak Morris may be receiving a survey from [employer] regarding your visit today. Please complete the survey to enable us to provide the highest quality of care to you and your family. If you cannot score us a very good on any question, please call the office to discuss how we could of made your experience a very good one. Thank you.

## 2021-04-12 NOTE — PROGRESS NOTES
Interval history: This is a 70-year-old woman presenting for reevaluation with osteopathic therapy for complaint of sinus pressure, sinus headache. She was previously treated with osteopathy for somatic dysfunction of the head region, to modest effect during the visit. Since our visit, she is reporting \"having only one headache\". She is reporting that there has been a great relief in sinus pressure but no noticeable nasal drainage. Reports cough has improved. Overall reporting \"70-80% improvement\" since previous visit; is using qAM loratadine as well. /80   Pulse 76   Temp 97.1 °F (36.2 °C) (Temporal)   SpO2 97%     Procedure Note: After history taking and examination of patient, it was determined that a beneficial treatment modality for their complaint would be osteopathic manipulative therapy (OMT) the nature of OMT, treatment goals, mechanism of action, and side effects were extensively discussed with this patient, who did wish to proceed with the procedure. The following body systems were treated with osteopathy during our office visit today: head region, cervical region. Osteopathic findings include left temporal fullness, tender frontal, ethmoid, maxillary sinuses, bilateral boggy endfeel of the zygomae, type 2 somatic dysfunction C3 on the left. Treatment included cranial therapy, strain counterstrain, sinus effleurage, occipital release. After treatment, the patient was reevaluated and the following physical exam findings were appreciated: resolution of cranial dysfunction, resolution of cervical dysfunction, improvement in endfeel over sinuses and reduction in perceived pressure over sinuses. We determined that the next best time for the patient to return to the office for additional treatment would be as needed. At this point, her treatment was concluded, there were no complications, there were no further questions. Patient tolerated this procedure very well.     Charlott Libman Gustavo Almaraz  4/12/2021  8:31 AM

## 2021-04-19 ENCOUNTER — TELEPHONE (OUTPATIENT)
Dept: UROLOGY | Age: 66
End: 2021-04-19

## 2021-04-19 RX ORDER — FLUCONAZOLE 100 MG/1
100 TABLET ORAL DAILY
Qty: 5 TABLET | Refills: 0 | Status: SHIPPED | OUTPATIENT
Start: 2021-04-19 | End: 2021-04-24

## 2021-04-19 NOTE — TELEPHONE ENCOUNTER
Called patient and informed her that Diflucan sent to pharmacy. Patient stated she has not started vaginal estrogen.   She wanted to wait until current symptoms have cleared

## 2021-04-19 NOTE — TELEPHONE ENCOUNTER
Patient called stating she finished antibiotic and then took Diflucan for 5 days. She states she took last dose of Diflucan on 4/16/21. She states she still has vaginal itching.   No urinary symptoms

## 2021-04-22 ENCOUNTER — TELEPHONE (OUTPATIENT)
Dept: UROLOGY | Age: 66
End: 2021-04-22

## 2021-04-22 NOTE — TELEPHONE ENCOUNTER
Please offer her a office visit today in [place] so he do a pelvic exam but if she wants to see the physician we can schedule her for Monday in the office for a pelvic exam.

## 2021-04-22 NOTE — TELEPHONE ENCOUNTER
Darwin Vann has been on 5 rounds of ATB's since January and she is concerned that what she has is not truly a UTI. She was on a 10 day supply of ATB recently and then given a 5 day diflucan supply. She is on a second 5 day round of diflucan that she got on 4/19/2021 and states she still has the itching, no discharge, pain with urination and she is really uncomfortable.        Health Maintenance   Topic Date Due    Hepatitis C screen  Never done    HIV screen  Never done    DTaP/Tdap/Td vaccine (1 - Tdap) Never done    Shingles Vaccine (1 of 2) Never done    Colon cancer screen colonoscopy  06/28/2021    Potassium monitoring  07/27/2021    TSH testing  01/27/2022    Breast cancer screen  01/31/2022    Annual Wellness Visit (AWV)  02/06/2022    Creatinine monitoring  02/11/2022    Lipid screen  11/24/2022    Diabetes screen  07/27/2023    DEXA (modify frequency per FRAX score)  Completed    Flu vaccine  Completed    Pneumococcal 65+ years Vaccine  Completed    COVID-19 Vaccine  Completed    Hepatitis A vaccine  Aged Out    Hepatitis B vaccine  Aged Out    Hib vaccine  Aged Out    Meningococcal (ACWY) vaccine  Aged Out             (applicable per patient's age: Cancer Screenings, Depression Screening, Fall Risk Screening, Immunizations)    Hemoglobin A1C (%)   Date Value   07/27/2020 5.6   07/31/2018 5.4     LDL Cholesterol (mg/dL)   Date Value   11/24/2017 65     AST (U/L)   Date Value   11/24/2017 15     ALT (U/L)   Date Value   11/24/2017 10     BUN (mg/dL)   Date Value   02/11/2021 20      (goal A1C is < 7)   (goal LDL is <100) need 30-50% reduction from baseline     BP Readings from Last 3 Encounters:   04/12/21 120/80   04/07/21 120/70   03/29/21 104/72    (goal /80)      All Future Testing planned in [facility]:  Lab Frequency Next Occurrence   US RENAL LIMITED Once 03/05/2022       Next Visit Date:  Future Appointments   Date Time Provider Claire Garcia   7/20/2021  8:00 AM Raina CRUZ Teresita Prader, MD [place] [facility]   2/21/2022  9:00 AM [facility] ULTRASOUND ROOM [facility]   3/2/2022  9:00 AM Alesia Carballo MD [place] [facility]            Patient Active Problem List:     Anxiety state     Hypothyroidism     Essential hypertension, benign     Esophageal reflux     Carpal tunnel syndrome     TIA (transient ischemic attack)     Chronic back pain     Osteopenia

## 2021-04-22 NOTE — TELEPHONE ENCOUNTER
Spoke with patient and offered an appointment for 2:15pm, she is going to speak with her  and call the office right back.

## 2021-04-26 ENCOUNTER — HOSPITAL ENCOUNTER (OUTPATIENT)
Age: 66
Setting detail: SPECIMEN
Discharge: HOME | End: 2021-04-26
Payer: MEDICARE

## 2021-04-26 ENCOUNTER — OFFICE VISIT (OUTPATIENT)
Dept: UROLOGY | Age: 66
End: 2021-04-26
Payer: MEDICARE

## 2021-04-26 VITALS
TEMPERATURE: 97.8 F | WEIGHT: 225 LBS | DIASTOLIC BLOOD PRESSURE: 82 MMHG | SYSTOLIC BLOOD PRESSURE: 128 MMHG | BODY MASS INDEX: 36.32 KG/M2

## 2021-04-26 DIAGNOSIS — N95.2 VAGINAL ATROPHY: ICD-10-CM

## 2021-04-26 DIAGNOSIS — B37.31 YEAST VAGINITIS: ICD-10-CM

## 2021-04-26 DIAGNOSIS — N39.0 RECURRENT UTI: ICD-10-CM

## 2021-04-26 DIAGNOSIS — N89.8 VAGINAL ITCHING: ICD-10-CM

## 2021-04-26 DIAGNOSIS — B37.31 YEAST VAGINITIS: Primary | ICD-10-CM

## 2021-04-26 LAB
-: NORMAL
AMORPHOUS: NORMAL
BACTERIA: NORMAL
BILIRUBIN URINE: NEGATIVE
CASTS UA: NORMAL /LPF
COLOR: YELLOW
COMMENT UA: NORMAL
CRYSTALS, UA: NORMAL /HPF
DIRECT EXAM: NORMAL
EPITHELIAL CELLS UA: NORMAL /HPF (ref 0–25)
GLUCOSE URINE: NEGATIVE
KETONES, URINE: NEGATIVE
LEUKOCYTE ESTERASE, URINE: NEGATIVE
Lab: NORMAL
MUCUS: NORMAL
NITRITE, URINE: NEGATIVE
OTHER OBSERVATIONS UA: NORMAL
PH UA: 6.5 (ref 5–9)
PROTEIN UA: NEGATIVE
RBC UA: NORMAL /HPF (ref 0–2)
RENAL EPITHELIAL, UA: NORMAL /HPF
SPECIFIC GRAVITY UA: 1.01 (ref 1.01–1.02)
SPECIMEN DESCRIPTION: NORMAL
TRICHOMONAS: NORMAL
TURBIDITY: CLEAR
URINE HGB: NEGATIVE
UROBILINOGEN, URINE: NORMAL
WBC UA: NORMAL /HPF (ref 0–5)
YEAST: NORMAL

## 2021-04-26 PROCEDURE — 99211 OFF/OP EST MAY X REQ PHY/QHP: CPT

## 2021-04-26 PROCEDURE — 1090F PRES/ABSN URINE INCON ASSESS: CPT | Performed by: UROLOGY

## 2021-04-26 PROCEDURE — G8399 PT W/DXA RESULTS DOCUMENT: HCPCS | Performed by: UROLOGY

## 2021-04-26 PROCEDURE — 87086 URINE CULTURE/COLONY COUNT: CPT

## 2021-04-26 PROCEDURE — G8427 DOCREV CUR MEDS BY ELIG CLIN: HCPCS | Performed by: UROLOGY

## 2021-04-26 PROCEDURE — 1123F ACP DISCUSS/DSCN MKR DOCD: CPT | Performed by: UROLOGY

## 2021-04-26 PROCEDURE — 99213 OFFICE O/P EST LOW 20 MIN: CPT | Performed by: UROLOGY

## 2021-04-26 PROCEDURE — 4040F PNEUMOC VAC/ADMIN/RCVD: CPT | Performed by: UROLOGY

## 2021-04-26 PROCEDURE — 81001 URINALYSIS AUTO W/SCOPE: CPT

## 2021-04-26 PROCEDURE — 3017F COLORECTAL CA SCREEN DOC REV: CPT | Performed by: UROLOGY

## 2021-04-26 PROCEDURE — 87210 SMEAR WET MOUNT SALINE/INK: CPT

## 2021-04-26 PROCEDURE — G8417 CALC BMI ABV UP PARAM F/U: HCPCS | Performed by: UROLOGY

## 2021-04-26 PROCEDURE — 1036F TOBACCO NON-USER: CPT | Performed by: UROLOGY

## 2021-04-26 ASSESSMENT — ENCOUNTER SYMPTOMS
EYE REDNESS: 0
NAUSEA: 0
COLOR CHANGE: 0
WHEEZING: 0
SHORTNESS OF BREATH: 0
ABDOMINAL PAIN: 0
BACK PAIN: 0
EYE PAIN: 0
VOMITING: 0
COUGH: 0

## 2021-04-26 NOTE — PROGRESS NOTES
BREAST BIOPSY      CHOLECYSTECTOMY      HYSTERECTOMY      still Rt ovary present    JOINT REPLACEMENT  08/07/2018    Rt knee     Outpatient Encounter Medications as of 4/26/2021   Medication Sig Dispense Refill    NONFORMULARY GoLo release-take 1 tablet twice a day.  Ascorbic Acid (VITAMIN C) 250 MG tablet Take 250 mg by mouth daily      LORazepam (ATIVAN) 0.5 MG tablet TAKE 1 TABLET BY MOUTH TWICE DAILY AS NEEDED FOR ANXIETY      hydroCHLOROthiazide (HYDRODIURIL) 25 MG tablet Take 1/2 tab po daily 90 tablet 1    levothyroxine (SYNTHROID) 100 MCG tablet TAKE 1 TABLET DAILY 90 tablet 1    losartan (COZAAR) 100 MG tablet TAKE 1 TABLET DAILY 90 tablet 1    omeprazole (PRILOSEC) 20 MG delayed release capsule TAKE 1 CAPSULE EVERY MORNING BEFORE BREAKFAST 90 capsule 1    UNABLE TO FIND Gabapentin-Ketoprofen amitriptyline HCL 10-10-2% neuroserum liquid  Apply small amt to affected area and rub for 10 minutes. Repeat 2-3 times daily 15 mL 11    loratadine (CLARITIN) 10 MG tablet Take 10 mg by mouth daily Prn      vitamin D (ERGOCALCIFEROL) 38647 units CAPS capsule Take 50,000 Units by mouth Twice a Week       Vitamin Mixture (VITAMIN E COMPLETE PO) Po qd      clopidogrel (PLAVIX) 75 MG tablet Take 1 tablet by mouth daily. 30 tablet 0    folic acid (FOLVITE) 1 MG tablet Take 1 mg by mouth daily.  estradiol (ESTRACE VAGINAL) 0.1 MG/GM vaginal cream pea-sized amount vaginally daily x 2 wks, then 3 x per week. Use finger, wash hands after application. DO NOT use plastic applicator. (Patient not taking: Reported on 2/18/2021) 1 Tube 2    famotidine (PEPCID) 20 MG tablet TAKE 1 TABLET DAILY WITH SUPPER (Patient not taking: Reported on 4/7/2021) 90 tablet 1     No facility-administered encounter medications on file as of 4/26/2021. Current Outpatient Medications on File Prior to Visit   Medication Sig Dispense Refill    NONFORMULARY GoLo release-take 1 tablet twice a day.       Ascorbic Acid (VITAMIN C) 250 MG tablet Take 250 mg by mouth daily      LORazepam (ATIVAN) 0.5 MG tablet TAKE 1 TABLET BY MOUTH TWICE DAILY AS NEEDED FOR ANXIETY      hydroCHLOROthiazide (HYDRODIURIL) 25 MG tablet Take 1/2 tab po daily 90 tablet 1    levothyroxine (SYNTHROID) 100 MCG tablet TAKE 1 TABLET DAILY 90 tablet 1    losartan (COZAAR) 100 MG tablet TAKE 1 TABLET DAILY 90 tablet 1    omeprazole (PRILOSEC) 20 MG delayed release capsule TAKE 1 CAPSULE EVERY MORNING BEFORE BREAKFAST 90 capsule 1    UNABLE TO FIND Gabapentin-Ketoprofen amitriptyline HCL 10-10-2% neuroserum liquid  Apply small amt to affected area and rub for 10 minutes. Repeat 2-3 times daily 15 mL 11    loratadine (CLARITIN) 10 MG tablet Take 10 mg by mouth daily Prn      vitamin D (ERGOCALCIFEROL) 06332 units CAPS capsule Take 50,000 Units by mouth Twice a Week       Vitamin Mixture (VITAMIN E COMPLETE PO) Po qd      clopidogrel (PLAVIX) 75 MG tablet Take 1 tablet by mouth daily. 30 tablet 0    folic acid (FOLVITE) 1 MG tablet Take 1 mg by mouth daily.  estradiol (ESTRACE VAGINAL) 0.1 MG/GM vaginal cream pea-sized amount vaginally daily x 2 wks, then 3 x per week. Use finger, wash hands after application. DO NOT use plastic applicator. (Patient not taking: Reported on 2021) 1 Tube 2    famotidine (PEPCID) 20 MG tablet TAKE 1 TABLET DAILY WITH SUPPER (Patient not taking: Reported on 2021) 90 tablet 1     No current facility-administered medications on file prior to visit.       Ciprofloxacin, Flonase [fluticasone propionate], Iodine, Macrobid [nitrofurantoin], Other, and Oxycodone  Family History   Problem Relation Age of Onset    Diabetes Mother     COPD Mother     Diabetes Father      Social History     Tobacco Use   Smoking Status Former Smoker    Packs/day: 1.00    Years: 15.00    Pack years: 15.00    Types: Cigarettes    Quit date: 12    Years since quittin.3   Smokeless Tobacco Never Used       Social History Substance and Sexual Activity   Alcohol Use No       Review of Systems   Constitutional: Negative for appetite change, chills and fever. Eyes: Negative for pain, redness and visual disturbance. Respiratory: Negative for cough, shortness of breath and wheezing. Cardiovascular: Negative for chest pain and leg swelling. Gastrointestinal: Negative for abdominal pain, nausea and vomiting. Genitourinary: Positive for vaginal pain. Negative for difficulty urinating, dysuria, flank pain, frequency, hematuria, pelvic pain, vaginal bleeding and vaginal discharge. Musculoskeletal: Negative for back pain, joint swelling and myalgias. Skin: Negative for color change, rash and wound. Neurological: Negative for dizziness, tremors and numbness. Hematological: Negative for adenopathy. Does not bruise/bleed easily. /82 (Site: Right Upper Arm, Position: Sitting, Cuff Size: Medium Adult) Comment: manual  Temp 97.8 °F (36.6 °C) (Temporal)   Wt 225 lb (102.1 kg)   BMI 36.32 kg/m²       PHYSICAL EXAM:  Constitutional: Patient resting comfortably, in no acute distress. Neuro: Alert and oriented to person place and time. Cranial nerves grossly intact. Psych: Mood and affect normal.  Skin: Warm, dry  HEENT: normocephalic, atraumatic  Lymphatics: No palpable lymphadenopathy  Lungs: Respiratory effort normal, unlabored  Cardiovascular:  Normal peripheral pulses  Abdomen: Soft, non-tender, non-distended with no organomegaly or palpable masses. : No CVA tenderness. Bladder non-tender and not distended. Pelvic: Redness in the inguinal folds, redness in the labia majora and vaginal introitus. No discharge    Lab Results   Component Value Date    BUN 20 02/11/2021     Lab Results   Component Value Date    CREATININE 1.12 (H) 02/11/2021       ASSESSMENT:  This is a 72 y.o. female with the following diagnoses:   Diagnosis Orders   1.  Yeast vaginitis  Wet prep, genital    Urinalysis with Microscopic Culture, Urine   2. Recurrent UTI  Wet prep, genital    Urinalysis with Microscopic    Culture, Urine   3. Vaginal atrophy  Wet prep, genital    Urinalysis with Microscopic    Culture, Urine   4. Vaginal itching  Wet prep, genital    Urinalysis with Microscopic    Culture, Urine         PLAN:  We did take a cath urine today. We also did a vaginal swab. We will have her back in 1 week and decide what our next steps are. I did tell her to void all creams in the vaginal area at this point time.

## 2021-04-27 ENCOUNTER — TELEPHONE (OUTPATIENT)
Dept: UROLOGY | Age: 66
End: 2021-04-27

## 2021-04-27 LAB
CULTURE: NO GROWTH
Lab: NORMAL
SPECIMEN DESCRIPTION: NORMAL

## 2021-04-27 NOTE — RESULT ENCOUNTER NOTE
Please let her know that her vaginal swab was negative, urine culture was negative. Follow-up next week as planned to discuss next steps.

## 2021-05-12 ENCOUNTER — OFFICE VISIT (OUTPATIENT)
Dept: FAMILY MEDICINE CLINIC | Age: 66
End: 2021-05-12
Payer: MEDICARE

## 2021-05-12 VITALS
HEART RATE: 72 BPM | HEIGHT: 66 IN | SYSTOLIC BLOOD PRESSURE: 110 MMHG | OXYGEN SATURATION: 98 % | WEIGHT: 222 LBS | BODY MASS INDEX: 35.68 KG/M2 | DIASTOLIC BLOOD PRESSURE: 70 MMHG

## 2021-05-12 DIAGNOSIS — R11.0 NAUSEA: Primary | ICD-10-CM

## 2021-05-12 DIAGNOSIS — R73.9 HYPERGLYCEMIA: ICD-10-CM

## 2021-05-12 LAB — HBA1C MFR BLD: 5.5 %

## 2021-05-12 PROCEDURE — 1090F PRES/ABSN URINE INCON ASSESS: CPT | Performed by: FAMILY MEDICINE

## 2021-05-12 PROCEDURE — G8399 PT W/DXA RESULTS DOCUMENT: HCPCS | Performed by: FAMILY MEDICINE

## 2021-05-12 PROCEDURE — 1123F ACP DISCUSS/DSCN MKR DOCD: CPT | Performed by: FAMILY MEDICINE

## 2021-05-12 PROCEDURE — G8427 DOCREV CUR MEDS BY ELIG CLIN: HCPCS | Performed by: FAMILY MEDICINE

## 2021-05-12 PROCEDURE — G8417 CALC BMI ABV UP PARAM F/U: HCPCS | Performed by: FAMILY MEDICINE

## 2021-05-12 PROCEDURE — 4040F PNEUMOC VAC/ADMIN/RCVD: CPT | Performed by: FAMILY MEDICINE

## 2021-05-12 PROCEDURE — 83036 HEMOGLOBIN GLYCOSYLATED A1C: CPT | Performed by: FAMILY MEDICINE

## 2021-05-12 PROCEDURE — 3017F COLORECTAL CA SCREEN DOC REV: CPT | Performed by: FAMILY MEDICINE

## 2021-05-12 PROCEDURE — 1036F TOBACCO NON-USER: CPT | Performed by: FAMILY MEDICINE

## 2021-05-12 PROCEDURE — 99213 OFFICE O/P EST LOW 20 MIN: CPT | Performed by: FAMILY MEDICINE

## 2021-05-12 ASSESSMENT — ENCOUNTER SYMPTOMS
SHORTNESS OF BREATH: 0
RECTAL PAIN: 0
DIARRHEA: 0
NAUSEA: 1

## 2021-05-12 NOTE — PROGRESS NOTES
HPI Notes    Name: Colletta Richters  : 1955        Chief Complaint:     Chief Complaint   Patient presents with    Other     Concerns with DM. Has a family h/o diabetes. C/o fatigue and nausea with eating sugar. Concerns with frequent difficulty healing uti. Last HgbA1c 20 5.6%. History of Present Illness:     Colletta Richters is a 72 y.o.  female who presents with Other (Concerns with DM. Has a family h/o diabetes. C/o fatigue and nausea with eating sugar. Concerns with frequent difficulty healing uti. Last HgbA1c 20 5.6%.)      HPI  Nausea - pt states she just \"feels weird if she eats sweets\". Pt states she just gets \"real tired when she eats sweets\". Pt has no vomiting. Pt has a family hx of diabetes---both parents had it. Pt had hgba1c 5.6% on 20. Pt just concerned as she also had a UTI that was hard to get healed. Hyperglycemia - pt has had elevated fasting glucose in the past    Past Medical History:     Past Medical History:   Diagnosis Date    Cerebrovascular disease     mini stroke may 2012    Chronic back pain     GERD (gastroesophageal reflux disease)     Hypertension     Hypothyroidism     Osteoarthritis     knee and back    Osteopenia 2016      Reviewed all health maintenance requirements and ordered appropriate tests  Health Maintenance Due   Topic Date Due    Hepatitis C screen  Never done    HIV screen  Never done    DTaP/Tdap/Td vaccine (1 - Tdap) Never done    Shingles Vaccine (1 of 2) Never done       Past Surgical History:     Past Surgical History:   Procedure Laterality Date    APPENDECTOMY      BREAST BIOPSY      CHOLECYSTECTOMY      HYSTERECTOMY      still Rt ovary present    JOINT REPLACEMENT  2018    Rt knee        Medications:       Prior to Admission medications    Medication Sig Start Date End Date Taking?  Authorizing Provider   Ascorbic Acid (VITAMIN C) 250 MG tablet Take 250 mg by mouth daily   Yes Historical 35 years ago. Her smoking use included cigarettes. She has a 15.00 pack-year smoking history. She has never used smokeless tobacco.  Alcohol:      reports no history of alcohol use. Drug Use:  reports no history of drug use. Family History:     Family History   Problem Relation Age of Onset    Diabetes Mother     COPD Mother     Diabetes Father        Review of Systems:       Review of Systems   Constitutional: Positive for fatigue. Respiratory: Negative for shortness of breath. Cardiovascular: Negative for chest pain and palpitations. Gastrointestinal: Positive for nausea. Negative for diarrhea and rectal pain. Skin: Negative for rash. Physical Exam:     Physical Exam  Vitals signs reviewed. Constitutional:       General: She is not in acute distress. Appearance: Normal appearance. She is well-developed. She is not ill-appearing. HENT:      Head: Normocephalic and atraumatic. Eyes:      General:         Right eye: No discharge. Left eye: No discharge. Conjunctiva/sclera: Conjunctivae normal.      Pupils: Pupils are equal, round, and reactive to light. Neck:      Musculoskeletal: Neck supple. Thyroid: No thyromegaly. Cardiovascular:      Rate and Rhythm: Normal rate and regular rhythm. Heart sounds: Normal heart sounds. No murmur. Pulmonary:      Effort: Pulmonary effort is normal. No respiratory distress. Breath sounds: Normal breath sounds. Musculoskeletal:      Right lower leg: No edema. Left lower leg: No edema. Lymphadenopathy:      Cervical: No cervical adenopathy. Neurological:      Mental Status: She is alert.    Psychiatric:         Mood and Affect: Mood normal.         Vitals:  /70   Pulse 72   Ht 5' 6\" (1.676 m)   Wt 222 lb (100.7 kg)   SpO2 98%   BMI 35.83 kg/m²       Data:     Lab Results   Component Value Date     07/27/2020    K 3.9 07/27/2020     07/27/2020    CO2 26 07/27/2020    BUN 20 02/11/2021 CREATININE 1.12 02/11/2021    GLUCOSE 111 07/27/2020    GLUCOSE 97 01/25/2012    PROT 6.5 11/24/2017    LABALBU 3.9 11/24/2017    LABALBU 4.4 01/25/2012    BILITOT 0.39 11/24/2017    ALKPHOS 65 11/24/2017    AST 15 11/24/2017    ALT 10 11/24/2017     Lab Results   Component Value Date    WBC 5.2 01/27/2020    RBC 4.77 01/27/2020    RBC 4.92 01/25/2012    HGB 13.6 01/27/2020    HCT 40.5 01/27/2020    MCV 85.0 01/27/2020    MCH 28.6 01/27/2020    MCHC 33.6 01/27/2020    RDW 15.0 01/27/2020     01/27/2020     01/25/2012    MPV NOT REPORTED 01/27/2020     Lab Results   Component Value Date    TSH 2.44 01/27/2021     Lab Results   Component Value Date    CHOL 158 11/24/2017    HDL 73 11/24/2017    LABA1C 5.6 07/27/2020          Assessment/Plan:        1. Nausea  Pt had hgba1c 5.5 and all ok so gave packet on DM diet info and work on wt loss    2. Hyperglycemia   See above       Return if symptoms worsen or fail to improve.       Electronically signed by Nick Kelly MD on 5/12/2021 at 10:04 AM

## 2021-05-13 NOTE — PROGRESS NOTES
HPI:        Patient is a 72 y.o. female in no acute distress. She is alert and oriented to person, place, and time. 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 She believes that she had 3-4 infections over the past year. She notes that she was to have a knee replacement today but it got canceled due to UTI.  Asymptomatic, but as part of PAT they found she had a urinary tract infection.  Her urine culture was positive for Tio Mario was given a course of Keflex -5 days- by preadmission testing. [facility] then developed urinary symptoms including dysuria and sensation of incomplete bladder emptying.  PCP did reorder urine culture and grew beta-hemolytic strep and Klebsiella.  This was treated with course of culture specific Keflex. She does states she has vaginal dryness.  She had a hysterectomy over 20 years ago which did spare one ovary.  She states that she went through menopause shortly thereafter.  She did have 2 vaginal births which were uncomplicated.      Urine cultures:  3/2021 -beta-hemolytic strep group B and C  1/2021 - beta heme strep and klebsiella   1/2021 - klebsiella  8/2020 - no growth  2/2020 - ecoli        Currently  Patient is here today for 2-week follow-up. At the last visit we did perform a catheterized urine as well as a vaginal swab. These did turn out to be negative. Patient does feel like she is doing better. She does have some slight itching at times but is much more tolerable. Patient did not start her vaginal estrogen. She would like to hold off on this. Patient has no gross hematuria dysuria. No flank pain nausea vomiting. She has no discharge from her vagina.   Past Medical History:   Diagnosis Date    Cerebrovascular disease     mini stroke may 2012    Chronic back pain     GERD (gastroesophageal reflux disease)     Hypertension     Hypothyroidism     Osteoarthritis     knee and back    Osteopenia 1/4/2016     Past Surgical History: Procedure Laterality Date    APPENDECTOMY      BREAST BIOPSY      CHOLECYSTECTOMY      HYSTERECTOMY      still Rt ovary present    JOINT REPLACEMENT  08/07/2018    Rt knee     Outpatient Encounter Medications as of 5/14/2021   Medication Sig Dispense Refill    NONFORMULARY GoLo release-take 1 tablet twice a day.  Ascorbic Acid (VITAMIN C) 250 MG tablet Take 250 mg by mouth daily      estradiol (ESTRACE VAGINAL) 0.1 MG/GM vaginal cream pea-sized amount vaginally daily x 2 wks, then 3 x per week. Use finger, wash hands after application. DO NOT use plastic applicator. (Patient not taking: Reported on 5/12/2021) 1 Tube 2    LORazepam (ATIVAN) 0.5 MG tablet TAKE 1 TABLET BY MOUTH TWICE DAILY AS NEEDED FOR ANXIETY      hydroCHLOROthiazide (HYDRODIURIL) 25 MG tablet Take 1/2 tab po daily 90 tablet 1    levothyroxine (SYNTHROID) 100 MCG tablet TAKE 1 TABLET DAILY 90 tablet 1    losartan (COZAAR) 100 MG tablet TAKE 1 TABLET DAILY 90 tablet 1    omeprazole (PRILOSEC) 20 MG delayed release capsule TAKE 1 CAPSULE EVERY MORNING BEFORE BREAKFAST 90 capsule 1    famotidine (PEPCID) 20 MG tablet TAKE 1 TABLET DAILY WITH SUPPER (Patient not taking: Reported on 4/7/2021) 90 tablet 1    UNABLE TO FIND Gabapentin-Ketoprofen amitriptyline HCL 10-10-2% neuroserum liquid  Apply small amt to affected area and rub for 10 minutes. Repeat 2-3 times daily 15 mL 11    loratadine (CLARITIN) 10 MG tablet Take 10 mg by mouth daily Prn      vitamin D (ERGOCALCIFEROL) 16105 units CAPS capsule Take 50,000 Units by mouth Twice a Week       Vitamin Mixture (VITAMIN E COMPLETE PO) Po qd      clopidogrel (PLAVIX) 75 MG tablet Take 1 tablet by mouth daily. 30 tablet 0    folic acid (FOLVITE) 1 MG tablet Take 1 mg by mouth daily. No facility-administered encounter medications on file as of 5/14/2021.        Current Outpatient Medications on File Prior to Visit   Medication Sig Dispense Refill    NONFORMULARY GoLo release-take 1 tablet twice a day.  Ascorbic Acid (VITAMIN C) 250 MG tablet Take 250 mg by mouth daily      estradiol (ESTRACE VAGINAL) 0.1 MG/GM vaginal cream pea-sized amount vaginally daily x 2 wks, then 3 x per week. Use finger, wash hands after application. DO NOT use plastic applicator. (Patient not taking: Reported on 5/12/2021) 1 Tube 2    LORazepam (ATIVAN) 0.5 MG tablet TAKE 1 TABLET BY MOUTH TWICE DAILY AS NEEDED FOR ANXIETY      hydroCHLOROthiazide (HYDRODIURIL) 25 MG tablet Take 1/2 tab po daily 90 tablet 1    levothyroxine (SYNTHROID) 100 MCG tablet TAKE 1 TABLET DAILY 90 tablet 1    losartan (COZAAR) 100 MG tablet TAKE 1 TABLET DAILY 90 tablet 1    omeprazole (PRILOSEC) 20 MG delayed release capsule TAKE 1 CAPSULE EVERY MORNING BEFORE BREAKFAST 90 capsule 1    famotidine (PEPCID) 20 MG tablet TAKE 1 TABLET DAILY WITH SUPPER (Patient not taking: Reported on 4/7/2021) 90 tablet 1    UNABLE TO FIND Gabapentin-Ketoprofen amitriptyline HCL 10-10-2% neuroserum liquid  Apply small amt to affected area and rub for 10 minutes. Repeat 2-3 times daily 15 mL 11    loratadine (CLARITIN) 10 MG tablet Take 10 mg by mouth daily Prn      vitamin D (ERGOCALCIFEROL) 57386 units CAPS capsule Take 50,000 Units by mouth Twice a Week       Vitamin Mixture (VITAMIN E COMPLETE PO) Po qd      clopidogrel (PLAVIX) 75 MG tablet Take 1 tablet by mouth daily. 30 tablet 0    folic acid (FOLVITE) 1 MG tablet Take 1 mg by mouth daily. No current facility-administered medications on file prior to visit.       Ciprofloxacin, Flonase [fluticasone propionate], Iodine, Macrobid [nitrofurantoin], Other, and Oxycodone  Family History   Problem Relation Age of Onset    Diabetes Mother     COPD Mother     Diabetes Father      Social History     Tobacco Use   Smoking Status Former Smoker    Packs/day: 1.00    Years: 15.00    Pack years: 15.00    Types: Cigarettes    Quit date: 12    Years since quitting: 35.3   Smokeless Tobacco Never Used       Social History     Substance and Sexual Activity   Alcohol Use No       Review of Systems   Constitutional: Negative for appetite change, chills and fever. Eyes: Negative for pain, redness and visual disturbance. Respiratory: Negative for cough, shortness of breath and wheezing. Cardiovascular: Negative for chest pain and leg swelling. Gastrointestinal: Negative for abdominal pain, nausea and vomiting. Genitourinary: Negative for difficulty urinating, dysuria, flank pain, frequency, hematuria, pelvic pain, vaginal bleeding and vaginal discharge. Musculoskeletal: Negative for back pain, joint swelling and myalgias. Skin: Negative for color change, rash and wound. Neurological: Negative for dizziness, tremors and numbness. Hematological: Negative for adenopathy. Does not bruise/bleed easily. There were no vitals taken for this visit. PHYSICAL EXAM:  Constitutional: Patient resting comfortably, in no acute distress. Neuro: Alert and oriented to person place and time. Cranial nerves grossly intact. Psych: Mood and affect normal.  Skin: Warm, dry  HEENT: normocephalic, atraumatic  Lymphatics: No palpable lymphadenopathy  Lungs: Respiratory effort normal, unlabored  Cardiovascular:  Normal peripheral pulses  Abdomen: Soft, non-tender, non-distended with no organomegaly or palpable masses. : No CVA tenderness. Bladder non-tender and not distended. Pelvic: deferred    Lab Results   Component Value Date    BUN 20 02/11/2021     Lab Results   Component Value Date    CREATININE 1.12 (H) 02/11/2021       ASSESSMENT:  This is a 72 y.o. female with the following diagnoses:   Diagnosis Orders   1. Yeast vaginitis     2. Recurrent UTI     3. Angiomyolipoma of kidney           PLAN:  Patient does seem stable. I did tell her that if she ever has any urinary issues that we should more than likely do a catheterized urine.   I do think that we are propagating yeast in the vagina with multiple rounds of antibiotics. She will follow-up with us next year to look at her kidneys again.

## 2021-05-14 ENCOUNTER — OFFICE VISIT (OUTPATIENT)
Dept: UROLOGY | Age: 66
End: 2021-05-14
Payer: MEDICARE

## 2021-05-14 VITALS
HEART RATE: 75 BPM | DIASTOLIC BLOOD PRESSURE: 78 MMHG | WEIGHT: 223 LBS | TEMPERATURE: 97.7 F | BODY MASS INDEX: 35.99 KG/M2 | SYSTOLIC BLOOD PRESSURE: 118 MMHG

## 2021-05-14 DIAGNOSIS — N39.0 RECURRENT UTI: ICD-10-CM

## 2021-05-14 DIAGNOSIS — D17.71 ANGIOMYOLIPOMA OF KIDNEY: ICD-10-CM

## 2021-05-14 DIAGNOSIS — B37.31 YEAST VAGINITIS: Primary | ICD-10-CM

## 2021-05-14 PROCEDURE — G8417 CALC BMI ABV UP PARAM F/U: HCPCS | Performed by: UROLOGY

## 2021-05-14 PROCEDURE — G8399 PT W/DXA RESULTS DOCUMENT: HCPCS | Performed by: UROLOGY

## 2021-05-14 PROCEDURE — 99213 OFFICE O/P EST LOW 20 MIN: CPT | Performed by: UROLOGY

## 2021-05-14 PROCEDURE — 1036F TOBACCO NON-USER: CPT | Performed by: UROLOGY

## 2021-05-14 PROCEDURE — 3017F COLORECTAL CA SCREEN DOC REV: CPT | Performed by: UROLOGY

## 2021-05-14 PROCEDURE — 1123F ACP DISCUSS/DSCN MKR DOCD: CPT | Performed by: UROLOGY

## 2021-05-14 PROCEDURE — 99211 OFF/OP EST MAY X REQ PHY/QHP: CPT

## 2021-05-14 PROCEDURE — G8427 DOCREV CUR MEDS BY ELIG CLIN: HCPCS | Performed by: UROLOGY

## 2021-05-14 PROCEDURE — 4040F PNEUMOC VAC/ADMIN/RCVD: CPT | Performed by: UROLOGY

## 2021-05-14 PROCEDURE — 1090F PRES/ABSN URINE INCON ASSESS: CPT | Performed by: UROLOGY

## 2021-05-14 ASSESSMENT — ENCOUNTER SYMPTOMS
SHORTNESS OF BREATH: 0
BACK PAIN: 0
COUGH: 0
EYE PAIN: 0
WHEEZING: 0
ABDOMINAL PAIN: 0
EYE REDNESS: 0
COLOR CHANGE: 0
NAUSEA: 0
VOMITING: 0

## 2021-06-10 ENCOUNTER — TELEPHONE (OUTPATIENT)
Dept: FAMILY MEDICINE CLINIC | Age: 66
End: 2021-06-10

## 2021-06-10 NOTE — TELEPHONE ENCOUNTER
Please tell pt ok for referral to Dr piedra -- recurrent UTIs and then what is she doing now for the itching --- I can try lotrimin cream

## 2021-06-10 NOTE — TELEPHONE ENCOUNTER
Took meds for fungal infection. No improvement with seeing Dr. Ricardo Kong. (would like to see Dr. Venus Bailey if she still needs to see urology) what should she do about vaginal itching now? ? Please advise              Last visit:  5/12/2021  Next Visit Date:    Future Appointments   Date Time Provider Claire Garcia   7/20/2021  8:00 AM [facility]   2/21/2022  9:00 AM [address] [place] [address]   3/2/2022  9:00 AM Shelby Nash MD [facility]         Medication List:  Prior to Admission medications    Medication Sig Start Date End Date Taking? Authorizing Provider   NONFORMULARY GoLo release-take 1 tablet twice a day. Historical Provider, MD   Ascorbic Acid (VITAMIN C) 250 MG tablet Take 250 mg by mouth daily    Historical Provider, MD   estradiol (ESTRACE VAGINAL) 0.1 MG/GM vaginal cream pea-sized amount vaginally daily x 2 wks, then 3 x per week. Use finger, wash hands after application. DO NOT use plastic applicator. Patient not taking: Reported on 5/14/2021 2/4/21   Bimal Patel PA-C   LORazepam (ATIVAN) 0.5 MG tablet TAKE 1 TABLET BY MOUTH TWICE DAILY AS NEEDED FOR ANXIETY 11/30/20   Historical Provider, MD   hydroCHLOROthiazide (HYDRODIURIL) 25 MG tablet Take 1/2 tab po daily 1/20/21   Erlinda Callahan, MD   levothyroxine (SYNTHROID) 100 MCG tablet TAKE 1 TABLET DAILY 12/1/20   Erlinda Callahan, MD   losartan (COZAAR) 100 MG tablet TAKE 1 TABLET DAILY 12/1/20   Erlinda Callahan MD   omeprazole (PRILOSEC) 20 MG delayed release capsule TAKE 1 CAPSULE EVERY MORNING BEFORE BREAKFAST 12/1/20   Erlinda Callahan, MD   famotidine (PEPCID) 20 MG tablet TAKE 1 TABLET DAILY WITH SUPPER  Patient taking differently: as needed TAKE 1 TABLET DAILY WITH SUPPER 7/27/20   Erlinda Callahan, MD   UNABLE TO FIND Gabapentin-Ketoprofen amitriptyline HCL 10-10-2% neuroserum liquid  Apply small amt to affected area and rub for 10 minutes.  Repeat 2-3 times daily 7/27/20   Erlinda Callahan, MD   loratadine (CLARITIN) 10 MG tablet Take 10 mg by mouth daily Prn    Historical Provider, MD   vitamin D (ERGOCALCIFEROL) 75876 units CAPS capsule Take 50,000 Units by mouth Twice a Week  1/24/19   Historical Provider, MD   Vitamin Mixture (VITAMIN E COMPLETE PO) Po qd    Historical Provider, MD   clopidogrel (PLAVIX) 75 MG tablet Take 1 tablet by mouth daily. 2/21/15   Trav Hines MD   folic acid (FOLVITE) 1 MG tablet Take 1 mg by mouth daily.       Historical Provider, MD

## 2021-06-11 RX ORDER — CLOTRIMAZOLE 1 %
CREAM (GRAM) TOPICAL
Qty: 60 G | Refills: 3 | OUTPATIENT
Start: 2021-06-11 | End: 2021-06-18

## 2021-06-11 NOTE — TELEPHONE ENCOUNTER
Pt has left over Clotrimazole but was told to stop. Would need more if that's what you recommend. Any other creams you recommend.

## 2021-06-11 NOTE — TELEPHONE ENCOUNTER
At this point have her try the clotrimazole over the weekend. If she needs more we can send a tube. If not helping by Monday/Tuesday have her call our office.

## 2021-06-15 DIAGNOSIS — N89.8 VAGINAL ITCHING: ICD-10-CM

## 2021-06-15 RX ORDER — CLOTRIMAZOLE 1 %
CREAM (GRAM) TOPICAL
Qty: 60 G | Refills: 1 | Status: SHIPPED | OUTPATIENT
Start: 2021-06-15 | End: 2022-01-04 | Stop reason: SDUPTHER

## 2021-06-15 NOTE — TELEPHONE ENCOUNTER
RX pending. I think Jesusita Leonardfrancesco wanted to hold off on the referral to another urologist. Would you like me to call her so we can get a new one in place since she is still having this issue ?

## 2021-06-15 NOTE — TELEPHONE ENCOUNTER
Clotrimazole cream    DM-caryn    Angel Burks had called in Friday and had some left over cream and did use it over the weekend. She said that it is helping but its not gone away totally. Can she get another tube sent in for her. Please let Ricardoantonella Martinguzman know.     Health Maintenance   Topic Date Due    Hepatitis C screen  Never done    HIV screen  Never done    DTaP/Tdap/Td vaccine (1 - Tdap) Never done    Shingles Vaccine (1 of 2) Never done    Colon cancer screen colonoscopy  06/28/2021    Potassium monitoring  07/27/2021    TSH testing  01/27/2022    Breast cancer screen  01/31/2022    Annual Wellness Visit (AWV)  02/06/2022    Creatinine monitoring  02/11/2022    Lipid screen  11/24/2022    Diabetes screen  05/12/2024    DEXA (modify frequency per FRAX score)  Completed    Flu vaccine  Completed    Pneumococcal 65+ years Vaccine  Completed    COVID-19 Vaccine  Completed    Hepatitis A vaccine  Aged Out    Hepatitis B vaccine  Aged Out    Hib vaccine  Aged Out    Meningococcal (ACWY) vaccine  Aged Out             (applicable per patient's age: Cancer Screenings, Depression Screening, Fall Risk Screening, Immunizations)    Hemoglobin A1C (%)   Date Value   05/12/2021 5.5   07/27/2020 5.6   07/31/2018 5.4     LDL Cholesterol (mg/dL)   Date Value   11/24/2017 65     AST (U/L)   Date Value   11/24/2017 15     ALT (U/L)   Date Value   11/24/2017 10     BUN (mg/dL)   Date Value   02/11/2021 20      (goal A1C is < 7)   (goal LDL is <100) need 30-50% reduction from baseline     BP Readings from Last 3 Encounters:   05/14/21 118/78   05/12/21 110/70   04/26/21 128/82    (goal /80)      All Future Testing planned in [facility]:  Lab Frequency Next Occurrence   US RENAL LIMITED Once 03/05/2022       Next Visit Date:  Future Appointments   Date Time Provider Claire Garcia   7/20/2021  8:00 AM MD CARYN Reina MED [facility]   2/21/2022  9:00 AM [facility] ULTRASOUND ROOM [facility] ULTRA [facility] Rad   3/2/2022  9:00 AM Zoya Singh MD [facility] UROLOGY [facility]            Patient Active Problem List:     Anxiety state     Hypothyroidism     Essential hypertension, benign     Esophageal reflux     Carpal tunnel syndrome     TIA (transient ischemic attack)     Chronic back pain     Osteopenia

## 2021-07-20 ENCOUNTER — OFFICE VISIT (OUTPATIENT)
Dept: FAMILY MEDICINE CLINIC | Age: 66
End: 2021-07-20
Payer: MEDICARE

## 2021-07-20 VITALS
OXYGEN SATURATION: 96 % | DIASTOLIC BLOOD PRESSURE: 80 MMHG | WEIGHT: 223 LBS | SYSTOLIC BLOOD PRESSURE: 120 MMHG | BODY MASS INDEX: 35.99 KG/M2 | HEART RATE: 72 BPM

## 2021-07-20 DIAGNOSIS — E03.8 OTHER SPECIFIED HYPOTHYROIDISM: ICD-10-CM

## 2021-07-20 DIAGNOSIS — M19.90 ARTHRITIS: ICD-10-CM

## 2021-07-20 DIAGNOSIS — I10 ESSENTIAL HYPERTENSION, BENIGN: Primary | ICD-10-CM

## 2021-07-20 DIAGNOSIS — K21.9 GASTROESOPHAGEAL REFLUX DISEASE WITHOUT ESOPHAGITIS: ICD-10-CM

## 2021-07-20 DIAGNOSIS — N89.8 VAGINAL ITCHING: ICD-10-CM

## 2021-07-20 PROCEDURE — 3017F COLORECTAL CA SCREEN DOC REV: CPT | Performed by: FAMILY MEDICINE

## 2021-07-20 PROCEDURE — 99214 OFFICE O/P EST MOD 30 MIN: CPT | Performed by: FAMILY MEDICINE

## 2021-07-20 PROCEDURE — 4040F PNEUMOC VAC/ADMIN/RCVD: CPT | Performed by: FAMILY MEDICINE

## 2021-07-20 PROCEDURE — 1036F TOBACCO NON-USER: CPT | Performed by: FAMILY MEDICINE

## 2021-07-20 PROCEDURE — G8399 PT W/DXA RESULTS DOCUMENT: HCPCS | Performed by: FAMILY MEDICINE

## 2021-07-20 PROCEDURE — 1123F ACP DISCUSS/DSCN MKR DOCD: CPT | Performed by: FAMILY MEDICINE

## 2021-07-20 PROCEDURE — G8427 DOCREV CUR MEDS BY ELIG CLIN: HCPCS | Performed by: FAMILY MEDICINE

## 2021-07-20 PROCEDURE — G8417 CALC BMI ABV UP PARAM F/U: HCPCS | Performed by: FAMILY MEDICINE

## 2021-07-20 PROCEDURE — 1090F PRES/ABSN URINE INCON ASSESS: CPT | Performed by: FAMILY MEDICINE

## 2021-07-20 SDOH — ECONOMIC STABILITY: FOOD INSECURITY: WITHIN THE PAST 12 MONTHS, THE FOOD YOU BOUGHT JUST DIDN'T LAST AND YOU DIDN'T HAVE MONEY TO GET MORE.: NEVER TRUE

## 2021-07-20 SDOH — ECONOMIC STABILITY: FOOD INSECURITY: WITHIN THE PAST 12 MONTHS, YOU WORRIED THAT YOUR FOOD WOULD RUN OUT BEFORE YOU GOT MONEY TO BUY MORE.: NEVER TRUE

## 2021-07-20 ASSESSMENT — ENCOUNTER SYMPTOMS
CONSTIPATION: 0
NAUSEA: 0
BLOOD IN STOOL: 0
FACIAL SWELLING: 0
COUGH: 0
SORE THROAT: 0
EYE DISCHARGE: 0
EYE REDNESS: 0
DIARRHEA: 0
SHORTNESS OF BREATH: 0
HEARTBURN: 0
ABDOMINAL PAIN: 0
CHOKING: 0
VOMITING: 0

## 2021-07-20 ASSESSMENT — SOCIAL DETERMINANTS OF HEALTH (SDOH): HOW HARD IS IT FOR YOU TO PAY FOR THE VERY BASICS LIKE FOOD, HOUSING, MEDICAL CARE, AND HEATING?: NOT HARD AT ALL

## 2021-07-20 NOTE — PROGRESS NOTES
HPI Notes    Name: Carrie Ron  : 1955        Chief Complaint:     Chief Complaint   Patient presents with    Hypertension    Gastroesophageal Reflux    Arthritis    Hypothyroidism     21  TSH - 2.44    Vaginal Itching     Pt still c/o vaginal itching returning a couple days after stopping Clotrimazole cream.       History of Present Illness:     Carrie Ron is a 72 y.o.  female who presents with Hypertension, Gastroesophageal Reflux, Arthritis, Hypothyroidism (21  TSH - 2.44), and Vaginal Itching (Pt still c/o vaginal itching returning a couple days after stopping Clotrimazole cream.)      Hypertension  This is a chronic problem. The current episode started more than 1 year ago. The problem is unchanged. The problem is controlled. Pertinent negatives include no chest pain, palpitations, peripheral edema or shortness of breath. There are no associated agents to hypertension. Risk factors for coronary artery disease include post-menopausal state. The current treatment provides significant improvement. Gastroesophageal Reflux  She reports no abdominal pain, no chest pain, no choking, no coughing, no heartburn, no nausea or no sore throat. pt has had this itching and off and on \"raw feeling\" since 2021. . This is a chronic problem. The current episode started more than 1 year ago. The problem has been unchanged. Pertinent negatives include no fatigue, melena or weight loss. She has tried a PPI for the symptoms. The treatment provided significant relief. Vaginal Itching  The patient's primary symptoms include genital itching and a genital rash. The patient's pertinent negatives include no genital lesions, genital odor, pelvic pain or vaginal discharge. Primary symptoms comment: pt has had this itching and off and on \"raw feeling\" since 2021. . This is a chronic problem. The current episode started more than 1 month ago.  Episode frequency: pt has Ascorbic Acid (VITAMIN C) 250 MG tablet Take 250 mg by mouth daily   Yes Historical Provider, MD   LORazepam (ATIVAN) 0.5 MG tablet TAKE 1 TABLET BY MOUTH TWICE DAILY AS NEEDED FOR ANXIETY 11/30/20  Yes Historical Provider, MD   hydroCHLOROthiazide (HYDRODIURIL) 25 MG tablet Take 1/2 tab po daily 1/20/21  Yes Mike Moore MD   levothyroxine (SYNTHROID) 100 MCG tablet TAKE 1 TABLET DAILY 12/1/20  Yes Mike Moore MD   losartan (COZAAR) 100 MG tablet TAKE 1 TABLET DAILY 12/1/20  Yes Mike Moore MD   omeprazole (PRILOSEC) 20 MG delayed release capsule TAKE 1 CAPSULE EVERY MORNING BEFORE BREAKFAST 12/1/20  Yes Mike Moore MD   loratadine (CLARITIN) 10 MG tablet Take 10 mg by mouth daily Prn   Yes Historical Provider, MD   vitamin D (ERGOCALCIFEROL) 78106 units CAPS capsule Take 50,000 Units by mouth Twice a Week  1/24/19  Yes Historical Provider, MD   Vitamin Mixture (VITAMIN E COMPLETE PO) Po qd   Yes Historical Provider, MD   clopidogrel (PLAVIX) 75 MG tablet Take 1 tablet by mouth daily. 2/21/15  Yes Marci Kumar MD   folic acid (FOLVITE) 1 MG tablet Take 1 mg by mouth daily. Yes Historical Provider, MD   famotidine (PEPCID) 20 MG tablet TAKE 1 TABLET DAILY WITH SUPPER  Patient not taking: Reported on 7/20/2021 7/27/20   Mike Moore MD        Allergies:       Ciprofloxacin, Flonase [fluticasone propionate], Iodine, Macrobid [nitrofurantoin], Other, and Oxycodone    Social History:     Tobacco:    reports that she quit smoking about 35 years ago. Her smoking use included cigarettes. She has a 15.00 pack-year smoking history. She has never used smokeless tobacco.  Alcohol:      reports no history of alcohol use. Drug Use:  reports no history of drug use.     Family History:     Family History   Problem Relation Age of Onset    Diabetes Mother     COPD Mother     Diabetes Father        Review of Systems:       Review of Systems   Constitutional: Negative for chills, fatigue, or rash. Neurological:      General: No focal deficit present. Mental Status: She is alert and oriented to person, place, and time. Psychiatric:         Mood and Affect: Mood normal.         Behavior: Behavior normal.         Vitals:  /80   Pulse 72   Wt 223 lb (101.2 kg)   SpO2 96%   BMI 35.99 kg/m²       Data:     Lab Results   Component Value Date     07/27/2020    K 3.9 07/27/2020     07/27/2020    CO2 26 07/27/2020    BUN 20 02/11/2021    CREATININE 1.12 02/11/2021    GLUCOSE 111 07/27/2020    GLUCOSE 97 01/25/2012    PROT 6.5 11/24/2017    LABALBU 3.9 11/24/2017    LABALBU 4.4 01/25/2012    BILITOT 0.39 11/24/2017    ALKPHOS 65 11/24/2017    AST 15 11/24/2017    ALT 10 11/24/2017     Lab Results   Component Value Date    WBC 5.2 01/27/2020    RBC 4.77 01/27/2020    RBC 4.92 01/25/2012    HGB 13.6 01/27/2020    HCT 40.5 01/27/2020    MCV 85.0 01/27/2020    MCH 28.6 01/27/2020    MCHC 33.6 01/27/2020    RDW 15.0 01/27/2020     01/27/2020     01/25/2012    MPV NOT REPORTED 01/27/2020     Lab Results   Component Value Date    TSH 2.44 01/27/2021     Lab Results   Component Value Date    CHOL 158 11/24/2017    HDL 73 11/24/2017    LABA1C 5.5 05/12/2021          Assessment/Plan:        1. Essential hypertension, benign  Stable on HCTZ and cozaar     2. Gastroesophageal reflux disease without esophagitis  Stable on prilosec     3. Other specified hypothyroidism  Stable on synthroid     4. Vaginal itching  Pt to continue on the clotrimazole but second opinion from Nereida Suggs 19., DO, OB/GYN, [place]    5. Arthritis  Pt to have Augu 16th Lt knee replacement      Pt having Rt knee replaced August 16th so will wait until 6mos to talk about her repeat screening colonoscopy.     Zeenat Vasquez received counseling on the following healthy behaviors: nutrition and exercise  Reviewed prior labs and health maintenance  Continue current medications, diet and exercise. Discussed use, benefit, and side effects of prescribed medications. Barriers to medication compliance addressed. Patient given educational materials - see patient instructions  Was a self-tracking handout given in paper form or via [employer]? Yes    Requested Prescriptions     Signed Prescriptions Disp Refills    UNABLE TO FIND 15 mL 11     Sig: Gabapentin-Ketoprofen amitriptyline HCL 10-10-2% neuroserum liquid  Apply small amt to affected area and rub for 10 minutes. Repeat 2-3 times daily       All patient questions answered. Patient voiced understanding. Quality Measures    Body mass index is 35.99 kg/m². Elevated. Weight control planned discussed Healthy diet and regular exercise. BP: 120/80. Blood pressure is normal. Treatment plan consists of No treatment change needed. Fall Risk 2/5/2021 1/20/2021   2 or more falls in past year? no no   Fall with injury in past year? no no     The patient does not have a history of falls. I did not - not indicated , complete a risk assessment for falls. A plan of care for falls No Treatment plan indicated    Lab Results   Component Value Date    LDLCHOLESTEROL 65 11/24/2017    (goal LDL reduction with dx if diabetes is 50% LDL reduction)    PHQ Scores 2/5/2021 1/20/2021 1/27/2020 4/1/2019 11/21/2018 11/9/2017   PHQ2 Score 0 0 0 0 0 0   PHQ9 Score 0 0 0 0 0 0     Interpretation of Total Score Depression Severity: 1-4 = Minimal depression, 5-9 = Mild depression, 10-14 = Moderate depression, 15-19 = Moderately severe depression, 20-27 = Severe depression        Return in about 6 months (around 1/20/2022) for HTN, gerd, Hypothyroid.       Electronically signed by Heloise Peabody, MD on 7/20/2021 at 8:29 AM

## 2021-07-21 DIAGNOSIS — F41.1 ANXIETY STATE: Primary | ICD-10-CM

## 2021-07-21 RX ORDER — LORAZEPAM 0.5 MG/1
0.5 TABLET ORAL 2 TIMES DAILY PRN
Qty: 30 TABLET | Refills: 0 | Status: SHIPPED | OUTPATIENT
Start: 2021-07-21 | End: 2021-08-20

## 2021-07-21 NOTE — TELEPHONE ENCOUNTER
Patient is asking for a refill on Lorazepam.  Patient was seen in the office yesterday 7/20/21. Next appt 1/20/2022.     Drug [address] Maintenance   Topic Date Due    Hepatitis C screen  Never done    HIV screen  Never done    DTaP/Tdap/Td vaccine (1 - Tdap) Never done    Shingles Vaccine (1 of 2) Never done    Colon cancer screen colonoscopy  06/28/2021    Potassium monitoring  07/27/2021    Flu vaccine (1) 09/01/2021    TSH testing  01/27/2022    Breast cancer screen  01/31/2022    Annual Wellness Visit (AWV)  02/06/2022    Creatinine monitoring  02/11/2022    Lipid screen  11/24/2022    Diabetes screen  05/12/2024    DEXA (modify frequency per FRAX score)  Completed    Pneumococcal 65+ years Vaccine  Completed    COVID-19 Vaccine  Completed    Hepatitis A vaccine  Aged Out    Hepatitis B vaccine  Aged Out    Hib vaccine  Aged Out    Meningococcal (ACWY) vaccine  Aged Out             (applicable per patient's age: Cancer Screenings, Depression Screening, Fall Risk Screening, Immunizations)    Hemoglobin A1C (%)   Date Value   05/12/2021 5.5   07/27/2020 5.6   07/31/2018 5.4     LDL Cholesterol (mg/dL)   Date Value   11/24/2017 65     AST (U/L)   Date Value   11/24/2017 15     ALT (U/L)   Date Value   11/24/2017 10     BUN (mg/dL)   Date Value   02/11/2021 20      (goal A1C is < 7)   (goal LDL is <100) need 30-50% reduction from baseline     BP Readings from Last 3 Encounters:   07/20/21 120/80   05/14/21 118/78   05/12/21 110/70    (goal /80)      All Future Testing planned in [facility]:  Lab Frequency Next Occurrence   US RENAL LIMITED Once 03/05/2022       Next Visit Date:  Future Appointments   Date Time Provider Claire Garcia   1/20/2022  8:40 [facility]   2/21/2022  9:00 AM [address] [place] [address] Rad   3/2/2022  9:00 AM Lalo Schilling MD [facility]            Patient Active Problem List:     Anxiety state Hypothyroidism     Essential hypertension, benign     Esophageal reflux     Carpal tunnel syndrome     TIA (transient ischemic attack)     Chronic back pain     Osteopenia

## 2021-08-04 DIAGNOSIS — I10 ESSENTIAL HYPERTENSION, BENIGN: ICD-10-CM

## 2021-08-05 RX ORDER — LOSARTAN POTASSIUM 100 MG/1
TABLET ORAL
Qty: 90 TABLET | Refills: 0 | Status: SHIPPED | OUTPATIENT
Start: 2021-08-05 | End: 2021-11-15 | Stop reason: SDUPTHER

## 2021-08-05 RX ORDER — OMEPRAZOLE 20 MG/1
CAPSULE, DELAYED RELEASE ORAL
Qty: 90 CAPSULE | Refills: 0 | Status: SHIPPED | OUTPATIENT
Start: 2021-08-05 | End: 2021-10-11

## 2021-08-05 NOTE — TELEPHONE ENCOUNTER
Last OV: 7/20/2021 htn hypothyroid  Last RX:   Next scheduled apt: 1/20/2022      Sure scripts request  RX pending

## 2021-08-10 ENCOUNTER — HOSPITAL ENCOUNTER (OUTPATIENT)
Age: 66
Setting detail: SPECIMEN
Discharge: HOME | End: 2021-08-10
Payer: MEDICARE

## 2021-08-10 ENCOUNTER — OFFICE VISIT (OUTPATIENT)
Dept: OBGYN | Age: 66
End: 2021-08-10
Payer: MEDICARE

## 2021-08-10 VITALS
WEIGHT: 223 LBS | HEIGHT: 66 IN | DIASTOLIC BLOOD PRESSURE: 80 MMHG | SYSTOLIC BLOOD PRESSURE: 126 MMHG | BODY MASS INDEX: 35.84 KG/M2

## 2021-08-10 DIAGNOSIS — N95.2 ATROPHIC VAGINITIS: ICD-10-CM

## 2021-08-10 DIAGNOSIS — N76.0 ACUTE VAGINITIS: Primary | ICD-10-CM

## 2021-08-10 DIAGNOSIS — N76.0 ACUTE VAGINITIS: ICD-10-CM

## 2021-08-10 PROCEDURE — 87480 CANDIDA DNA DIR PROBE: CPT

## 2021-08-10 PROCEDURE — G8399 PT W/DXA RESULTS DOCUMENT: HCPCS | Performed by: OBSTETRICS & GYNECOLOGY

## 2021-08-10 PROCEDURE — 99213 OFFICE O/P EST LOW 20 MIN: CPT | Performed by: OBSTETRICS & GYNECOLOGY

## 2021-08-10 PROCEDURE — G8417 CALC BMI ABV UP PARAM F/U: HCPCS | Performed by: OBSTETRICS & GYNECOLOGY

## 2021-08-10 PROCEDURE — 87510 GARDNER VAG DNA DIR PROBE: CPT

## 2021-08-10 PROCEDURE — 1123F ACP DISCUSS/DSCN MKR DOCD: CPT | Performed by: OBSTETRICS & GYNECOLOGY

## 2021-08-10 PROCEDURE — 99211 OFF/OP EST MAY X REQ PHY/QHP: CPT

## 2021-08-10 PROCEDURE — 1036F TOBACCO NON-USER: CPT | Performed by: OBSTETRICS & GYNECOLOGY

## 2021-08-10 PROCEDURE — 87660 TRICHOMONAS VAGIN DIR PROBE: CPT

## 2021-08-10 PROCEDURE — 4040F PNEUMOC VAC/ADMIN/RCVD: CPT | Performed by: OBSTETRICS & GYNECOLOGY

## 2021-08-10 PROCEDURE — 1090F PRES/ABSN URINE INCON ASSESS: CPT | Performed by: OBSTETRICS & GYNECOLOGY

## 2021-08-10 PROCEDURE — 3017F COLORECTAL CA SCREEN DOC REV: CPT | Performed by: OBSTETRICS & GYNECOLOGY

## 2021-08-10 PROCEDURE — G8427 DOCREV CUR MEDS BY ELIG CLIN: HCPCS | Performed by: OBSTETRICS & GYNECOLOGY

## 2021-08-10 NOTE — PROGRESS NOTES
DATE OF VISIT:  8/10/21    PATIENT NAME:  Fay Ferrell     YOB: 1955    REASON FOR VISIT:    Chief Complaint   Patient presents with    Vaginal Itching     Patient complains of worsening symptoms since Jan.  She has been treated 5 times for both UTI and Diflucan. She notes that the only time the vaginal itching improves is if she uses Clotrimazole cream.          HISTORY OF PRESENT ILLNESS:  Pt states that she has had recurrent yeast infections and uti's since the beg of this year; in reviewing her labs, however, I dont see any positive wet prep or culture for yeast; suspect that in addition to her uti's she is having atrophic vaginitis/vulvitis symptoms; disc'd this with the patient and disc'd a culture to assure no yeast or bacterial overgrowth; pt agreeable; disc'd that it will take several weeks to notice improvement in symptoms      No LMP recorded. Patient has had a hysterectomy. Vitals:    08/10/21 1628   BP: 126/80   Weight: 223 lb (101.2 kg)   Height: 5' 6\" (1.676 m)     Body mass index is 35.99 kg/m². Allergies   Allergen Reactions    Ciprofloxacin Other (See Comments)     Headaches    Flonase [Fluticasone Propionate]      Smelled smoke with medication.  Iodine      Other reaction(s): AOF    Macrobid [Nitrofurantoin] Nausea Only    Other Hives     Contrast for a stress test    Oxycodone Rash     hallucinations     Current Outpatient Medications   Medication Sig Dispense Refill    losartan (COZAAR) 100 MG tablet TAKE ONE TABLET BY MOUTH DAILY 90 tablet 0    omeprazole (PRILOSEC) 20 MG delayed release capsule TAKE ONE CAPSULE BY MOUTH EVERY MORNING BEFORE BREAKFAST 90 capsule 0    LORazepam (ATIVAN) 0.5 MG tablet Take 1 tablet by mouth 2 times daily as needed for Anxiety for up to 30 days. 30 tablet 0    UNABLE TO FIND Gabapentin-Ketoprofen amitriptyline HCL 10-10-2% neuroserum liquid  Apply small amt to affected area and rub for 10 minutes.  Repeat 2-3 times daily 15 mL 11    Ascorbic Acid (VITAMIN C) 250 MG tablet Take 250 mg by mouth daily      hydroCHLOROthiazide (HYDRODIURIL) 25 MG tablet Take 1/2 tab po daily 90 tablet 1    levothyroxine (SYNTHROID) 100 MCG tablet TAKE 1 TABLET DAILY 90 tablet 1    loratadine (CLARITIN) 10 MG tablet Take 10 mg by mouth daily Prn      vitamin D (ERGOCALCIFEROL) 66524 units CAPS capsule Take 50,000 Units by mouth Twice a Week       Vitamin Mixture (VITAMIN E COMPLETE PO) Po qd      clopidogrel (PLAVIX) 75 MG tablet Take 1 tablet by mouth daily. 30 tablet 0    folic acid (FOLVITE) 1 MG tablet Take 1 mg by mouth daily.  Estradiol (VAGIFEM) 10 MCG TABS vaginal tablet Place 1 tablet vaginally nightly for 21 days 21 tablet 0    [START ON 2021] Estradiol (VAGIFEM) 10 MCG TABS vaginal tablet Place 1 tablet vaginally Twice a Week 8 tablet 5    NONFORMULARY GoLo release-take 1 tablet twice a day. (Patient not taking: Reported on 8/10/2021)      famotidine (PEPCID) 20 MG tablet TAKE 1 TABLET DAILY WITH SUPPER (Patient not taking: Reported on 2021) 90 tablet 1     No current facility-administered medications for this visit.      Social History     Socioeconomic History    Marital status:      Spouse name: None    Number of children: None    Years of education: None    Highest education level: None   Occupational History    None   Tobacco Use    Smoking status: Former Smoker     Packs/day: 1.00     Years: 15.00     Pack years: 15.00     Types: Cigarettes     Quit date:      Years since quittin.6    Smokeless tobacco: Never Used   Vaping Use    Vaping Use: Never used   Substance and Sexual Activity    Alcohol use: No    Drug use: No    Sexual activity: Not Currently     Partners: Male   Other Topics Concern    None   Social History Narrative    None     Social Determinants of Health     Financial Resource Strain: Low Risk     Difficulty of Paying Living Expenses: Not hard at all   Food Insecurity: No Food Insecurity    Worried About Running Out of Food in the Last Year: Never true    Sangeetha of Food in the Last Year: Never true   Transportation Needs:     Lack of Transportation (Medical):  Lack of Transportation (Non-Medical):    Physical Activity:     Days of Exercise per Week:     Minutes of Exercise per Session:    Stress:     Feeling of Stress :    Social Connections:     Frequency of Communication with Friends and Family:     Frequency of Social Gatherings with Friends and Family:     Attends Religion Services:     Active Member of Clubs or Organizations:     Attends Club or Organization Meetings:     Marital Status:    Intimate Partner Violence:     Fear of Current or Ex-Partner:     Emotionally Abused:     Physically Abused:     Sexually Abused:        REVIEW OF SYSTEMS:  Review of Systems   Constitutional: Negative for chills and fever. Genitourinary: Positive for vaginal pain (itching). Negative for dysuria, pelvic pain and vaginal discharge. PHYSICAL EXAM:  /80   Ht 5' 6\" (1.676 m)   Wt 223 lb (101.2 kg)   BMI 35.99 kg/m²   Physical Exam  Constitutional:       Appearance: Normal appearance. Genitourinary:      Vulva normal.   HENT:      Head: Normocephalic and atraumatic. Eyes:      Extraocular Movements: Extraocular movements intact. Pupils: Pupils are equal, round, and reactive to light. Cardiovascular:      Rate and Rhythm: Normal rate. Pulmonary:      Effort: Pulmonary effort is normal.   Abdominal:      General: Abdomen is flat. Palpations: Abdomen is soft. Musculoskeletal:         General: Normal range of motion. Neurological:      Mental Status: She is alert and oriented to person, place, and time. Skin:     General: Skin is warm and dry. Psychiatric:         Mood and Affect: Mood normal.         Behavior: Behavior normal.         Thought Content:  Thought content normal.         Judgment: Judgment normal.       The patient, Kayla Guadalupe Luke Carrasquillo is a 72 y.o. female, was seen with a total time spent of 20 minutes for the visit on this date of service by the E/M provider. The time component had both face to face and non face to face time spent in determining the total time component. Counseling and education regarding her diagnosis listed below and her options regarding those diagnoses were also included in determining her time component. Diagnosis Orders   1. Acute vaginitis  VAGINITIS DNA PROBE   2. Atrophic vaginitis          The patient had her preventative health maintenance recommendations and follow-up reviewed with her at the completion of her visit. ASSESSMENT:      1. Acute vaginitis    2.  Atrophic vaginitis        PLAN:  Orders Placed This Encounter   Procedures    VAGINITIS DNA PROBE     Return if symptoms worsen or fail to improve, for annual.       Electronically signed by Carson Kincaid DO on 08/19/21

## 2021-08-11 ENCOUNTER — TELEPHONE (OUTPATIENT)
Dept: OBGYN | Age: 66
End: 2021-08-11

## 2021-08-11 LAB
DIRECT EXAM: NORMAL
Lab: NORMAL
SPECIMEN DESCRIPTION: NORMAL

## 2021-08-11 RX ORDER — ESTRADIOL 10 UG/1
10 INSERT VAGINAL
Qty: 8 TABLET | Refills: 5 | Status: SHIPPED | OUTPATIENT
Start: 2021-09-02 | End: 2022-01-26

## 2021-08-11 RX ORDER — ESTRADIOL 10 UG/1
10 INSERT VAGINAL NIGHTLY
Qty: 21 TABLET | Refills: 0 | Status: SHIPPED | OUTPATIENT
Start: 2021-08-11 | End: 2021-12-16

## 2021-08-11 NOTE — TELEPHONE ENCOUNTER
----- Message from Raj Hartley DO sent at 8/11/2021  2:21 PM EDT -----  No growth on culture - treat with vaginal estradiol tablet (once nightly x 21 days then twice weekly thereafter) and externally have her use clobetasol bid x 7 days then twice weekly thereafter

## 2021-08-30 ENCOUNTER — HOSPITAL ENCOUNTER (OUTPATIENT)
Dept: PHYSICAL THERAPY | Age: 66
Setting detail: THERAPIES SERIES
Discharge: HOME | End: 2021-08-30
Payer: MEDICARE

## 2021-08-30 PROCEDURE — 97110 THERAPEUTIC EXERCISES: CPT

## 2021-08-30 PROCEDURE — 97162 PT EVAL MOD COMPLEX 30 MIN: CPT

## 2021-08-30 ASSESSMENT — PAIN SCALES - GENERAL: PAINLEVEL_OUTOF10: 5

## 2021-08-30 NOTE — PLAN OF CARE
cosigning electronically, I have reviewed this Plan of Care and certify a need for medically necessary rehabilitation services.

## 2021-08-30 NOTE — PROGRESS NOTES
Phone: [phone] [facility]         Fax: [phone]                      Outpatient Physical Therapy                                                                      Evaluation    Date: 2021  Patient: Angela Harper  : 1955  ACCT #: [de-identified]    Referring Practitioner: Dr. Hitesh Alanis    Referral Date : 21    Diagnosis: S/P L TKR    Treatment Diagnosis: Gait ataxia due to L knee pain s/p TKR  Onset Date: 21  PT Insurance Information: [facility]  Total # of Visits Approved: 12 Per Physician Order  Total # of Visits to Date: 1  No Show: 0  Canceled Appointment: 0     Subjective  Additional Pertinent Hx: Pt underwent L TKR on 21, stayed overnight(2 PT sessions)- no complications. Pt reports HH PT for 2 wks(5 sessions). Pt reports pain 5-6/10 today, aching this AM.  Pt reports she is starting to wean from her pain meds, pt reports up 2x/night due to discomfort. Pt reports in the home she uses her FWW, today she amb with rollator today to session with good safety and no sig devations. Incision well healing, no drainage, clear dressing in tact. Pt reports compliance with HEP x3/day and elevating. Min swelling at ankle no pitting. Pt reports using a cane for amb 6-7months prior to sx.   Pain Screening  Patient Currently in Pain: Yes  Pain Assessment  Pain Assessment: 0-10  Pain Level: 5    Objective  Strength LLE  L Hip Flexion: 3+/5  L Hip Extension: 4-/5  L Hip ABduction: 3+/5  L Knee Extension: 3-/5  L Ankle Dorsiflexion: 5/5  AROM LLE (degrees)  L Knee Flexion 0-145: 93deg (Tension/pull anterior knee)  L Knee Extension 0: 1deg (25deg of Quad lag)  L Ankle Dorsiflexion 0-20: ~3deg       AROM LLE (degrees)  L Knee Flexion 0-145: 93deg (Tension/pull anterior knee)  L Knee Extension 0: 1deg (25deg of Quad lag)  L Ankle Dorsiflexion 0-20: ~3deg    Assessment  Body structures, Functions, Activity limitations: Decreased functional mobility , Decreased ADL status, Decreased strength, Decreased ROM, Decreased endurance, Decreased balance, Decreased high-level IADLs, Increased pain, Decreased posture  Assessment: Pt to benefit from ther ex for ROM and strengthening of L LE. Pt amb with rollator, pt hope is to amb without AD. Pt to benefit from gait training and manual therapy for scar mobility. Prognosis: Good  Decision Making: Medium Complexity  Exam: LEFS-19/80    Clinical Presentation:  Stable/Uncomplicated  The Following Comorbities will impact the patients progression and Plan of Care:   Cardiac Disease/Pacemaker, Previous Orthopedic Injury/Surgery and Thyroid       Activity Tolerance: Patient limited by fatigue, Patient Tolerated treatment well    Education: POC; proper form with step taps and amb ball rolls     Barriers to Learning: None    Goals  Short term goals  Time Frame for Short term goals: 6 visits  Short term goal 1: Pt to be independent and compliant with HEP for ROM   Short term goal 2: Pt to have PROM 100deg flexion to improve car transfers. Short term goal 3: Pt to amb with SC x100ft with min deviations    Long term goals  Time Frame for Long term goals : 12 visits  Long term goal 1: Pt to score >29/80 on LEFS to improve ADL lamar. Long term goal 2: Pt to have 4/5 knee ext strength to improve stair management. Long term goal 3: Pt to have PROM 115deg to improve stair lamar and transfers. Long term goal 4: Pt to amb 75ft without AD min deviations to improve household amb.     Patient's Goal:  Patient goals : Be able to walk without an AD    Timed Code Treatment Minutes: 10 Minutes  Total Treatment Time: 44  Time In: 1010  Time Out: [address], PT Date: 8/30/2021

## 2021-08-30 NOTE — PRE-CERTIFICATION NOTE
Medicare Cap     [] Physical Therapy  [] Speech Therapy  [] Occupational therapy    *PT and Speech caps combine      $9292 Cap limit < kx modifier needed < $5625 requires pre-cert     Patient Name: Rashida Henriquez  YOB: 1955     Date of [address] Name $$$ charge Daily Charge YTD   Total $   8/30/21 Eval, Ex 90.90, 26.88 117.78 117.78

## 2021-09-01 ENCOUNTER — HOSPITAL ENCOUNTER (OUTPATIENT)
Dept: PHYSICAL THERAPY | Age: 66
Setting detail: THERAPIES SERIES
Discharge: HOME | End: 2021-09-01
Payer: MEDICARE

## 2021-09-01 PROCEDURE — 97110 THERAPEUTIC EXERCISES: CPT

## 2021-09-01 ASSESSMENT — PAIN SCALES - GENERAL: PAINLEVEL_OUTOF10: 5

## 2021-09-01 NOTE — PROGRESS NOTES
Phone: [address]      Fax: [phone]                            Outpatient Physical Therapy                                                                            Daily Note    Date: 2021  Patient Name: Endy Adams        MRN: [id number]   ACCT#:  [de-identified]  : 1955  (72 y.o.)    Referring Practitioner: Dr. Nica Marshall    Referral Date : 21    Diagnosis: S/P L TKR  Treatment Diagnosis: Gait ataxia due to L knee pain s/p TKR    Onset Date: 21  PT Insurance Information: [facility]  Total # of Visits Approved: 12 Per Physician Order  Total # of Visits to Date: 2  No Show: 0  Canceled Appointment: 0  Plan of Care/Certification Expiration Date: 21    Pre-Treatment Pain:  5/10     Assessment  Assessment: Initiated ex as outline with good overall lamar. Pt did note feeling \"hot\" at one point, she was given ice water and seated rest and felt better. She did not eat prior to therapy, advised to eat breakfast before coming to therapy . PROM in sitting 95 deg . flexion. Chart Reviewed: Yes    Plan  Plan: Continue with current plan    Exercises/Modalities/Manual:  See DocFlow Sheet         Barriers to Learning: None    Goals  (Total # of Visits to Date: 2)   Short Term Goals - Time Frame for Short term goals: 6 visits  Short term goal 1: Pt to be independent and compliant with HEP for ROM   Short term goal 2: Pt to have PROM 100deg flexion to improve car transfers. Short term goal 3: Pt to amb with SC x100ft with min deviations          Long Term Goals - Time Frame for Long term goals : 12 visits(POC Exp 21)  Long term goal 1: Pt to score >29/80 on LEFS to improve ADL lamar. Long term goal 2: Pt to have 4/5 knee ext strength to improve stair management. Long term goal 3: Pt to have PROM 115deg to improve stair lamar and transfers. Long term goal 4: Pt to amb 75ft without AD min deviations to improve household amb.        Post Treatment Pain: 5/10    Time In: 0815    Time Out : 2500        Timed Code Treatment Minutes: 43 Minutes  Total Treatment Time: 37 Minutes    Kaden rBaga PTA     Date: 9/1/2021

## 2021-09-01 NOTE — PRE-CERTIFICATION NOTE
Medicare Cap     [] Physical Therapy  [] Speech Therapy  [] Occupational therapy    *PT and Speech caps combine      $7160 Cap limit < kx modifier needed < $8522 requires pre-cert     Patient Name: Wing Velazquez  YOB: 1955     Date of [address] Name $$$ charge Daily Charge YTD   Total $   8/30/21 Eval, Ex 90.90, 26.88 117.78 117.78   9/1/21 Ex x 3

## 2021-09-03 ENCOUNTER — HOSPITAL ENCOUNTER (OUTPATIENT)
Dept: PHYSICAL THERAPY | Age: 66
Setting detail: THERAPIES SERIES
Discharge: HOME | End: 2021-09-03
Payer: MEDICARE

## 2021-09-03 PROCEDURE — 97110 THERAPEUTIC EXERCISES: CPT

## 2021-09-03 ASSESSMENT — PAIN SCALES - GENERAL: PAINLEVEL_OUTOF10: 3

## 2021-09-03 NOTE — PRE-CERTIFICATION NOTE
Medicare Cap     [] Physical Therapy  [] Speech Therapy  [] Occupational therapy    *PT and Speech caps combine      $5909 Cap limit < kx modifier needed < $9288 requires pre-cert     Patient Name: Artemio Arnold  YOB: 1955     Date of [address] Name $$$ charge Daily Charge YTD   Total $   8/30/21 Eval, Ex 90.90, 26.88 117.78 117.78   9/1/21 Ex x 3      9/3/21 Ex x 3

## 2021-09-03 NOTE — PROGRESS NOTES
Phone: [address]      Fax: [phone]                            Outpatient Physical Therapy                                                                            Daily Note    Date: 9/3/2021  Patient Name: Waylon Dickson        MRN: [id number]   ACCT#:  [de-identified]  : 1955  (72 y.o.)    Referring Practitioner: Dr. 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 Did eat breakfast and took pain meds this morning prior to session. Completes exercises as outlined above with good tolerance. Added shuttle without difficulty. PROM L knee flexion is 110 degrees. Plan to progress to gait training with straight cane next 1-2 visits as patient is able to tolerate. Chart Reviewed: Yes    Plan  Plan: Continue with current plan    Exercises/Modalities/Manual:  See DocFlow Sheet    Education:      Barriers to Learning: None    Goals  (Total # of Visits to Date: 3)   Short Term Goals - Time Frame for Short term goals: 6 visits  Short term goal 1: Pt to be independent and compliant with HEP for ROM -MET  Short term goal 2: Pt to have PROM 100deg flexion to improve car transfers. -MET  Short term goal 3: Pt to amb with SC x100ft with min deviations          Long Term Goals - Time Frame for Long term goals : 12 visits(POC Exp 21)  Long term goal 1: Pt to score >29/80 on LEFS to improve ADL lamar. Long term goal 2: Pt to have 4/5 knee ext strength to improve stair management. Long term goal 3: Pt to have PROM 115deg to improve stair lamar and transfers.   Long term goal 4: Pt to amb 75ft without AD min deviations to improve household amb.        Post Treatment Pain:  3-4/10    Time [id number]    Time Out : 9515        Timed Code Treatment Minutes: 39 Minutes  Total Treatment Time: 39 Minutes    Nitesh Cerda     Date: 9/3/2021

## 2021-09-07 ENCOUNTER — HOSPITAL ENCOUNTER (OUTPATIENT)
Dept: PHYSICAL THERAPY | Age: 66
Setting detail: THERAPIES SERIES
Discharge: HOME | End: 2021-09-07
Payer: MEDICARE

## 2021-09-07 PROCEDURE — 97110 THERAPEUTIC EXERCISES: CPT

## 2021-09-07 ASSESSMENT — PAIN SCALES - GENERAL: PAINLEVEL_OUTOF10: 4

## 2021-09-07 NOTE — PROGRESS NOTES
Phone: [address]      Fax: [phone]                            Outpatient Physical Therapy                                                                            Daily Note    Date: 2021  Patient Name: Nj Woodard        MRN: [id number]   ACCT#:  [de-identified]  : 1955  (72 y.o.)    Referring Practitioner: Dr. Obdulia Kat    Referral Date : 21    Diagnosis: S/P L TKR  Treatment Diagnosis: Gait ataxia due to L knee pain s/p TKR    Onset Date: 21  PT Insurance Information: [facility]  Total # of Visits Approved: 12 Per Physician Order  Total # of Visits to Date: 4  No Show: 0  Canceled Appointment: 0  Plan of Care/Certification Expiration Date: 21    Pre-Treatment Pain:  4/10     Assessment  Assessment: Pt reports weaning from meds, did not take one during the night last night. Pain slightly elevated this AM 4/10. PROM [id number]  Plan to progress to 6\" step up next visit. Chart Reviewed: Yes    Plan  Plan: Continue with current plan    Exercises/Modalities/Manual:  See DocFlow Sheet    Education: Amp ball rolls for WB and stability training     Barriers to Learning: None    Goals  (Total # of Visits to Date: 4)   Short Term Goals - Time Frame for Short term goals: 6 visits  Short term goal 1: Pt to be independent and compliant with HEP for ROM -MET  Short term goal 2: Pt to have PROM 100deg flexion to improve car transfers. -MET  Short term goal 3: Pt to amb with SC x100ft with min deviations          Long Term Goals - Time Frame for Long term goals : 12 visits(POC Exp 21)  Long term goal 1: Pt to score >29/80 on LEFS to improve ADL lamar. Long term goal 2: Pt to have 4/5 knee ext strength to improve stair management. Long term goal 3: Pt to have PROM 115deg to improve stair lamar and transfers. Long term goal 4: Pt to amb 75ft without AD min deviations to improve household amb.        Post Treatment Pain:  /10    Time In: 0818  Time Out: 0852  Timed Code Treatment Minutes: 34 Minutes  Total Treatment Time: [address], PT     Date: 9/7/2021

## 2021-09-08 ENCOUNTER — HOSPITAL ENCOUNTER (OUTPATIENT)
Dept: PHYSICAL THERAPY | Age: 66
Setting detail: THERAPIES SERIES
Discharge: HOME | End: 2021-09-08
Payer: MEDICARE

## 2021-09-08 PROCEDURE — 97110 THERAPEUTIC EXERCISES: CPT

## 2021-09-08 ASSESSMENT — PAIN SCALES - GENERAL: PAINLEVEL_OUTOF10: 4

## 2021-09-08 NOTE — PRE-CERTIFICATION NOTE
Medicare Cap     [] Physical Therapy  [] Speech Therapy  [] Occupational therapy    *PT and Speech caps combine      $5569 Cap limit < kx modifier needed < $6986 requires pre-cert     Patient Name: Dany Maxwell  YOB: 1955     [facility] Name $$$ charge Daily Charge YTD   Total $   8/30/21 Eval, Ex 90.90, 26.88 117.78 117.78   9/1/21 Ex x 3 80.64 80.64 ---   9/3/21 Ex x 3 80.64 80.64 ---   9/8/21 Ex x 3 80.64 80.64 359.70

## 2021-09-08 NOTE — PRE-CERTIFICATION NOTE
Medicare Cap     [] Physical Therapy  [] Speech Therapy  [] Occupational therapy    *PT and Speech caps combine      $4120 Cap limit < kx modifier needed < $3076 requires pre-cert     Patient Name: Wilfred Thakur  YOB: 1955     [facility] Name $$$ charge Daily Charge YTD   Total $   8/30/21 Eval, Ex 90.90, 26.88 117.78 117.78   9/1/21 Ex x 3      9/3/21 Ex x 3      9/8/21 Ex x 3

## 2021-09-08 NOTE — PROGRESS NOTES
Phone: [address]      Fax: [phone]                            Outpatient Physical Therapy                                                                            Daily Note    Date: 2021  Patient Name: Indra Fuller        MRN: [id number]   ACCT#:  [de-identified]  : 1955  (72 y.o.)    Referring Practitioner: Dr. 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 She is sleeping well. Performed ex as outlined for strength, flexibility and proprioception. PROM to 108 deg flex today. May initiate gait with cane 1-2 visits. Progressed step up to 6\" today. Chart Reviewed: Yes    Plan  Plan: Continue with current plan    Exercises/Modalities/Manual:  See DocFlow Sheet         Barriers to Learning: None    Goals  (Total # of Visits to Date: 5)   Short Term Goals - Time Frame for Short term goals: 6 visits  Short term goal 1: Pt to be independent and compliant with HEP for ROM -MET  Short term goal 2: Pt to have PROM 100deg flexion to improve car transfers. -MET  Short term goal 3: Pt to amb with SC x100ft with min deviations          Long Term Goals - Time Frame for Long term goals : 12 visits(POC Exp 21)  Long term goal 1: Pt to score >29/80 on LEFS to improve ADL lamar. Long term goal 2: Pt to have 4/5 knee ext strength to improve stair management. Long term goal 3: Pt to have PROM 115deg to improve stair lamar and transfers. Long term goal 4: Pt to amb 75ft without AD min deviations to improve household amb.        Post Treatment Pain: 4/10    Time In: 0815    Time Out : 2272        Timed Code Treatment Minutes: 40 Minutes  Total Treatment Time: 40 Minutes    Rivera Braga PTA     Date: 9/8/2021

## 2021-09-10 ENCOUNTER — HOSPITAL ENCOUNTER (OUTPATIENT)
Dept: PHYSICAL THERAPY | Age: 66
Setting detail: THERAPIES SERIES
Discharge: HOME | End: 2021-09-10
Payer: MEDICARE

## 2021-09-10 PROCEDURE — 97110 THERAPEUTIC EXERCISES: CPT

## 2021-09-10 ASSESSMENT — PAIN SCALES - GENERAL: PAINLEVEL_OUTOF10: 6

## 2021-09-10 NOTE — PROGRESS NOTES
Phone: [address]      Fax: [phone]                            Outpatient Physical Therapy                                                                            Daily Note    Date: 9/10/2021  Patient Name: Waylon Dickson        MRN: [id number]   ACCT#:  [de-identified]  : 1955  (72 y.o.)    Referring Practitioner: Dr. 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 Pt with good lamar to new ther ex including kevin sidestepping. Completed soft tissue mobilization ~3min, PROM to 112deg of flexion this date. Pt encouraged to ice and elevated once returning home and monitor activity level today. Chart Reviewed: Yes    Plan  Plan: Continue with current plan    Exercises/Modalities/Manual:  See DocFlow Sheet    Education: see assessment     Barriers to Learning: None    Goals  (Total # of Visits to Date: 6)   Short Term Goals - Time Frame for Short term goals: 6 visits  Short term goal 1: Pt to be independent and compliant with HEP for ROM -MET  Short term goal 2: Pt to have PROM 100deg flexion to improve car transfers. -MET  Short term goal 3: Pt to amb with SC x100ft with min deviations    Long Term Goals - Time Frame for Long term goals : 12 visits(POC Exp 21)  Long term goal 1: Pt to score >29/80 on LEFS to improve ADL lamar. Long term goal 2: Pt to have 4/5 knee ext strength to improve stair management. Long term goal 3: Pt to have PROM 115deg to improve stair lamar and transfers.   Long term goal 4: Pt to amb 75ft without AD min deviations to improve household amb.        Post Treatment Pain: 5/10    Time In: 0850    Time Out : 0930  Timed Code Treatment Minutes: 40 Minutes  Total Treatment Time: Mikhail Carolina PT     Date: 9/10/2021

## 2021-09-10 NOTE — PRE-CERTIFICATION NOTE
Medicare Cap     [] Physical Therapy  [] Speech Therapy  [] Occupational therapy    *PT and Speech caps combine      $1940 Cap limit < kx modifier needed < $8674 requires pre-cert     Patient Name: Marah Garibay  YOB: 1955     Date of [address] Name $$$ charge Daily Charge YTD   Total $   8/30/21 Sueal, Ex 90.90, 26.88 117.78 117.78   9/1/21 Ex x 3 80.64 80.64 ---   9/3/21 Ex x 3 80.64 80.64 ---   9/8/21 Ex x 3 80.64 80.64 359.70   9/10/21 Ex x 3 80.64 80.64 440.34

## 2021-09-13 ENCOUNTER — HOSPITAL ENCOUNTER (OUTPATIENT)
Dept: PHYSICAL THERAPY | Age: 66
Setting detail: THERAPIES SERIES
Discharge: HOME | End: 2021-09-13
Payer: MEDICARE

## 2021-09-13 PROCEDURE — 97110 THERAPEUTIC EXERCISES: CPT

## 2021-09-13 ASSESSMENT — PAIN SCALES - GENERAL: PAINLEVEL_OUTOF10: 3

## 2021-09-13 NOTE — PRE-CERTIFICATION NOTE
Medicare Cap     [] Physical Therapy  [] Speech Therapy  [] Occupational therapy    *PT and Speech caps combine      $1940 Cap limit < kx modifier needed < $3125 requires pre-cert     Patient Name: Luis Fuller  YOB: 1955     Date of [address] Name $$$ charge Daily Charge YTD   Total $   8/30/21 Sueal, Ex 90.90, 26.88 117.78 117.78   9/1/21 Ex x 3 80.64 80.64 ---   9/3/21 Ex x 3 80.64 80.64 ---   9/8/21 Ex x 3 80.64 80.64 359.70   9/10/21 Ex x 3 80.64 80.64 440.34   9/13/21 Ex x 3 80.64 80.64 520.98

## 2021-09-13 NOTE — OP NOTE
[facility] ACACIA GUERRA          Phone: [phone]      Outpatient Physical Therapy  Fax: [phone]                                 10th Visit Report    Date: 9/13/2021  ACCT #: [de-identified]      Referring Practitioner: Dr. 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  Total # of Visits Approved: 12 Per Physician Order  Total # of Visits to Date: 7  No Show: 0  Canceled Appointment: 0    Current Treatment:  Patient Education/HEP, Therapeutic Exercise, Manual Therapy: Myofacial Release/Cupping, Manual Therapy: Mobilization/Manipulation and Gait Training    Skilled Intervention:  Body structures, Functions, Activity limitations: Decreased functional mobility , Decreased ADL status, Decreased strength, Decreased ROM, Decreased endurance, Decreased balance, Decreased high-level IADLs, Increased pain, Decreased posture  Assessment: Progressed gait this date to 1 UE assist with Emphasis on heel toe gait. Pt reports decreased pain since last visit. AROM: Ext 0deg, AAROM: Flexion= 107deg  Prognosis: Good  Improve Ambulation, Complete Daily Tasks Safely and Return to Prior Level of Function    Expectations for Improvement/Time Frame: Plan to cont x 1.5wks then will determine if recert needed    Plan  Plan: Continue with current plan    Goals  Short term goals  Time Frame for Short term goals: 6 visits  Short term goal 1: Pt to be independent and compliant with HEP for ROM -MET  Short term goal 2: Pt to have PROM 100deg flexion to improve car transfers. -MET  Short term goal 3: Pt to amb with SC x100ft with min deviations    Long term goals  Time Frame for Long term goals : 12 visits(POC Exp 9/27/21)  Long term goal 1: Pt to score >29/80 on LEFS to improve ADL lamar.   Long term goal 2: Pt to have 4/5 knee ext strength to improve stair management. Long term goal 3: Pt to have PROM 115deg to improve stair lamar and transfers. Long term goal 4: Pt to amb 75ft without AD min deviations to improve household amb.     Rahul Singleton, PT      Date: 9/13/2021-

## 2021-09-13 NOTE — PROGRESS NOTES
Phone: [address]      Fax: [phone]                            Outpatient Physical Therapy                                                                            Daily Note    Date: 2021  Patient Name: Meeta Downey        MRN: [id number]   ACCT#:  [de-identified]  : 1955  (72 y.o.)    Referring Practitioner: Dr. Mlee Lemons    Referral Date : 21    Diagnosis: S/P L TKR  Treatment Diagnosis: Gait ataxia due to L knee pain s/p TKR    Onset Date: 21  PT Insurance Information: [facility]  Total # of Visits Approved: 12 Per Physician Order  Total # of Visits to Date: 7  No Show: 0  Canceled Appointment: 0  Plan of Care/Certification Expiration Date: 21    Pre-Treatment Pain:  3/10     Assessment  Assessment: Progressed gait this date to 1 UE assist with Emphasis on heel toe gait. Pt reports decreased pain since last visit. AROM: Ext 0deg, AAROM: Flexion= 107deg  Chart Reviewed: Yes    Plan  Plan: Continue with current plan    Exercises/Modalities/Manual:  See DocFlow Sheet    Education: improved extension     Barriers to Learning: None    Goals  (Total # of Visits to Date: 7)   Short Term Goals - Time Frame for Short term goals: 6 visits  Short term goal 1: Pt to be independent and compliant with HEP for ROM -MET  Short term goal 2: Pt to have PROM 100deg flexion to improve car transfers. -MET  Short term goal 3: Pt to amb with SC x100ft with min deviations          Long Term Goals - Time Frame for Long term goals : 12 visits(POC Exp 21)  Long term goal 1: Pt to score >29/80 on LEFS to improve ADL lamar. Long term goal 2: Pt to have 4/5 knee ext strength to improve stair management. Long term goal 3: Pt to have PROM 115deg to improve stair lamar and transfers. Long term goal 4: Pt to amb 75ft without AD min deviations to improve household amb.        Post Treatment Pain:  3/10    Time In: 0803  Time Out: 1770  Timed Code Treatment Minutes: 48 Minutes  Total Treatment Time: [address], PT     Date: 9/13/2021

## 2021-09-15 ENCOUNTER — HOSPITAL ENCOUNTER (OUTPATIENT)
Dept: PHYSICAL THERAPY | Age: 66
Setting detail: THERAPIES SERIES
Discharge: HOME | End: 2021-09-15
Payer: MEDICARE

## 2021-09-15 PROCEDURE — 97110 THERAPEUTIC EXERCISES: CPT

## 2021-09-15 ASSESSMENT — PAIN SCALES - GENERAL: PAINLEVEL_OUTOF10: 4

## 2021-09-15 NOTE — PROGRESS NOTES
Phone: [address]      Fax: [phone]                            Outpatient Physical Therapy                                                                            Daily Note    Date: 9/15/2021  Patient Name: Miriam Otero        MRN: [id number]   ACCT#:  [de-identified]  : 1955  (72 y.o.)    Referring Practitioner: Dr. 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 Progressed with gait training with s-cane, self correcting  mild LOB x 2. Pt instructed to only practice with her cane in a safe area such as in her hallway. She has used a cane prior to surgery. PROM to 110 deg flex. Plan to cont with gait  and progressing ROM. Chart Reviewed: Yes    Plan  Plan: Continue with current plan    Exercises/Modalities/Manual:  See DocFlow Sheet         Barriers to Learning: None    Goals  (Total # of Visits to Date: 8)   Short Term Goals - Time Frame for Short term goals: 6 visits  Short term goal 1: Pt to be independent and compliant with HEP for ROM -MET  Short term goal 2: Pt to have PROM 100deg flexion to improve car transfers. -MET  Short term goal 3: Pt to amb with SC x100ft with min deviations          Long Term Goals - Time Frame for Long term goals : 12 visits(POC Exp 21)  Long term goal 1: Pt to score >29/80 on LEFS to improve ADL lamar. Long term goal 2: Pt to have 4/5 knee ext strength to improve stair management. Long term goal 3: Pt to have PROM 115deg to improve stair lamar and transfers.   Long term goal 4: Pt to amb 75ft without AD min

## 2021-09-15 NOTE — PRE-CERTIFICATION NOTE
Medicare Cap     [] Physical Therapy  [] Speech Therapy  [] Occupational therapy    *PT and Speech caps combine      $1940 Cap limit < kx modifier needed < $3554 requires pre-cert     Patient Name: Dom Grover  YOB: 1955     Date of [address] Name $$$ charge Daily Charge YTD   Total $   8/30/21 Sueal, Ex 90.90, 26.88 117.78 117.78   9/1/21 Ex x 3 80.64 80.64 ---   9/3/21 Ex x 3 80.64 80.64 ---   9/8/21 Ex x 3 80.64 80.64 359.70   9/10/21 Ex x 3 80.64 80.64 440.34   9/13/21 Ex x 3 80.64 80.64 520.98   9/15/21 Ex x 3 80.64 80.64 601.62

## 2021-09-17 ENCOUNTER — HOSPITAL ENCOUNTER (OUTPATIENT)
Dept: PHYSICAL THERAPY | Age: 66
Setting detail: THERAPIES SERIES
Discharge: HOME | End: 2021-09-17
Payer: MEDICARE

## 2021-09-17 PROCEDURE — 97110 THERAPEUTIC EXERCISES: CPT

## 2021-09-17 ASSESSMENT — PAIN SCALES - GENERAL: PAINLEVEL_OUTOF10: 4

## 2021-09-17 NOTE — PRE-CERTIFICATION NOTE
Medicare Cap     [] Physical Therapy  [] Speech Therapy  [] Occupational therapy    *PT and Speech caps combine      $1940 Cap limit < kx modifier needed < $3858 requires pre-cert     Patient Name: Avelino Hill  YOB: 1955     Date of [address] Name $$$ charge Daily Charge YTD   Total $   8/30/21 Eval, Ex 90.90, 26.88 117.78 117.78   9/1/21 Ex x 3 80.64 80.64 ---   9/3/21 Ex x 3 80.64 80.64 ---   9/8/21 Ex x 3 80.64 80.64 359.70   9/10/21 Ex x 3 80.64 80.64 440.34   9/13/21 Ex x 3 80.64 80.64 520.98   9/15/21 Ex x 3 80.64 80.64 601.62   9/17/21 Ex x 3 80.64 80.64 682.26

## 2021-09-17 NOTE — PRE-CERTIFICATION NOTE
Medicare Cap     [] Physical Therapy  [] Speech Therapy  [] Occupational therapy    *PT and Speech caps combine      $1940 Cap limit < kx modifier needed < $7087 requires pre-cert     Patient Name: Mariola Kwon  YOB: 1955     Date of [address] Name $$$ charge Daily Charge YTD   Total $   8/30/21 Sueal, Ex 90.90, 26.88 117.78 117.78   9/1/21 Ex x 3 80.64 80.64 ---   9/3/21 Ex x 3 80.64 80.64 ---   9/8/21 Ex x 3 80.64 80.64 359.70   9/10/21 Ex x 3 80.64 80.64 440.34   9/13/21 Ex x 3 80.64 80.64 520.98   9/15/21 Ex x 3 80.64 80.64 601.62   9/17/21 Ex x 3

## 2021-09-20 ENCOUNTER — HOSPITAL ENCOUNTER (OUTPATIENT)
Dept: PHYSICAL THERAPY | Age: 66
Setting detail: THERAPIES SERIES
Discharge: HOME | End: 2021-09-20
Payer: MEDICARE

## 2021-09-20 PROCEDURE — 97110 THERAPEUTIC EXERCISES: CPT

## 2021-09-20 ASSESSMENT — PAIN SCALES - GENERAL: PAINLEVEL_OUTOF10: 4

## 2021-09-20 NOTE — PRE-CERTIFICATION NOTE
Medicare Cap     [] Physical Therapy  [] Speech Therapy  [] Occupational therapy    *PT and Speech caps combine      $1940 Cap limit < kx modifier needed < $5679 requires pre-cert     Patient Name: Kerline Escalante  YOB: 1955     Date of [address] Name $$$ charge Daily Charge YTD   Total $   8/30/21 Eval, Ex 90.90, 26.88 117.78 117.78   9/1/21 Ex x 3 80.64 80.64 ---   9/3/21 Ex x 3 80.64 80.64 ---   9/8/21 Ex x 3 80.64 80.64 359.70   9/10/21 Ex x 3 80.64 80.64 440.34   9/13/21 Ex x 3 80.64 80.64 520.98   9/15/21 Ex x 3 80.64 80.64 601.62   9/17/21 Ex x 3 80.64 80.64 682.26   9/20/21 Ex x 3

## 2021-09-20 NOTE — PROGRESS NOTES
Phone: Tameka Ambriz      Fax: [phone]                            Outpatient Physical Therapy                                                                            Daily Note    Date: 2021  Patient Name: Luis Fuller        MRN: [id number]   ACCT#:  [de-identified]  : 1955  (72 y.o.)    Referring Practitioner: Dr. 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 No adverse effects noted. Progressed with increased resistance on shuttle with good lamar. PROM to 110 deg today. Educated pt to use cane when going short distances such as in to therapy. Mardee Shallow for prolonged ambulation. Pt independent in clinic with cane. Possible DC at end of week. Chart Reviewed: Yes    Plan  Plan: Continue with current plan    Exercises/Modalities/Manual:  See DocFlow Sheet         Barriers to Learning: None    Goals  (Total # of Visits to Date: 10)   Short Term Goals - Time Frame for Short term goals: 6 visits  Short term goal 1: Pt to be independent and compliant with HEP for ROM -MET  Short term goal 2: Pt to have PROM 100deg flexion to improve car transfers. -MET  Short term goal 3: Pt to amb with SC x100ft with min deviations          Long Term Goals - Time Frame for Long term goals : 12 visits(POC Exp 21)  Long term goal 1: Pt to score >29/80 on LEFS to improve ADL lamar. Long term goal 2: Pt to have 4/5 knee ext strength to improve stair management.   Long term goal 3: Pt to have PROM 115deg to improve stair lamar and transfers. Long term goal 4: Pt to amb 75ft without AD min deviations to improve household amb.        Post Treatment Pain:  4/10    Time In: 5801    Time Out : 6292        Timed Code Treatment Minutes: 45 Minutes  Total Treatment Time: 39 (Pt required a restroom break)) Minutes    Marshal Braga    PTA Date: 9/20/2021

## 2021-09-22 ENCOUNTER — HOSPITAL ENCOUNTER (OUTPATIENT)
Dept: PHYSICAL THERAPY | Age: 66
Setting detail: THERAPIES SERIES
Discharge: HOME | End: 2021-09-22
Payer: MEDICARE

## 2021-09-22 PROCEDURE — 97110 THERAPEUTIC EXERCISES: CPT

## 2021-09-22 ASSESSMENT — PAIN SCALES - GENERAL: PAINLEVEL_OUTOF10: 3

## 2021-09-22 NOTE — PRE-CERTIFICATION NOTE
Medicare Cap     [] Physical Therapy  [] Speech Therapy  [] Occupational therapy    *PT and Speech caps combine      $1940 Cap limit < kx modifier needed < $2267 requires pre-cert     Patient Name: Pk Gonzalez  YOB: 1955     Date of [address] Name $$$ charge Daily Charge YTD   Total $   8/30/21 Eval, Ex 90.90, 26.88 117.78 117.78   9/1/21 Ex x 3 80.64 80.64 ---   9/3/21 Ex x 3 80.64 80.64 ---   9/8/21 Ex x 3 80.64 80.64 359.70   9/10/21 Ex x 3 80.64 80.64 440.34   9/13/21 Ex x 3 80.64 80.64 520.98   9/15/21 Ex x 3 80.64 80.64 601.62   9/17/21 Ex x 3 80.64 80.64 682.26   9/20/21 Ex x 3 80.64 80.64 762.90   9/22/21 Ex x 3 80.64 80.64 843.54

## 2021-09-22 NOTE — PRE-CERTIFICATION NOTE
Medicare Cap     [] Physical Therapy  [] Speech Therapy  [] Occupational therapy    *PT and Speech caps combine      $1940 Cap limit < kx modifier needed < $4853 requires pre-cert     Patient Name: Dany Maxwell  YOB: 1955     Date of [address] Name $$$ charge Daily Charge YTD   Total $   8/30/21 Eval, Ex 90.90, 26.88 117.78 117.78   9/1/21 Ex x 3 80.64 80.64 ---   9/3/21 Ex x 3 80.64 80.64 ---   9/8/21 Ex x 3 80.64 80.64 359.70   9/10/21 Ex x 3 80.64 80.64 440.34   9/13/21 Ex x 3 80.64 80.64 520.98   9/15/21 Ex x 3 80.64 80.64 601.62   9/17/21 Ex x 3 80.64 80.64 682.26   9/20/21 Ex x 3 80.64 80.64 762.90   9/22/21 Ex x 3

## 2021-09-22 NOTE — PROGRESS NOTES
Phone: [address]      Fax: [phone]                            Outpatient Physical Therapy                                                                            Daily Note    Date: 2021  Patient Name: Oma Dowell        MRN: [id number]   ACCT#:  [de-identified]  : 1955  (72 y.o.)    Referring Practitioner: Dr. Hanna Ca    Referral Date : 21    Diagnosis: S/P L TKR  Treatment Diagnosis: Gait ataxia due to L knee pain s/p TKR    Onset Date: 21  PT Insurance Information: [facility]  Total # of Visits Approved: 12 Per Physician Order  Total # of Visits to Date: 11  No Show: 0  Canceled Appointment: 0  Plan of Care/Certification Expiration Date: 21    Pre-Treatment Pain:  310     Assessment  Assessment: Pt had pain 7-810  Day of and day after last visit. She is unsure why, possibly d/t  increased activity at home. Advised pt to pace herself and to ice after increased activities. Pt performs community amb  and in her home, s-cane in clinic only. Progressed with stepping on to aeromat to simulate uneven ground, noted some difficulty. PROM to 115 deg flex today. Plan reassessment next visit for possible DC . See's  Monday. Chart Reviewed: Yes    Plan  Plan: Continue with current plan    Exercises/Modalities/Manual:  See DocFlow Sheet         Barriers to Learning: None    Goals  (Total # of Visits to Date: 6)   Short Term Goals - Time Frame for Short term goals: 6 visits  Short term goal 1: Pt to be independent and compliant with HEP for ROM -MET  Short term goal 2: Pt to have PROM 100deg flexion to improve car transfers. -MET  Short term goal 3: Pt to amb with SC x100ft with min deviations-met          Long Term Goals - Time Frame for Long term goals : 12 visits(POC Exp 21)  Long term goal 1: Pt to score >29/80 on LEFS to improve ADL lamar. Long term goal 2: Pt to have 4/5 knee ext strength to improve stair management.   Long term goal 3: Pt to have PROM 115deg to improve stair lamar and transfers. Long term goal 4: Pt to amb 75ft without AD min deviations to improve household amb.        Post Treatment Pain:  3/10    Time In: 0810    Time Out : 0855        Timed Code Treatment Minutes: 45 Minutes  Total Treatment Time: 39 (Pt required a restroom break)) Minutes    Danelle Braga  PTA   Date: 9/22/2021

## 2021-09-24 ENCOUNTER — HOSPITAL ENCOUNTER (OUTPATIENT)
Dept: PHYSICAL THERAPY | Age: 66
Setting detail: THERAPIES SERIES
Discharge: HOME | End: 2021-09-24
Payer: MEDICARE

## 2021-09-24 PROCEDURE — 97116 GAIT TRAINING THERAPY: CPT

## 2021-09-24 PROCEDURE — 97110 THERAPEUTIC EXERCISES: CPT

## 2021-09-24 ASSESSMENT — PAIN SCALES - GENERAL: PAINLEVEL_OUTOF10: 2

## 2021-09-24 NOTE — PRE-CERTIFICATION NOTE
Medicare Cap     [] Physical Therapy  [] Speech Therapy  [] Occupational therapy    *PT and Speech caps combine      $1940 Cap limit < kx modifier needed < $8433 requires pre-cert     Patient Name: Pk Gonzalez  YOB: 1955     Date of [address] 63 Name $$$ charge Daily Charge YTD   Total $   8/30/21 Eval, Ex 90.90, 26.88 117.78 117.78   9/1/21 Ex x 3 80.64 80.64 ---   9/3/21 Ex x 3 80.64 80.64 ---   9/8/21 Ex x 3 80.64 80.64 359.70   9/10/21 Ex x 3 80.64 80.64 440.34   9/13/21 Ex x 3 80.64 80.64 520.98   9/15/21 Ex x 3 80.64 80.64 601.62   9/17/21 Ex x 3 80.64 80.64 682.26   9/20/21 Ex x 3 80.64 80.64 762.90   9/22/21 Ex x 3 80.64 80.64 843.54   9/24/21 Ex x 2, gait 80.64 80.64 924.18

## 2021-09-24 NOTE — PROGRESS NOTES
Phone: [address]      Fax: [phone]                            Outpatient Physical Therapy                                                                            Daily Note    Date: 2021  Patient Name: Akosua Hargrove        MRN: [id number]   ACCT#:  [de-identified]  : 1955  (72 y.o.)    Referring Practitioner: Dr. Jacoby Amador    Referral Date : 21    Diagnosis: S/P L TKR  Treatment Diagnosis: Gait ataxia due to L knee pain s/p TKR    Onset Date: 21  PT Insurance Information: [facility]  Total # of Visits Approved: 12 Per Physician Order  Total # of Visits to Date: 12  No Show: 0  Plan of Care/Certification Expiration Date: 21    Pre-Treatment Pain:  2/10     Assessment  Assessment: Pt progressing well, able to amb with SC in the clinic without deviation. Pt with amb no AD required VCs this date for Heel-toe gait. Pt continues to use rollator for distance amb due to fatigue and pain management. Pt reports taking pain meds only for PT and at night to assist with sleep. Pt able to demonstrate proper gait following cues. Pt states she is comfortable progressing HEP and gait on her own. AROM Ext=0deg, PROM Flexion 117deg. LEFS=46/80. MMT knee ext =4/5. Pt d/c at this time. Chart Reviewed: Yes    Plan  Plan: Discharge    Exercises/Modalities/Manual:  See DocFlow Sheet    Education: Discharge, Gait drills for HEP     Barriers to Learning: None    Goals  (Total # of Visits to Date: 15)   Short Term Goals - Time Frame for Short term goals: 6 visits  Short term goal 1: Pt to be independent and compliant with HEP for ROM -MET  Short term goal 2: Pt to have PROM 100deg flexion to improve car transfers. -MET  Short term goal 3: Pt to amb with SC x100ft with min deviations-met          Long Term Goals - Time Frame for Long term goals : 12 visits(POC Exp 21)  Long term goal 1: Pt to score >29/80 on LEFS to improve ADL lamar.   Long term goal 2:

## 2021-09-24 NOTE — DISCHARGE SUMMARY
Phone: [phone]             Fax: [phone]                            Outpatient Physical Therapy                                                                    Discharge Summary    Patient: Mariola Kwon  : 1955  ACCT #: [de-identified]   Referring Practitioner: Dr. Simon Shaw      Diagnosis: S/P L TKR    Date Treatment Initiated:21  Date of Last Treatment: 21    PT Visit Information  Onset Date: 21  PT Insurance Information: [facility]  Total # of Visits Approved: 12  Total # of Visits to Date: 12  Plan of Care/Certification Expiration Date: 21  No Show: 0  Progress Note Counter: 0    Frequency/Duration  Days: 3 times per week  Weeks: 4 weeks    Treatment Received  Patient Education/HEP, Therapeutic Exercise, Manual Therapy: Myofacial Release/Cupping and Gait Training    Pain Level: 2    Assessment  Assessment: Pt progressing well, able to amb with SC in the clinic without deviation. Pt with amb no AD required VCs this date for Heel-toe gait. Pt continues to use rollator for distance amb due to fatigue and pain management. Pt reports taking pain meds only for PT and at night to assist with sleep. Pt able to demonstrate proper gait following cues. Pt states she is comfortable progressing HEP and gait on her own. AROM Ext=0deg, PROM Flexion 117deg. LEFS=46/80. MMT knee ext =4/5. Pt d/c at this time. Reason for Discharge  Completion of Prescribed visits and Goals Met    Comments:   Thank you for this referral      Josiah Werner, PT  Date: 2021

## 2021-10-05 ENCOUNTER — OFFICE VISIT (OUTPATIENT)
Dept: FAMILY MEDICINE CLINIC | Age: 66
End: 2021-10-05
Payer: MEDICARE

## 2021-10-05 VITALS
OXYGEN SATURATION: 98 % | WEIGHT: 216 LBS | SYSTOLIC BLOOD PRESSURE: 130 MMHG | BODY MASS INDEX: 34.86 KG/M2 | DIASTOLIC BLOOD PRESSURE: 72 MMHG | HEART RATE: 88 BPM

## 2021-10-05 DIAGNOSIS — K59.1 FUNCTIONAL DIARRHEA: Primary | ICD-10-CM

## 2021-10-05 DIAGNOSIS — F41.1 ANXIETY STATE: ICD-10-CM

## 2021-10-05 PROCEDURE — 1123F ACP DISCUSS/DSCN MKR DOCD: CPT | Performed by: FAMILY MEDICINE

## 2021-10-05 PROCEDURE — 1036F TOBACCO NON-USER: CPT | Performed by: FAMILY MEDICINE

## 2021-10-05 PROCEDURE — 99213 OFFICE O/P EST LOW 20 MIN: CPT | Performed by: FAMILY MEDICINE

## 2021-10-05 PROCEDURE — 4040F PNEUMOC VAC/ADMIN/RCVD: CPT | Performed by: FAMILY MEDICINE

## 2021-10-05 PROCEDURE — G8484 FLU IMMUNIZE NO ADMIN: HCPCS | Performed by: FAMILY MEDICINE

## 2021-10-05 PROCEDURE — G8417 CALC BMI ABV UP PARAM F/U: HCPCS | Performed by: FAMILY MEDICINE

## 2021-10-05 PROCEDURE — 1090F PRES/ABSN URINE INCON ASSESS: CPT | Performed by: FAMILY MEDICINE

## 2021-10-05 PROCEDURE — G8399 PT W/DXA RESULTS DOCUMENT: HCPCS | Performed by: FAMILY MEDICINE

## 2021-10-05 PROCEDURE — 3017F COLORECTAL CA SCREEN DOC REV: CPT | Performed by: FAMILY MEDICINE

## 2021-10-05 PROCEDURE — G8427 DOCREV CUR MEDS BY ELIG CLIN: HCPCS | Performed by: FAMILY MEDICINE

## 2021-10-05 RX ORDER — TRAMADOL HYDROCHLORIDE 50 MG/1
TABLET ORAL
COMMUNITY
Start: 2021-09-14

## 2021-10-05 RX ORDER — AMOXICILLIN 500 MG/1
CAPSULE ORAL
COMMUNITY
Start: 2021-10-04 | End: 2021-12-16

## 2021-10-05 RX ORDER — LORAZEPAM 0.5 MG/1
0.5 TABLET ORAL
COMMUNITY
Start: 2021-08-12 | End: 2022-07-20 | Stop reason: SDUPTHER

## 2021-10-05 ASSESSMENT — ENCOUNTER SYMPTOMS
BLOATING: 1
BLOOD IN STOOL: 0
COUGH: 0
SHORTNESS OF BREATH: 0
ABDOMINAL PAIN: 0
EYE REDNESS: 0
DIARRHEA: 1
EYE DISCHARGE: 0
NAUSEA: 0
VOMITING: 0
FLATUS: 1
ANAL BLEEDING: 0
CONSTIPATION: 0

## 2021-10-05 NOTE — PROGRESS NOTES
HPI Notes    Name: Pilar Banegas  : 1955        Chief Complaint:     Chief Complaint   Patient presents with    Diarrhea     Pt c/o diarrhea after she eats, Pt states it started last Monday. Pt states she feels bloated after she eats, no c/o of stomch cramps, Pt was constipated before diarrhea started from pain pills after knee surgery. Pt hasn't tried any OTC medication for c/o diarrhea. History of Present Illness:     Pilar Banegas is a 72 y.o.  female who presents with Diarrhea (Pt c/o diarrhea after she eats, Pt states it started last Monday. Pt states she feels bloated after she eats, no c/o of stomch cramps, Pt was constipated before diarrhea started from pain pills after knee surgery. Pt hasn't tried any OTC medication for c/o diarrhea.)      Diarrhea   This is a new (pt was constipated in August after her knee replacement due to pain meds then. but stools got normal and pt had bee off all stool softners.) problem. Episode onset: 1 wk ago pt started having some diarrhea seems like after a meal --- meal will trigger it but does NOT occur with every meal.   The problem occurs 2 to 4 times per day. The problem has been unchanged. The stool consistency is described as watery (loose to watery). The patient states that diarrhea does not awaken her from sleep. Associated symptoms include bloating and increased flatus. Pertinent negatives include no abdominal pain, chills, coughing, fever or vomiting. Associated symptoms comments: She has some bloating after eating then loose to watery stools. . Exacerbated by: eating a meal but pt not aware of any particular food  Risk factors: pt just started antibx for the tooth last night. She has tried nothing (pt doesn't want to try immodium and then get constipated. ) for the symptoms. Anxiety - pt has been having some anxiety about 3d ago since broke a tooth.  Pt was nervous since she was told no dental procedure 3mos after her knee replacement. Pt was put on amoxicillin and had 4 doses and seeing dentist tomorrow. Pt wondering if she could take a ativan if needed. Past Medical History:     Past Medical History:   Diagnosis Date    Cerebrovascular disease     mini stroke may 2012    Chronic back pain     GERD (gastroesophageal reflux disease)     Hypertension     Hypothyroidism     Osteoarthritis     knee and back    Osteopenia 1/4/2016      Reviewed all health maintenance requirements and ordered appropriate tests  Health Maintenance Due   Topic Date Due    Hepatitis C screen  Never done    HIV screen  Never done    DTaP/Tdap/Td vaccine (1 - Tdap) Never done    Shingles Vaccine (1 of 2) Never done    Breast cancer screen  01/31/2021    Colon cancer screen colonoscopy  06/28/2021    Potassium monitoring  07/27/2021    Flu vaccine (1) 09/01/2021       Past Surgical History:     Past Surgical History:   Procedure Laterality Date    APPENDECTOMY      BREAST BIOPSY      CHOLECYSTECTOMY      HYSTERECTOMY      still Rt ovary present    JOINT REPLACEMENT  08/07/2018    Rt knee        Medications:       Prior to Admission medications    Medication Sig Start Date End Date Taking? Authorizing Provider   LORazepam (ATIVAN) 0.5 MG tablet Take 0.5 mg by mouth. 8/12/21  Yes Historical Provider, MD   losartan (COZAAR) 100 MG tablet TAKE ONE TABLET BY MOUTH DAILY 8/5/21  Yes Tory Trivedi MD   omeprazole (PRILOSEC) 20 MG delayed release capsule TAKE ONE CAPSULE BY MOUTH EVERY MORNING BEFORE BREAKFAST 8/5/21  Yes Tory Trivedi MD   UNABLE TO FIND Gabapentin-Ketoprofen amitriptyline HCL 10-10-2% neuroserum liquid  Apply small amt to affected area and rub for 10 minutes.  Repeat 2-3 times daily 7/20/21  Yes Tory Trivedi MD   Ascorbic Acid (VITAMIN C) 250 MG tablet Take 250 mg by mouth daily   Yes Historical Provider, MD   hydroCHLOROthiazide (HYDRODIURIL) 25 MG tablet Take 1/2 tab po daily 1/20/21  Yes Tory Trivedi MD Failure Mother     Breast Cancer Mother     Cancer Father         colon    Diabetes Father        Review of Systems:       Review of Systems   Constitutional: Negative for chills and fever. HENT: Positive for dental problem. Eyes: Negative for discharge and redness. Respiratory: Negative for cough and shortness of breath. Gastrointestinal: Positive for bloating, diarrhea and flatus. Negative for abdominal pain, anal bleeding, blood in stool, constipation, nausea and vomiting. Psychiatric/Behavioral: The patient is nervous/anxious. Physical Exam:     Physical Exam  Vitals reviewed. Constitutional:       General: She is not in acute distress. Appearance: Normal appearance. She is not ill-appearing. Cardiovascular:      Rate and Rhythm: Normal rate and regular rhythm. Heart sounds: Normal heart sounds. Pulmonary:      Effort: Pulmonary effort is normal. No respiratory distress. Breath sounds: Normal breath sounds. Abdominal:      General: Bowel sounds are normal. There is no distension. Palpations: Abdomen is soft. Tenderness: There is no abdominal tenderness. There is no guarding. Neurological:      Mental Status: She is alert. Psychiatric:         Mood and Affect: Affect normal. Mood is anxious. Speech: Speech normal.         Behavior: Behavior normal. Behavior is cooperative.          Vitals:  /72   Pulse 88   Wt 216 lb (98 kg)   SpO2 98%   BMI 34.86 kg/m²       Data:     Lab Results   Component Value Date     07/27/2020    K 3.9 07/27/2020     07/27/2020    CO2 26 07/27/2020    BUN 20 02/11/2021    CREATININE 1.12 02/11/2021    GLUCOSE 111 07/27/2020    GLUCOSE 97 01/25/2012    PROT 6.5 11/24/2017    LABALBU 3.9 11/24/2017    LABALBU 4.4 01/25/2012    BILITOT 0.39 11/24/2017    ALKPHOS 65 11/24/2017    AST 15 11/24/2017    ALT 10 11/24/2017     Lab Results   Component Value Date    WBC 5.2 01/27/2020    RBC 4.77 01/27/2020

## 2021-10-07 ENCOUNTER — TELEPHONE (OUTPATIENT)
Dept: FAMILY MEDICINE CLINIC | Age: 66
End: 2021-10-07

## 2021-10-07 DIAGNOSIS — R50.9 FEVER, UNSPECIFIED FEVER CAUSE: Primary | ICD-10-CM

## 2021-10-07 NOTE — TELEPHONE ENCOUNTER
Patient asking if she could be tested for COVID - has low grade fever, headache, stomach ache - she does not know if she has had any exposure - has been vaccinated - did have dental work done on Monday     Health Maintenance   Topic Date Due    Hepatitis C screen  Never done    HIV screen  Never done    DTaP/Tdap/Td vaccine (1 - Tdap) Never done    Shingles Vaccine (1 of 2) Never done    Breast cancer screen  01/31/2021    Colon cancer screen colonoscopy  06/28/2021    Potassium monitoring  07/27/2021    Flu vaccine (1) 09/01/2021    TSH testing  01/27/2022    Annual Wellness Visit (AWV)  02/06/2022    Creatinine monitoring  02/11/2022    Lipid screen  11/24/2022    Diabetes screen  05/12/2024    DEXA (modify frequency per FRAX score)  Completed    Pneumococcal 65+ years Vaccine  Completed    COVID-19 Vaccine  Completed    Hepatitis A vaccine  Aged Out    Hepatitis B vaccine  Aged Out    Hib vaccine  Aged Out    Meningococcal (ACWY) vaccine  Aged Out             (applicable per patient's age: Cancer Screenings, Depression Screening, Fall Risk Screening, Immunizations)    Hemoglobin A1C (%)   Date Value   05/12/2021 5.5   07/27/2020 5.6   07/31/2018 5.4     LDL Cholesterol (mg/dL)   Date Value   11/24/2017 65     AST (U/L)   Date Value   11/24/2017 15     ALT (U/L)   Date Value   11/24/2017 10     BUN (mg/dL)   Date Value   02/11/2021 20      (goal A1C is < 7)   (goal LDL is <100) need 30-50% reduction from baseline     BP Readings from Last 3 Encounters:   10/05/21 130/72   08/10/21 126/80   07/20/21 120/80    (goal /80)      All Future Testing planned in [facility]:  Lab Frequency Next Occurrence   US RENAL LIMITED Once 03/05/2022       Next Visit Date:  Future Appointments   Date Time Provider Claire Garcia   1/20/2022  8:40 AM [facility]   2/21/2022  9:00 AM [facility] ULTRASOUND ROOM [facility] ULTRA [facility] Rad   3/2/2022  9:00 AM ARVIN Ponce UROLOGY [facility] Patient Active Problem List:     Anxiety state     Hypothyroidism     Essential hypertension, benign     Esophageal reflux     Carpal tunnel syndrome     TIA (transient ischemic attack)     Chronic back pain     Osteopenia

## 2021-10-08 ENCOUNTER — HOSPITAL ENCOUNTER (OUTPATIENT)
Dept: PREADMISSION TESTING | Age: 66
Setting detail: SPECIMEN
Discharge: HOME | End: 2021-10-08
Payer: MEDICARE

## 2021-10-08 DIAGNOSIS — R50.9 FEVER, UNSPECIFIED FEVER CAUSE: ICD-10-CM

## 2021-10-08 PROCEDURE — U0003 INFECTIOUS AGENT DETECTION BY NUCLEIC ACID (DNA OR RNA); SEVERE ACUTE RESPIRATORY SYNDROME CORONAVIRUS 2 (SARS-COV-2) (CORONAVIRUS DISEASE [COVID-19]), AMPLIFIED PROBE TECHNIQUE, MAKING USE OF HIGH THROUGHPUT TECHNOLOGIES AS DESCRIBED BY CMS-2020-01-R: HCPCS

## 2021-10-08 PROCEDURE — C9803 HOPD COVID-19 SPEC COLLECT: HCPCS

## 2021-10-08 PROCEDURE — U0005 INFEC AGEN DETEC AMPLI PROBE: HCPCS

## 2021-10-09 ENCOUNTER — TELEPHONE (OUTPATIENT)
Dept: FAMILY MEDICINE CLINIC | Age: 66
End: 2021-10-09

## 2021-10-09 LAB
SARS-COV-2: NORMAL
SARS-COV-2: NOT DETECTED
SOURCE: NORMAL

## 2021-10-09 RX ORDER — DOXYCYCLINE HYCLATE 100 MG
100 TABLET ORAL 2 TIMES DAILY
Qty: 20 TABLET | Refills: 0 | Status: SHIPPED | OUTPATIENT
Start: 2021-10-09 | End: 2021-10-19

## 2021-10-09 NOTE — TELEPHONE ENCOUNTER
Talked to pt and even on amoxicillin pt still has dental and sinus pain.  So will sign for doxycycline

## 2021-11-04 ENCOUNTER — TELEPHONE (OUTPATIENT)
Dept: UROLOGY | Age: 66
End: 2021-11-04

## 2021-11-04 NOTE — TELEPHONE ENCOUNTER
Patient called to cancel her ultrasound on 2/21/22 and appointment for follow up on 3/2/22. Patient stated she is going to go elsewhere. Offered her to come in to discuss but patient refused. OK to cancel ultrasound? ?

## 2021-11-15 DIAGNOSIS — I10 ESSENTIAL HYPERTENSION, BENIGN: ICD-10-CM

## 2021-11-15 RX ORDER — LOSARTAN POTASSIUM 100 MG/1
TABLET ORAL
Qty: 90 TABLET | Refills: 1 | Status: SHIPPED | OUTPATIENT
Start: 2021-11-15 | End: 2022-05-04

## 2021-11-15 NOTE — TELEPHONE ENCOUNTER
Losartan 100 mg    Prosperr-Vinay    Last appointment 10/5/21  apppt 1/20/22    Health Maintenance   Topic Date Due    Hepatitis C screen  Never done    HIV screen  Never done    DTaP/Tdap/Td vaccine (1 - Tdap) Never done    Shingles Vaccine (1 of 2) Never done    Breast cancer screen  01/31/2021    Colon cancer screen colonoscopy  06/28/2021    Potassium monitoring  07/27/2021    Flu vaccine (1) 09/01/2021    COVID-19 Vaccine (3 - Booster for Moderna series) 10/02/2021    TSH testing  01/27/2022    Annual Wellness Visit (AWV)  02/06/2022    Creatinine monitoring  02/11/2022    Lipid screen  11/24/2022    Diabetes screen  05/12/2024    DEXA (modify frequency per FRAX score)  Completed    Pneumococcal 65+ years Vaccine  Completed    Hepatitis A vaccine  Aged Out    Hepatitis B vaccine  Aged Out    Hib vaccine  Aged Out    Meningococcal (ACWY) vaccine  Aged Out             (applicable per patient's age: Cancer Screenings, Depression Screening, Fall Risk Screening, Immunizations)    Hemoglobin A1C (%)   Date Value   05/12/2021 5.5   07/27/2020 5.6   07/31/2018 5.4     LDL Cholesterol (mg/dL)   Date Value   11/24/2017 65     AST (U/L)   Date Value   11/24/2017 15     ALT (U/L)   Date Value   11/24/2017 10     BUN (mg/dL)   Date Value   02/11/2021 20      (goal A1C is < 7)   (goal LDL is <100) need 30-50% reduction from baseline     BP Readings from Last 3 Encounters:   10/05/21 130/72   08/10/21 126/80   07/20/21 120/80    (goal /80)      All Future Testing planned in [facility]:  Lab Frequency Next Occurrence   US RENAL LIMITED Once 11/05/2021       Next Visit Date:  Future Appointments   Date Time Provider Claire Garcia   1/20/2022  8:40 AM MD Sha Tavares MED [facility]   2/2/2022  9:30  [address] [place] [facility] Rad   3/2/2022  9:00 AM ARVIN Aparicio UROLOGY [facility]            Patient Active Problem List:     Anxiety state     Hypothyroidism Essential hypertension, benign     Esophageal reflux     Carpal tunnel syndrome     TIA (transient ischemic attack)     Chronic back pain     Osteopenia

## 2021-12-14 ENCOUNTER — TELEPHONE (OUTPATIENT)
Dept: FAMILY MEDICINE CLINIC | Age: 66
End: 2021-12-14

## 2021-12-14 DIAGNOSIS — J02.9 SORE THROAT: Primary | ICD-10-CM

## 2021-12-14 NOTE — TELEPHONE ENCOUNTER
----- Message from Marden Pool sent at 12/14/2021 12:53 PM EST -----  Subject: Appointment Request    Reason for Call: Semi-Routine Cough, Cold Symptoms    QUESTIONS  Type of Appointment? Established Patient  Reason for appointment request? No appointments available during search  Additional Information for Provider? Patient is experiencing sore throat,   cough, sinus, fever 99. Tried to get an appt but was not able to. Needs   doctor to give her a call as soon as possible. Patient is not very   pleased. ---------------------------------------------------------------------------  --------------  Jackeline GALDAMEZ  What is the best way for the office to contact you? OK to leave message on   [employer], OK to respond with electronic message via [employer] portal (only   for patients who have registered [employer] account)  Preferred Call Back Phone Number? [phone]  ---------------------------------------------------------------------------  --------------  SCRIPT ANSWERS  Relationship to Patient? Self  Are you currently unable to finish sentences due to any difficulty   breathing? No  Are you unable to swallow liquids? No  Are you having fevers (100.4 or greater), chills, or sweats? No  Do you have COPD, asthma or a chronic lung condition? No  Have your symptoms been present for more than 5 days? No  Have you recently (14 days) been seen by a provider for this issue? No  Have you been diagnosed with, awaiting test results for, or told that you   are suspected of having COVID-19 (Coronavirus)? (If patient has tested   negative or was tested as a requirement for work, school, or travel and   not based on symptoms, answer no)? No  Within the past two weeks have you developed any of the following symptoms   (answer no if symptoms have been present longer than 2 weeks or began   more than 2 weeks ago)?  Fever or Chills, Cough, Shortness of breath or   difficulty breathing, Loss of taste or smell, Sore throat, Nasal congestion, Sneezing or runny nose, Fatigue or generalized body aches   (answer no if pain is specific to a body part e.g. back pain), Diarrhea,   Headache?  Yes

## 2021-12-14 NOTE — TELEPHONE ENCOUNTER
I called Pt. Covid test is ordered. I instructed her to take Tylenol for fever and HA. Quarantine until she gets her covid results back.

## 2021-12-15 ENCOUNTER — HOSPITAL ENCOUNTER (OUTPATIENT)
Dept: PREADMISSION TESTING | Age: 66
Setting detail: SPECIMEN
Discharge: HOME | End: 2021-12-15
Payer: MEDICARE

## 2021-12-15 DIAGNOSIS — J02.9 SORE THROAT: ICD-10-CM

## 2021-12-15 LAB
SARS-COV-2, RAPID: NOT DETECTED
SPECIMEN DESCRIPTION: NORMAL

## 2021-12-15 PROCEDURE — C9803 HOPD COVID-19 SPEC COLLECT: HCPCS

## 2021-12-15 PROCEDURE — 87635 SARS-COV-2 COVID-19 AMP PRB: CPT

## 2021-12-16 ENCOUNTER — OFFICE VISIT (OUTPATIENT)
Dept: FAMILY MEDICINE CLINIC | Age: 66
End: 2021-12-16
Payer: MEDICARE

## 2021-12-16 VITALS
SYSTOLIC BLOOD PRESSURE: 118 MMHG | HEIGHT: 66 IN | RESPIRATION RATE: 20 BRPM | WEIGHT: 220.6 LBS | BODY MASS INDEX: 35.45 KG/M2 | DIASTOLIC BLOOD PRESSURE: 78 MMHG | HEART RATE: 80 BPM

## 2021-12-16 DIAGNOSIS — M99.01 SOMATIC DYSFUNCTION OF SPINE, CERVICAL: ICD-10-CM

## 2021-12-16 DIAGNOSIS — H65.93 OME (OTITIS MEDIA WITH EFFUSION), BILATERAL: ICD-10-CM

## 2021-12-16 DIAGNOSIS — J06.9 VIRAL URI: ICD-10-CM

## 2021-12-16 DIAGNOSIS — R05.8 PRODUCTIVE COUGH: Primary | ICD-10-CM

## 2021-12-16 DIAGNOSIS — M99.00 SOMATIC DYSFUNCTION OF HEAD REGION: ICD-10-CM

## 2021-12-16 PROCEDURE — 1036F TOBACCO NON-USER: CPT | Performed by: STUDENT IN AN ORGANIZED HEALTH CARE EDUCATION/TRAINING PROGRAM

## 2021-12-16 PROCEDURE — 99214 OFFICE O/P EST MOD 30 MIN: CPT | Performed by: STUDENT IN AN ORGANIZED HEALTH CARE EDUCATION/TRAINING PROGRAM

## 2021-12-16 PROCEDURE — G8484 FLU IMMUNIZE NO ADMIN: HCPCS | Performed by: STUDENT IN AN ORGANIZED HEALTH CARE EDUCATION/TRAINING PROGRAM

## 2021-12-16 PROCEDURE — 98925 OSTEOPATH MANJ 1-2 REGIONS: CPT | Performed by: STUDENT IN AN ORGANIZED HEALTH CARE EDUCATION/TRAINING PROGRAM

## 2021-12-16 PROCEDURE — 1123F ACP DISCUSS/DSCN MKR DOCD: CPT | Performed by: STUDENT IN AN ORGANIZED HEALTH CARE EDUCATION/TRAINING PROGRAM

## 2021-12-16 PROCEDURE — 3017F COLORECTAL CA SCREEN DOC REV: CPT | Performed by: STUDENT IN AN ORGANIZED HEALTH CARE EDUCATION/TRAINING PROGRAM

## 2021-12-16 PROCEDURE — G8417 CALC BMI ABV UP PARAM F/U: HCPCS | Performed by: STUDENT IN AN ORGANIZED HEALTH CARE EDUCATION/TRAINING PROGRAM

## 2021-12-16 PROCEDURE — 1090F PRES/ABSN URINE INCON ASSESS: CPT | Performed by: STUDENT IN AN ORGANIZED HEALTH CARE EDUCATION/TRAINING PROGRAM

## 2021-12-16 PROCEDURE — 4040F PNEUMOC VAC/ADMIN/RCVD: CPT | Performed by: STUDENT IN AN ORGANIZED HEALTH CARE EDUCATION/TRAINING PROGRAM

## 2021-12-16 PROCEDURE — G8427 DOCREV CUR MEDS BY ELIG CLIN: HCPCS | Performed by: STUDENT IN AN ORGANIZED HEALTH CARE EDUCATION/TRAINING PROGRAM

## 2021-12-16 PROCEDURE — G8399 PT W/DXA RESULTS DOCUMENT: HCPCS | Performed by: STUDENT IN AN ORGANIZED HEALTH CARE EDUCATION/TRAINING PROGRAM

## 2021-12-16 RX ORDER — BENZONATATE 100 MG/1
100-200 CAPSULE ORAL 3 TIMES DAILY PRN
Qty: 60 CAPSULE | Refills: 0 | Status: SHIPPED | OUTPATIENT
Start: 2021-12-16 | End: 2021-12-23

## 2021-12-16 ASSESSMENT — ENCOUNTER SYMPTOMS
BACK PAIN: 0
SHORTNESS OF BREATH: 0
SINUS PRESSURE: 0
SINUS PAIN: 0
RHINORRHEA: 0
VOMITING: 0
WHEEZING: 0
ABDOMINAL PAIN: 0
COUGH: 1
DIARRHEA: 0
NAUSEA: 0
SORE THROAT: 1

## 2021-12-16 NOTE — PROGRESS NOTES
HPI Notes    Name: Qiana Miranda  : 1955         Chief Complaint:     Chief Complaint   Patient presents with    Pharyngitis     ST sinus drainage onset  has tried  coricidin HPB /  robitussin/ tylenol    Cough     Chest congestion,       History of Present Illness:      HPI    This is a 77year old woman presenting for evaluation of sinus drainage, sore throat, productive cough, chest congestion since  (four days ago). She did have a slight elevation in temperature of 100.1F. She tested negative for COVID-19, testing yesterday. She did watch her sick granddaughter last week, and may have acquired an illness from her. Granddaughter has since recovered. She denies fever, chills, myalgias, nausea, vomiting, diarrhea, hemoptysis, shortness of air. She has tried over-the-counter Coricidin, Robitussin, as well as Tylenol and has felt that these have helped somewhat. She feels she has had small improvement over the past four days.      Past Medical History:     Past Medical History:   Diagnosis Date    Cerebrovascular disease     mini stroke may 2012    Chronic back pain     GERD (gastroesophageal reflux disease)     Hypertension     Hypothyroidism     Osteoarthritis     knee and back    Osteopenia 2016      Reviewed all health maintenance requirements and ordered appropriate tests  Health Maintenance Due   Topic Date Due    Hepatitis C screen  Never done    DTaP/Tdap/Td vaccine (1 - Tdap) Never done    Shingles Vaccine (1 of 2) Never done    Breast cancer screen  2021    Colon cancer screen colonoscopy  2021    Potassium monitoring  2021    COVID-19 Vaccine (3 - Booster for 95 [address] [place] series) 10/02/2021       Past Surgical History:     Past Surgical History:   Procedure Laterality Date    APPENDECTOMY      BREAST BIOPSY      CHOLECYSTECTOMY      HYSTERECTOMY      still Rt ovary present    JOINT REPLACEMENT  2018    Rt knee        Medications: Prior to Admission medications    Medication Sig Start Date End Date Taking? Authorizing Provider   benzonatate (TESSALON) 100 MG capsule Take 1-2 capsules by mouth 3 times daily as needed for Cough 12/16/21 12/23/21 Yes Barney Sosa DO   losartan (COZAAR) 100 MG tablet TAKE ONE TABLET BY MOUTH DAILY 11/15/21  Yes Jeet New MD   levothyroxine (SYNTHROID) 100 MCG tablet TAKE ONE TABLET BY MOUTH DAILY 10/11/21  Yes Jeet New MD   omeprazole (PRILOSEC) 20 MG delayed release capsule TAKE ONE CAPSULE BY MOUTH EVERY MORNING BEFORE BREAKFAST 10/11/21  Yes Jeet New MD   LORazepam (ATIVAN) 0.5 MG tablet Take 0.5 mg by mouth. 8/12/21  Yes Historical Provider, MD   UNABLE TO FIND Gabapentin-Ketoprofen amitriptyline HCL 10-10-2% neuroserum liquid  Apply small amt to affected area and rub for 10 minutes. Repeat 2-3 times daily 7/20/21  Yes Jeet New MD   Ascorbic Acid (VITAMIN C) 250 MG tablet Take 250 mg by mouth daily   Yes Historical Provider, MD   hydroCHLOROthiazide (HYDRODIURIL) 25 MG tablet Take 1/2 tab po daily 1/20/21  Yes Jeet New MD   loratadine (CLARITIN) 10 MG tablet Take 10 mg by mouth daily Prn   Yes Historical Provider, MD   vitamin D (ERGOCALCIFEROL) 02630 units CAPS capsule Take 50,000 Units by mouth Twice a Week  1/24/19  Yes Historical Provider, MD   Vitamin Mixture (VITAMIN E COMPLETE PO) Po qd   Yes Historical Provider, MD   clopidogrel (PLAVIX) 75 MG tablet Take 1 tablet by mouth daily.  2/21/15  Yes Trav Phoenix MD   folic acid (FOLVITE) 1 MG tablet Take 1 mg by mouth daily     Yes Historical Provider, MD   traMADol (ULTRAM) 50 MG tablet TAKE 1 TABLET BY MOUTH EVERY 6 HOURS AS NEEDED FOR PAIN  Patient not taking: Reported on 12/16/2021 9/14/21   Historical Provider, MD   Estradiol (VAGIFEM) 10 MCG TABS vaginal tablet Place 1 tablet vaginally Twice a Week  Patient not taking: Reported on 10/5/2021 9/2/21   Yo Chang DO   NONFORMULARY Marleny Reddisverónica release-take 1 tablet twice a day. Patient not taking: Reported on 8/10/2021    Historical Provider, MD   famotidine (PEPCID) 20 MG tablet TAKE 1 TABLET DAILY WITH SUPPER  Patient not taking: Reported on 7/20/2021 7/27/20   Cristihan Vee MD        Allergies:       Ciprofloxacin, Flonase [fluticasone propionate], Iodine, Macrobid [nitrofurantoin], Other, and Oxycodone    Social History:     Tobacco:    reports that she quit smoking about 35 years ago. Her smoking use included cigarettes. She has a 15.00 pack-year smoking history. She has never used smokeless tobacco.  Alcohol:      reports no history of alcohol use. Drug Use:  reports no history of drug use. Family History:     Family History   Problem Relation Age of Onset   Sandeep Alfaro Diabetes Mother    Sandeep Alfaro COPD Mother     Heart Failure Mother     Breast Cancer Mother     Cancer Father         colon    Diabetes Father        Review of Systems:         Review of Systems   Constitutional: Negative for chills and fever. HENT: Positive for congestion, ear pain, postnasal drip and sore throat. Negative for ear discharge, rhinorrhea, sinus pressure, sinus pain and sneezing. Respiratory: Positive for cough. Negative for shortness of breath and wheezing. Cardiovascular: Negative for chest pain. Gastrointestinal: Negative for abdominal pain, diarrhea, nausea and vomiting. Musculoskeletal: Negative for back pain. Skin: Negative for rash. Neurological: Negative for headaches. Psychiatric/Behavioral: Negative for sleep disturbance. Physical Exam:     Vitals:  /78 (Site: Left Upper Arm, Position: Sitting, Cuff Size: Medium Adult)   Pulse 80   Resp 20   Ht 5' 6\" (1.676 m)   Wt 220 lb 9.6 oz (100.1 kg)   BMI 35.61 kg/m²       Physical Exam  Vitals and nursing note reviewed. Constitutional:       General: She is not in acute distress. Appearance: Normal appearance. She is obese.    HENT:      Head:      Comments: Tender to palpation of all sinuses (frontal, ethmoid, maxillary)     Right Ear: Ear canal and external ear normal. There is no impacted cerumen. Left Ear: Ear canal and external ear normal. There is no impacted cerumen. Ears:      Comments: Bilateral OME     Nose: Nose normal. No congestion or rhinorrhea. Mouth/Throat:      Mouth: Mucous membranes are moist.      Pharynx: Oropharynx is clear. No oropharyngeal exudate or posterior oropharyngeal erythema. Eyes:      Conjunctiva/sclera: Conjunctivae normal.   Cardiovascular:      Rate and Rhythm: Normal rate and regular rhythm. Pulses: Normal pulses. Heart sounds: Normal heart sounds. No murmur heard. Pulmonary:      Effort: Pulmonary effort is normal. No respiratory distress. Breath sounds: Normal breath sounds. No wheezing or rhonchi. Musculoskeletal:      Cervical back: Neck supple. No tenderness. Lymphadenopathy:      Cervical: No cervical adenopathy. Skin:     General: Skin is warm and dry. Capillary Refill: Capillary refill takes less than 2 seconds. Neurological:      General: No focal deficit present. Mental Status: She is alert and oriented to person, place, and time. Mental status is at baseline. Cranial Nerves: No cranial nerve deficit. Psychiatric:         Mood and Affect: Mood normal.         Behavior: Behavior normal.         Thought Content: Thought content normal.       Osteopathic: Right OA joint restricted, tender to palpation over frontal, ethmoid, maxillary sinuses with boggy end feel, cervical musculature hypertonic.           Data:     Lab Results   Component Value Date     07/27/2020    K 3.9 07/27/2020     07/27/2020    CO2 26 07/27/2020    BUN 20 02/11/2021    CREATININE 1.12 02/11/2021    GLUCOSE 111 07/27/2020    GLUCOSE 97 01/25/2012    PROT 6.5 11/24/2017    LABALBU 3.9 11/24/2017    LABALBU 4.4 01/25/2012    BILITOT 0.39 11/24/2017    ALKPHOS 65 11/24/2017    AST 15 11/24/2017    ALT 10 11/24/2017     Lab Results   Component Value Date    WBC 5.2 01/27/2020    RBC 4.77 01/27/2020    RBC 4.92 01/25/2012    HGB 13.6 01/27/2020    HCT 40.5 01/27/2020    MCV 85.0 01/27/2020    MCH 28.6 01/27/2020    MCHC 33.6 01/27/2020    RDW 15.0 01/27/2020     01/27/2020     01/25/2012    MPV NOT REPORTED 01/27/2020     Lab Results   Component Value Date    TSH 2.44 01/27/2021     Lab Results   Component Value Date    CHOL 158 11/24/2017    HDL 73 11/24/2017    LABA1C 5.5 05/12/2021          Assessment & Plan        Diagnosis Orders   1. Productive cough  benzonatate (TESSALON) 100 MG capsule    CA OSTEOPATHIC MANIP,1-2 BODY REGN   2. Viral URI  benzonatate (TESSALON) 100 MG capsule    CA OSTEOPATHIC MANIP,1-2 BODY REGN   3. OME (otitis media with effusion), bilateral  CA OSTEOPATHIC MANIP,1-2 BODY REGN   4. Somatic dysfunction of head region  CA OSTEOPATHIC MANIP,1-2 BODY REGN   5. Somatic dysfunction of spine, cervical         1. I have little concern for pneumonia, bronchitis due to a normal pulmonary examination. I believe this is likely a viral upper respiratory infection, from which she is actively recovering. I would recommend her continuing her symptomatic relieving over-the-counter therapies and add benzonatate 1 to 2 capsules 3 times daily as needed. The patient I had a long discussion about starting benzonatate. We discussed its mechanism of action, intended goals, adverse effects, as well as common side effects. They were able to verbalize understanding, and repeat plan back to me. 3.  Likely due to the viral upper respiratory infection. She is taking Coricidin, which has phenylephrine in it. I would recommend she continue this decongestant. We will also perform the [place] technique when we treat this with OMT.     4. After history taking and examination of patient, it was determined that a beneficial treatment modality for their complaint would be osteopathic manipulative therapy (OMT) the nature of OMT, treatment goals, mechanism of action, and side effects were extensively discussed with this patient, who did wish to proceed with the procedure. The following body systems were treated with osteopathy during our office visit today: Head, cervical.  Osteopathic findings include Right OA joint restricted, tender to palpation over frontal, ethmoid, maxillary sinuses with boggy end feel, cervical musculature hypertonic. After treatment, the patient was reevaluated and the following physical exam findings were appreciated: Improved discomfort over sinuses, resolution of OA joint dysfunction, improved tissue texture and feel of the cervical musculature. We determined that the next best time for the patient to return to the office for additional treatment would be as needed. At this point, her treatment was concluded, there were no complications, there were no further questions. Patient tolerated this procedure very well. I also provided her with PDFs for these techniques so she can do them at home. Completed Refills   Requested Prescriptions     Signed Prescriptions Disp Refills    benzonatate (TESSALON) 100 MG capsule 60 capsule 0     Sig: Take 1-2 capsules by mouth 3 times daily as needed for Cough     Return if symptoms worsen or fail to improve. Orders Placed This Encounter   Medications    benzonatate (TESSALON) 100 MG capsule     Sig: Take 1-2 capsules by mouth 3 times daily as needed for Cough     Dispense:  60 capsule     Refill:  0     Orders Placed This Encounter   Procedures    SD OSTEOPATHIC MANIP,1-2 BODY REGN         Patient Instructions   [facility],    Thank you for coming to see me today! I believe that our main problem is a viral upper respiratory infection. I'm not concerned for bronchitis or pneumonia. Here's what I would recommend we do:    Continue Robitussin and Coricidin. You can use Tessalon perles 1-2 three times a day as needed for cough. We also treated you with OMT today. Read the papers and use the techniques if you feel comfortable doing so. We also discussed that you have normal fluid behind the ears. No infection. The decongestant will help. Please review the documents I'm attaching related to what we discussed today. Please give me a call or message me on [employer] if you have any problems or questions, and I will see you at your next visit. Dr. Venkat Iraheta:    Meseret Camejo may be receiving a survey from [employer] regarding your visit today. Please complete the survey to enable us to provide the highest quality of care to you and your family. If you cannot score us a very good on any question, please call the office to discuss how we could of made your experience a very good one. Thank you. Clinical Care Team:     Dr. Akua Solorio, CARMINA Toddam:     Sheila Curtis      Electronically signed by Tamanna Jones DO on 12/16/2021 at 9:03 AM           Completed Refills   Requested Prescriptions     Signed Prescriptions Disp Refills    benzonatate (TESSALON) 100 MG capsule 60 capsule 0     Sig: Take 1-2 capsules by mouth 3 times daily as needed for Cough         Venessa received counseling on the following healthy behaviors: nutrition, exercise and medication adherence  Reviewed prior labs and health maintenance. Continue current medications, diet and exercise. Discussed use, benefit, and side effects of prescribed medications. Barriers to medication compliance addressed. Patient given educational materials - see patient instructions. All patient questions answered. Patient voiced understanding.

## 2022-01-04 RX ORDER — CLOTRIMAZOLE 1 %
CREAM (GRAM) TOPICAL
Qty: 60 G | Refills: 1 | Status: SHIPPED | OUTPATIENT
Start: 2022-01-04 | End: 2022-01-11

## 2022-01-04 NOTE — TELEPHONE ENCOUNTER
Patient asking for a new script for the Lotrimin Cream for vaginal itch - patient uses [facility] in [place]     Also questioning if there is anything she can do for her cough - can't seem to shake it    Health Maintenance   Topic Date Due    Hepatitis C screen  Never done    DTaP/Tdap/Td vaccine (1 - Tdap) Never done    Shingles Vaccine (1 of 2) Never done    Breast cancer screen  01/31/2021    Colon cancer screen colonoscopy  06/28/2021    Potassium monitoring  07/27/2021    COVID-19 Vaccine (3 - Booster for Moderna series) 10/02/2021    TSH testing  01/27/2022    Depression Screen  02/05/2022    Annual Wellness Visit (AWV)  02/06/2022    Creatinine monitoring  02/11/2022    Lipid screen  11/24/2022    Diabetes screen  05/12/2024    DEXA (modify frequency per FRAX score)  Completed    Flu vaccine  Completed    Pneumococcal 65+ years Vaccine  Completed    Hepatitis A vaccine  Aged Out    Hepatitis B vaccine  Aged Out    Hib vaccine  Aged Out    Meningococcal (ACWY) vaccine  Aged Out             (applicable per patient's age: Cancer Screenings, Depression Screening, Fall Risk Screening, Immunizations)    Hemoglobin A1C (%)   Date Value   05/12/2021 5.5   07/27/2020 5.6   07/31/2018 5.4     LDL Cholesterol (mg/dL)   Date Value   11/24/2017 65     AST (U/L)   Date Value   11/24/2017 15     ALT (U/L)   Date Value   11/24/2017 10     BUN (mg/dL)   Date Value   02/11/2021 20      (goal A1C is < 7)   (goal LDL is <100) need 30-50% reduction from baseline     BP Readings from Last 3 Encounters:   12/16/21 118/78   10/05/21 130/72   08/10/21 126/80    (goal /80)      All Future Testing planned in [facility]:  Lab Frequency Next Occurrence   US RENAL LIMITED Once 11/05/2021       Next Visit Date:  Future Appointments   Date Time Provider Claire Garcia   1/20/2022  8:40 AM Felicita Navarro MD [facility]   2/2/2022  9:30  [facility]            Patient Active Problem List:     Anxiety state     Hypothyroidism     Essential hypertension, benign     Esophageal reflux     Carpal tunnel syndrome     TIA (transient ischemic attack)     Chronic back pain     Osteopenia

## 2022-01-19 ENCOUNTER — HOSPITAL ENCOUNTER (OUTPATIENT)
Dept: PREADMISSION TESTING | Age: 67
Setting detail: SPECIMEN
Discharge: HOME | End: 2022-01-19
Payer: MEDICARE

## 2022-01-19 ENCOUNTER — TELEPHONE (OUTPATIENT)
Dept: FAMILY MEDICINE CLINIC | Age: 67
End: 2022-01-19

## 2022-01-19 DIAGNOSIS — Z20.822 SUSPECTED COVID-19 VIRUS INFECTION: Primary | ICD-10-CM

## 2022-01-19 DIAGNOSIS — Z20.822 SUSPECTED COVID-19 VIRUS INFECTION: ICD-10-CM

## 2022-01-19 LAB
SARS-COV-2, RAPID: DETECTED
SPECIMEN DESCRIPTION: ABNORMAL

## 2022-01-19 PROCEDURE — C9803 HOPD COVID-19 SPEC COLLECT: HCPCS

## 2022-01-19 PROCEDURE — 87635 SARS-COV-2 COVID-19 AMP PRB: CPT

## 2022-01-19 NOTE — TELEPHONE ENCOUNTER
Chely Miller hasn't been feeling good since 1/13. She said she has had a low grade fever for a couple days, aches, cough and runny nose. She is supposed to have an appointment tomorrow. Should she be tested for COVID prior ? Rapid test ordered.

## 2022-01-19 NOTE — TELEPHONE ENCOUNTER
Beryl Coles hasn't been feeling good since 1/13. She said she has had a low grade fever for a couple days, aches, cough and runny nose. She is supposed to have an appointment tomorrow. Should she be tested for COVID prior ? Please let Beryl Coles know.     Health Maintenance   Topic Date Due    Hepatitis C screen  Never done    DTaP/Tdap/Td vaccine (1 - Tdap) Never done    Shingles Vaccine (1 of 2) Never done    Breast cancer screen  01/31/2021    Colon cancer screen colonoscopy  06/28/2021    Potassium monitoring  07/27/2021    COVID-19 Vaccine (3 - Booster for Moderna series) 10/02/2021    TSH testing  01/27/2022    Annual Wellness Visit (AWV)  02/06/2022    Depression Screen  02/05/2022    Creatinine monitoring  02/11/2022    Lipid screen  11/24/2022    Diabetes screen  05/12/2024    DEXA (modify frequency per FRAX score)  Completed    Flu vaccine  Completed    Pneumococcal 65+ years Vaccine  Completed    Hepatitis A vaccine  Aged Out    Hepatitis B vaccine  Aged Out    Hib vaccine  Aged Out    Meningococcal (ACWY) vaccine  Aged Out             (applicable per patient's age: Cancer Screenings, Depression Screening, Fall Risk Screening, Immunizations)    Hemoglobin A1C (%)   Date Value   05/12/2021 5.5   07/27/2020 5.6   07/31/2018 5.4     LDL Cholesterol (mg/dL)   Date Value   11/24/2017 65     AST (U/L)   Date Value   11/24/2017 15     ALT (U/L)   Date Value   11/24/2017 10     BUN (mg/dL)   Date Value   02/11/2021 20      (goal A1C is < 7)   (goal LDL is <100) need 30-50% reduction from baseline     BP Readings from Last 3 Encounters:   12/16/21 118/78   10/05/21 130/72   08/10/21 126/80    (goal /80)      All Future Testing planned in [facility]:  Lab Frequency Next Occurrence   US RENAL LIMITED Once 11/05/2021       Next Visit Date:  Future Appointments   Date Time Provider Claire Garcia   1/20/2022  8:40 AM Gilles Boast, MD [facility]   2/2/2022  9:30 AM Ellen Gabriel [facility]            Patient Active Problem List:     Anxiety state     Hypothyroidism     Essential hypertension, benign     Esophageal reflux     Carpal tunnel syndrome     TIA (transient ischemic attack)     Chronic back pain     Osteopenia

## 2022-01-26 ENCOUNTER — OFFICE VISIT (OUTPATIENT)
Dept: FAMILY MEDICINE CLINIC | Age: 67
End: 2022-01-26
Payer: MEDICARE

## 2022-01-26 VITALS
WEIGHT: 222 LBS | SYSTOLIC BLOOD PRESSURE: 136 MMHG | HEART RATE: 90 BPM | BODY MASS INDEX: 35.68 KG/M2 | HEIGHT: 66 IN | DIASTOLIC BLOOD PRESSURE: 84 MMHG

## 2022-01-26 DIAGNOSIS — M19.90 ARTHRITIS: ICD-10-CM

## 2022-01-26 DIAGNOSIS — E03.8 OTHER SPECIFIED HYPOTHYROIDISM: ICD-10-CM

## 2022-01-26 DIAGNOSIS — Z12.31 ENCOUNTER FOR SCREENING MAMMOGRAM FOR MALIGNANT NEOPLASM OF BREAST: ICD-10-CM

## 2022-01-26 DIAGNOSIS — I10 ESSENTIAL HYPERTENSION, BENIGN: Primary | ICD-10-CM

## 2022-01-26 DIAGNOSIS — K21.9 GASTROESOPHAGEAL REFLUX DISEASE WITHOUT ESOPHAGITIS: ICD-10-CM

## 2022-01-26 DIAGNOSIS — Z86.73 HISTORY OF TIA (TRANSIENT ISCHEMIC ATTACK): ICD-10-CM

## 2022-01-26 DIAGNOSIS — N39.0 CHRONIC URINARY TRACT INFECTION: ICD-10-CM

## 2022-01-26 PROBLEM — I63.40 CEREBRAL INFARCTION DUE TO EMBOLISM OF CEREBRAL ARTERY (HCC): Status: ACTIVE | Noted: 2022-01-26

## 2022-01-26 PROCEDURE — G8484 FLU IMMUNIZE NO ADMIN: HCPCS | Performed by: FAMILY MEDICINE

## 2022-01-26 PROCEDURE — 3017F COLORECTAL CA SCREEN DOC REV: CPT | Performed by: FAMILY MEDICINE

## 2022-01-26 PROCEDURE — G8427 DOCREV CUR MEDS BY ELIG CLIN: HCPCS | Performed by: FAMILY MEDICINE

## 2022-01-26 PROCEDURE — 1123F ACP DISCUSS/DSCN MKR DOCD: CPT | Performed by: FAMILY MEDICINE

## 2022-01-26 PROCEDURE — G8399 PT W/DXA RESULTS DOCUMENT: HCPCS | Performed by: FAMILY MEDICINE

## 2022-01-26 PROCEDURE — G8417 CALC BMI ABV UP PARAM F/U: HCPCS | Performed by: FAMILY MEDICINE

## 2022-01-26 PROCEDURE — 4040F PNEUMOC VAC/ADMIN/RCVD: CPT | Performed by: FAMILY MEDICINE

## 2022-01-26 PROCEDURE — 1090F PRES/ABSN URINE INCON ASSESS: CPT | Performed by: FAMILY MEDICINE

## 2022-01-26 PROCEDURE — 99214 OFFICE O/P EST MOD 30 MIN: CPT | Performed by: FAMILY MEDICINE

## 2022-01-26 PROCEDURE — 1036F TOBACCO NON-USER: CPT | Performed by: FAMILY MEDICINE

## 2022-01-26 ASSESSMENT — ENCOUNTER SYMPTOMS
HEARTBURN: 0
EYE REDNESS: 0
EYE DISCHARGE: 0
DIARRHEA: 0
HOARSE VOICE: 0
ABDOMINAL PAIN: 0
NAUSEA: 0
COUGH: 0
CHOKING: 0
CONSTIPATION: 0
BLOOD IN STOOL: 0
VOMITING: 0
SHORTNESS OF BREATH: 0
SORE THROAT: 0

## 2022-01-26 NOTE — PROGRESS NOTES
HPI Notes    Name: Althea Ellis  : 1955        Chief Complaint:     Chief Complaint   Patient presents with    Hypertension    Gastroesophageal Reflux    Hypothyroidism     Last TSH 21 @ 2.44    Arthritis     Follows with rheumatology, treats on tramadol, takes prn    Transient Ischemic Attack     follows with neurology, Dr Barba See with Dr Abhishek Yusuf, u/s scheduled 22 to f/u on tumor on kidney       History of Present Illness:     Althea Ellis is a 77 y.o.  female who presents with Hypertension, Gastroesophageal Reflux, Hypothyroidism (Last TSH 21 @ 2.44), Arthritis (Follows with rheumatology, treats on tramadol, takes prn), Transient Ischemic Attack (follows with neurology, Dr Ivette Marion), and Chronic Kidney Disease (Follows with Dr Abhishek Yusuf, u/s scheduled 22 to f/u on tumor on kidney)      Hypertension  This is a chronic problem. The current episode started more than 1 year ago. The problem is unchanged. The problem is controlled. Pertinent negatives include no chest pain, headaches, malaise/fatigue, palpitations, peripheral edema or shortness of breath. There are no associated agents to hypertension. Risk factors for coronary artery disease include post-menopausal state. Gastroesophageal Reflux  She reports no abdominal pain, no chest pain, no choking, no coughing, no heartburn, no hoarse voice, no nausea or no sore throat. This is a chronic problem. The current episode started more than 1 year ago. The problem has been unchanged. Pertinent negatives include no fatigue, melena or weight loss. She has tried a PPI ( Activa -- probiotic helps so only take the pepcid as needed. ) for the symptoms. The treatment provided significant relief. Hypothyroidism- Chronic/stable, Patient denies changes in weight,bowels,palpitations. Energy is good Last TSH 2.44 was 2021. Arthritis - chronic but stable and doing well.  Pt has had Elizabeth Mcdonald MD   omeprazole (PRILOSEC) 20 MG delayed release capsule TAKE ONE CAPSULE BY MOUTH EVERY MORNING BEFORE BREAKFAST 10/11/21  Yes Elizabeth Mcdonald MD   UNABLE TO FIND Gabapentin-Ketoprofen amitriptyline HCL 10-10-2% neuroserum liquid  Apply small amt to affected area and rub for 10 minutes. Repeat 2-3 times daily 7/20/21  Yes Elizabeth Mcdonald MD   Ascorbic Acid (VITAMIN C) 250 MG tablet Take 250 mg by mouth daily   Yes Historical Provider, MD   hydroCHLOROthiazide (HYDRODIURIL) 25 MG tablet Take 1/2 tab po daily 1/20/21  Yes Elizabeth Mcdonald MD   vitamin D (ERGOCALCIFEROL) 03808 units CAPS capsule Take 50,000 Units by mouth Twice a Week  1/24/19  Yes Historical Provider, MD   Vitamin Mixture (VITAMIN E COMPLETE PO) Po qd   Yes Historical Provider, MD   clopidogrel (PLAVIX) 75 MG tablet Take 1 tablet by mouth daily. Patient taking differently: Take 75 mg by mouth every other day  2/21/15  Yes Yessica Rodríguez MD   folic acid (FOLVITE) 1 MG tablet Take 1 mg by mouth daily     Yes Historical Provider, MD   LORazepam (ATIVAN) 0.5 MG tablet Take 0.5 mg by mouth. 8/12/21   Historical Provider, MD   traMADol (ULTRAM) 50 MG tablet TAKE 1 TABLET BY MOUTH EVERY 6 HOURS AS NEEDED FOR PAIN 9/14/21   Historical Provider, MD   famotidine (PEPCID) 20 MG tablet TAKE 1 TABLET DAILY WITH SUPPER 7/27/20   Elizabeth Mcdonald MD   loratadine (CLARITIN) 10 MG tablet Take 10 mg by mouth daily Prn    Historical Provider, MD        Allergies:       Ciprofloxacin, Flonase [fluticasone propionate], Iodine, Macrobid [nitrofurantoin], Other, and Oxycodone    Social History:     Tobacco:    reports that she quit smoking about 36 years ago. Her smoking use included cigarettes. She has a 15.00 pack-year smoking history. She has never used smokeless tobacco.  Alcohol:      reports no history of alcohol use. Drug Use:  reports no history of drug use.     Family History:     Family History   Problem Relation Age of Onset    Diabetes Mother     COPD Mother     Heart Failure Mother     Breast Cancer Mother     Cancer Father         colon    Diabetes Father        Review of Systems:       Review of Systems   Constitutional: Negative for chills, fatigue, fever, malaise/fatigue, unexpected weight change and weight loss. HENT: Negative for hoarse voice and sore throat. Eyes: Negative for discharge, redness and visual disturbance. Respiratory: Negative for cough, choking and shortness of breath. Cardiovascular: Negative for chest pain and palpitations. Gastrointestinal: Negative for abdominal pain, blood in stool, constipation, diarrhea, heartburn, melena, nausea and vomiting. Genitourinary: Negative for dysuria and hematuria. Musculoskeletal: Positive for arthralgias. Negative for joint swelling. Skin: Negative for rash. Neurological: Negative for dizziness, facial asymmetry, light-headedness and headaches. Psychiatric/Behavioral: Negative for sleep disturbance. Physical Exam:     Physical Exam  Vitals reviewed. Constitutional:       General: She is not in acute distress. Appearance: Normal appearance. She is well-developed. She is not ill-appearing. HENT:      Head: Normocephalic and atraumatic. Eyes:      General:         Right eye: No discharge. Left eye: No discharge. Conjunctiva/sclera: Conjunctivae normal.   Neck:      Thyroid: No thyromegaly. Cardiovascular:      Rate and Rhythm: Normal rate and regular rhythm. Heart sounds: Normal heart sounds. No murmur heard. Pulmonary:      Effort: Pulmonary effort is normal.      Breath sounds: Normal breath sounds. Abdominal:      General: Bowel sounds are normal. There is no distension. Palpations: Abdomen is soft. Tenderness: There is no abdominal tenderness. Musculoskeletal:      Cervical back: Neck supple. Right lower leg: No edema. Left lower leg: No edema.    Lymphadenopathy:      Cervical: No cervical adenopathy. Skin:     Findings: No erythema or rash. Neurological:      General: No focal deficit present. Mental Status: She is alert and oriented to person, place, and time. Psychiatric:         Mood and Affect: Mood normal.         Behavior: Behavior normal.         Vitals:  /84   Pulse 90   Ht 5' 6\" (1.676 m)   Wt 222 lb (100.7 kg)   BMI 35.83 kg/m²       Data:     Lab Results   Component Value Date     07/27/2020    K 3.9 07/27/2020     07/27/2020    CO2 26 07/27/2020    BUN 20 02/11/2021    CREATININE 1.12 02/11/2021    GLUCOSE 111 07/27/2020    GLUCOSE 97 01/25/2012    PROT 6.5 11/24/2017    LABALBU 3.9 11/24/2017    LABALBU 4.4 01/25/2012    BILITOT 0.39 11/24/2017    ALKPHOS 65 11/24/2017    AST 15 11/24/2017    ALT 10 11/24/2017     Lab Results   Component Value Date    WBC 5.2 01/27/2020    RBC 4.77 01/27/2020    RBC 4.92 01/25/2012    HGB 13.6 01/27/2020    HCT 40.5 01/27/2020    MCV 85.0 01/27/2020    MCH 28.6 01/27/2020    MCHC 33.6 01/27/2020    RDW 15.0 01/27/2020     01/27/2020     01/25/2012    MPV NOT REPORTED 01/27/2020     Lab Results   Component Value Date    TSH 2.44 01/27/2021     Lab Results   Component Value Date    CHOL 158 11/24/2017    HDL 73 11/24/2017    LABA1C 5.5 05/12/2021          Assessment/Plan:        1. Essential hypertension, benign  Stable on cozaar and HCTZ with potassium and needs labs soon  - Basic Metabolic Panel; Future    2. Gastroesophageal reflux disease without esophagitis  Stable on prilosec    3. Other specified hypothyroidism  Stable on synthroid and labs soon  - TSH without Reflex; Future    4. Arthritis  Stable     5. History of TIA (transient ischemic attack)  Stable on plavix and taking every other day per neurology    6. Chronic urinary tract infection   pt following with urology, Dr piedra    7.  Encounter for screening mammogram for malignant neoplasm of breast  Screening ordered --- pt may wait until March to schedule  - [facility] DIGITAL SCREEN BILATERAL; Future    D/w pt IN July to schedule appt for colonoscopy screening then. Jeronimoalf Baugh received counseling on the following healthy behaviors: nutrition and exercise  Reviewed prior labs and health maintenance  Continue current medications, diet and exercise. Discussed use, benefit, and side effects of prescribed medications. Barriers to medication compliance addressed. Patient given educational materials - see patient instructions  Was a self-tracking handout given in paper form or via [employer]? Yes    Requested Prescriptions      No prescriptions requested or ordered in this encounter       All patient questions answered. Patient voiced understanding. Quality Measures    Body mass index is 35.83 kg/m². Elevated. Weight control planned discussed Healthy diet and regular exercise. BP: 136/84. Blood pressure is normal. Treatment plan consists of No treatment change needed. Fall Risk 2/5/2021 1/20/2021   2 or more falls in past year? no no   Fall with injury in past year? no no     The patient does not have a history of falls. I did not - not indicated , complete a risk assessment for falls. A plan of care for falls No Treatment plan indicated    Lab Results   Component Value Date    LDLCHOLESTEROL 65 11/24/2017    (goal LDL reduction with dx if diabetes is 50% LDL reduction)    PHQ Scores 2/5/2021 1/20/2021 1/27/2020 4/1/2019 11/21/2018 11/9/2017   PHQ2 Score 0 0 0 0 0 0   PHQ9 Score 0 0 0 0 0 0     Interpretation of Total Score Depression Severity: 1-4 = Minimal depression, 5-9 = Mild depression, 10-14 = Moderate depression, 15-19 = Moderately severe depression, 20-27 = Severe depression        Return in about 6 months (around 7/26/2022) for HTN, Hypothyroid, gerd, arthritis and medicare.       Electronically signed by Dot Hagan MD on 1/26/2022 at 1:18 PM

## 2022-01-26 NOTE — PATIENT INSTRUCTIONS
Survey: You may be receiving a survey from [employer] regarding your visit today. You may get this in the mail, through your MyChart or in your email. Please complete the survey to enable us to provide the highest quality of care to you and your family. Please also, mention our names. If you cannot score us as very good (5 Stars) on any question, please feel free to call the office to discuss how we could have made your experience exceptional.      Thank You!         MD Robin Multani LPN

## 2022-02-07 RX ORDER — HYDROCHLOROTHIAZIDE 25 MG/1
TABLET ORAL
Qty: 45 TABLET | Refills: 1 | Status: SHIPPED | OUTPATIENT
Start: 2022-02-07 | End: 2022-08-16

## 2022-02-07 NOTE — TELEPHONE ENCOUNTER
Last OV: 1/26/2022  Last RX:    Next scheduled apt: 7/27/2022       Rx refill requested through [employer] for:  Hydrochlorothiazide 25mg    Rx pending.

## 2022-02-08 ENCOUNTER — HOSPITAL ENCOUNTER (OUTPATIENT)
Age: 67
Discharge: HOME | End: 2022-02-08
Payer: MEDICARE

## 2022-02-08 ENCOUNTER — HOSPITAL ENCOUNTER (OUTPATIENT)
Dept: ULTRASOUND IMAGING | Age: 67
Discharge: HOME | End: 2022-02-10
Payer: MEDICARE

## 2022-02-08 DIAGNOSIS — E03.8 OTHER SPECIFIED HYPOTHYROIDISM: ICD-10-CM

## 2022-02-08 DIAGNOSIS — I10 ESSENTIAL HYPERTENSION, BENIGN: ICD-10-CM

## 2022-02-08 DIAGNOSIS — R31.9 HEMATURIA, UNSPECIFIED TYPE: ICD-10-CM

## 2022-02-08 DIAGNOSIS — N28.1 RENAL CYST: ICD-10-CM

## 2022-02-08 LAB
ANION GAP SERPL CALCULATED.3IONS-SCNC: 10 MMOL/L (ref 9–17)
BUN BLDV-MCNC: 21 MG/DL (ref 8–23)
BUN/CREAT BLD: 24 (ref 9–20)
CALCIUM SERPL-MCNC: 9.1 MG/DL (ref 8.6–10.4)
CHLORIDE BLD-SCNC: 106 MMOL/L (ref 98–107)
CO2: 23 MMOL/L (ref 20–31)
CREAT SERPL-MCNC: 0.89 MG/DL (ref 0.5–0.9)
GFR AFRICAN AMERICAN: >60 ML/MIN
GFR NON-AFRICAN AMERICAN: >60 ML/MIN
GFR SERPL CREATININE-BSD FRML MDRD: ABNORMAL ML/MIN/{1.73_M2}
GFR SERPL CREATININE-BSD FRML MDRD: ABNORMAL ML/MIN/{1.73_M2}
GLUCOSE BLD-MCNC: 102 MG/DL (ref 70–99)
POTASSIUM SERPL-SCNC: 4.4 MMOL/L (ref 3.7–5.3)
SODIUM BLD-SCNC: 139 MMOL/L (ref 135–144)
TSH SERPL DL<=0.05 MIU/L-ACNC: 2.02 MIU/L (ref 0.3–5)
VITAMIN D 25-HYDROXY: 51.9 NG/ML (ref 30–100)

## 2022-02-08 PROCEDURE — 82306 VITAMIN D 25 HYDROXY: CPT

## 2022-02-08 PROCEDURE — 80048 BASIC METABOLIC PNL TOTAL CA: CPT

## 2022-02-08 PROCEDURE — 76775 US EXAM ABDO BACK WALL LIM: CPT

## 2022-02-08 PROCEDURE — 84443 ASSAY THYROID STIM HORMONE: CPT

## 2022-02-08 PROCEDURE — 36415 COLL VENOUS BLD VENIPUNCTURE: CPT

## 2022-02-10 ENCOUNTER — HOSPITAL ENCOUNTER (OUTPATIENT)
Dept: MAMMOGRAPHY | Age: 67
Discharge: HOME | End: 2022-02-12
Payer: MEDICARE

## 2022-02-10 DIAGNOSIS — Z12.31 ENCOUNTER FOR SCREENING MAMMOGRAM FOR MALIGNANT NEOPLASM OF BREAST: ICD-10-CM

## 2022-02-10 PROCEDURE — 77063 BREAST TOMOSYNTHESIS BI: CPT

## 2022-03-23 ENCOUNTER — TELEPHONE (OUTPATIENT)
Dept: FAMILY MEDICINE CLINIC | Age: 67
End: 2022-03-23

## 2022-03-23 NOTE — TELEPHONE ENCOUNTER
Patient is calling in concerned about a recall on Hydrochlorothiazide. She would like to know if she needs to switch to a different medication. Patient was last seen 1/26/22. Last prescription was on 2/7/22 and she takes 1/2 tablet of 25 mg daily. Next appt for [employer] is 7/27/2022. Please call patient back.     Health Maintenance   Topic Date Due    Hepatitis C screen  Never done    Depression Screen  Never done    DTaP/Tdap/Td vaccine (1 - Tdap) Never done    Shingles Vaccine (1 of 2) Never done    Colorectal Cancer Screen  06/28/2021    COVID-19 Vaccine (3 - Booster for Moderna series) 09/02/2021    Annual Wellness Visit (AWV)  02/06/2022    Lipid screen  11/24/2022    TSH testing  02/08/2023    Potassium monitoring  02/08/2023    Creatinine monitoring  02/08/2023    Breast cancer screen  02/10/2023    Diabetes screen  05/12/2024    DEXA (modify frequency per FRAX score)  Completed    Flu vaccine  Completed    Pneumococcal 65+ years Vaccine  Completed    Hepatitis A vaccine  Aged Out    Hepatitis B vaccine  Aged Out    Hib vaccine  Aged Out    Meningococcal (ACWY) vaccine  Aged Out             (applicable per patient's age: Cancer Screenings, Depression Screening, Fall Risk Screening, Immunizations)    Hemoglobin A1C (%)   Date Value   05/12/2021 5.5   07/27/2020 5.6   07/31/2018 5.4     LDL Cholesterol (mg/dL)   Date Value   11/24/2017 65     AST (U/L)   Date Value   11/24/2017 15     ALT (U/L)   Date Value   11/24/2017 10     BUN (mg/dL)   Date Value   02/08/2022 21      (goal A1C is < 7)   (goal LDL is <100) need 30-50% reduction from baseline     BP Readings from Last 3 Encounters:   01/26/22 136/84   12/16/21 118/78   10/05/21 130/72    (goal /80)      All Future Testing planned in [facility]:  Lab Frequency Next Occurrence       Next Visit Date:  Future Appointments   Date Time Provider Claire Garcia   7/27/2022  9:20 AM MD [facility]            Patient Active Problem List:     Anxiety state     Hypothyroidism     Essential hypertension, benign     Esophageal reflux     Carpal tunnel syndrome     TIA (transient ischemic attack)     Chronic back pain     Osteopenia     Cerebral infarction due to embolism of cerebral artery ([facility] [address]

## 2022-03-31 DIAGNOSIS — E03.8 OTHER SPECIFIED HYPOTHYROIDISM: ICD-10-CM

## 2022-03-31 RX ORDER — LEVOTHYROXINE SODIUM 0.1 MG/1
TABLET ORAL
Qty: 90 TABLET | Refills: 1 | Status: SHIPPED | OUTPATIENT
Start: 2022-03-31 | End: 2022-10-03

## 2022-03-31 NOTE — TELEPHONE ENCOUNTER
Last OV: 1/26/2022 chronic  Last RX:    Next scheduled apt: 7/27/2022 AWV      surescript requesting a refill

## 2022-05-04 DIAGNOSIS — I10 ESSENTIAL HYPERTENSION, BENIGN: ICD-10-CM

## 2022-05-04 RX ORDER — OMEPRAZOLE 20 MG/1
CAPSULE, DELAYED RELEASE ORAL
Qty: 90 CAPSULE | Refills: 1 | Status: SHIPPED | OUTPATIENT
Start: 2022-05-04

## 2022-05-04 RX ORDER — LOSARTAN POTASSIUM 100 MG/1
TABLET ORAL
Qty: 90 TABLET | Refills: 1 | Status: SHIPPED | OUTPATIENT
Start: 2022-05-04

## 2022-05-04 NOTE — TELEPHONE ENCOUNTER
Last OV: 1/26/2022  chronic  Last RX:    Next scheduled apt: 7/27/2022 6 month           surescript requesting a refill

## 2022-05-26 ENCOUNTER — OFFICE VISIT (OUTPATIENT)
Dept: FAMILY MEDICINE CLINIC | Age: 67
End: 2022-05-26
Payer: MEDICARE

## 2022-05-26 VITALS
BODY MASS INDEX: 37.61 KG/M2 | SYSTOLIC BLOOD PRESSURE: 122 MMHG | DIASTOLIC BLOOD PRESSURE: 70 MMHG | HEART RATE: 80 BPM | OXYGEN SATURATION: 98 % | TEMPERATURE: 97.9 F | WEIGHT: 233 LBS

## 2022-05-26 DIAGNOSIS — J06.9 VIRAL URI: Primary | ICD-10-CM

## 2022-05-26 PROCEDURE — 99213 OFFICE O/P EST LOW 20 MIN: CPT | Performed by: NURSE PRACTITIONER

## 2022-05-26 PROCEDURE — G8417 CALC BMI ABV UP PARAM F/U: HCPCS | Performed by: NURSE PRACTITIONER

## 2022-05-26 PROCEDURE — 1123F ACP DISCUSS/DSCN MKR DOCD: CPT | Performed by: NURSE PRACTITIONER

## 2022-05-26 PROCEDURE — G8399 PT W/DXA RESULTS DOCUMENT: HCPCS | Performed by: NURSE PRACTITIONER

## 2022-05-26 PROCEDURE — 1090F PRES/ABSN URINE INCON ASSESS: CPT | Performed by: NURSE PRACTITIONER

## 2022-05-26 PROCEDURE — G8427 DOCREV CUR MEDS BY ELIG CLIN: HCPCS | Performed by: NURSE PRACTITIONER

## 2022-05-26 PROCEDURE — 1036F TOBACCO NON-USER: CPT | Performed by: NURSE PRACTITIONER

## 2022-05-26 PROCEDURE — 3017F COLORECTAL CA SCREEN DOC REV: CPT | Performed by: NURSE PRACTITIONER

## 2022-05-26 RX ORDER — CLOTRIMAZOLE 1 %
CREAM (GRAM) TOPICAL
COMMUNITY
Start: 2022-03-27 | End: 2022-07-01 | Stop reason: SDUPTHER

## 2022-05-26 ASSESSMENT — PATIENT HEALTH QUESTIONNAIRE - PHQ9
SUM OF ALL RESPONSES TO PHQ QUESTIONS 1-9: 0
SUM OF ALL RESPONSES TO PHQ QUESTIONS 1-9: 0
1. LITTLE INTEREST OR PLEASURE IN DOING THINGS: 0
SUM OF ALL RESPONSES TO PHQ QUESTIONS 1-9: 0
SUM OF ALL RESPONSES TO PHQ9 QUESTIONS 1 & 2: 0
2. FEELING DOWN, DEPRESSED OR HOPELESS: 0
SUM OF ALL RESPONSES TO PHQ QUESTIONS 1-9: 0

## 2022-05-26 ASSESSMENT — ENCOUNTER SYMPTOMS
COUGH: 1
DIARRHEA: 0
SORE THROAT: 1
SINUS PAIN: 1
VOMITING: 0
SHORTNESS OF BREATH: 0
NAUSEA: 0

## 2022-05-26 NOTE — LETTER
[address] [address]  Phone: [phone]  Fax: Onel Lazaro 1701, APRN - RONALD        May 26, 2022    Karmen Xiong [address] [place] [id number]      Dear Ebony Milks:    Coricidin HBP Cold and Cough 1 tab every 6 hours as needed (nasal decongestant and antihistamine)  Zinc Sulfate 50mg Daily  Vitamin C 1000mg Daily  Vitamin D3 2000IU Daily   Warm tea with 1tbsp honey (soothes the throat)  Increase water intake  Rest      If you have any questions or concerns, please don't hesitate to call.     Sincerely,          JOSE Barton CNP

## 2022-05-26 NOTE — PROGRESS NOTES
HPI Notes    Name: Lalita Alexandre  : 1955         Chief Complaint:     Chief Complaint   Patient presents with    Laryngitis     Patient here today with very hoarse voice, cough, sinus drainage. Started 3 days ago. History of Present Illness:        URI   This is a new problem. The current episode started in the past 7 days. The problem has been waxing and waning. There has been no fever. Associated symptoms include congestion, coughing, ear pain, headaches, sinus pain and a sore throat. Pertinent negatives include no chest pain, diarrhea, nausea or vomiting. Associated symptoms comments: laryngitis. She has tried acetaminophen and antihistamine for the symptoms. The treatment provided mild relief. Past Medical History:     Past Medical History:   Diagnosis Date    Cerebrovascular disease     mini stroke may 2012    Chronic back pain     GERD (gastroesophageal reflux disease)     Hypertension     Hypothyroidism     Osteoarthritis     knee and back    Osteopenia 2016      Reviewed all health maintenance requirements and ordered appropriate tests  Health Maintenance Due   Topic Date Due    Hepatitis C screen  Never done    DTaP/Tdap/Td vaccine (1 - Tdap) Never done    Shingles vaccine (1 of 2) Never done    Colorectal Cancer Screen  2021    COVID-19 Vaccine (3 - Booster for Moderna series) 2021    Pneumococcal 65+ years Vaccine (2 - PCV) 2022    Depression Screen  2022    Annual Wellness Visit (AWV)  2022       Past Surgical History:     Past Surgical History:   Procedure Laterality Date    APPENDECTOMY      BREAST BIOPSY      CHOLECYSTECTOMY      HYSTERECTOMY      still Rt ovary present    JOINT REPLACEMENT  2018    Rt knee        Medications:       Prior to Admission medications    Medication Sig Start Date End Date Taking?  Authorizing Provider   clotrimazole (LOTRIMIN) 1 % cream Use daily as needed 3/27/22  Yes Historical Provider, MD   losartan (COZAAR) 100 MG tablet TAKE ONE TABLET BY MOUTH DAILY 5/4/22  Yes Christine Norman DO   omeprazole (PRILOSEC) 20 MG delayed release capsule TAKE ONE CAPSULE BY MOUTH EVERY MORNING BEFORE BREAKFAST 5/4/22  Yes Christine Norman DO   levothyroxine (SYNTHROID) 100 MCG tablet TAKE ONE TABLET BY MOUTH DAILY 3/31/22  Yes Haley Hartley MD   hydroCHLOROthiazide (HYDRODIURIL) 25 MG tablet TAKE 1/2 TABLET BY MOUTH DAILY 2/7/22  Yes Haley Hartley MD   LORazepam (ATIVAN) 0.5 MG tablet Take 0.5 mg by mouth. 8/12/21  Yes Historical Provider, MD   traMADol (ULTRAM) 50 MG tablet TAKE 1 TABLET BY MOUTH EVERY 6 HOURS AS NEEDED FOR PAIN 9/14/21  Yes Historical Provider, MD   UNABLE TO FIND Gabapentin-Ketoprofen amitriptyline HCL 10-10-2% neuroserum liquid  Apply small amt to affected area and rub for 10 minutes. Repeat 2-3 times daily 7/20/21  Yes Haley Hartley MD   Ascorbic Acid (VITAMIN C) 250 MG tablet Take 250 mg by mouth daily   Yes Historical Provider, MD   famotidine (PEPCID) 20 MG tablet TAKE 1 TABLET DAILY WITH SUPPER 7/27/20  Yes Haley Hartley MD   loratadine (CLARITIN) 10 MG tablet Take 10 mg by mouth daily Prn   Yes Historical Provider, MD   vitamin D (ERGOCALCIFEROL) 12342 units CAPS capsule Take 50,000 Units by mouth Twice a Week  1/24/19  Yes Historical Provider, MD   Vitamin Mixture (VITAMIN E COMPLETE PO) Po qd   Yes Historical Provider, MD   clopidogrel (PLAVIX) 75 MG tablet Take 1 tablet by mouth daily. Patient taking differently: Take 75 mg by mouth every other day  2/21/15  Yes Jenifer Youssef MD   folic acid (FOLVITE) 1 MG tablet Take 1 mg by mouth daily     Yes Historical Provider, MD        Allergies:       Celecoxib, Ciprofloxacin, Flonase [fluticasone propionate], Iodine, Macrobid [nitrofurantoin], Other, Oxycodone hcl, and Oxycodone    Social History:     Tobacco:    reports that she quit smoking about 36 years ago. Her smoking use included cigarettes.  She has a 15.00 pack-year smoking history. She has never used smokeless tobacco.  Alcohol:      reports no history of alcohol use. Drug Use:  reports no history of drug use. Family History:     Family History   Problem Relation Age of Onset   Shila Her Diabetes Mother    Shila Her COPD Mother     Heart Failure Mother     Breast Cancer Mother     Cancer Father         colon    Diabetes Father        Review of Systems:         Review of Systems   Constitutional: Negative for chills and fever. HENT: Positive for congestion, ear pain, sinus pain and sore throat. Respiratory: Positive for cough. Negative for shortness of breath. Cardiovascular: Negative for chest pain and palpitations. Gastrointestinal: Negative for diarrhea, nausea and vomiting. Neurological: Positive for headaches. Negative for dizziness and seizures. Physical Exam:     Vitals:  /70   Pulse 80   Temp 97.9 °F (36.6 °C) (Oral)   Wt 233 lb (105.7 kg)   SpO2 98%   BMI 37.61 kg/m²       Physical Exam  Vitals and nursing note reviewed. Constitutional:       Appearance: She is well-developed. HENT:      Nose: Rhinorrhea present. Mouth/Throat:      Pharynx: Posterior oropharyngeal erythema present. No oropharyngeal exudate. Cardiovascular:      Rate and Rhythm: Normal rate and regular rhythm. Pulmonary:      Effort: Pulmonary effort is normal. No respiratory distress. Breath sounds: Normal breath sounds. Skin:     General: Skin is warm and dry. Neurological:      Mental Status: She is alert and oriented to person, place, and time.                Data:     Lab Results   Component Value Date     02/08/2022    K 4.4 02/08/2022     02/08/2022    CO2 23 02/08/2022    BUN 21 02/08/2022    CREATININE 0.89 02/08/2022    GLUCOSE 102 02/08/2022    GLUCOSE 97 01/25/2012    PROT 6.5 11/24/2017    LABALBU 3.9 11/24/2017    LABALBU 4.4 01/25/2012    BILITOT 0.39 11/24/2017    ALKPHOS 65 11/24/2017    AST 15 11/24/2017    ALT 10 11/24/2017     Lab Results   Component Value Date    WBC 5.2 01/27/2020    RBC 4.77 01/27/2020    RBC 4.92 01/25/2012    HGB 13.6 01/27/2020    HCT 40.5 01/27/2020    MCV 85.0 01/27/2020    MCH 28.6 01/27/2020    MCHC 33.6 01/27/2020    RDW 15.0 01/27/2020     01/27/2020     01/25/2012    MPV NOT REPORTED 01/27/2020     Lab Results   Component Value Date    TSH 2.02 02/08/2022     Lab Results   Component Value Date    CHOL 158 11/24/2017    HDL 73 11/24/2017    LABA1C 5.5 05/12/2021          Assessment & Plan        Diagnosis Orders   1. Viral URI       Coricidin HBP Cold and Cough 1 tab every 6 hours as needed (nasal decongestant and antihistamine)  Zinc Sulfate 50mg Daily  Vitamin C 1000mg Daily  Vitamin D3 2000IU Daily   Warm tea with 1tbsp honey (soothes the throat)  Increase water intake  Rest                      Completed Refills   Requested Prescriptions      No prescriptions requested or ordered in this encounter     No follow-ups on file. No orders of the defined types were placed in this encounter. No orders of the defined types were placed in this encounter. There are no Patient Instructions on file for this visit.     Electronically signed by JOSE Mario CNP on 5/26/2022 at 8:35 AM           Completed Refills   Requested Prescriptions      No prescriptions requested or ordered in this encounter

## 2022-06-01 ENCOUNTER — OFFICE VISIT (OUTPATIENT)
Dept: OBGYN | Age: 67
End: 2022-06-01
Payer: MEDICARE

## 2022-06-01 ENCOUNTER — HOSPITAL ENCOUNTER (OUTPATIENT)
Age: 67
Setting detail: SPECIMEN
Discharge: HOME | End: 2022-06-01
Payer: MEDICARE

## 2022-06-01 DIAGNOSIS — N76.0 ACUTE VAGINITIS: ICD-10-CM

## 2022-06-01 DIAGNOSIS — N95.2 ATROPHIC VAGINITIS: Primary | ICD-10-CM

## 2022-06-01 PROCEDURE — G8427 DOCREV CUR MEDS BY ELIG CLIN: HCPCS

## 2022-06-01 PROCEDURE — 87660 TRICHOMONAS VAGIN DIR PROBE: CPT

## 2022-06-01 PROCEDURE — 1090F PRES/ABSN URINE INCON ASSESS: CPT

## 2022-06-01 PROCEDURE — 1036F TOBACCO NON-USER: CPT

## 2022-06-01 PROCEDURE — 1123F ACP DISCUSS/DSCN MKR DOCD: CPT

## 2022-06-01 PROCEDURE — 87480 CANDIDA DNA DIR PROBE: CPT

## 2022-06-01 PROCEDURE — G8417 CALC BMI ABV UP PARAM F/U: HCPCS

## 2022-06-01 PROCEDURE — 99211 OFF/OP EST MAY X REQ PHY/QHP: CPT

## 2022-06-01 PROCEDURE — G8399 PT W/DXA RESULTS DOCUMENT: HCPCS

## 2022-06-01 PROCEDURE — 99213 OFFICE O/P EST LOW 20 MIN: CPT

## 2022-06-01 PROCEDURE — 87510 GARDNER VAG DNA DIR PROBE: CPT

## 2022-06-01 PROCEDURE — 3017F COLORECTAL CA SCREEN DOC REV: CPT

## 2022-06-01 RX ORDER — ESTRADIOL 10 UG/1
10 INSERT VAGINAL
Qty: 8 TABLET | Refills: 3 | Status: SHIPPED | OUTPATIENT
Start: 2022-06-02 | End: 2022-09-07 | Stop reason: SDUPTHER

## 2022-06-01 NOTE — PROGRESS NOTES
DATE OF VISIT:  6/1/22    PATIENT NAME:  Anali Ferrell     YOB: 1955    REASON FOR VISIT:    Chief Complaint   Patient presents with    Other     Pt is here for f/u estradiol, pt is having irritation and pressure, she is currently out of estradiol. She has been taking clotrimazole and it has been working it does not completly take everything away just barable. HISTORY OF PRESENT ILLNESS:  Patient presents to follow up on estradiol vaginal tablets. She continues to have irritation, however she states that she just started using tablets about a month ago. She has also been using clobetasol cream topically which she thinks is helping with itching. Made her aware that the estradiol tablets often take more time to work. She will continue with those and will continue using clobetasol cream.  She also has some discharge/odor present so vaginal cultures will be obtained to rule out infection. She is due for annual visit around September so will return at that time to reassess efficacy of estradiol/clobetasol. No LMP recorded. Patient has had a hysterectomy. There were no vitals filed for this visit. There is no height or weight on file to calculate BMI. Allergies   Allergen Reactions    Celecoxib Other (See Comments)    Ciprofloxacin Other (See Comments)     Headaches    Flonase [Fluticasone Propionate]      Smelled smoke with medication.     Iodine      Other reaction(s): AOF    Macrobid [Nitrofurantoin] Nausea Only    Other Hives     Contrast for a stress test    Oxycodone Hcl Other (See Comments)    Oxycodone Rash     hallucinations     Current Outpatient Medications   Medication Sig Dispense Refill    Estradiol (VAGIFEM) 10 MCG TABS vaginal tablet Place 1 tablet vaginally Twice a Week 8 tablet 3    clotrimazole (LOTRIMIN) 1 % cream Use daily as needed      losartan (COZAAR) 100 MG tablet TAKE ONE TABLET BY MOUTH DAILY 90 tablet 1    omeprazole (PRILOSEC) 20 MG delayed release capsule TAKE ONE CAPSULE BY MOUTH EVERY MORNING BEFORE BREAKFAST 90 capsule 1    levothyroxine (SYNTHROID) 100 MCG tablet TAKE ONE TABLET BY MOUTH DAILY 90 tablet 1    hydroCHLOROthiazide (HYDRODIURIL) 25 MG tablet TAKE 1/2 TABLET BY MOUTH DAILY 45 tablet 1    LORazepam (ATIVAN) 0.5 MG tablet Take 0.5 mg by mouth.  traMADol (ULTRAM) 50 MG tablet TAKE 1 TABLET BY MOUTH EVERY 6 HOURS AS NEEDED FOR PAIN      UNABLE TO FIND Gabapentin-Ketoprofen amitriptyline HCL 10-10-2% neuroserum liquid  Apply small amt to affected area and rub for 10 minutes. Repeat 2-3 times daily 15 mL 11    Ascorbic Acid (VITAMIN C) 250 MG tablet Take 250 mg by mouth daily      famotidine (PEPCID) 20 MG tablet TAKE 1 TABLET DAILY WITH SUPPER 90 tablet 1    loratadine (CLARITIN) 10 MG tablet Take 10 mg by mouth daily Prn      vitamin D (ERGOCALCIFEROL) 02259 units CAPS capsule Take 50,000 Units by mouth Twice a Week       Vitamin Mixture (VITAMIN E COMPLETE PO) Po qd      clopidogrel (PLAVIX) 75 MG tablet Take 1 tablet by mouth daily. (Patient taking differently: Take 75 mg by mouth every other day ) 30 tablet 0    folic acid (FOLVITE) 1 MG tablet Take 1 mg by mouth daily         No current facility-administered medications for this visit.      Social History     Socioeconomic History    Marital status:      Spouse name: None    Number of children: None    Years of education: None    Highest education level: None   Occupational History    None   Tobacco Use    Smoking status: Former Smoker     Packs/day: 1.00     Years: 15.00     Pack years: 15.00     Types: Cigarettes     Quit date:      Years since quittin.4    Smokeless tobacco: Never Used   Vaping Use    Vaping Use: Never used   Substance and Sexual Activity    Alcohol use: No    Drug use: No    Sexual activity: Not Currently     Partners: Male   Other Topics Concern    None   Social History Narrative    None     Social Determinants of Health     Financial Resource Strain: Low Risk     Difficulty of Paying Living Expenses: Not hard at all   Food Insecurity: No Food Insecurity    Worried About Running Out of Food in the Last Year: Never true    Sangeetha of Food in the Last Year: Never true   Transportation Needs:     Lack of Transportation (Medical): Not on file    Lack of Transportation (Non-Medical): Not on file   Physical Activity:     Days of Exercise per Week: Not on file    Minutes of Exercise per Session: Not on file   Stress:     Feeling of Stress : Not on file   Social Connections:     Frequency of Communication with Friends and Family: Not on file    Frequency of Social Gatherings with Friends and Family: Not on file    Attends Taoist Services: Not on file    Active Member of [address] [place] [address] [employer] or Organizations: Not on file    Attends Club or Organization Meetings: Not on file    Marital Status: Not on file   Intimate Partner Violence:     Fear of Current or Ex-Partner: Not on file    Emotionally Abused: Not on file    Physically Abused: Not on file    Sexually Abused: Not on file   Housing Stability:     Unable to Pay for Housing in the Last Year: Not on file    Number of [place] in the Last Year: Not on file    Unstable Housing in the Last Year: Not on file       REVIEW OF SYSTEMS:  Review of Systems   Constitutional: Negative for chills, fatigue and fever. Genitourinary: Positive for vaginal discharge (with odor/irritation). Negative for dysuria, frequency, menstrual problem, pelvic pain and vaginal bleeding. PHYSICAL EXAM:  There were no vitals taken for this visit. Physical Exam  Constitutional:       Appearance: Normal appearance. Genitourinary:      Right Labia: rash. Right Labia: No tenderness or lesions. Left Labia: rash. Left Labia: No tenderness or lesions. Vulva exam comments: Diffuse erythema on bilateral labia. Vaginal discharge present.       No vaginal tenderness or bleeding. HENT:      Head: Normocephalic and atraumatic. Mouth/Throat:      Mouth: Mucous membranes are moist.   Eyes:      Extraocular Movements: Extraocular movements intact. Musculoskeletal:         General: Normal range of motion. Cervical back: Normal range of motion. Neurological:      General: No focal deficit present. Mental Status: She is alert and oriented to person, place, and time. Skin:     General: Skin is warm and dry. Psychiatric:         Mood and Affect: Mood normal.         Behavior: Behavior normal.         Thought Content: Thought content normal.       The patient, Mariola Kwon is a 77 y.o. female, was seen with a total time spent of 20 minutes for the visit on this date of service by the E/M provider. The time component had both face to face and non face to face time spent in determining the total time component. Counseling and education regarding her diagnosis listed below and her options regarding those diagnoses were also included in determining her time component. The patient had her preventative health maintenance recommendations and follow-up reviewed with her at the completion of her visit. ASSESSMENT:  1. Atrophic vaginitis    2. Acute vaginitis        PLAN:  Orders Placed This Encounter   Procedures    Vaginitis DNA Probe     Return in about 3 months (around 9/1/2022) for annual, follow-up.        Electronically signed by Vernon Betts PA-C on 06/06/22

## 2022-06-02 LAB
CANDIDA SPECIES, DNA PROBE: NEGATIVE
GARDNERELLA VAGINALIS, DNA PROBE: POSITIVE
SOURCE: ABNORMAL
TRICHOMONAS VAGINALIS DNA: NEGATIVE

## 2022-06-06 RX ORDER — METRONIDAZOLE 500 MG/1
500 TABLET ORAL 2 TIMES DAILY
Qty: 14 TABLET | Refills: 0 | Status: SHIPPED | OUTPATIENT
Start: 2022-06-06 | End: 2022-06-13

## 2022-06-09 NOTE — TELEPHONE ENCOUNTER
Patient states she took antibiotic yesterday am & pm and this am. Not feeling right. Very itchy, feels very sick. Advised pt to stop medication and let her know if we need to change her to something else. States that she has issues with a lot of antibiotics such as: cipro, floxacin, Nitrofurantoin.

## 2022-06-13 RX ORDER — METRONIDAZOLE 7.5 MG/G
1 GEL VAGINAL DAILY
Qty: 70 G | Refills: 0 | Status: SHIPPED | OUTPATIENT
Start: 2022-06-13 | End: 2022-06-18

## 2022-07-01 ENCOUNTER — TELEPHONE (OUTPATIENT)
Dept: OBGYN | Age: 67
End: 2022-07-01

## 2022-07-01 DIAGNOSIS — N95.2 ATROPHIC VAGINITIS: Primary | ICD-10-CM

## 2022-07-01 RX ORDER — METRONIDAZOLE 7.5 MG/G
1 GEL VAGINAL DAILY
Qty: 1 EACH | Refills: 0 | Status: SHIPPED | OUTPATIENT
Start: 2022-07-01 | End: 2022-07-06

## 2022-07-01 RX ORDER — CLOTRIMAZOLE 1 %
CREAM (GRAM) TOPICAL
Qty: 40 G | Refills: 1 | Status: SHIPPED | OUTPATIENT
Start: 2022-07-01

## 2022-07-01 NOTE — TELEPHONE ENCOUNTER
Pt informed however pt has no applicators and greg will not give just those so new script will be sent in.

## 2022-07-01 NOTE — TELEPHONE ENCOUNTER
Patient asking for a new script for her Clotrimazole cream - patient uses [address] in [address] Maintenance   Topic Date Due    Hepatitis C screen  Never done    DTaP/Tdap/Td vaccine (1 - Tdap) Never done    Shingles vaccine (1 of 2) Never done    Colorectal Cancer Screen  06/28/2021    COVID-19 Vaccine (3 - Booster for Moderna series) 09/02/2021    Pneumococcal 65+ years Vaccine (2 - PCV) 01/20/2022    Annual Wellness Visit (AWV)  02/06/2022    Flu vaccine (1) 09/01/2022    Lipids  11/24/2022    Breast cancer screen  02/10/2023    Depression Screen  05/26/2023    Diabetes screen  05/12/2024    DEXA (modify frequency per FRAX score)  Completed    Hepatitis A vaccine  Aged Out    Hepatitis B vaccine  Aged Out    Hib vaccine  Aged Out    Meningococcal (ACWY) vaccine  Aged Out             (applicable per patient's age: Cancer Screenings, Depression Screening, Fall Risk Screening, Immunizations)    Hemoglobin A1C (%)   Date Value   05/12/2021 5.5   07/27/2020 5.6   07/31/2018 5.4     LDL Cholesterol (mg/dL)   Date Value   11/24/2017 65     AST (U/L)   Date Value   11/24/2017 15     ALT (U/L)   Date Value   11/24/2017 10     BUN (mg/dL)   Date Value   02/08/2022 21      (goal A1C is < 7)   (goal LDL is <100) need 30-50% reduction from baseline     BP Readings from Last 3 Encounters:   05/26/22 122/70   01/26/22 136/84   12/16/21 118/78    (goal /80)      All Future Testing planned in [facility]:  Lab Frequency Next Occurrence       Next Visit Date:  Future Appointments   Date Time Provider Claire Garcia   7/27/2022  9:20 AM MD Gideon Allen [facility]   9/7/2022  8:00 AM ARVIN Torres [facility]            Patient Active Problem List:     Anxiety state     Hypothyroidism     Essential hypertension, benign     Esophageal reflux     Carpal tunnel syndrome     TIA (transient ischemic attack)     Chronic back pain     Osteopenia     Cerebral infarction due to embolism of cerebral artery ([facility]

## 2022-07-01 NOTE — TELEPHONE ENCOUNTER
Last OV: 5/26/2022 URI 01/26/22 CHRONIC  Last RX:    Next scheduled apt: 7/27/2022 chronic        Pt requesting refill

## 2022-07-20 ENCOUNTER — HOSPITAL ENCOUNTER (OUTPATIENT)
Dept: GENERAL RADIOLOGY | Age: 67
Discharge: HOME | End: 2022-07-22
Payer: MEDICARE

## 2022-07-20 ENCOUNTER — HOSPITAL ENCOUNTER (OUTPATIENT)
Age: 67
Discharge: HOME | End: 2022-07-20
Payer: MEDICARE

## 2022-07-20 ENCOUNTER — HOSPITAL ENCOUNTER (OUTPATIENT)
Age: 67
Discharge: HOME | End: 2022-07-22
Payer: MEDICARE

## 2022-07-20 ENCOUNTER — OFFICE VISIT (OUTPATIENT)
Dept: FAMILY MEDICINE CLINIC | Age: 67
End: 2022-07-20
Payer: MEDICARE

## 2022-07-20 VITALS
HEIGHT: 66 IN | WEIGHT: 233 LBS | BODY MASS INDEX: 37.45 KG/M2 | SYSTOLIC BLOOD PRESSURE: 134 MMHG | DIASTOLIC BLOOD PRESSURE: 86 MMHG | OXYGEN SATURATION: 96 % | HEART RATE: 98 BPM

## 2022-07-20 DIAGNOSIS — M79.642 PAIN IN BOTH HANDS: Primary | ICD-10-CM

## 2022-07-20 DIAGNOSIS — M79.641 PAIN IN BOTH HANDS: ICD-10-CM

## 2022-07-20 DIAGNOSIS — R07.89 OTHER CHEST PAIN: ICD-10-CM

## 2022-07-20 DIAGNOSIS — M79.641 PAIN IN BOTH HANDS: Primary | ICD-10-CM

## 2022-07-20 DIAGNOSIS — M79.601 PAIN IN BOTH UPPER EXTREMITIES: ICD-10-CM

## 2022-07-20 DIAGNOSIS — M79.642 PAIN IN BOTH HANDS: ICD-10-CM

## 2022-07-20 DIAGNOSIS — F41.9 ANXIETY: ICD-10-CM

## 2022-07-20 DIAGNOSIS — M79.602 PAIN IN BOTH UPPER EXTREMITIES: ICD-10-CM

## 2022-07-20 LAB
HCT VFR BLD CALC: 38.2 % (ref 36–46)
HEMOGLOBIN: 12.7 G/DL (ref 12–16)
MCH RBC QN AUTO: 27.5 PG (ref 26–34)
MCHC RBC AUTO-ENTMCNC: 33.2 G/DL (ref 31–37)
MCV RBC AUTO: 82.7 FL (ref 80–100)
PDW BLD-RTO: 15.1 % (ref 12.1–15.2)
PLATELET # BLD: 278 K/UL (ref 140–450)
RBC # BLD: 4.62 M/UL (ref 4–5.2)
RHEUMATOID FACTOR: <10 IU/ML
WBC # BLD: 6.7 K/UL (ref 3.5–11)

## 2022-07-20 PROCEDURE — 1123F ACP DISCUSS/DSCN MKR DOCD: CPT | Performed by: FAMILY MEDICINE

## 2022-07-20 PROCEDURE — 85027 COMPLETE CBC AUTOMATED: CPT

## 2022-07-20 PROCEDURE — G8399 PT W/DXA RESULTS DOCUMENT: HCPCS | Performed by: FAMILY MEDICINE

## 2022-07-20 PROCEDURE — 36415 COLL VENOUS BLD VENIPUNCTURE: CPT

## 2022-07-20 PROCEDURE — 99214 OFFICE O/P EST MOD 30 MIN: CPT | Performed by: FAMILY MEDICINE

## 2022-07-20 PROCEDURE — G8417 CALC BMI ABV UP PARAM F/U: HCPCS | Performed by: FAMILY MEDICINE

## 2022-07-20 PROCEDURE — 86038 ANTINUCLEAR ANTIBODIES: CPT

## 2022-07-20 PROCEDURE — 82607 VITAMIN B-12: CPT

## 2022-07-20 PROCEDURE — 86225 DNA ANTIBODY NATIVE: CPT

## 2022-07-20 PROCEDURE — 3017F COLORECTAL CA SCREEN DOC REV: CPT | Performed by: FAMILY MEDICINE

## 2022-07-20 PROCEDURE — 73130 X-RAY EXAM OF HAND: CPT

## 2022-07-20 PROCEDURE — G8427 DOCREV CUR MEDS BY ELIG CLIN: HCPCS | Performed by: FAMILY MEDICINE

## 2022-07-20 PROCEDURE — 1036F TOBACCO NON-USER: CPT | Performed by: FAMILY MEDICINE

## 2022-07-20 PROCEDURE — 86431 RHEUMATOID FACTOR QUANT: CPT

## 2022-07-20 PROCEDURE — 1090F PRES/ABSN URINE INCON ASSESS: CPT | Performed by: FAMILY MEDICINE

## 2022-07-20 RX ORDER — LORAZEPAM 0.5 MG/1
0.5 TABLET ORAL EVERY 8 HOURS PRN
Qty: 30 TABLET | Refills: 0 | Status: SHIPPED | OUTPATIENT
Start: 2022-07-20 | End: 2022-08-19

## 2022-07-20 ASSESSMENT — ENCOUNTER SYMPTOMS
SORE THROAT: 0
CHANGE IN BOWEL HABIT: 0

## 2022-07-20 NOTE — PROGRESS NOTES
HPI Notes    Name: Geremias Wallace  : 1955        Chief Complaint:     Chief Complaint   Patient presents with    Arm Pain     BL arm pain, numbness. Sx's recurring past 2 months, sx's worsening. Fatigue       History of Present Illness:     Geremias Wallace is a 77 y.o.  female who presents with Arm Pain (BL arm pain, numbness. Sx's recurring past 2 months, sx's worsening.) and Fatigue    Hand pain - as described below pt has been having this occ coming and going of with no injury or trigger of pain that starts in her hands and then radiates up through her arms and is bilateral-- can also be just Lt or Rt side. Numbness occurs and last about 5min. Pt has no new medication. Pt has some chest tightness occ. NO treatments tried     Arm Pain   Incident onset: actaully pt states the symptoms have been coming and going for \"couple of months\". There was no injury mechanism (pt states no particular triggers as it can happen at rest or most recently she was just shopping at [employer]. Pt states she has not been nervous or anxious until this started to happen but does worry b/c her mom had a MI at age 71 and pt is going to be 79.). Pain location: pain seems to start in her hands and radiate up through her arms and occ into the shoulder. pt states the hands feel numb first then pain into the arms. Occ she will JUST have it on her Lt arm and then Occ her Rt arm. It can move \"back forth\". The quality of the pain is described as aching (numbness and symptoms last about max 5min). The pain is moderate. The pain has been Intermittent since the incident. Associated symptoms include numbness. Pertinent negatives include no chest pain, muscle weakness or tingling. Associated symptoms comments: Chest tightness. Nothing (pt has had normal BP but also concerned as she had TIAs in the past.) aggravates the symptoms. She has tried nothing (The last time she had it the Ativan did help it.  So pt wondering if any anxiety aspect but really last time it happened she was having a \"good day\". ) for the symptoms. The treatment provided significant relief. Fatigue  This is a new problem. The current episode started in the past 7 days (5d ago (Friday night) after her most recent episode and then into Sat and SUn pt was really tired. NO rash. Pt has no fever. She took a home COVID and negative. ). Associated symptoms include fatigue, headaches and numbness. Pertinent negatives include no change in bowel habit, chest pain, joint swelling, nausea, rash, sore throat or vomiting. Associated symptoms comments: Pt had one headaches and occ Chest pressure under the Lt breast.     Chest pressure - pt denies any chest pain but has felt couple times in the past few months some chest pressure under LT breast. No SOB. No palpitations.    Only new medication is the estradiol but only for 6-7wks an pt's symptoms have started before 6-7wks ago,    Past Medical History:     Past Medical History:   Diagnosis Date    Cerebrovascular disease     mini stroke may 2012    Chronic back pain     GERD (gastroesophageal reflux disease)     Hypertension     Hypothyroidism     Osteoarthritis     knee and back    Osteopenia 1/4/2016      Reviewed all health maintenance requirements and ordered appropriate tests  Health Maintenance Due   Topic Date Due    Hepatitis C screen  Never done    DTaP/Tdap/Td vaccine (1 - Tdap) Never done    Shingles vaccine (1 of 2) Never done    Colorectal Cancer Screen  06/28/2021    COVID-19 Vaccine (3 - Booster for Moderna series) 09/02/2021    Pneumococcal 65+ years Vaccine (2 - PCV) 01/20/2022    Annual Wellness Visit (AWV)  02/06/2022       Past Surgical History:     Past Surgical History:   Procedure Laterality Date    APPENDECTOMY      BREAST BIOPSY      CHOLECYSTECTOMY      HYSTERECTOMY (CERVIX STATUS UNKNOWN)      still Rt ovary present    JOINT REPLACEMENT  08/07/2018    Rt knee    KNEE SURGERY Left 8/16/22 Medications:       Prior to Admission medications    Medication Sig Start Date End Date Taking? Authorizing Provider   Estradiol (VAGIFEM) 10 MCG TABS vaginal tablet Place 1 tablet vaginally Twice a Week 6/2/22  Yes Stiven Lynn PA-C   losartan (COZAAR) 100 MG tablet TAKE ONE TABLET BY MOUTH DAILY 5/4/22  Yes Molly Tony DO   omeprazole (PRILOSEC) 20 MG delayed release capsule TAKE ONE CAPSULE BY MOUTH EVERY MORNING BEFORE BREAKFAST 5/4/22  Yes Molly Tony DO   levothyroxine (SYNTHROID) 100 MCG tablet TAKE ONE TABLET BY MOUTH DAILY 3/31/22  Yes Mark Zaman MD   hydroCHLOROthiazide (HYDRODIURIL) 25 MG tablet TAKE 1/2 TABLET BY MOUTH DAILY 2/7/22  Yes Mark Zaman MD   LORazepam (ATIVAN) 0.5 MG tablet Take 0.5 mg by mouth. 8/12/21  Yes Historical Provider, MD   traMADol (ULTRAM) 50 MG tablet TAKE 1 TABLET BY MOUTH EVERY 6 HOURS AS NEEDED FOR PAIN 9/14/21  Yes Historical Provider, MD   famotidine (PEPCID) 20 MG tablet TAKE 1 TABLET DAILY WITH SUPPER 7/27/20  Yes Mark Zaman MD   loratadine (CLARITIN) 10 MG tablet Take 10 mg by mouth daily Prn   Yes Historical Provider, MD   Vitamin Mixture (VITAMIN E COMPLETE PO) Po qd   Yes Historical Provider, MD   clopidogrel (PLAVIX) 75 MG tablet Take 1 tablet by mouth daily. Patient taking differently: Take 75 mg by mouth every other day 2/21/15  Yes Raine Spangler MD   folic acid (FOLVITE) 1 MG tablet Take 1 mg by mouth daily     Yes Historical Provider, MD   clotrimazole (LOTRIMIN) 1 % cream Use daily as needed 7/1/22   Hayes Sosa, DO   UNABLE TO FIND Gabapentin-Ketoprofen amitriptyline HCL 10-10-2% neuroserum liquid  Apply small amt to affected area and rub for 10 minutes.  Repeat 2-3 times daily 7/20/21   Mark Zaman MD   Ascorbic Acid (VITAMIN C) 250 MG tablet Take 250 mg by mouth daily    Historical Provider, MD   vitamin D (ERGOCALCIFEROL) 47817 units CAPS capsule Take 50,000 Units by mouth Twice a Week  1/24/19 Historical Provider, MD        Allergies:       Celecoxib, Ciprofloxacin, Flonase [fluticasone propionate], Iodine, Macrobid [nitrofurantoin], Other, Oxycodone hcl, and Oxycodone    Social History:     Tobacco:    reports that she quit smoking about 36 years ago. Her smoking use included cigarettes. She has a 15.00 pack-year smoking history. She has never used smokeless tobacco.  Alcohol:      reports no history of alcohol use. Drug Use:  reports no history of drug use. Family History:     Family History   Problem Relation Age of Onset    Diabetes Mother     COPD Mother     Heart Failure Mother     Breast Cancer Mother     Cancer Father         colon    Diabetes Father        Review of Systems:       Review of Systems   Constitutional:  Positive for fatigue. Negative for activity change and appetite change. HENT:  Negative for sore throat. Eyes:  Negative for discharge and redness. Respiratory:  Positive for chest tightness. Negative for shortness of breath. Cardiovascular:  Negative for chest pain, palpitations and leg swelling. Gastrointestinal:  Negative for blood in stool, change in bowel habit, diarrhea, nausea and vomiting. Genitourinary:  Negative for dysuria and hematuria. Musculoskeletal:  Negative for joint swelling. Skin:  Negative for rash. Neurological:  Positive for numbness and headaches. Negative for dizziness, tingling and facial asymmetry. Physical Exam:     Physical Exam  Constitutional:       General: She is not in acute distress. Appearance: Normal appearance. She is well-developed. She is not ill-appearing. HENT:      Head: Normocephalic and atraumatic. Eyes:      General:         Right eye: No discharge. Left eye: No discharge. Conjunctiva/sclera: Conjunctivae normal.   Neck:      Thyroid: No thyromegaly. Cardiovascular:      Rate and Rhythm: Normal rate and regular rhythm. Heart sounds: Normal heart sounds. No murmur heard.   Pulmonary: Effort: Pulmonary effort is normal.      Breath sounds: Normal breath sounds. Abdominal:      General: Bowel sounds are normal.      Palpations: Abdomen is soft. Tenderness: There is no abdominal tenderness. Musculoskeletal:      Cervical back: Neck supple. Right lower leg: No edema. Left lower leg: No edema. Comments: +5/5 UE strength bilateral   Lymphadenopathy:      Cervical: No cervical adenopathy. Skin:     Findings: No erythema or rash. Neurological:      General: No focal deficit present. Mental Status: She is alert and oriented to person, place, and time.    Psychiatric:         Mood and Affect: Mood normal.         Behavior: Behavior normal.       Vitals:  /86 (Site: Right Upper Arm, Position: Sitting, Cuff Size: Large Adult)   Pulse 98   Ht 5' 6\" (1.676 m)   Wt 233 lb (105.7 kg)   SpO2 96%   BMI 37.61 kg/m²       Data:     Lab Results   Component Value Date/Time     02/08/2022 09:05 AM    K 4.4 02/08/2022 09:05 AM     02/08/2022 09:05 AM    CO2 23 02/08/2022 09:05 AM    BUN 21 02/08/2022 09:05 AM    CREATININE 0.89 02/08/2022 09:05 AM    GLUCOSE 102 02/08/2022 09:05 AM    GLUCOSE 97 01/25/2012 08:00 AM    PROT 6.5 11/24/2017 07:58 AM    LABALBU 3.9 11/24/2017 07:58 AM    LABALBU 4.4 01/25/2012 08:00 AM    BILITOT 0.39 11/24/2017 07:58 AM    ALKPHOS 65 11/24/2017 07:58 AM    AST 15 11/24/2017 07:58 AM    ALT 10 11/24/2017 07:58 AM     Lab Results   Component Value Date/Time    WBC 5.2 01/27/2020 09:07 AM    RBC 4.77 01/27/2020 09:07 AM    RBC 4.92 01/25/2012 08:00 AM    HGB 13.6 01/27/2020 09:07 AM    HCT 40.5 01/27/2020 09:07 AM    MCV 85.0 01/27/2020 09:07 AM    MCH 28.6 01/27/2020 09:07 AM    MCHC 33.6 01/27/2020 09:07 AM    RDW 15.0 01/27/2020 09:07 AM     01/27/2020 09:07 AM     01/25/2012 08:00 AM    MPV NOT REPORTED 01/27/2020 09:07 AM     Lab Results   Component Value Date/Time    TSH 2.02 02/08/2022 09:05 AM     Lab Results

## 2022-07-20 NOTE — PATIENT INSTRUCTIONS
Survey: You may be receiving a survey from [employer] regarding your visit today. You may get this in the mail, through your MyChart or in your email. Please complete the survey to enable us to provide the highest quality of care to you and your family. Please also, mention our names. If you cannot score us as very good (5 Stars) on any question, please feel free to call the office to discuss how we could have made your experience exceptional.      Thank You!         Dr. Francina Richards, MD Jaun Kayser, LPN

## 2022-07-21 LAB
ANTI DNA DOUBLE STRANDED: 1.2 IU/ML
ANTI-NUCLEAR ANTIBODY (ANA): NEGATIVE
ENA ANTIBODIES SCREEN: 0.3 U/ML
VITAMIN B-12: <150 PG/ML (ref 232–1245)

## 2022-07-22 ENCOUNTER — NURSE ONLY (OUTPATIENT)
Dept: FAMILY MEDICINE CLINIC | Age: 67
End: 2022-07-22
Payer: MEDICARE

## 2022-07-22 DIAGNOSIS — M79.641 PAIN IN BOTH HANDS: ICD-10-CM

## 2022-07-22 DIAGNOSIS — M79.601 PAIN IN BOTH UPPER EXTREMITIES: Primary | ICD-10-CM

## 2022-07-22 DIAGNOSIS — M79.602 PAIN IN BOTH UPPER EXTREMITIES: Primary | ICD-10-CM

## 2022-07-22 DIAGNOSIS — M79.642 PAIN IN BOTH HANDS: ICD-10-CM

## 2022-07-22 PROCEDURE — 96372 THER/PROPH/DIAG INJ SC/IM: CPT | Performed by: FAMILY MEDICINE

## 2022-07-22 RX ORDER — CYANOCOBALAMIN 1000 UG/ML
1000 INJECTION INTRAMUSCULAR; SUBCUTANEOUS ONCE
Status: COMPLETED | OUTPATIENT
Start: 2022-07-22 | End: 2022-07-22

## 2022-07-22 RX ADMIN — CYANOCOBALAMIN 1000 MCG: 1000 INJECTION INTRAMUSCULAR; SUBCUTANEOUS at 08:09

## 2022-07-22 NOTE — PROGRESS NOTES
After obtaining consent, and per orders of Dr. Toro Gambino, injection of B-12 given in Left deltoid by Aby Schwab MA. Patient instructed to remain in clinic for 20 minutes afterwards, and to report any adverse reaction to me immediately.

## 2022-07-25 ENCOUNTER — HOSPITAL ENCOUNTER (OUTPATIENT)
Dept: NON INVASIVE DIAGNOSTICS | Age: 67
Discharge: HOME | End: 2022-07-25

## 2022-07-25 ASSESSMENT — ENCOUNTER SYMPTOMS
EYE REDNESS: 0
EYE DISCHARGE: 0
BLOOD IN STOOL: 0
SHORTNESS OF BREATH: 0
VOMITING: 0
DIARRHEA: 0
CHEST TIGHTNESS: 1
NAUSEA: 0

## 2022-07-27 ENCOUNTER — TELEPHONE (OUTPATIENT)
Dept: GASTROENTEROLOGY | Age: 67
End: 2022-07-27

## 2022-07-27 ENCOUNTER — OFFICE VISIT (OUTPATIENT)
Dept: FAMILY MEDICINE CLINIC | Age: 67
End: 2022-07-27
Payer: MEDICARE

## 2022-07-27 VITALS
BODY MASS INDEX: 37.45 KG/M2 | DIASTOLIC BLOOD PRESSURE: 72 MMHG | WEIGHT: 233 LBS | HEIGHT: 66 IN | SYSTOLIC BLOOD PRESSURE: 112 MMHG

## 2022-07-27 DIAGNOSIS — E66.01 SEVERE OBESITY (BMI 35.0-39.9) WITH COMORBIDITY (HCC): ICD-10-CM

## 2022-07-27 DIAGNOSIS — R07.89 OTHER CHEST PAIN: ICD-10-CM

## 2022-07-27 DIAGNOSIS — E53.8 B12 DEFICIENCY: ICD-10-CM

## 2022-07-27 DIAGNOSIS — I10 ESSENTIAL HYPERTENSION, BENIGN: ICD-10-CM

## 2022-07-27 DIAGNOSIS — E03.8 OTHER SPECIFIED HYPOTHYROIDISM: ICD-10-CM

## 2022-07-27 DIAGNOSIS — Z00.00 INITIAL MEDICARE ANNUAL WELLNESS VISIT: Primary | ICD-10-CM

## 2022-07-27 DIAGNOSIS — Z12.11 COLON CANCER SCREENING: ICD-10-CM

## 2022-07-27 DIAGNOSIS — K21.9 GASTROESOPHAGEAL REFLUX DISEASE WITHOUT ESOPHAGITIS: ICD-10-CM

## 2022-07-27 PROCEDURE — 96372 THER/PROPH/DIAG INJ SC/IM: CPT | Performed by: FAMILY MEDICINE

## 2022-07-27 PROCEDURE — G8399 PT W/DXA RESULTS DOCUMENT: HCPCS | Performed by: FAMILY MEDICINE

## 2022-07-27 PROCEDURE — 3017F COLORECTAL CA SCREEN DOC REV: CPT | Performed by: FAMILY MEDICINE

## 2022-07-27 PROCEDURE — G0438 PPPS, INITIAL VISIT: HCPCS | Performed by: FAMILY MEDICINE

## 2022-07-27 PROCEDURE — 1090F PRES/ABSN URINE INCON ASSESS: CPT | Performed by: FAMILY MEDICINE

## 2022-07-27 PROCEDURE — G8427 DOCREV CUR MEDS BY ELIG CLIN: HCPCS | Performed by: FAMILY MEDICINE

## 2022-07-27 PROCEDURE — 1123F ACP DISCUSS/DSCN MKR DOCD: CPT | Performed by: FAMILY MEDICINE

## 2022-07-27 PROCEDURE — 1036F TOBACCO NON-USER: CPT | Performed by: FAMILY MEDICINE

## 2022-07-27 PROCEDURE — G8417 CALC BMI ABV UP PARAM F/U: HCPCS | Performed by: FAMILY MEDICINE

## 2022-07-27 PROCEDURE — 99214 OFFICE O/P EST MOD 30 MIN: CPT | Performed by: FAMILY MEDICINE

## 2022-07-27 RX ORDER — CYANOCOBALAMIN 1000 UG/ML
1000 INJECTION INTRAMUSCULAR; SUBCUTANEOUS ONCE
Status: COMPLETED | OUTPATIENT
Start: 2022-07-27 | End: 2022-07-27

## 2022-07-27 RX ADMIN — CYANOCOBALAMIN 1000 MCG: 1000 INJECTION INTRAMUSCULAR; SUBCUTANEOUS at 10:20

## 2022-07-27 SDOH — ECONOMIC STABILITY: FOOD INSECURITY: WITHIN THE PAST 12 MONTHS, YOU WORRIED THAT YOUR FOOD WOULD RUN OUT BEFORE YOU GOT MONEY TO BUY MORE.: NEVER TRUE

## 2022-07-27 SDOH — ECONOMIC STABILITY: FOOD INSECURITY: WITHIN THE PAST 12 MONTHS, THE FOOD YOU BOUGHT JUST DIDN'T LAST AND YOU DIDN'T HAVE MONEY TO GET MORE.: NEVER TRUE

## 2022-07-27 ASSESSMENT — ENCOUNTER SYMPTOMS
BLOOD IN STOOL: 0
COUGH: 0
SHORTNESS OF BREATH: 0
CHOKING: 0
VOMITING: 0
EYE DISCHARGE: 0
HEARTBURN: 0
NAUSEA: 0
EYE REDNESS: 0

## 2022-07-27 ASSESSMENT — PATIENT HEALTH QUESTIONNAIRE - PHQ9
SUM OF ALL RESPONSES TO PHQ QUESTIONS 1-9: 0
2. FEELING DOWN, DEPRESSED OR HOPELESS: 0
SUM OF ALL RESPONSES TO PHQ9 QUESTIONS 1 & 2: 0
1. LITTLE INTEREST OR PLEASURE IN DOING THINGS: 0
SUM OF ALL RESPONSES TO PHQ QUESTIONS 1-9: 0

## 2022-07-27 ASSESSMENT — SOCIAL DETERMINANTS OF HEALTH (SDOH): HOW HARD IS IT FOR YOU TO PAY FOR THE VERY BASICS LIKE FOOD, HOUSING, MEDICAL CARE, AND HEATING?: NOT VERY HARD

## 2022-07-27 ASSESSMENT — LIFESTYLE VARIABLES
HOW MANY STANDARD DRINKS CONTAINING ALCOHOL DO YOU HAVE ON A TYPICAL DAY: 1 OR 2
HOW OFTEN DO YOU HAVE A DRINK CONTAINING ALCOHOL: 2-4 TIMES A MONTH

## 2022-07-27 NOTE — PROGRESS NOTES
HPI Notes    Name: Ron Lomeli  : 1955        Chief Complaint:     Chief Complaint   Patient presents with    Hypertension     6 month check up    Gastroesophageal Reflux    Hypothyroidism    Arthritis     Follows with rheumatology. Medicare AWV    Chest Pain     1 week f/u, unable to do stress test d/t unsteady gait. Denies any recurring chest pain. Fatigue     Fatigue improving with starting b12 injection. First injection 22, questions if can get second injection today. History of Present Illness:     Ron Lomeli is a 77 y.o.  female who presents with Hypertension (6 month check up), Gastroesophageal Reflux, Hypothyroidism, Arthritis (Follows with rheumatology.), Medicare AWV, Chest Pain (1 week f/u, unable to do stress test d/t unsteady gait. Denies any recurring chest pain.), and Fatigue (Fatigue improving with starting b12 injection. First injection 22, questions if can get second injection today.)      Hypertension  This is a chronic problem. The current episode started more than 1 year ago. The problem is unchanged. The problem is controlled. Associated symptoms include chest pain and malaise/fatigue. Pertinent negatives include no palpitations or shortness of breath. There are no associated agents to hypertension. Risk factors for coronary artery disease include post-menopausal state. The current treatment provides significant improvement. Gastroesophageal Reflux  She complains of chest pain. She reports no choking, no coughing, no dysphagia, no heartburn or no nausea. This is a chronic problem. The current episode started more than 1 year ago. The problem has been unchanged. The symptoms are aggravated by certain foods. Associated symptoms include fatigue. Pertinent negatives include no melena or weight loss. She has tried a PPI for the symptoms. The treatment provided significant relief. Chest Pain   This is a new problem.  The current episode started more than 1 month ago. The problem has been resolved (pt states since she has not had any significant chest pressure or pain since finding out her vit B 12 was low. Pt feels better and less anxious about things. ). The patient is experiencing no pain. The pain does not radiate. Associated symptoms include malaise/fatigue. Pertinent negatives include no cough, dizziness, fever, irregular heartbeat, nausea, near-syncope, numbness, palpitations, shortness of breath or vomiting. Associated symptoms comments: Fatigue is better since got one Vit B12 shot. Treatments tried: pt unable to do her stress test as couldn't walk fast enough on treadmill but symptoms have resolved. Fatigue  This is a new problem. The current episode started more than 1 month ago (pt in office last week and found to have B12 deficiency and got once B12 injection and feeling a little better already. ). The problem has been gradually improving. Associated symptoms include chest pain and fatigue. Pertinent negatives include no coughing, fever, nausea, numbness, rash or vomiting. Treatments tried: B12 injections. Hypothyroidism- Chronic/stable, Patient denies changes in weight,bowels,palpitations. Energy is good Last TSH 2.02 on 2/8/22    Obesity - chronic but stable and pt trying to eat healthy and walking more since knee replacement.     Past Medical History:     Past Medical History:   Diagnosis Date    Cerebrovascular disease     mini stroke may 2012    Chronic back pain     GERD (gastroesophageal reflux disease)     Hypertension     Hypothyroidism     Osteoarthritis     knee and back    Osteopenia 1/4/2016      Reviewed all health maintenance requirements and ordered appropriate tests  Health Maintenance Due   Topic Date Due    Hepatitis C screen  Never done    DTaP/Tdap/Td vaccine (1 - Tdap) Never done    Shingles vaccine (1 of 2) Never done    Colorectal Cancer Screen  06/28/2021    COVID-19 Vaccine (3 - Booster for Moderna series) 09/02/2021    Pneumococcal 65+ years Vaccine (2 - PCV) 01/20/2022    Annual Wellness Visit (AWV)  02/06/2022       Past Surgical History:     Past Surgical History:   Procedure Laterality Date    APPENDECTOMY      BREAST BIOPSY      CHOLECYSTECTOMY      HYSTERECTOMY (CERVIX STATUS UNKNOWN)      still Rt ovary present    JOINT REPLACEMENT  08/07/2018    Rt knee    KNEE SURGERY Left 8/16/22        Medications:       Prior to Admission medications    Medication Sig Start Date End Date Taking? Authorizing Provider   LORazepam (ATIVAN) 0.5 MG tablet Take 1 tablet by mouth every 8 hours as needed for Anxiety for up to 30 days.  7/20/22 8/19/22 Yes Luis Manuel Go MD   Estradiol (VAGIFEM) 10 MCG TABS vaginal tablet Place 1 tablet vaginally Twice a Week 6/2/22  Yes Molly Benjamin PA-C   losartan (COZAAR) 100 MG tablet TAKE ONE TABLET BY MOUTH DAILY 5/4/22  Yes Mirian Durant,    omeprazole (PRILOSEC) 20 MG delayed release capsule TAKE ONE CAPSULE BY MOUTH EVERY MORNING BEFORE BREAKFAST 5/4/22  Yes Mirian Durant,    levothyroxine (SYNTHROID) 100 MCG tablet TAKE ONE TABLET BY MOUTH DAILY 3/31/22  Yes Luis Manuel Go MD   hydroCHLOROthiazide (HYDRODIURIL) 25 MG tablet TAKE 1/2 TABLET BY MOUTH DAILY 2/7/22  Yes Luis Manuel Go MD   traMADol (ULTRAM) 50 MG tablet TAKE 1 TABLET BY MOUTH EVERY 6 HOURS AS NEEDED FOR PAIN 9/14/21  Yes Historical Provider, MD   Ascorbic Acid (VITAMIN C) 250 MG tablet Take 250 mg by mouth daily   Yes Historical Provider, MD   famotidine (PEPCID) 20 MG tablet TAKE 1 TABLET DAILY WITH SUPPER 7/27/20  Yes Luis Manuel Go MD   loratadine (CLARITIN) 10 MG tablet Take 10 mg by mouth daily Prn   Yes Historical Provider, MD   vitamin D (ERGOCALCIFEROL) 49451 units CAPS capsule Take 50,000 Units by mouth Twice a Week  1/24/19  Yes Historical Provider, MD   Vitamin Mixture (VITAMIN E COMPLETE PO) Po qd   Yes Historical Provider, MD   clopidogrel (PLAVIX) 75 MG tablet Take 1 tablet by mouth daily. Patient taking differently: Take 75 mg by mouth every other day 2/21/15  Yes Lisbeth Burkitt, MD   folic acid (FOLVITE) 1 MG tablet Take 1 mg by mouth daily     Yes Historical Provider, MD   clotrimazole (LOTRIMIN) 1 % cream Use daily as needed 7/1/22   Richa Bailee Hamptonblack, DO   UNABLE TO FIND Gabapentin-Ketoprofen amitriptyline HCL 10-10-2% neuroserum liquid  Apply small amt to affected area and rub for 10 minutes. Repeat 2-3 times daily 7/20/21   Britt Peña MD        Allergies:       Celecoxib, Ciprofloxacin, Flonase [fluticasone propionate], Iodine, Macrobid [nitrofurantoin], Other, Oxycodone hcl, and Oxycodone    Social History:     Tobacco:    reports that she quit smoking about 36 years ago. Her smoking use included cigarettes. She has a 15.00 pack-year smoking history. She has never used smokeless tobacco.  Alcohol:      reports no history of alcohol use. Drug Use:  reports no history of drug use. Family History:     Family History   Problem Relation Age of Onset    Diabetes Mother     COPD Mother     Heart Failure Mother     Breast Cancer Mother     Cancer Father         colon    Diabetes Father        Review of Systems:       Review of Systems   Constitutional:  Positive for fatigue and malaise/fatigue. Negative for fever and weight loss. Eyes:  Negative for discharge and redness. Respiratory:  Negative for cough, choking and shortness of breath. Cardiovascular:  Positive for chest pain. Negative for palpitations and near-syncope. Gastrointestinal:  Negative for blood in stool, dysphagia, heartburn, melena, nausea and vomiting. Genitourinary:  Negative for difficulty urinating. Skin:  Negative for rash. Neurological:  Negative for dizziness and numbness. Psychiatric/Behavioral:  Negative for sleep disturbance. Physical Exam:     Physical Exam  Vitals reviewed. Constitutional:       General: She is not in acute distress. Appearance: Normal appearance.  She is well-developed. She is not ill-appearing. HENT:      Head: Normocephalic and atraumatic. Eyes:      General:         Right eye: No discharge. Left eye: No discharge. Conjunctiva/sclera: Conjunctivae normal.   Neck:      Thyroid: No thyromegaly. Vascular: No carotid bruit. Cardiovascular:      Rate and Rhythm: Normal rate and regular rhythm. Heart sounds: Normal heart sounds. No murmur heard. Pulmonary:      Effort: Pulmonary effort is normal. No respiratory distress. Breath sounds: Normal breath sounds. Abdominal:      General: There is no distension. Palpations: Abdomen is soft. Tenderness: There is no abdominal tenderness. Musculoskeletal:      Cervical back: Neck supple. Right lower leg: No edema. Left lower leg: No edema. Lymphadenopathy:      Cervical: No cervical adenopathy. Skin:     Findings: No rash. Neurological:      General: No focal deficit present. Mental Status: She is alert.    Psychiatric:         Mood and Affect: Mood normal.         Behavior: Behavior normal.       Vitals:  Ht 5' 6\" (1.676 m)   Wt 233 lb (105.7 kg)   BMI 37.61 kg/m²       Data:     Lab Results   Component Value Date/Time     02/08/2022 09:05 AM    K 4.4 02/08/2022 09:05 AM     02/08/2022 09:05 AM    CO2 23 02/08/2022 09:05 AM    BUN 21 02/08/2022 09:05 AM    CREATININE 0.89 02/08/2022 09:05 AM    GLUCOSE 102 02/08/2022 09:05 AM    GLUCOSE 97 01/25/2012 08:00 AM    PROT 6.5 11/24/2017 07:58 AM    LABALBU 3.9 11/24/2017 07:58 AM    LABALBU 4.4 01/25/2012 08:00 AM    BILITOT 0.39 11/24/2017 07:58 AM    ALKPHOS 65 11/24/2017 07:58 AM    AST 15 11/24/2017 07:58 AM    ALT 10 11/24/2017 07:58 AM     Lab Results   Component Value Date/Time    WBC 6.7 07/20/2022 02:49 PM    RBC 4.62 07/20/2022 02:49 PM    RBC 4.92 01/25/2012 08:00 AM    HGB 12.7 07/20/2022 02:49 PM    HCT 38.2 07/20/2022 02:49 PM    MCV 82.7 07/20/2022 02:49 PM    MCH 27.5 07/20/2022 02:49 PM    MCHC 33.2 07/20/2022 02:49 PM    RDW 15.1 07/20/2022 02:49 PM     07/20/2022 02:49 PM     01/25/2012 08:00 AM    MPV NOT REPORTED 01/27/2020 09:07 AM     Lab Results   Component Value Date/Time    TSH 2.02 02/08/2022 09:05 AM     Lab Results   Component Value Date/Time    CHOL 158 11/24/2017 07:58 AM    HDL 73 11/24/2017 07:58 AM    LABA1C 5.5 05/12/2021 11:27 AM          Assessment/Plan:        1. Essential hypertension, benign  Stable on HCTZ    2. Gastroesophageal reflux disease without esophagitis  Stable on prilosec     3. Other chest pain  Improved and better since found out B12 def    4. B12 deficiency  Gets 2nd B12 shot today and then 3rd next week then wait one week and mirian B12 level. Add MVI daily   - cyanocobalamin injection 1,000 mcg  - Vitamin B12; Future    5. Other specified hypothyroidism  Stable on synthroid    6. Severe obesity (BMI 35.0-39. 9) with comorbidity ([address]  Try walking more     7. Colon cancer screening  Referral made for colonoscopy   - [facility] Gastroenterology    8. Initial Medicare annual wellness visit  completed        Armin London received counseling on the following healthy behaviors: nutrition and exercise  Reviewed prior labs and health maintenance  Continue current medications, diet and exercise. Discussed use, benefit, and side effects of prescribed medications. Barriers to medication compliance addressed. Patient given educational materials - see patient instructions  Was a self-tracking handout given in paper form or via [employer]? Yes    Requested Prescriptions      No prescriptions requested or ordered in this encounter       All patient questions answered. Patient voiced understanding. Quality Measures    Body mass index is 37.61 kg/m². Elevated. Weight control planned discussed Healthy diet and regular exercise. BP: 112/72. Blood pressure is Normal. Treatment plan consists of No treatment change needed.     Fall Risk 7/27/2022 5/26/2022 2/5/2021 1/20/2021   2 or more falls in past year? no no no no   Fall with injury in past year? no no no no     The patient does not have a history of falls. I did not - not indicated , complete a risk assessment for falls. A plan of care for falls No Treatment plan indicated    Lab Results   Component Value Date    LDLCHOLESTEROL 65 11/24/2017    (goal LDL reduction with dx if diabetes is 50% LDL reduction)    PHQ Scores 7/27/2022 5/26/2022 2/5/2021 1/20/2021 1/27/2020 4/1/2019 11/21/2018   PHQ2 Score 0 0 0 0 0 0 0   PHQ9 Score 0 0 0 0 0 0 0     Interpretation of Total Score Depression Severity: 1-4 = Minimal depression, 5-9 = Mild depression, 10-14 = Moderate depression, 15-19 = Moderately severe depression, 20-27 = Severe depression      No follow-ups on file.       Electronically signed by Shanna Rios MD on 7/27/2022 at 9:58 AM

## 2022-07-27 NOTE — PATIENT INSTRUCTIONS
Survey: You may be receiving a survey from [employer] regarding your visit today. You may get this in the mail, through your MyChart or in your email. Please complete the survey to enable us to provide the highest quality of care to you and your family. Please also, mention our names. If you cannot score us as very good (5 Stars) on any question, please feel free to call the office to discuss how we could have made your experience exceptional.      Thank You! Dr. Ayana Ruvalcaba, [address] [place] [address], [facility]   Personalized Preventive Plan for Laron Montanez - 7/27/2022  Medicare offers a range of preventive health benefits. Some of the tests and screenings are paid in full while other may be subject to a deductible, co-insurance, and/or copay. Some of these benefits include a comprehensive review of your medical history including lifestyle, illnesses that may run in your family, and various assessments and screenings as appropriate. After reviewing your medical record and screening and assessments performed today your provider may have ordered immunizations, labs, imaging, and/or referrals for you. A list of these orders (if applicable) as well as your Preventive Care list are included within your After Visit Summary for your review. Other Preventive Recommendations:    A preventive eye exam performed by an eye specialist is recommended every 1-2 years to screen for glaucoma; cataracts, macular degeneration, and other eye disorders. A preventive dental visit is recommended every 6 months. Try to get at least 150 minutes of exercise per week or 10,000 steps per day on a pedometer . Order or download the FREE \"Exercise & Physical Activity: Your Everyday Guide\" from [employer]. Call 0-450.797.8963 or search [employer] online. You need 4024-2085 mg of calcium and 8699-4544 IU of vitamin D per day.  It is possible to meet your calcium requirement with diet alone, but a vitamin D supplement is usually necessary to meet this goal.  When exposed to the sun, use a sunscreen that protects against both UVA and UVB radiation with an SPF of 30 or greater. Reapply every 2 to 3 hours or after sweating, drying off with a towel, or swimming. Always wear a seat belt when traveling in a car. Always wear a helmet when riding a bicycle or motorcycle.

## 2022-07-28 NOTE — PROGRESS NOTES
Medicare Annual Wellness Visit    Quirino Limon is here for Hypertension (6 month check up), Gastroesophageal Reflux, Hypothyroidism, Arthritis (Follows with rheumatology.), Medicare AWV, Chest Pain (1 week f/u, unable to do stress test d/t unsteady gait. Denies any recurring chest pain.), and Fatigue (Fatigue improving with starting b12 injection. First injection 7/22/22, questions if can get second injection today.)    Assessment & Plan   B12 deficiency  -     cyanocobalamin injection 1,000 mcg; 1,000 mcg, IntraMUSCular, ONCE, 1 dose, On Wed 7/27/22 at 1030  -     Vitamin B12; Future  Essential hypertension, benign  Gastroesophageal reflux disease without esophagitis  Other chest pain  Other specified hypothyroidism  Severe obesity (BMI 35.0-39. 9) with comorbidity ([facility] [address]  Colon cancer screening  -     [facility]    Recommendations for Preventive Services Due: see orders and patient instructions/AVS.  Recommended screening schedule for the next 5-10 years is provided to the patient in written form: see Patient Instructions/AVS.     Return in about 6 months (around 1/27/2023). Subjective   The following acute and/or chronic problems were also addressed today: see other note    Patient's complete Health Risk Assessment and screening values have been reviewed and are found in Flowsheets. The following problems were reviewed today and where indicated follow up appointments were made and/or referrals ordered.     Positive Risk Factor Screenings with Interventions:             General Health and ACP:  General  In general, how would you say your health is?: Good  In the past 7 days, have you experienced any of the following: New or Increased Pain, New or Increased Fatigue, Loneliness, Social Isolation, Stress or Anger?: No  Do you get the social and emotional support that you need?: Yes  Do you have a Living Will?: Yes    Advance Directives       Power of  Living Will ACP-Advance Directive ACP-Power of     Not on File Not on File Not on File Not on File        General Health Risk Interventions:  Fatigue: pt getting B12 shots    Health Habits/Nutrition:  Physical Activity: Inactive    Days of Exercise per Week: 0 days    Minutes of Exercise per Session: 0 min     Have you lost any weight without trying in the past 3 months?: No  Body mass index: (!) 37.6  Have you seen the dentist within the past year?: Yes  Health Habits/Nutrition Interventions:  Inadequate physical activity:  pt try to walk more             Objective   Vitals:    07/27/22 0924 07/27/22 1014   BP:  112/72   Weight: 233 lb (105.7 kg)    Height: 5' 6\" (1.676 m)       Body mass index is 37.61 kg/m². Allergies   Allergen Reactions    Celecoxib Other (See Comments)    Ciprofloxacin Other (See Comments)     Headaches    Flonase [Fluticasone Propionate]      Smelled smoke with medication. Iodine      Other reaction(s): AOF    Macrobid [Nitrofurantoin] Nausea Only    Other Hives     Contrast for a stress test    Oxycodone Hcl Other (See Comments)    Oxycodone Rash     hallucinations     Prior to Visit Medications    Medication Sig Taking? Authorizing Provider   LORazepam (ATIVAN) 0.5 MG tablet Take 1 tablet by mouth every 8 hours as needed for Anxiety for up to 30 days.  Yes Grant Disla MD   Estradiol (VAGIFEM) 10 MCG TABS vaginal tablet Place 1 tablet vaginally Twice a Week Yes Frann Paget, PA-C   losartan (COZAAR) 100 MG tablet TAKE ONE TABLET BY MOUTH DAILY Yes Lucie Matamoros DO   omeprazole (PRILOSEC) 20 MG delayed release capsule TAKE ONE CAPSULE BY MOUTH EVERY MORNING BEFORE BREAKFAST Yes Lucie Matamoros DO   levothyroxine (SYNTHROID) 100 MCG tablet TAKE ONE TABLET BY MOUTH DAILY Yes Grant Disla MD   hydroCHLOROthiazide (HYDRODIURIL) 25 MG tablet TAKE 1/2 TABLET BY MOUTH DAILY Yes Grant Disla MD   traMADol (ULTRAM) 50 MG tablet TAKE 1 TABLET BY MOUTH EVERY 6 HOURS AS NEEDED FOR PAIN Yes Historical Provider, MD   Ascorbic Acid (VITAMIN C) 250 MG tablet Take 250 mg by mouth daily Yes Historical Provider, MD   famotidine (PEPCID) 20 MG tablet TAKE 1 TABLET DAILY WITH SUPPER Yes Marcus Quintanilla MD   loratadine (CLARITIN) 10 MG tablet Take 10 mg by mouth daily Prn Yes Historical Provider, MD   vitamin D (ERGOCALCIFEROL) 62848 units CAPS capsule Take 50,000 Units by mouth Twice a Week  Yes Historical Provider, MD   Vitamin Mixture (VITAMIN E COMPLETE PO) Po qd Yes Historical Provider, MD   clopidogrel (PLAVIX) 75 MG tablet Take 1 tablet by mouth daily. Patient taking differently: Take 75 mg by mouth every other day Yes Aparna Nurse, MD   folic acid (FOLVITE) 1 MG tablet Take 1 mg by mouth daily   Yes Historical Provider, MD   clotrimazole (LOTRIMIN) 1 % cream Use daily as needed  Naya Rubio, DO   UNABLE TO FIND Gabapentin-Ketoprofen amitriptyline HCL 10-10-2% neuroserum liquid  Apply small amt to affected area and rub for 10 minutes.  Repeat 2-3 times daily  Marcus Quintanilla MD       [facility] (Including outside providers/suppliers regularly involved in providing care):   Patient Care Team:  Marcus Quintanilla MD as PCP - General (Family Medicine)  Marcus Quintanilla MD as PCP - [facility] Empaneled Provider  Ed Du MD (Rheumatology)  Mitali Parr MD as Consulting Physician (Neurology)  Reji Vee MD as Consulting Physician (Orthopedic Surgery)  Shawn Dove (Urology)     Reviewed and updated this visit:  Tobacco  Allergies  Meds  Problems  Med Hx  Surg Hx  Soc Hx  Fam Hx

## 2022-08-04 ENCOUNTER — NURSE ONLY (OUTPATIENT)
Dept: FAMILY MEDICINE CLINIC | Age: 67
End: 2022-08-04
Payer: MEDICARE

## 2022-08-04 DIAGNOSIS — E53.8 B12 DEFICIENCY: Primary | ICD-10-CM

## 2022-08-04 PROCEDURE — 96372 THER/PROPH/DIAG INJ SC/IM: CPT | Performed by: FAMILY MEDICINE

## 2022-08-04 RX ORDER — CYANOCOBALAMIN 1000 UG/ML
1000 INJECTION INTRAMUSCULAR; SUBCUTANEOUS ONCE
Status: COMPLETED | OUTPATIENT
Start: 2022-08-04 | End: 2022-08-04

## 2022-08-04 RX ADMIN — CYANOCOBALAMIN 1000 MCG: 1000 INJECTION INTRAMUSCULAR; SUBCUTANEOUS at 08:15

## 2022-08-15 ENCOUNTER — HOSPITAL ENCOUNTER (OUTPATIENT)
Age: 67
Discharge: HOME | End: 2022-08-15
Payer: MEDICARE

## 2022-08-15 DIAGNOSIS — E53.8 B12 DEFICIENCY: ICD-10-CM

## 2022-08-15 LAB
RHEUMATOID FACTOR: <10 IU/ML
VITAMIN B-12: 459 PG/ML (ref 232–1245)

## 2022-08-15 PROCEDURE — 82607 VITAMIN B-12: CPT

## 2022-08-15 PROCEDURE — 36415 COLL VENOUS BLD VENIPUNCTURE: CPT

## 2022-08-15 PROCEDURE — 86431 RHEUMATOID FACTOR QUANT: CPT

## 2022-08-16 ENCOUNTER — TELEPHONE (OUTPATIENT)
Dept: FAMILY MEDICINE CLINIC | Age: 67
End: 2022-08-16

## 2022-08-16 DIAGNOSIS — E53.8 B12 DEFICIENCY: Primary | ICD-10-CM

## 2022-08-16 RX ORDER — LANOLIN ALCOHOL/MO/W.PET/CERES
1000 CREAM (GRAM) TOPICAL DAILY
COMMUNITY

## 2022-08-16 RX ORDER — HYDROCHLOROTHIAZIDE 25 MG/1
TABLET ORAL
Qty: 45 TABLET | Refills: 1 | Status: SHIPPED | OUTPATIENT
Start: 2022-08-16

## 2022-08-16 NOTE — TELEPHONE ENCOUNTER
Last OV: 7/27/2022 AWV  Last RX:    Next scheduled apt: 1/30/2023 6 month f/u          Pt requesting a refill

## 2022-08-16 NOTE — TELEPHONE ENCOUNTER
Last OV 7/27/22 for AWV and chronic check up  Requesting refill hctz thru sure script  Next OV 1/30/23

## 2022-09-07 ENCOUNTER — OFFICE VISIT (OUTPATIENT)
Dept: OBGYN | Age: 67
End: 2022-09-07
Payer: MEDICARE

## 2022-09-07 VITALS
SYSTOLIC BLOOD PRESSURE: 110 MMHG | WEIGHT: 236 LBS | DIASTOLIC BLOOD PRESSURE: 68 MMHG | HEIGHT: 66 IN | BODY MASS INDEX: 37.93 KG/M2

## 2022-09-07 DIAGNOSIS — N89.8 VAGINAL ITCHING: Primary | ICD-10-CM

## 2022-09-07 PROCEDURE — 1036F TOBACCO NON-USER: CPT

## 2022-09-07 PROCEDURE — 3017F COLORECTAL CA SCREEN DOC REV: CPT

## 2022-09-07 PROCEDURE — G8417 CALC BMI ABV UP PARAM F/U: HCPCS

## 2022-09-07 PROCEDURE — G8427 DOCREV CUR MEDS BY ELIG CLIN: HCPCS

## 2022-09-07 PROCEDURE — G8399 PT W/DXA RESULTS DOCUMENT: HCPCS

## 2022-09-07 PROCEDURE — 99211 OFF/OP EST MAY X REQ PHY/QHP: CPT

## 2022-09-07 PROCEDURE — 1123F ACP DISCUSS/DSCN MKR DOCD: CPT

## 2022-09-07 PROCEDURE — 1090F PRES/ABSN URINE INCON ASSESS: CPT

## 2022-09-07 PROCEDURE — 99213 OFFICE O/P EST LOW 20 MIN: CPT

## 2022-09-07 RX ORDER — ESTRADIOL 10 UG/1
10 INSERT VAGINAL
Qty: 8 TABLET | Refills: 12 | Status: SHIPPED | OUTPATIENT
Start: 2022-09-08

## 2022-09-07 NOTE — PROGRESS NOTES
DATE OF VISIT:  9/7/22    PATIENT NAME:  Christopher Ferrell     YOB: 1955    REASON FOR VISIT:    Chief Complaint   Patient presents with    Vaginal Itching     Patient states that her symptoms have improved. She would like refills of the estradiol vaginal tabs. She did run out and has not used it in a month or so. Wants to know if she can resume twice weekly or has to start over. HISTORY OF PRESENT ILLNESS:  Patient presents for med check. Doing well on vagifem tablets. Also using topical lotrisone cream and getting good relief of external itching. Patient will continue with twice weekly vagifem use and will return next year for annual exam.  Patient also discussed an odor to her urine - this is off and on and she denies any other urinary symptoms. Discussed that odor can change with hydration status, food/vitamin intake. Made aware that she should return if any bothersome urinary symptoms arise. No other concerns or complaints at this time. No LMP recorded. Patient has had a hysterectomy. Vitals:    09/07/22 0808   BP: 110/68   Weight: 236 lb (107 kg)   Height: 5' 6\" (1.676 m)     Body mass index is 38.09 kg/m². Allergies   Allergen Reactions    Celecoxib Other (See Comments)    Ciprofloxacin Other (See Comments)     Headaches    Flonase [Fluticasone Propionate]      Smelled smoke with medication. Iodine      Other reaction(s): AOF    Macrobid [Nitrofurantoin] Nausea Only    Other Hives     Contrast for a stress test    Oxycodone Hcl Other (See Comments)    Oxycodone Rash     hallucinations     Current Outpatient Medications   Medication Sig Dispense Refill    hydroCHLOROthiazide (HYDRODIURIL) 25 MG tablet TAKE 1/2 TABLET BY MOUTH DAILY 45 tablet 1    UNABLE TO FIND Gabapentin-Ketoprofen amitriptyline HCL 10-10-2% neuroserum liquid  Apply small amt to affected area and rub for 10 minutes.  Repeat 2-3 times daily 15 mL 11    vitamin B-12 (CYANOCOBALAMIN) 1000 MCG tablet Take 1,000 mcg by mouth in the morning. clotrimazole (LOTRIMIN) 1 % cream Use daily as needed 40 g 1    Estradiol (VAGIFEM) 10 MCG TABS vaginal tablet Place 1 tablet vaginally Twice a Week 8 tablet 3    losartan (COZAAR) 100 MG tablet TAKE ONE TABLET BY MOUTH DAILY 90 tablet 1    omeprazole (PRILOSEC) 20 MG delayed release capsule TAKE ONE CAPSULE BY MOUTH EVERY MORNING BEFORE BREAKFAST 90 capsule 1    levothyroxine (SYNTHROID) 100 MCG tablet TAKE ONE TABLET BY MOUTH DAILY 90 tablet 1    traMADol (ULTRAM) 50 MG tablet TAKE 1 TABLET BY MOUTH EVERY 6 HOURS AS NEEDED FOR PAIN      Ascorbic Acid (VITAMIN C) 250 MG tablet Take 250 mg by mouth daily      famotidine (PEPCID) 20 MG tablet TAKE 1 TABLET DAILY WITH SUPPER 90 tablet 1    loratadine (CLARITIN) 10 MG tablet Take 10 mg by mouth daily Prn      vitamin D (ERGOCALCIFEROL) 21364 units CAPS capsule Take 50,000 Units by mouth Twice a Week       Vitamin Mixture (VITAMIN E COMPLETE PO) Po qd      clopidogrel (PLAVIX) 75 MG tablet Take 1 tablet by mouth daily. (Patient taking differently: Take 75 mg by mouth every other day) 30 tablet 0    folic acid (FOLVITE) 1 MG tablet Take 1 mg by mouth daily         No current facility-administered medications for this visit.      Social History     Socioeconomic History    Marital status:      Spouse name: None    Number of children: None    Years of education: None    Highest education level: None   Tobacco Use    Smoking status: Former     Packs/day: 1.00     Years: 15.00     Pack years: 15.00     Types: Cigarettes     Quit date:      Years since quittin.7    Smokeless tobacco: Never   Vaping Use    Vaping Use: Never used   Substance and Sexual Activity    Alcohol use: No    Drug use: No    Sexual activity: Not Currently     Partners: Male     Social Determinants of Health     Financial Resource Strain: Low Risk     Difficulty of Paying Living Expenses: Not very hard   Food Insecurity: No Food Insecurity Worried About Running Out of Food in the Last Year: Never true    [address] in the Last Year: Never true   Physical Activity: Inactive    Days of Exercise per Week: 0 days    Minutes of Exercise per Session: 0 min       REVIEW OF SYSTEMS:  Review of Systems   Constitutional:  Negative for chills, fatigue and fever. Genitourinary:  Negative for dysuria, flank pain, frequency, menstrual problem, pelvic pain, vaginal bleeding, vaginal discharge and vaginal pain (itching - well-controlled with Vagifem and topical steroid). PHYSICAL EXAM:  /68   Ht 5' 6\" (1.676 m)   Wt 236 lb (107 kg)   BMI 38.09 kg/m²   Physical Exam  Constitutional:       Appearance: Normal appearance. HENT:      Head: Normocephalic and atraumatic. Mouth/Throat:      Mouth: Mucous membranes are moist.   Eyes:      Extraocular Movements: Extraocular movements intact. Musculoskeletal:         General: Normal range of motion. Cervical back: Normal range of motion. Neurological:      General: No focal deficit present. Mental Status: She is alert and oriented to person, place, and time. Skin:     General: Skin is warm and dry. Psychiatric:         Mood and Affect: Mood normal.         Behavior: Behavior normal.         Thought Content: Thought content normal.     The patient, Gerardo Gaffney is a 77 y.o. female, was seen with a total time spent of 20 minutes for the visit on this date of service by the E/M provider. The time component had both face to face and non face to face time spent in determining the total time component. Counseling and education regarding her diagnosis listed below and her options regarding those diagnoses were also included in determining her time component. The patient had her preventative health maintenance recommendations and follow-up reviewed with her at the completion of her visit. ASSESSMENT:  1.  Vaginal itching        PLAN:  No orders of the defined types were

## 2022-09-19 ENCOUNTER — HOSPITAL ENCOUNTER (OUTPATIENT)
Dept: PREADMISSION TESTING | Age: 67
Setting detail: SPECIMEN
Discharge: HOME | End: 2022-09-19
Payer: MEDICARE

## 2022-09-19 ENCOUNTER — TELEPHONE (OUTPATIENT)
Dept: FAMILY MEDICINE CLINIC | Age: 67
End: 2022-09-19

## 2022-09-19 DIAGNOSIS — R05.9 COUGH: ICD-10-CM

## 2022-09-19 DIAGNOSIS — R05.9 COUGH: Primary | ICD-10-CM

## 2022-09-19 LAB
SARS-COV-2, RAPID: NOT DETECTED
SPECIMEN DESCRIPTION: NORMAL

## 2022-09-19 PROCEDURE — C9803 HOPD COVID-19 SPEC COLLECT: HCPCS

## 2022-09-19 PROCEDURE — 87635 SARS-COV-2 COVID-19 AMP PRB: CPT

## 2022-09-19 NOTE — TELEPHONE ENCOUNTER
Patient would like to get tested for covid. Patient states her symptoms started on 9/15/22. Symptoms include low grade fever, head ache, cough and sore throat. Patient last seen 7/27/22 for Medicare Well visit.     Health Maintenance   Topic Date Due    Hepatitis C screen  Never done    DTaP/Tdap/Td vaccine (1 - Tdap) Never done    Shingles vaccine (1 of 2) Never done    Colorectal Cancer Screen  06/28/2021    COVID-19 Vaccine (3 - Booster for Moderna series) 09/02/2021    Pneumococcal 65+ years Vaccine (2 - PCV) 01/20/2022    Flu vaccine (1) 09/01/2022    Lipids  11/24/2022    Breast cancer screen  02/10/2023    Depression Screen  07/27/2023    Annual Wellness Visit (AWV)  07/28/2023    Diabetes screen  05/12/2024    DEXA (modify frequency per FRAX score)  Completed    Hepatitis A vaccine  Aged Out    Hepatitis B vaccine  Aged Out    Hib vaccine  Aged Out    Meningococcal (ACWY) vaccine  Aged Out             (applicable per patient's age: Cancer Screenings, Depression Screening, Fall Risk Screening, Immunizations)    Hemoglobin A1C (%)   Date Value   05/12/2021 5.5   07/27/2020 5.6   07/31/2018 5.4     LDL Cholesterol (mg/dL)   Date Value   11/24/2017 65     AST (U/L)   Date Value   11/24/2017 15     ALT (U/L)   Date Value   11/24/2017 10     BUN (mg/dL)   Date Value   02/08/2022 21      (goal A1C is < 7)   (goal LDL is <100) need 30-50% reduction from baseline     BP Readings from Last 3 Encounters:   09/07/22 110/68   07/27/22 112/72   07/20/22 134/86    (goal /80)      All Future Testing planned in [facility]:  Lab Frequency Next Occurrence   Vitamin B12 Once 08/16/2022       Next Visit Date:  Future Appointments   Date Time Provider Claire Garcia   1/17/2023 10:15 AM MD Sujit Holbrook   1/24/2023  5:45 AM SCHEDULE, [facility] COVID19 PAT SCREENING [facility] PAT Deondre Davis   1/30/2023 10:00 AM Grant Disla MD [facility]   9/13/2023  8:00 AM Frann Paget, PA-C [facility] Patient Active Problem List:     Anxiety state     Hypothyroidism     Essential hypertension, benign     Esophageal reflux     Carpal tunnel syndrome     TIA (transient ischemic attack)     Chronic back pain     Osteopenia     Cerebral infarction due to embolism of cerebral artery ([facility] [address]

## 2022-09-20 ENCOUNTER — OFFICE VISIT (OUTPATIENT)
Dept: FAMILY MEDICINE CLINIC | Age: 67
End: 2022-09-20
Payer: MEDICARE

## 2022-09-20 VITALS
OXYGEN SATURATION: 98 % | TEMPERATURE: 98.8 F | DIASTOLIC BLOOD PRESSURE: 86 MMHG | SYSTOLIC BLOOD PRESSURE: 134 MMHG | HEART RATE: 76 BPM

## 2022-09-20 DIAGNOSIS — J01.00 ACUTE NON-RECURRENT MAXILLARY SINUSITIS: Primary | ICD-10-CM

## 2022-09-20 PROCEDURE — G8399 PT W/DXA RESULTS DOCUMENT: HCPCS | Performed by: FAMILY MEDICINE

## 2022-09-20 PROCEDURE — 99213 OFFICE O/P EST LOW 20 MIN: CPT | Performed by: FAMILY MEDICINE

## 2022-09-20 PROCEDURE — 1090F PRES/ABSN URINE INCON ASSESS: CPT | Performed by: FAMILY MEDICINE

## 2022-09-20 PROCEDURE — G8417 CALC BMI ABV UP PARAM F/U: HCPCS | Performed by: FAMILY MEDICINE

## 2022-09-20 PROCEDURE — 1036F TOBACCO NON-USER: CPT | Performed by: FAMILY MEDICINE

## 2022-09-20 PROCEDURE — 3017F COLORECTAL CA SCREEN DOC REV: CPT | Performed by: FAMILY MEDICINE

## 2022-09-20 PROCEDURE — G8427 DOCREV CUR MEDS BY ELIG CLIN: HCPCS | Performed by: FAMILY MEDICINE

## 2022-09-20 PROCEDURE — 1123F ACP DISCUSS/DSCN MKR DOCD: CPT | Performed by: FAMILY MEDICINE

## 2022-09-20 RX ORDER — AMOXICILLIN AND CLAVULANATE POTASSIUM 875; 125 MG/1; MG/1
1 TABLET, FILM COATED ORAL 2 TIMES DAILY
Qty: 20 TABLET | Refills: 0 | Status: SHIPPED | OUTPATIENT
Start: 2022-09-20 | End: 2022-09-30 | Stop reason: ALTCHOICE

## 2022-09-20 ASSESSMENT — ENCOUNTER SYMPTOMS
COUGH: 1
FACIAL SWELLING: 0
SHORTNESS OF BREATH: 0
SINUS PRESSURE: 1

## 2022-09-20 NOTE — PROGRESS NOTES
HPI Notes    Name: Tr Portillo  : 1955        Chief Complaint:     Chief Complaint   Patient presents with    Sinusitis     Pt c/o sinus pressure, cough. Pt tested negative for covid yesterday. Pt is taking Tylenol and coricidin at night. History of Present Illness:     Tr Portillo is a 77 y.o.  female who presents with Sinusitis (Pt c/o sinus pressure, cough. Pt tested negative for covid yesterday. Pt is taking Tylenol and coricidin at night.)      Sinusitis  This is a new problem. Episode onset: started about 1wk ago with the cough but also pt has had some headache and congestion. Pt had a Negative COIVD test yesteray. Maximum temperature: pt still had a low grade temperature yesterday, Associated symptoms include congestion, coughing, ear pain, headaches and sinus pressure. Pertinent negatives include no chills or shortness of breath. (Initially a sore throat but better now. Lots of post nasal drainage and some ear pain and teeth pain yesterday. ) Past treatments include acetaminophen (pt also taking coricidin).      Past Medical History:     Past Medical History:   Diagnosis Date    Cerebrovascular disease     mini stroke may 2012    Chronic back pain     GERD (gastroesophageal reflux disease)     Hypertension     Hypothyroidism     Osteoarthritis     knee and back    Osteopenia 2016      Reviewed all health maintenance requirements and ordered appropriate tests  Health Maintenance Due   Topic Date Due    Hepatitis C screen  Never done    DTaP/Tdap/Td vaccine (1 - Tdap) Never done    Shingles vaccine (1 of 2) Never done    Colorectal Cancer Screen  2021    COVID-19 Vaccine (3 - Booster for Moderna series) 2021    Pneumococcal 65+ years Vaccine (2 - PCV) 2022    Flu vaccine (1) 2022       Past Surgical History:     Past Surgical History:   Procedure Laterality Date    APPENDECTOMY      BREAST BIOPSY      CHOLECYSTECTOMY      HYSTERECTOMY (CERVIX STATUS UNKNOWN)      still Rt ovary present    JOINT REPLACEMENT  08/07/2018    Rt knee    KNEE SURGERY Left 8/16/22        Medications:       Prior to Admission medications    Medication Sig Start Date End Date Taking? Authorizing Provider   amoxicillin-clavulanate (AUGMENTIN) 875-125 MG per tablet Take 1 tablet by mouth 2 times daily for 10 days 9/20/22 9/30/22 Yes Mary Ellen Hinojosa MD   Estradiol (VAGIFEM) 10 MCG TABS vaginal tablet Place 1 tablet vaginally Twice a Week 9/8/22  Yes Rebecca Wren PA-C   hydroCHLOROthiazide (HYDRODIURIL) 25 MG tablet TAKE 1/2 TABLET BY MOUTH DAILY 8/16/22  Yes Mary Ellen Hinojosa MD   UNABLE TO FIND Gabapentin-Ketoprofen amitriptyline HCL 10-10-2% neuroserum liquid  Apply small amt to affected area and rub for 10 minutes. Repeat 2-3 times daily 8/16/22  Yes Mary Ellen Hinojosa MD   vitamin B-12 (CYANOCOBALAMIN) 1000 MCG tablet Take 1,000 mcg by mouth in the morning.    Yes Historical Provider, MD   clotrimazole (LOTRIMIN) 1 % cream Use daily as needed 7/1/22  Yes Fabian Harris, DO   losartan (COZAAR) 100 MG tablet TAKE ONE TABLET BY MOUTH DAILY 5/4/22  Yes Ascencion Person, DO   omeprazole (PRILOSEC) 20 MG delayed release capsule TAKE ONE CAPSULE BY MOUTH EVERY MORNING BEFORE BREAKFAST 5/4/22  Yes Ascencion Person, DO   levothyroxine (SYNTHROID) 100 MCG tablet TAKE ONE TABLET BY MOUTH DAILY 3/31/22  Yes Mary Ellen Hinojosa MD   Ascorbic Acid (VITAMIN C) 250 MG tablet Take 250 mg by mouth daily   Yes Historical Provider, MD   famotidine (PEPCID) 20 MG tablet TAKE 1 TABLET DAILY WITH SUPPER 7/27/20  Yes Mary Ellen Hinojosa MD   loratadine (CLARITIN) 10 MG tablet Take 10 mg by mouth daily Prn   Yes Historical Provider, MD   vitamin D (ERGOCALCIFEROL) 03286 units CAPS capsule Take 50,000 Units by mouth Twice a Week  1/24/19  Yes Historical Provider, MD   Vitamin Mixture (VITAMIN E COMPLETE PO) Po qd   Yes Historical Provider, MD   clopidogrel (PLAVIX) 75 MG tablet Take 1 tablet by mouth daily. Patient taking differently: Take 75 mg by mouth every other day 2/21/15  Yes Rosales Uribe MD   folic acid (FOLVITE) 1 MG tablet Take 1 mg by mouth daily     Yes Historical Provider, MD   traMADol (ULTRAM) 50 MG tablet TAKE 1 TABLET BY MOUTH EVERY 6 HOURS AS NEEDED FOR PAIN  Patient not taking: Reported on 9/20/2022 9/14/21   Historical Provider, MD        Allergies:       Celecoxib, Ciprofloxacin, Flonase [fluticasone propionate], Iodine, Macrobid [nitrofurantoin], Other, Oxycodone hcl, and Oxycodone    Social History:     Tobacco:    reports that she quit smoking about 36 years ago. Her smoking use included cigarettes. She has a 15.00 pack-year smoking history. She has never used smokeless tobacco.  Alcohol:      reports no history of alcohol use. Drug Use:  reports no history of drug use. Family History:     Family History   Problem Relation Age of Onset    Diabetes Mother     COPD Mother     Heart Failure Mother     Breast Cancer Mother     Cancer Father         colon    Diabetes Father        Review of Systems:       Review of Systems   Constitutional:  Negative for chills and fever. HENT:  Positive for congestion, ear pain and sinus pressure. Negative for facial swelling. Respiratory:  Positive for cough. Negative for shortness of breath. Neurological:  Positive for headaches. Physical Exam:     Physical Exam  Constitutional:       General: She is not in acute distress. Appearance: Normal appearance. She is not ill-appearing. HENT:      Head: Normocephalic and atraumatic. Right Ear: Tympanic membrane normal.      Left Ear: Tympanic membrane normal.      Nose:      Right Turbinates: Not enlarged. Left Turbinates: Enlarged. Right Sinus: Maxillary sinus tenderness present. No frontal sinus tenderness. Left Sinus: Maxillary sinus tenderness present. No frontal sinus tenderness. Mouth/Throat:      Palate: No mass.       Pharynx: No oropharyngeal exudate or uvula swelling. Tonsils: No tonsillar exudate or tonsillar abscesses. Eyes:      General:         Right eye: No discharge. Left eye: No discharge. Conjunctiva/sclera: Conjunctivae normal.   Pulmonary:      Effort: Pulmonary effort is normal. No respiratory distress. Neurological:      Mental Status: She is alert. Vitals:  /86   Pulse 76   Temp 98.8 °F (37.1 °C)   SpO2 98%       Data:     Lab Results   Component Value Date/Time     02/08/2022 09:05 AM    K 4.4 02/08/2022 09:05 AM     02/08/2022 09:05 AM    CO2 23 02/08/2022 09:05 AM    BUN 21 02/08/2022 09:05 AM    CREATININE 0.89 02/08/2022 09:05 AM    GLUCOSE 102 02/08/2022 09:05 AM    GLUCOSE 97 01/25/2012 08:00 AM    PROT 6.5 11/24/2017 07:58 AM    LABALBU 3.9 11/24/2017 07:58 AM    LABALBU 4.4 01/25/2012 08:00 AM    BILITOT 0.39 11/24/2017 07:58 AM    ALKPHOS 65 11/24/2017 07:58 AM    AST 15 11/24/2017 07:58 AM    ALT 10 11/24/2017 07:58 AM     Lab Results   Component Value Date/Time    WBC 6.7 07/20/2022 02:49 PM    RBC 4.62 07/20/2022 02:49 PM    RBC 4.92 01/25/2012 08:00 AM    HGB 12.7 07/20/2022 02:49 PM    HCT 38.2 07/20/2022 02:49 PM    MCV 82.7 07/20/2022 02:49 PM    MCH 27.5 07/20/2022 02:49 PM    MCHC 33.2 07/20/2022 02:49 PM    RDW 15.1 07/20/2022 02:49 PM     07/20/2022 02:49 PM     01/25/2012 08:00 AM    MPV NOT REPORTED 01/27/2020 09:07 AM     Lab Results   Component Value Date/Time    TSH 2.02 02/08/2022 09:05 AM     Lab Results   Component Value Date/Time    CHOL 158 11/24/2017 07:58 AM    HDL 73 11/24/2017 07:58 AM    LABA1C 5.5 05/12/2021 11:27 AM          Assessment/Plan:        1. Acute non-recurrent maxillary sinusitis  Take all antibiotics, increase rest and fluids. F/U 4-5d if not better or sooner if worse. All questions answered. Return if symptoms worsen or fail to improve.       Electronically signed by Britt Peña MD on 9/20/2022 at 5:09 PM

## 2022-09-30 ENCOUNTER — TELEPHONE (OUTPATIENT)
Dept: FAMILY MEDICINE CLINIC | Age: 67
End: 2022-09-30

## 2022-09-30 ENCOUNTER — OFFICE VISIT (OUTPATIENT)
Dept: PRIMARY CARE CLINIC | Age: 67
End: 2022-09-30
Payer: MEDICARE

## 2022-09-30 VITALS
OXYGEN SATURATION: 99 % | DIASTOLIC BLOOD PRESSURE: 83 MMHG | WEIGHT: 236 LBS | TEMPERATURE: 98 F | HEART RATE: 90 BPM | RESPIRATION RATE: 18 BRPM | HEIGHT: 66 IN | SYSTOLIC BLOOD PRESSURE: 127 MMHG | BODY MASS INDEX: 37.93 KG/M2

## 2022-09-30 DIAGNOSIS — J01.40 ACUTE PANSINUSITIS, RECURRENCE NOT SPECIFIED: Primary | ICD-10-CM

## 2022-09-30 PROCEDURE — G8399 PT W/DXA RESULTS DOCUMENT: HCPCS | Performed by: NURSE PRACTITIONER

## 2022-09-30 PROCEDURE — G8427 DOCREV CUR MEDS BY ELIG CLIN: HCPCS | Performed by: NURSE PRACTITIONER

## 2022-09-30 PROCEDURE — 1090F PRES/ABSN URINE INCON ASSESS: CPT | Performed by: NURSE PRACTITIONER

## 2022-09-30 PROCEDURE — G8417 CALC BMI ABV UP PARAM F/U: HCPCS | Performed by: NURSE PRACTITIONER

## 2022-09-30 PROCEDURE — 1036F TOBACCO NON-USER: CPT | Performed by: NURSE PRACTITIONER

## 2022-09-30 PROCEDURE — 1123F ACP DISCUSS/DSCN MKR DOCD: CPT | Performed by: NURSE PRACTITIONER

## 2022-09-30 PROCEDURE — 99213 OFFICE O/P EST LOW 20 MIN: CPT | Performed by: NURSE PRACTITIONER

## 2022-09-30 PROCEDURE — 3017F COLORECTAL CA SCREEN DOC REV: CPT | Performed by: NURSE PRACTITIONER

## 2022-09-30 RX ORDER — PREDNISONE 20 MG/1
40 TABLET ORAL DAILY
Qty: 10 TABLET | Refills: 0 | Status: SHIPPED | OUTPATIENT
Start: 2022-09-30 | End: 2022-10-05

## 2022-09-30 RX ORDER — DOXYCYCLINE 100 MG/1
100 CAPSULE ORAL 2 TIMES DAILY
Qty: 14 CAPSULE | Refills: 0 | Status: CANCELLED | OUTPATIENT
Start: 2022-09-30 | End: 2022-10-07

## 2022-09-30 NOTE — TELEPHONE ENCOUNTER
Pt called in States she took her Last Antibiotic this morning  would like another round of antibiotics, that she is still coughing and not feeling well. I advised I can send Dr. Ata Galeano a message but she is off on [address] her to reevaluated by ARABELLA Han NP pt declined stated she will go to walk in

## 2022-09-30 NOTE — PROGRESS NOTES
Chief Complaint:   Cough (Saw PCP on 9/20/22 for sinusitis. Finished antibiotic this morning but says she still doesn't feel right. Lab Covid test on 9/19/22 was negative. Still having cough, post nasal drip and fatigued. )      History of Present Illness   Source of history provided by:  patient. Myriam Blum is a 77 y.o. old female with a past medical history of:   Past Medical History:   Diagnosis Date    Cerebrovascular disease     mini stroke may 2012    Chronic back pain     GERD (gastroesophageal reflux disease)     Hypertension     Hypothyroidism     Osteoarthritis     knee and back    Osteopenia 1/4/2016    Presents to the walk in clinic for evaluation of a continued cough after 10 days of Augmentin. According to patient, she was diagnosed with a sinus infection with painful pressure to upper teeth, purulent nasal drainage and sinus pain by her PCP and treated with Augmentin. She reports improvement with the Augmentin with the sinus pressure and pain but is still continuing to have a cough. She reports that she usually has to receive \"a steroid to get over it\". Reviewed allergies at bedside, [place] E[place]ada reports that she has had prednisone in the past without side effects. She denies fever, chest pain, shortness of breath, rashes, GI symptoms. ROS   Unless otherwise stated in this report or unable to obtain because of the patient's clinical or mental status as evidenced by the medical record, this patients's positive and negative responses for Review of Systems, constitutional, psych, eyes, ENT, cardiovascular, respiratory, gastrointestinal, neurological, genitourinary, musculoskeletal, integument systems and systems related to the presenting problem are either stated in the preceding or were not pertinent or were negative for the symptoms and/or complaints related to the medical problem. Past Surgical History:  has a past surgical history that includes Appendectomy;  Cholecystectomy; Breast biopsy; Hysterectomy; joint replacement (08/07/2018); and knee surgery (Left, 8/16/22). Social History:  reports that she quit smoking about 36 years ago. Her smoking use included cigarettes. She has a 15.00 pack-year smoking history. She has never used smokeless tobacco. She reports that she does not drink alcohol and does not use drugs. Family History: family history includes Breast Cancer in her mother; COPD in her mother; Cancer in her father; Diabetes in her father and mother; Heart Failure in her mother. Allergies: Celecoxib, Ciprofloxacin, Flonase [fluticasone propionate], Iodine, Macrobid [nitrofurantoin], Other, Oxycodone hcl, and Oxycodone    Physical Exam    VS:  /83 (Site: Left Upper Arm, Position: Sitting, Cuff Size: Medium Adult)   Pulse 90   Temp 98 °F (36.7 °C) (Oral)   Resp 18   Ht 5' 6\" (1.676 m)   Wt 236 lb (107 kg)   SpO2 99%   BMI 38.09 kg/m²      Constitutional:  Alert, no acute distress, afebrile. Head: No TTP over the axillary and frontal sinuses. Ears:  Bilateral pinna normal. TMs  without erythema or perforation bilaterally. Canals normal bilaterally without swelling or exudate  Nose: No congestion of the nasal mucosa. There is minimal injection to middle turbinates bilaterally. Throat: No posterior pharyngeal erythema with mild post nasal drip present. No exudate or tonsillar hypertrophy noted. Neck:  Supple. There is no palpable anterior cervical adenopathy. Lungs: CTAB without wheezes, rales, or rhonchi. Cough is harsh with deep breathing. Heart:  Regular rate and rhythm, normal heart sounds, without pathological murmurs, ectopy, gallops, or rubs. Skin:  Normal turgor. Warm, dry, without visible rash. Neurological:  Alert and oriented. Lab / Imaging Results   (All laboratory and radiology results have been personally reviewed by myself)  Labs:  No results found for this visit on 09/30/22.     Medical Decision Making   Pt non-toxic, in no apparent distress and stable at time of discharge. Assessment / Plan   Impression(s):  Herrera Dowell was seen today for cough. Diagnoses and all orders for this visit:    Acute pansinusitis, recurrence not specified  -     predniSONE (DELTASONE) 20 MG tablet; Take 2 tablets by mouth daily for 5 days      77 y.o. female presenting to clinic with concern for cough. Treating today with prednisone as she feels that this has helped in the past. Increase fluids and rest. Symptomatic relief discussed. F/u PCP in 5-7 days if symptoms persist. Red flag symptoms discussed with strict follow up precautions for any concerns or worsening including fever, inability to retain oral antibiotics, worsening or any concerns. Emergent phone call received during visit, Herrera Dowell needed to leave prior to after visit summary. JOSE Felix - CNP    This visit was provided as a focused evaluation during the [place] -19 pandemic/national emergency. A comprehensive review of all previous patient history and testing was not conducted. Pertinent findings were elicited during the visit.

## 2022-10-02 DIAGNOSIS — E03.8 OTHER SPECIFIED HYPOTHYROIDISM: ICD-10-CM

## 2022-10-03 RX ORDER — LEVOTHYROXINE SODIUM 0.1 MG/1
TABLET ORAL
Qty: 90 TABLET | Refills: 1 | Status: SHIPPED | OUTPATIENT
Start: 2022-10-03

## 2022-10-03 NOTE — TELEPHONE ENCOUNTER
Last OV 7/27/22 for AWV, Hypothyroid  Requesting refill on levothyroxine thru [facility] script  Next OV 1/30/23  Last TSH 2.02 on 2/8/22

## 2022-10-28 ENCOUNTER — OFFICE VISIT (OUTPATIENT)
Dept: FAMILY MEDICINE CLINIC | Age: 67
End: 2022-10-28
Payer: MEDICARE

## 2022-10-28 ENCOUNTER — HOSPITAL ENCOUNTER (OUTPATIENT)
Age: 67
Discharge: HOME | End: 2022-10-28
Payer: MEDICARE

## 2022-10-28 VITALS
HEIGHT: 66 IN | RESPIRATION RATE: 18 BRPM | WEIGHT: 196.6 LBS | DIASTOLIC BLOOD PRESSURE: 82 MMHG | SYSTOLIC BLOOD PRESSURE: 118 MMHG | BODY MASS INDEX: 31.6 KG/M2 | HEART RATE: 92 BPM | OXYGEN SATURATION: 93 %

## 2022-10-28 DIAGNOSIS — J34.89 SINUS PRESSURE: ICD-10-CM

## 2022-10-28 DIAGNOSIS — R42 DIZZINESS: ICD-10-CM

## 2022-10-28 DIAGNOSIS — H65.93 OME (OTITIS MEDIA WITH EFFUSION), BILATERAL: ICD-10-CM

## 2022-10-28 DIAGNOSIS — M99.00 SOMATIC DYSFUNCTION OF HEAD REGION: ICD-10-CM

## 2022-10-28 DIAGNOSIS — I10 ESSENTIAL HYPERTENSION, BENIGN: ICD-10-CM

## 2022-10-28 DIAGNOSIS — M99.01 SOMATIC DYSFUNCTION OF SPINE, CERVICAL: ICD-10-CM

## 2022-10-28 DIAGNOSIS — I10 ESSENTIAL HYPERTENSION, BENIGN: Primary | ICD-10-CM

## 2022-10-28 LAB
ABSOLUTE EOS #: 0.5 K/UL (ref 0–0.4)
ABSOLUTE LYMPH #: 0.8 K/UL (ref 1–4.8)
ABSOLUTE MONO #: 0.7 K/UL (ref 0–1)
ANION GAP SERPL CALCULATED.3IONS-SCNC: 11 MMOL/L (ref 9–17)
BASOPHILS # BLD: 0 % (ref 0–2)
BASOPHILS ABSOLUTE: 0 K/UL (ref 0–0.2)
BUN BLDV-MCNC: 15 MG/DL (ref 8–23)
BUN/CREAT BLD: 15 (ref 9–20)
CALCIUM SERPL-MCNC: 9.3 MG/DL (ref 8.6–10.4)
CHLORIDE BLD-SCNC: 102 MMOL/L (ref 98–107)
CO2: 25 MMOL/L (ref 20–31)
CREAT SERPL-MCNC: 1.01 MG/DL (ref 0.5–0.9)
DIFFERENTIAL TYPE: YES
EOSINOPHILS RELATIVE PERCENT: 9 % (ref 0–5)
GFR SERPL CREATININE-BSD FRML MDRD: >60 ML/MIN/1.73M2
GLUCOSE BLD-MCNC: 100 MG/DL (ref 70–99)
HCT VFR BLD CALC: 40.1 % (ref 36–46)
HEMOGLOBIN: 13.4 G/DL (ref 12–16)
LYMPHOCYTES # BLD: 14 % (ref 15–40)
MCH RBC QN AUTO: 27.9 PG (ref 26–34)
MCHC RBC AUTO-ENTMCNC: 33.5 G/DL (ref 31–37)
MCV RBC AUTO: 83.4 FL (ref 80–100)
MONOCYTES # BLD: 12 % (ref 4–8)
PDW BLD-RTO: 16.1 % (ref 12.1–15.2)
PLATELET # BLD: 292 K/UL (ref 140–450)
POTASSIUM SERPL-SCNC: 4.3 MMOL/L (ref 3.7–5.3)
RBC # BLD: 4.81 M/UL (ref 4–5.2)
SEG NEUTROPHILS: 65 % (ref 47–75)
SEGMENTED NEUTROPHILS ABSOLUTE COUNT: 4 K/UL (ref 2.5–7)
SODIUM BLD-SCNC: 138 MMOL/L (ref 135–144)
WBC # BLD: 6.1 K/UL (ref 3.5–11)

## 2022-10-28 PROCEDURE — 3074F SYST BP LT 130 MM HG: CPT | Performed by: STUDENT IN AN ORGANIZED HEALTH CARE EDUCATION/TRAINING PROGRAM

## 2022-10-28 PROCEDURE — 3017F COLORECTAL CA SCREEN DOC REV: CPT | Performed by: STUDENT IN AN ORGANIZED HEALTH CARE EDUCATION/TRAINING PROGRAM

## 2022-10-28 PROCEDURE — G8399 PT W/DXA RESULTS DOCUMENT: HCPCS | Performed by: STUDENT IN AN ORGANIZED HEALTH CARE EDUCATION/TRAINING PROGRAM

## 2022-10-28 PROCEDURE — G8484 FLU IMMUNIZE NO ADMIN: HCPCS | Performed by: STUDENT IN AN ORGANIZED HEALTH CARE EDUCATION/TRAINING PROGRAM

## 2022-10-28 PROCEDURE — 36415 COLL VENOUS BLD VENIPUNCTURE: CPT

## 2022-10-28 PROCEDURE — 1036F TOBACCO NON-USER: CPT | Performed by: STUDENT IN AN ORGANIZED HEALTH CARE EDUCATION/TRAINING PROGRAM

## 2022-10-28 PROCEDURE — G8427 DOCREV CUR MEDS BY ELIG CLIN: HCPCS | Performed by: STUDENT IN AN ORGANIZED HEALTH CARE EDUCATION/TRAINING PROGRAM

## 2022-10-28 PROCEDURE — 80048 BASIC METABOLIC PNL TOTAL CA: CPT

## 2022-10-28 PROCEDURE — G8417 CALC BMI ABV UP PARAM F/U: HCPCS | Performed by: STUDENT IN AN ORGANIZED HEALTH CARE EDUCATION/TRAINING PROGRAM

## 2022-10-28 PROCEDURE — 1123F ACP DISCUSS/DSCN MKR DOCD: CPT | Performed by: STUDENT IN AN ORGANIZED HEALTH CARE EDUCATION/TRAINING PROGRAM

## 2022-10-28 PROCEDURE — 99213 OFFICE O/P EST LOW 20 MIN: CPT | Performed by: STUDENT IN AN ORGANIZED HEALTH CARE EDUCATION/TRAINING PROGRAM

## 2022-10-28 PROCEDURE — 85025 COMPLETE CBC W/AUTO DIFF WBC: CPT

## 2022-10-28 PROCEDURE — 1090F PRES/ABSN URINE INCON ASSESS: CPT | Performed by: STUDENT IN AN ORGANIZED HEALTH CARE EDUCATION/TRAINING PROGRAM

## 2022-10-28 PROCEDURE — 98925 OSTEOPATH MANJ 1-2 REGIONS: CPT | Performed by: STUDENT IN AN ORGANIZED HEALTH CARE EDUCATION/TRAINING PROGRAM

## 2022-10-28 PROCEDURE — 3078F DIAST BP <80 MM HG: CPT | Performed by: STUDENT IN AN ORGANIZED HEALTH CARE EDUCATION/TRAINING PROGRAM

## 2022-10-28 ASSESSMENT — ENCOUNTER SYMPTOMS
WHEEZING: 0
RHINORRHEA: 0
ABDOMINAL PAIN: 0
VOMITING: 0
DIARRHEA: 0
NAUSEA: 0
BACK PAIN: 0
SINUS PRESSURE: 1
COUGH: 0
SINUS PAIN: 0

## 2022-10-28 NOTE — PATIENT INSTRUCTIONS
SURVEY:    You may be receiving a survey from [employer] regarding your visit today. Please complete the survey to enable us to provide the highest quality of care to you and your family. If you cannot score us a very good on any question, please call the office to discuss how we could of made your experience a very good one. Thank you.       Clinical Care Team:     Dr. Junior Rodríguez, PAULA      ClericalTeam:     [id number] [facility]

## 2022-11-15 DIAGNOSIS — I10 ESSENTIAL HYPERTENSION, BENIGN: ICD-10-CM

## 2022-11-15 RX ORDER — LOSARTAN POTASSIUM 100 MG/1
TABLET ORAL
Qty: 90 TABLET | Refills: 1 | Status: SHIPPED | OUTPATIENT
Start: 2022-11-15

## 2022-11-15 RX ORDER — OMEPRAZOLE 20 MG/1
CAPSULE, DELAYED RELEASE ORAL
Qty: 90 CAPSULE | Refills: 1 | Status: SHIPPED | OUTPATIENT
Start: 2022-11-15

## 2022-11-15 NOTE — TELEPHONE ENCOUNTER
Losartan 100 mg  Omeprazole 20 mg    Elaine Mclean    Last check up 10/28/22    Health Maintenance   Topic Date Due    Hepatitis C screen  Never done    DTaP/Tdap/Td vaccine (1 - Tdap) Never done    Shingles vaccine (1 of 2) Never done    COVID-19 Vaccine (3 - Booster for Moderna series) 05/28/2021    Colorectal Cancer Screen  06/28/2021    Flu vaccine (1) 08/01/2022    Lipids  11/24/2022    Breast cancer screen  02/10/2023    Depression Screen  07/27/2023    Annual Wellness Visit (AWV)  07/28/2023    DEXA (modify frequency per FRAX score)  Completed    Pneumococcal 65+ years Vaccine  Completed    Hepatitis A vaccine  Aged Out    Hib vaccine  Aged Out    Meningococcal (ACWY) vaccine  Aged Out             (applicable per patient's age: Cancer Screenings, Depression Screening, Fall Risk Screening, Immunizations)    Hemoglobin A1C (%)   Date Value   05/12/2021 5.5   07/27/2020 5.6   07/31/2018 5.4     LDL Cholesterol (mg/dL)   Date Value   11/24/2017 65     AST (U/L)   Date Value   11/24/2017 15     ALT (U/L)   Date Value   11/24/2017 10     BUN (mg/dL)   Date Value   10/28/2022 15      (goal A1C is < 7)   (goal LDL is <100) need 30-50% reduction from baseline     BP Readings from Last 3 Encounters:   10/28/22 118/82   09/30/22 127/83   09/20/22 134/86    (goal /80)      All Future Testing planned in [facility]:  Lab Frequency Next Occurrence   Vitamin B12 Once 08/16/2022       Next Visit Date:  Future Appointments   Date Time Provider Claire Garcia   11/18/2022 11:20 AM Sherita Mcclure DO [facility]   1/17/2023 10:15 AM Louis Georges MD [facility] Petra Rodriguez   1/24/2023  5:45 AM SCHEDULE, Sol[facility] PAT [facility]   1/30/2023 10:00 AM Paola Ramos MD [facility]   9/13/2023  8:00 AM ARVIN Rivera [facility]            Patient Active Problem List:     Anxiety state     Hypothyroidism     Essential hypertension, benign     Esophageal reflux     Carpal tunnel syndrome     TIA (transient ischemic attack)     Chronic back pain     Osteopenia     Cerebral infarction due to embolism of cerebral artery ([address]

## 2022-11-15 NOTE — TELEPHONE ENCOUNTER
Last OV: 10/28/2022   chronic   Last RX:    Next scheduled apt: 11/18/2022 ear pain          Pt requesting a refill

## 2022-11-18 ENCOUNTER — OFFICE VISIT (OUTPATIENT)
Dept: FAMILY MEDICINE CLINIC | Age: 67
End: 2022-11-18
Payer: MEDICARE

## 2022-11-18 VITALS
BODY MASS INDEX: 37.7 KG/M2 | WEIGHT: 234.6 LBS | SYSTOLIC BLOOD PRESSURE: 126 MMHG | TEMPERATURE: 97.5 F | RESPIRATION RATE: 20 BRPM | OXYGEN SATURATION: 94 % | DIASTOLIC BLOOD PRESSURE: 80 MMHG | HEIGHT: 66 IN | HEART RATE: 86 BPM

## 2022-11-18 DIAGNOSIS — J01.90 ACUTE BACTERIAL SINUSITIS: ICD-10-CM

## 2022-11-18 DIAGNOSIS — H65.93 OME (OTITIS MEDIA WITH EFFUSION), BILATERAL: ICD-10-CM

## 2022-11-18 DIAGNOSIS — B96.89 ACUTE BACTERIAL SINUSITIS: ICD-10-CM

## 2022-11-18 DIAGNOSIS — J34.89 SINUS PRESSURE: ICD-10-CM

## 2022-11-18 DIAGNOSIS — J34.89 SINUS PAIN: Primary | ICD-10-CM

## 2022-11-18 PROCEDURE — 1123F ACP DISCUSS/DSCN MKR DOCD: CPT | Performed by: STUDENT IN AN ORGANIZED HEALTH CARE EDUCATION/TRAINING PROGRAM

## 2022-11-18 PROCEDURE — 3017F COLORECTAL CA SCREEN DOC REV: CPT | Performed by: STUDENT IN AN ORGANIZED HEALTH CARE EDUCATION/TRAINING PROGRAM

## 2022-11-18 PROCEDURE — 1090F PRES/ABSN URINE INCON ASSESS: CPT | Performed by: STUDENT IN AN ORGANIZED HEALTH CARE EDUCATION/TRAINING PROGRAM

## 2022-11-18 PROCEDURE — 3078F DIAST BP <80 MM HG: CPT | Performed by: STUDENT IN AN ORGANIZED HEALTH CARE EDUCATION/TRAINING PROGRAM

## 2022-11-18 PROCEDURE — 3074F SYST BP LT 130 MM HG: CPT | Performed by: STUDENT IN AN ORGANIZED HEALTH CARE EDUCATION/TRAINING PROGRAM

## 2022-11-18 PROCEDURE — G8427 DOCREV CUR MEDS BY ELIG CLIN: HCPCS | Performed by: STUDENT IN AN ORGANIZED HEALTH CARE EDUCATION/TRAINING PROGRAM

## 2022-11-18 PROCEDURE — 99213 OFFICE O/P EST LOW 20 MIN: CPT | Performed by: STUDENT IN AN ORGANIZED HEALTH CARE EDUCATION/TRAINING PROGRAM

## 2022-11-18 PROCEDURE — 1036F TOBACCO NON-USER: CPT | Performed by: STUDENT IN AN ORGANIZED HEALTH CARE EDUCATION/TRAINING PROGRAM

## 2022-11-18 PROCEDURE — G8399 PT W/DXA RESULTS DOCUMENT: HCPCS | Performed by: STUDENT IN AN ORGANIZED HEALTH CARE EDUCATION/TRAINING PROGRAM

## 2022-11-18 PROCEDURE — G8417 CALC BMI ABV UP PARAM F/U: HCPCS | Performed by: STUDENT IN AN ORGANIZED HEALTH CARE EDUCATION/TRAINING PROGRAM

## 2022-11-18 PROCEDURE — G8484 FLU IMMUNIZE NO ADMIN: HCPCS | Performed by: STUDENT IN AN ORGANIZED HEALTH CARE EDUCATION/TRAINING PROGRAM

## 2022-11-18 RX ORDER — AMOXICILLIN AND CLAVULANATE POTASSIUM 875; 125 MG/1; MG/1
1 TABLET, FILM COATED ORAL 2 TIMES DAILY
Qty: 14 TABLET | Refills: 0 | Status: SHIPPED | OUTPATIENT
Start: 2022-11-18 | End: 2022-11-25

## 2022-11-18 ASSESSMENT — ENCOUNTER SYMPTOMS
COUGH: 0
BACK PAIN: 0
DIARRHEA: 0
VOMITING: 0
SINUS PRESSURE: 1
NAUSEA: 0
WHEEZING: 0
ABDOMINAL PAIN: 0
SINUS PAIN: 1
RHINORRHEA: 0

## 2022-11-18 NOTE — PROGRESS NOTES
HPI Notes    Name: Faith Avila  : 1955         Chief Complaint:     Chief Complaint   Patient presents with    Pharyngitis    Congestion    Otalgia     Having balance issues would like her ears checks        History of Present Illness:        HPI    This is a 77year old woman presenting for reevaluation of previously discussed sinus pain, pressure as well as ear pressure. They have not improved despite use of pseudoephedrine and OMT, and have worsened to now affect the teeth as well. She denies fevers, bloody nasal discharge. She will be leaving for a family trip and is concerned about being ill during that trip. Past Medical History:     Past Medical History:   Diagnosis Date    Cerebrovascular disease     mini stroke may 2012    Chronic back pain     GERD (gastroesophageal reflux disease)     Hypertension     Hypothyroidism     Osteoarthritis     knee and back    Osteopenia 2016      Reviewed all health maintenance requirements and ordered appropriate tests  Health Maintenance Due   Topic Date Due    Hepatitis C screen  Never done    DTaP/Tdap/Td vaccine (1 - Tdap) Never done    Shingles vaccine (1 of 2) Never done    COVID-19 Vaccine (3 - Booster for Moderna series) 2021    Colorectal Cancer Screen  2021    Lipids  2022       Past Surgical History:     Past Surgical History:   Procedure Laterality Date    APPENDECTOMY      BREAST BIOPSY      CHOLECYSTECTOMY      HYSTERECTOMY (CERVIX STATUS UNKNOWN)      still Rt ovary present    JOINT REPLACEMENT  2018    Rt knee    KNEE SURGERY Left 22        Medications:       Prior to Admission medications    Medication Sig Start Date End Date Taking?  Authorizing Provider   amoxicillin-clavulanate (AUGMENTIN) 875-125 MG per tablet Take 1 tablet by mouth 2 times daily for 7 days 22 Yes Barney Sosa,    omeprazole (PRILOSEC) 20 MG delayed release capsule TAKE ONE CAPSULE BY MOUTH EVERY MORNING BEFORE BREAKFAST 11/15/22   Anabel Moreno MD   losartan (COZAAR) 100 MG tablet TAKE ONE TABLET BY MOUTH DAILY 11/15/22   Anabel Moreno MD   levothyroxine (SYNTHROID) 100 MCG tablet TAKE ONE TABLET BY MOUTH DAILY 10/3/22   Anabel Moreno MD   Estradiol (VAGIFEM) 10 MCG TABS vaginal tablet Place 1 tablet vaginally Twice a Week 9/8/22   Nolberto Brunner, PA-C   hydroCHLOROthiazide (HYDRODIURIL) 25 MG tablet TAKE 1/2 TABLET BY MOUTH DAILY 8/16/22   Anabel Moreno MD   UNABLE TO FIND Gabapentin-Ketoprofen amitriptyline HCL 10-10-2% neuroserum liquid  Apply small amt to affected area and rub for 10 minutes. Repeat 2-3 times daily 8/16/22   Anabel Moreno MD   vitamin B-12 (CYANOCOBALAMIN) 1000 MCG tablet Take 1,000 mcg by mouth in the morning. Historical Provider, MD   clotrimazole (LOTRIMIN) 1 % cream Use daily as needed 7/1/22   Floydene Gauze, DO   traMADol Herschell Fortune) 50 MG tablet  9/14/21   Historical Provider, MD   Ascorbic Acid (VITAMIN C) 250 MG tablet Take 250 mg by mouth daily    Historical Provider, MD   famotidine (PEPCID) 20 MG tablet TAKE 1 TABLET DAILY WITH SUPPER 7/27/20   Anabel Moreno MD   loratadine (CLARITIN) 10 MG tablet Take 10 mg by mouth daily Prn    Historical Provider, MD   vitamin D (ERGOCALCIFEROL) 41548 units CAPS capsule Take 50,000 Units by mouth Twice a Week  1/24/19   Historical Provider, MD   Vitamin Mixture (VITAMIN E COMPLETE PO) Po qd    Historical Provider, MD   clopidogrel (PLAVIX) 75 MG tablet Take 1 tablet by mouth daily. Patient taking differently: Take 75 mg by mouth every other day 2/21/15   Alaina Manzo MD   folic acid (FOLVITE) 1 MG tablet Take 1 mg by mouth daily      Historical Provider, MD        Allergies:       Celecoxib, Ciprofloxacin, Flonase [fluticasone propionate], Iodine, Macrobid [nitrofurantoin], Other, Oxycodone hcl, and Oxycodone    Social History:     Tobacco:    reports that she quit smoking about 36 years ago.  Her smoking use included cigarettes. She has a 15.00 pack-year smoking history. She has never used smokeless tobacco.  Alcohol:      reports no history of alcohol use. Drug Use:  reports no history of drug use. Family History:     Family History   Problem Relation Age of Onset    Diabetes Mother     COPD Mother     Heart Failure Mother     Breast Cancer Mother     Cancer Father         colon    Diabetes Father        Review of Systems:         Review of Systems   Constitutional:  Negative for fever. HENT:  Positive for congestion, ear pain, sinus pressure and sinus pain. Negative for rhinorrhea and sneezing. Respiratory:  Negative for cough and wheezing. Cardiovascular:  Negative for chest pain. Gastrointestinal:  Negative for abdominal pain, diarrhea, nausea and vomiting. Genitourinary:  Negative for menstrual problem and vaginal discharge. Musculoskeletal:  Negative for back pain. Skin:  Negative for rash. Neurological:  Negative for headaches. Psychiatric/Behavioral:  Negative for sleep disturbance. Physical Exam:     Vitals:  /80 (Site: Right Upper Arm, Position: Sitting, Cuff Size: Large Adult)   Pulse 86   Temp 97.5 °F (36.4 °C) (Oral)   Resp 20   Ht 5' 6\" (1.676 m)   Wt 234 lb 9.6 oz (106.4 kg)   SpO2 94%   BMI 37.87 kg/m²       Physical Exam  Vitals and nursing note reviewed. Constitutional:       General: She is not in acute distress. Appearance: Normal appearance. She is normal weight. HENT:      Head:      Comments: Tap tenderness to maxillary sinuses, frontal sinuses  Skin:     General: Skin is warm and dry. Capillary Refill: Capillary refill takes less than 2 seconds. Neurological:      General: No focal deficit present. Mental Status: She is alert and oriented to person, place, and time. Mental status is at baseline. Cranial Nerves: No cranial nerve deficit.    Psychiatric:         Mood and Affect: Mood normal.         Behavior: Behavior normal. Thought Content: Thought content normal.               Data:     Lab Results   Component Value Date/Time     10/28/2022 10:05 AM    K 4.3 10/28/2022 10:05 AM     10/28/2022 10:05 AM    CO2 25 10/28/2022 10:05 AM    BUN 15 10/28/2022 10:05 AM    CREATININE 1.01 10/28/2022 10:05 AM    GLUCOSE 100 10/28/2022 10:05 AM    GLUCOSE 97 01/25/2012 08:00 AM    PROT 6.5 11/24/2017 07:58 AM    LABALBU 3.9 11/24/2017 07:58 AM    LABALBU 4.4 01/25/2012 08:00 AM    BILITOT 0.39 11/24/2017 07:58 AM    ALKPHOS 65 11/24/2017 07:58 AM    AST 15 11/24/2017 07:58 AM    ALT 10 11/24/2017 07:58 AM     Lab Results   Component Value Date/Time    WBC 6.1 10/28/2022 10:05 AM    RBC 4.81 10/28/2022 10:05 AM    RBC 4.92 01/25/2012 08:00 AM    HGB 13.4 10/28/2022 10:05 AM    HCT 40.1 10/28/2022 10:05 AM    MCV 83.4 10/28/2022 10:05 AM    MCH 27.9 10/28/2022 10:05 AM    MCHC 33.5 10/28/2022 10:05 AM    RDW 16.1 10/28/2022 10:05 AM     10/28/2022 10:05 AM     01/25/2012 08:00 AM    MPV NOT REPORTED 01/27/2020 09:07 AM     Lab Results   Component Value Date/Time    TSH 2.02 02/08/2022 09:05 AM     Lab Results   Component Value Date/Time    CHOL 158 11/24/2017 07:58 AM    HDL 73 11/24/2017 07:58 AM    LABA1C 5.5 05/12/2021 11:27 AM          Assessment & Plan        Diagnosis Orders   1. Sinus pain        2. Sinus pressure        3. OME (otitis media with effusion), bilateral        4. Acute bacterial sinusitis  amoxicillin-clavulanate (AUGMENTIN) 875-125 MG per tablet          Based on worsening condition, I believe that she likely has acute bacterial sinusitis, which may represent a superimposed infection upon an originally viral condition. I would recommend antibiotic therpay for 7 days with Augmentin as this will decrease transmission likelihood to her family members. The patient I had a long discussion about starting Augmentin.   We discussed its mechanism of action, intended goals, adverse effects, as well as common side effects. They were able to verbalize understanding, and repeat plan back to me. Follow up PRN      Completed Refills   Requested Prescriptions     Signed Prescriptions Disp Refills    amoxicillin-clavulanate (AUGMENTIN) 875-125 MG per tablet 14 tablet 0     Sig: Take 1 tablet by mouth 2 times daily for 7 days     Return if symptoms worsen or fail to improve. Orders Placed This Encounter   Medications    amoxicillin-clavulanate (AUGMENTIN) 875-125 MG per tablet     Sig: Take 1 tablet by mouth 2 times daily for 7 days     Dispense:  14 tablet     Refill:  0     No orders of the defined types were placed in this encounter. Patient Instructions     SURVEY:    You may be receiving a survey from [email or web address] regarding your visit today. Please complete the survey to enable us to provide the highest quality of care to you and your family. If you cannot score us a very good on any question, please call the office to discuss how we could of made your experience a very good one. Thank you.       Clinical Care Team:     Dr. Chanda Sandy [facility]      ClericalTeam:     [id number] [facility]   Electronically signed by Radha Field DO on 11/18/2022 at 1:22 PM           Completed Refills   Requested Prescriptions     Signed Prescriptions Disp Refills    amoxicillin-clavulanate (AUGMENTIN) 875-125 MG per tablet 14 tablet 0     Sig: Take 1 tablet by mouth 2 times daily for 7 days

## 2022-12-19 DIAGNOSIS — E03.8 OTHER SPECIFIED HYPOTHYROIDISM: ICD-10-CM

## 2022-12-19 RX ORDER — LEVOTHYROXINE SODIUM 0.1 MG/1
TABLET ORAL
Qty: 90 TABLET | Refills: 1 | Status: SHIPPED | OUTPATIENT
Start: 2022-12-19

## 2022-12-19 NOTE — TELEPHONE ENCOUNTER
Last OV: 11/18/2022 101/28/22 chronic   Last RX:    Next scheduled apt: 1/30/2023  6 month f/u        Surescript requesting a refill

## 2023-01-02 RX ORDER — CLOTRIMAZOLE 1 %
CREAM (GRAM) TOPICAL
Qty: 45 G | Refills: 1 | Status: SHIPPED | OUTPATIENT
Start: 2023-01-02

## 2023-01-04 ENCOUNTER — OFFICE VISIT (OUTPATIENT)
Dept: FAMILY MEDICINE CLINIC | Age: 68
End: 2023-01-04
Payer: MEDICARE

## 2023-01-04 VITALS
WEIGHT: 233 LBS | HEART RATE: 84 BPM | HEIGHT: 66 IN | BODY MASS INDEX: 37.45 KG/M2 | DIASTOLIC BLOOD PRESSURE: 82 MMHG | OXYGEN SATURATION: 96 % | SYSTOLIC BLOOD PRESSURE: 128 MMHG

## 2023-01-04 DIAGNOSIS — H69.81 EUSTACHIAN TUBE DYSFUNCTION, RIGHT: Primary | ICD-10-CM

## 2023-01-04 PROCEDURE — G8399 PT W/DXA RESULTS DOCUMENT: HCPCS | Performed by: FAMILY MEDICINE

## 2023-01-04 PROCEDURE — 3074F SYST BP LT 130 MM HG: CPT | Performed by: FAMILY MEDICINE

## 2023-01-04 PROCEDURE — 1123F ACP DISCUSS/DSCN MKR DOCD: CPT | Performed by: FAMILY MEDICINE

## 2023-01-04 PROCEDURE — G8417 CALC BMI ABV UP PARAM F/U: HCPCS | Performed by: FAMILY MEDICINE

## 2023-01-04 PROCEDURE — G8484 FLU IMMUNIZE NO ADMIN: HCPCS | Performed by: FAMILY MEDICINE

## 2023-01-04 PROCEDURE — 3017F COLORECTAL CA SCREEN DOC REV: CPT | Performed by: FAMILY MEDICINE

## 2023-01-04 PROCEDURE — 1036F TOBACCO NON-USER: CPT | Performed by: FAMILY MEDICINE

## 2023-01-04 PROCEDURE — G8427 DOCREV CUR MEDS BY ELIG CLIN: HCPCS | Performed by: FAMILY MEDICINE

## 2023-01-04 PROCEDURE — 99213 OFFICE O/P EST LOW 20 MIN: CPT | Performed by: FAMILY MEDICINE

## 2023-01-04 PROCEDURE — 3079F DIAST BP 80-89 MM HG: CPT | Performed by: FAMILY MEDICINE

## 2023-01-04 PROCEDURE — 1090F PRES/ABSN URINE INCON ASSESS: CPT | Performed by: FAMILY MEDICINE

## 2023-01-04 ASSESSMENT — PATIENT HEALTH QUESTIONNAIRE - PHQ9
SUM OF ALL RESPONSES TO PHQ9 QUESTIONS 1 & 2: 0
2. FEELING DOWN, DEPRESSED OR HOPELESS: 0
SUM OF ALL RESPONSES TO PHQ QUESTIONS 1-9: 0
1. LITTLE INTEREST OR PLEASURE IN DOING THINGS: 0

## 2023-01-04 ASSESSMENT — ENCOUNTER SYMPTOMS
VOMITING: 0
DIARRHEA: 0
RHINORRHEA: 1
SORE THROAT: 0
COUGH: 0

## 2023-01-04 NOTE — PATIENT INSTRUCTIONS
Survey: You may be receiving a survey from [employer] regarding your visit today. You may get this in the mail, through your MyChart or in your email. Please complete the survey to enable us to provide the highest quality of care to you and your family. Please also, mention our names. If you cannot score us as very good (5 Stars) on any question, please feel free to call the office to discuss how we could have made your experience exceptional.      Thank You!         MD Samanta Davenport LPN

## 2023-01-04 NOTE — PROGRESS NOTES
HPI Notes    Name: Liane Jurado  : 1955        Chief Complaint:     Chief Complaint   Patient presents with    Otalgia     Right side ear pain. History of Present Illness:     Liane Jurado is a 79 y.o.  female who presents with Otalgia (Right side ear pain. )      Otalgia   There is pain in the right ear. This is a new problem. Episode onset: started 3-4d ago with achiness and goes down the her teeth. JUST this AM pt has more sinus drainage. NO cough. Pt just saw dentist 2wks ago and all good. NO fever or chills. NO body aches. NO sore throat. The problem has been unchanged. There has been no fever. Associated symptoms include rhinorrhea. Pertinent negatives include no coughing, diarrhea, ear discharge, headaches, hearing loss, sore throat or vomiting. She has tried acetaminophen (pt took ONE claritin yesterday ans seemed to help.) for the symptoms. The treatment provided moderate relief.      Past Medical History:     Past Medical History:   Diagnosis Date    Cerebrovascular disease     mini stroke may 2012    Chronic back pain     GERD (gastroesophageal reflux disease)     Hypertension     Hypothyroidism     Osteoarthritis     knee and back    Osteopenia 2016      Reviewed all health maintenance requirements and ordered appropriate tests  Health Maintenance Due   Topic Date Due    Hepatitis C screen  Never done    DTaP/Tdap/Td vaccine (1 - Tdap) Never done    Shingles vaccine (1 of 2) Never done    COVID-19 Vaccine (3 - Booster for Moderna series) 2021    Colorectal Cancer Screen  2021    Lipids  2022       Past Surgical History:     Past Surgical History:   Procedure Laterality Date    APPENDECTOMY      BREAST BIOPSY      CHOLECYSTECTOMY      HYSTERECTOMY (CERVIX STATUS UNKNOWN)      still Rt ovary present    JOINT REPLACEMENT  2018    Rt knee    KNEE SURGERY Left 22        Medications:       Prior to Admission medications    Medication Sig Start Date End Date Taking? Authorizing Provider   clotrimazole (LOTRIMIN) 1 % cream APPLY TO AFFECTED AREA(S) DAILY TOPICALLY AS NEEDED 1/2/23   Luis Manuel oG MD   levothyroxine (SYNTHROID) 100 MCG tablet TAKE ONE TABLET BY MOUTH DAILY 12/19/22   Luis Manuel Go MD   omeprazole (PRILOSEC) 20 MG delayed release capsule TAKE ONE CAPSULE BY MOUTH EVERY MORNING BEFORE BREAKFAST 11/15/22   Luis Manuel Go MD   losartan (COZAAR) 100 MG tablet TAKE ONE TABLET BY MOUTH DAILY 11/15/22   Luis Manuel Go MD   Estradiol (VAGIFEM) 10 MCG TABS vaginal tablet Place 1 tablet vaginally Twice a Week 9/8/22   Molly Benjamin PA-C   hydroCHLOROthiazide (HYDRODIURIL) 25 MG tablet TAKE 1/2 TABLET BY MOUTH DAILY 8/16/22   Luis Manuel Go MD   UNABLE TO FIND Gabapentin-Ketoprofen amitriptyline HCL 10-10-2% neuroserum liquid  Apply small amt to affected area and rub for 10 minutes. Repeat 2-3 times daily 8/16/22   Luis Manuel Go MD   vitamin B-12 (CYANOCOBALAMIN) 1000 MCG tablet Take 1,000 mcg by mouth in the morning. Historical Provider, MD   traMADol Phyllis Heading) 50 MG tablet  9/14/21   Historical Provider, MD   Ascorbic Acid (VITAMIN C) 250 MG tablet Take 250 mg by mouth daily    Historical Provider, MD   famotidine (PEPCID) 20 MG tablet TAKE 1 TABLET DAILY WITH SUPPER 7/27/20   Luis Manuel Go MD   loratadine (CLARITIN) 10 MG tablet Take 10 mg by mouth daily Prn    Historical Provider, MD   vitamin D (ERGOCALCIFEROL) 55948 units CAPS capsule Take 50,000 Units by mouth Twice a Week  1/24/19   Historical Provider, MD   Vitamin Mixture (VITAMIN E COMPLETE PO) Po qd    Historical Provider, MD   clopidogrel (PLAVIX) 75 MG tablet Take 1 tablet by mouth daily.   Patient taking differently: Take 75 mg by mouth every other day 2/21/15   Jose Huerta MD   folic acid (FOLVITE) 1 MG tablet Take 1 mg by mouth daily      Historical Provider, MD        Allergies:       Celecoxib, Ciprofloxacin, Flonase [fluticasone propionate], Iodine, Macrobid [nitrofurantoin], Other, Oxycodone hcl, and Oxycodone    Social History:     Tobacco:    reports that she quit smoking about 37 years ago. Her smoking use included cigarettes. She has a 15.00 pack-year smoking history. She has never used smokeless tobacco.  Alcohol:      reports no history of alcohol use. Drug Use:  reports no history of drug use. Family History:     Family History   Problem Relation Age of Onset    Diabetes Mother     COPD Mother     Heart Failure Mother     Breast Cancer Mother     Cancer Father         colon    Diabetes Father        Review of Systems:       Review of Systems   HENT:  Positive for ear pain and rhinorrhea. Negative for ear discharge, hearing loss and sore throat. Respiratory:  Negative for cough. Gastrointestinal:  Negative for diarrhea and vomiting. Neurological:  Negative for headaches. Physical Exam:     Physical Exam  Vitals reviewed. Constitutional:       General: She is not in acute distress. Appearance: Normal appearance. She is not ill-appearing. HENT:      Head: Normocephalic and atraumatic. Right Ear: Ear canal and external ear normal. There is no impacted cerumen. Tympanic membrane is bulging. Tympanic membrane is not injected, scarred, perforated or erythematous. Left Ear: Ear canal and external ear normal. A middle ear effusion is present. There is no impacted cerumen. Tympanic membrane is not injected, scarred, perforated or erythematous. Ears:      Comments: Rt TM is more dull and slight bulge. Nose: Rhinorrhea present. No congestion. Mouth/Throat:      Pharynx: No oropharyngeal exudate or posterior oropharyngeal erythema. Neurological:      Mental Status: She is alert.        Vitals:  /82   Pulse 84   Ht 5' 6\" (1.676 m)   Wt 233 lb (105.7 kg)   SpO2 96%   BMI 37.61 kg/m²       Data:     Lab Results   Component Value Date/Time     10/28/2022 10:05 AM    K 4.3 10/28/2022 10:05 AM  10/28/2022 10:05 AM    CO2 25 10/28/2022 10:05 AM    BUN 15 10/28/2022 10:05 AM    CREATININE 1.01 10/28/2022 10:05 AM    GLUCOSE 100 10/28/2022 10:05 AM    GLUCOSE 97 01/25/2012 08:00 AM    PROT 6.5 11/24/2017 07:58 AM    LABALBU 3.9 11/24/2017 07:58 AM    LABALBU 4.4 01/25/2012 08:00 AM    BILITOT 0.39 11/24/2017 07:58 AM    ALKPHOS 65 11/24/2017 07:58 AM    AST 15 11/24/2017 07:58 AM    ALT 10 11/24/2017 07:58 AM     Lab Results   Component Value Date/Time    WBC 6.1 10/28/2022 10:05 AM    RBC 4.81 10/28/2022 10:05 AM    RBC 4.92 01/25/2012 08:00 AM    HGB 13.4 10/28/2022 10:05 AM    HCT 40.1 10/28/2022 10:05 AM    MCV 83.4 10/28/2022 10:05 AM    MCH 27.9 10/28/2022 10:05 AM    MCHC 33.5 10/28/2022 10:05 AM    RDW 16.1 10/28/2022 10:05 AM     10/28/2022 10:05 AM     01/25/2012 08:00 AM    MPV NOT REPORTED 01/27/2020 09:07 AM     Lab Results   Component Value Date/Time    TSH 2.02 02/08/2022 09:05 AM     Lab Results   Component Value Date/Time    CHOL 158 11/24/2017 07:58 AM    HDL 73 11/24/2017 07:58 AM    LABA1C 5.5 05/12/2021 11:27 AM          Assessment/Plan:        1. Eustachian tube dysfunction, right  D/w pt no signs of infection so keep taking claritin 10mg and add nasal saline spray daily. Use for next week before she flies to [place] and during trip and home. Return if any new symptoms. Return if symptoms worsen or fail to improve.       Electronically signed by eLia Antoine MD on 1/4/2023 at 8:17 AM

## 2023-01-16 NOTE — PROGRESS NOTES
[facility]   Preadmission Testing    Name: Maryana Rocha  : 1955  Patient Phone: [phone] (home) [phone] (work)    Procedure: COLONOSCOPY    Date of Procedure: 2023  Surgeon: Kiera Tracey MD    Ht:  5' 6\" (167.6 cm)  Wt: 228 lb (103.4 kg)  Wt method: Stated    Allergies: Allergies   Allergen Reactions    Celecoxib Other (See Comments)    Ciprofloxacin Other (See Comments)     Headaches    Flonase [Fluticasone Propionate]      Smelled smoke with medication. Iodine      Other reaction(s): AOF    Macrobid [Nitrofurantoin] Nausea Only    Other Hives     Contrast for a stress test    Oxycodone Hcl Other (See Comments)    Oxycodone Rash     hallucinations                There were no vitals filed for this visit. No LMP recorded. Patient has had a hysterectomy. Do you take blood thinners? [x] Yes    [] No         Instructed to stop blood thinners prior to procedure? [x] Yes    [] No      [] N/A   Do you have sleep apnea? [] Yes    [x] No     Do you have acid reflux ? [] Yes    [x] No     Do you have  hiatal hernia? [x] Yes    [] No    Do you ever experience motion sickness? [] Yes    [x] No     Have you had a respiratory infection or sore throat in last 4 weeks before surgery? [] Yes    [x] No     Do you have poorly controlled asthma or COPD? Difficulty with intubation in past? [] Yes    [x] No      [] Yes    [x] No       Do you have a history of angina in the last month or symptomatic arrhythmia? [] Yes    [x] No     Do you have significant central nervous system disease? [] Yes    [x] No     Have you had an EKG, labs, or chest xray in last 12 months? If yes provide copies to anesthesia   [x] Yes    [] No       [x] Lab    [] EKG    [] CXR     Have you had a stress test?     [] Yes    [x] No    When/where:    Was it normal?    [] Yes    [] No     Do you or your family have a history of Malignant Hyperthermia?    [] Yes    [x] No           Do you smoke? [] Yes    [x] No      Please refrain from smoking on the day of surgery. Patient instructed on: [x] NPO Status   [x] Meds to Take  [x] Ride Home  [x]No Jewelry/Contact Lenses/Nail [place]  [] Prep/Lax/Clear Liquids    [] Chlorhexidene     DOS Patient Needs [] HCG   [] Blood Sugar  [] PT/INR    [] T&S       COVID Vaccinated? [x] Yes    [] No           PAT Call/Visit Questions  Person Interviewed: Earline Ernst  Relationship to Patient: Patient         Patient instructed on the pre-operative, intra-operative, and post-operative process? Yes  Medication instructions reviewed with patient?   Yes

## 2023-01-17 ENCOUNTER — HOSPITAL ENCOUNTER (OUTPATIENT)
Age: 68
Discharge: HOME | End: 2023-01-17
Payer: MEDICARE

## 2023-01-17 ENCOUNTER — OFFICE VISIT (OUTPATIENT)
Dept: FAMILY MEDICINE CLINIC | Age: 68
End: 2023-01-17

## 2023-01-17 VITALS
HEART RATE: 72 BPM | SYSTOLIC BLOOD PRESSURE: 139 MMHG | WEIGHT: 233 LBS | DIASTOLIC BLOOD PRESSURE: 88 MMHG | BODY MASS INDEX: 37.45 KG/M2 | HEIGHT: 66 IN

## 2023-01-17 DIAGNOSIS — Z12.11 COLON CANCER SCREENING: Primary | ICD-10-CM

## 2023-01-17 DIAGNOSIS — Z01.818 PRE-OP TESTING: ICD-10-CM

## 2023-01-17 LAB
EKG ATRIAL RATE: 66 BPM
EKG P AXIS: 79 DEGREES
EKG P-R INTERVAL: 142 MS
EKG Q-T INTERVAL: 406 MS
EKG QRS DURATION: 84 MS
EKG QTC CALCULATION (BAZETT): 425 MS
EKG R AXIS: 82 DEGREES
EKG T AXIS: 73 DEGREES
EKG VENTRICULAR RATE: 66 BPM

## 2023-01-17 PROCEDURE — 93005 ELECTROCARDIOGRAM TRACING: CPT | Performed by: INTERNAL MEDICINE

## 2023-01-17 RX ORDER — SODIUM, POTASSIUM,MAG SULFATES 17.5-3.13G
SOLUTION, RECONSTITUTED, ORAL ORAL
Qty: 1 EACH | Refills: 0 | Status: SHIPPED | OUTPATIENT
Start: 2023-01-17

## 2023-01-17 ASSESSMENT — ENCOUNTER SYMPTOMS
ABDOMINAL PAIN: 0
BLOOD IN STOOL: 0
NAUSEA: 0
CHEST TIGHTNESS: 0
RHINORRHEA: 0
DIARRHEA: 0
COUGH: 0
SHORTNESS OF BREATH: 0
SORE THROAT: 0
CONSTIPATION: 0

## 2023-01-17 NOTE — PROGRESS NOTES
Tre Valenzuela (:  1955) is a 79 y.o. female,Established patient, here for evaluation of the following chief complaint(s):  Surgical Consult (Colonoscopy consult. Scheduled for 23. Last scope 10 yrs ago, Dr David Estrada. )         ASSESSMENT/PLAN:  1. Colon cancer screening  -     COLONOSCOPY W/ OR W/O BIOPSY; Future  -     sodium-potassium-mag sulfate (SUPREP BOWEL PREP KIT) 17.5-3.13-1.6 GM/177ML SOLN solution; Use as directed., Disp-1 each, R-0Normal    Plan:  1. Colon cancer screening  Set up for screening colonoscopy. Risk and benefits explained and informed consent obtained. The bowel prep was explained to Felipe Beltran and she was instructed to start the prep the day before the procedure (printed directions were given). Avoid corn 1 week prior to the procedure as this can cause suctioning difficulties during the procedure. Avoid eating or drinking at least 8 hours prior to the procedure. Felipe Beltran was encouraged to call the clinic if she has any questions prior to the procedure. - COLONOSCOPY W/ OR W/O BIOPSY; Future  - sodium-potassium-mag sulfate (SUPREP BOWEL PREP KIT) 17.5-3.13-1.6 GM/177ML SOLN solution; Use as directed. Dispense: 1 each; Refill: 0    Follow up with Dr. Mak Sebastian after the procedure. Subjective   SUBJECTIVE/OBJECTIVE:  Felipe Beltran a patient of Dr. Mak Sebastian presents to be evaluated for a colonoscopy. Felipe Beltran is 79 y.o. and has had a colonoscopy in the past.  There is not a history of colon polyps or colon cancer. Currently Felipe Beltran denies rectal bleeding, denies bowel habit changes, and denies abdominal pain. There is  a family history of colon cancer (father had colon cancer).      Past Medical History:  No date: Cerebrovascular disease      Comment:  mini stroke may 2012,   No date: Chronic back pain  No date: GERD (gastroesophageal reflux disease)  No date: Hypertension  No date: Hypothyroidism  No date: Osteoarthritis      Comment:  knee and back  2016: Osteopenia    Past Surgical History:  No date: APPENDECTOMY  No date: BREAST BIOPSY  No date: CHOLECYSTECTOMY  No date: HYSTERECTOMY (CERVIX STATUS UNKNOWN)      Comment:  still Rt ovary present  2018: JOINT REPLACEMENT      Comment:  Rt knee  22: KNEE SURGERY;  Left    Social History    Socioeconomic History      Marital status:       Spouse name: Not on file      Number of children: Not on file      Years of education: Not on file      Highest education level: Not on file    Occupational History      Not on file    Tobacco Use      Smoking status: Former        Packs/day: 1.00        Years: 15.00        Pack years: 15        Types: Cigarettes        Quit date: 12        Years since quittin.0      Smokeless tobacco: Never    Vaping Use      Vaping Use: Never used    Substance and Sexual Activity      Alcohol use: No      Drug use: No      Sexual activity: Not Currently        Partners: Male    Other Topics      Concerns:        Not on file    Social History Narrative      Not on file    Social Determinants of Health  Financial Resource Strain: Low Risk       Difficulty of Paying Living Expenses: Not very hard  Food Insecurity: No Food Insecurity      Worried About Running Out of Food in the Last Year: Never true      Ran Out of Food in the Last Year: Never true  Transportation Needs: Not on file  Physical Activity: Inactive      Days of Exercise per Week: 0 days      Minutes of Exercise per Session: 0 min  Stress: Not on file  Social Connections: Not on file  Intimate Partner Violence: Not on file  Housing Stability: Not on file    Review of patient's family history indicates:  Problem: Diabetes      Relation: Mother          Age of Onset: (Not Specified)  Problem: COPD      Relation: Mother          Age of Onset: (Not Specified)  Problem: Heart Failure      Relation: Mother          Age of Onset: (Not Specified)  Problem: Breast Cancer      Relation: Mother          Age of Onset: (Not Specified)  Problem: Cancer      Relation: Father          Age of Onset: (Not Specified)          Comment: colon  Problem: Diabetes      Relation: Father          Age of Onset: (Not Specified)      Current Outpatient Medications on File Prior to Visit:  clotrimazole (LOTRIMIN) 1 % cream, APPLY TO AFFECTED AREA(S) DAILY TOPICALLY AS NEEDED, Disp: 45 g, Rfl: 1  levothyroxine (SYNTHROID) 100 MCG tablet, TAKE ONE TABLET BY MOUTH DAILY, Disp: 90 tablet, Rfl: 1  omeprazole (PRILOSEC) 20 MG delayed release capsule, TAKE ONE CAPSULE BY MOUTH EVERY MORNING BEFORE BREAKFAST, Disp: 90 capsule, Rfl: 1  losartan (COZAAR) 100 MG tablet, TAKE ONE TABLET BY MOUTH DAILY, Disp: 90 tablet, Rfl: 1  Estradiol (VAGIFEM) 10 MCG TABS vaginal tablet, Place 1 tablet vaginally Twice a Week, Disp: 8 tablet, Rfl: 12  hydroCHLOROthiazide (HYDRODIURIL) 25 MG tablet, TAKE 1/2 TABLET BY MOUTH DAILY, Disp: 45 tablet, Rfl: 1  UNABLE TO FIND, Gabapentin-Ketoprofen amitriptyline HCL 10-10-2% neuroserum liquidApply small amt to affected area and rub for 10 minutes. Repeat 2-3 times daily, Disp: 15 mL, Rfl: 11  traMADol (ULTRAM) 50 MG tablet, , Disp: , Rfl:   loratadine (CLARITIN) 10 MG tablet, Take 10 mg by mouth daily Prn, Disp: , Rfl:   vitamin D (ERGOCALCIFEROL) 60494 units CAPS capsule, Take 50,000 Units by mouth Twice a Week , Disp: , Rfl:   clopidogrel (PLAVIX) 75 MG tablet, Take 1 tablet by mouth daily. (Patient taking differently: Take 75 mg by mouth every other day), Disp: 30 tablet, Rfl: 0  folic acid (FOLVITE) 1 MG tablet, Take 1 mg by mouth daily  , Disp: , Rfl:     No current facility-administered medications on file prior to visit. -- Celecoxib -- Other (See Comments)   -- Ciprofloxacin -- Other (See Comments)    --  Headaches   -- Flonase [Fluticasone Propionate]     --  Smelled smoke with medication.    -- Iodine     --  Other reaction(s): AOF   -- Macrobid [Nitrofurantoin] -- Nausea Only   -- Other -- Hives    --  Contrast for a stress test   -- Oxycodone Hcl -- Other (See Comments)   -- Oxycodone -- Rash    --  hallucinations      Lab Results       Component                Value               Date                       NA                       138                 10/28/2022                 K                        4.3                 10/28/2022                 CL                       102                 10/28/2022                 CO2                      25                  10/28/2022                 BUN                      15                  10/28/2022                 CREATININE               1.01 (H)            10/28/2022                 GLUCOSE                  100 (H)             10/28/2022                 CALCIUM                  9.3                 10/28/2022                 PROT                     6.5                 11/24/2017                 LABALBU                  3.9                 11/24/2017                 BILITOT                  0.39                11/24/2017                 ALKPHOS                  65                  11/24/2017                 AST                      15                  11/24/2017                 ALT                      10                  11/24/2017                 LABGLOM                  >60                 10/28/2022                 GFRAA                    >60                 02/08/2022              Lab Results       Component                Value               Date                       LABA1C                   5.5                 05/12/2021            Lab Results       Component                Value               Date                       EAG                      114                 07/27/2020              Lab Results       Component                Value               Date                       CHOL                     158                 11/24/2017                 CHOL                     174                 02/27/2016                 CHOL                     135                 09/21/2013 Lab Results       Component                Value               Date                       TRIG                     99                  11/24/2017                 TRIG                     98                  02/27/2016                 TRIG                     103                 09/21/2013            Lab Results       Component                Value               Date                       HDL                      73                  11/24/2017                 HDL                      67                  02/27/2016                 HDL                      58                  09/21/2013            Lab Results       Component                Value               Date                       LDLCHOLESTEROL           65                  11/24/2017                 LDLCHOLESTEROL           87                  02/27/2016                 LDLCHOLESTEROL           57                  09/21/2013            Lab Results       Component                Value               Date                       VLDL                     NOT REPORTED        11/24/2017                 VLDL                     20                  02/27/2016                 VLDL                     21                  09/21/2013            Lab Results       Component                Value               Date                       CHOLHDLRATIO             2.2                 11/24/2017                 CHOLHDLRATIO             2.6                 02/27/2016                 CHOLHDLRATIO             2.3                 09/21/2013                                  Review of Systems   Constitutional: Negative. HENT:  Negative for congestion, ear pain, rhinorrhea, sneezing and sore throat. Eyes:  Negative for visual disturbance. Respiratory:  Negative for cough, chest tightness and shortness of breath. Cardiovascular:  Negative for chest pain and palpitations. Gastrointestinal:  Negative for abdominal pain, blood in stool, constipation, diarrhea and nausea. Genitourinary:  Negative for difficulty urinating, dysuria, frequency, menstrual problem and urgency. Musculoskeletal:  Negative for arthralgias, joint swelling, myalgias and neck pain. Skin: Negative. Neurological:  Negative for syncope. Psychiatric/Behavioral: Negative. Objective   Physical Exam  Constitutional:       Appearance: She is well-developed. She is obese. HENT:      Head: Atraumatic. Eyes:      Conjunctiva/sclera: Conjunctivae normal.   Cardiovascular:      Rate and Rhythm: Normal rate and regular rhythm. Heart sounds: Normal heart sounds. Pulmonary:      Effort: Pulmonary effort is normal.      Breath sounds: Normal breath sounds. Abdominal:      Palpations: Abdomen is soft. Tenderness: There is no abdominal tenderness. Musculoskeletal:         General: Normal range of motion. Cervical back: Normal range of motion and neck supple. Lymphadenopathy:      Cervical: No cervical adenopathy. Skin:     Findings: No rash. Neurological:      Mental Status: She is alert. Psychiatric:         Behavior: Behavior normal.         Thought Content: Thought content normal.                An electronic signature was used to authenticate this note.     --Ligia Yee MD

## 2023-01-17 NOTE — PATIENT INSTRUCTIONS
Survey: You may be receiving a survey from [employer] regarding your visit today. You may get this in the mail, through your MyChart or in your email. Please complete the survey to enable us to provide the highest quality of care to you and your family. Please also, mention our names. If you cannot score us as very good (5 Stars) on any question, please feel free to call the office to discuss how we could have made your experience exceptional.      Thank You! Dr. Brynn Moritz, MD Leanna Spells, [address] [place] [address], KWESI Smith RN     Plan:  1. Colon cancer screening  Set up for screening colonoscopy. Risk and benefits explained and informed consent obtained. The bowel prep was explained to Lyudmila Villa and she was instructed to start the prep the day before the procedure (printed directions were given). Avoid corn 1 week prior to the procedure as this can cause suctioning difficulties during the procedure. Avoid eating or drinking at least 8 hours prior to the procedure. Lyudmila Villa was encouraged to call the clinic if she has any questions prior to the procedure. - COLONOSCOPY W/ OR W/O BIOPSY; Future  - sodium-potassium-mag sulfate (SUPREP BOWEL PREP KIT) 17.5-3.13-1.6 GM/177ML SOLN solution; Use as directed. Dispense: 1 each; Refill: 0    Follow up with Dr. Garrison Clark after the procedure.

## 2023-01-18 ENCOUNTER — ANESTHESIA EVENT (OUTPATIENT)
Dept: ENDOSCOPY | Age: 68
End: 2023-01-18
Payer: MEDICARE

## 2023-01-22 ENCOUNTER — PREP FOR PROCEDURE (OUTPATIENT)
Dept: INTERNAL MEDICINE CLINIC | Age: 68
End: 2023-01-22

## 2023-01-22 RX ORDER — SODIUM CHLORIDE 0.9 % (FLUSH) 0.9 %
5-40 SYRINGE (ML) INJECTION PRN
Status: CANCELLED | OUTPATIENT
Start: 2023-01-22

## 2023-01-22 RX ORDER — SODIUM CHLORIDE, SODIUM LACTATE, POTASSIUM CHLORIDE, CALCIUM CHLORIDE 600; 310; 30; 20 MG/100ML; MG/100ML; MG/100ML; MG/100ML
INJECTION, SOLUTION INTRAVENOUS CONTINUOUS
Status: CANCELLED | OUTPATIENT
Start: 2023-01-22

## 2023-01-22 RX ORDER — SODIUM CHLORIDE 9 MG/ML
25 INJECTION, SOLUTION INTRAVENOUS PRN
Status: CANCELLED | OUTPATIENT
Start: 2023-01-22

## 2023-01-22 RX ORDER — SODIUM CHLORIDE 0.9 % (FLUSH) 0.9 %
5-40 SYRINGE (ML) INJECTION EVERY 12 HOURS SCHEDULED
Status: CANCELLED | OUTPATIENT
Start: 2023-01-22

## 2023-01-24 ENCOUNTER — ANESTHESIA (OUTPATIENT)
Dept: ENDOSCOPY | Age: 68
End: 2023-01-24
Payer: MEDICARE

## 2023-01-24 ENCOUNTER — HOSPITAL ENCOUNTER (OUTPATIENT)
Age: 68
Setting detail: OUTPATIENT SURGERY
Discharge: HOME | End: 2023-01-24
Attending: INTERNAL MEDICINE | Admitting: INTERNAL MEDICINE
Payer: MEDICARE

## 2023-01-24 VITALS
HEART RATE: 67 BPM | SYSTOLIC BLOOD PRESSURE: 135 MMHG | BODY MASS INDEX: 36.8 KG/M2 | TEMPERATURE: 97.1 F | HEIGHT: 66 IN | WEIGHT: 229 LBS | OXYGEN SATURATION: 97 % | DIASTOLIC BLOOD PRESSURE: 78 MMHG | RESPIRATION RATE: 13 BRPM

## 2023-01-24 DIAGNOSIS — Z12.11 COLON CANCER SCREENING: ICD-10-CM

## 2023-01-24 DIAGNOSIS — Z01.818 PRE-OP TESTING: Primary | ICD-10-CM

## 2023-01-24 PROCEDURE — 2500000003 HC RX 250 WO HCPCS: Performed by: NURSE ANESTHETIST, CERTIFIED REGISTERED

## 2023-01-24 PROCEDURE — 3700000001 HC ADD 15 MINUTES (ANESTHESIA): Performed by: INTERNAL MEDICINE

## 2023-01-24 PROCEDURE — 2580000003 HC RX 258: Performed by: INTERNAL MEDICINE

## 2023-01-24 PROCEDURE — 3609009300 HC COLONOSCOPY BIOPSY/STOMA: Performed by: INTERNAL MEDICINE

## 2023-01-24 PROCEDURE — 7100000010 HC PHASE II RECOVERY - FIRST 15 MIN: Performed by: INTERNAL MEDICINE

## 2023-01-24 PROCEDURE — 6360000002 HC RX W HCPCS: Performed by: NURSE ANESTHETIST, CERTIFIED REGISTERED

## 2023-01-24 PROCEDURE — 3700000000 HC ANESTHESIA ATTENDED CARE: Performed by: INTERNAL MEDICINE

## 2023-01-24 PROCEDURE — 7100000011 HC PHASE II RECOVERY - ADDTL 15 MIN: Performed by: INTERNAL MEDICINE

## 2023-01-24 PROCEDURE — 2709999900 HC NON-CHARGEABLE SUPPLY: Performed by: INTERNAL MEDICINE

## 2023-01-24 PROCEDURE — 88305 TISSUE EXAM BY PATHOLOGIST: CPT

## 2023-01-24 RX ORDER — SODIUM CHLORIDE 9 MG/ML
25 INJECTION, SOLUTION INTRAVENOUS PRN
Status: DISCONTINUED | OUTPATIENT
Start: 2023-01-24 | End: 2023-01-24 | Stop reason: HOSPADM

## 2023-01-24 RX ORDER — MIDAZOLAM HYDROCHLORIDE 1 MG/ML
INJECTION INTRAMUSCULAR; INTRAVENOUS PRN
Status: DISCONTINUED | OUTPATIENT
Start: 2023-01-24 | End: 2023-01-24 | Stop reason: SDUPTHER

## 2023-01-24 RX ORDER — GLYCOPYRROLATE 0.2 MG/ML
INJECTION INTRAMUSCULAR; INTRAVENOUS PRN
Status: DISCONTINUED | OUTPATIENT
Start: 2023-01-24 | End: 2023-01-24 | Stop reason: SDUPTHER

## 2023-01-24 RX ORDER — MAGNESIUM HYDROXIDE 1200 MG/15ML
LIQUID ORAL PRN
Status: DISCONTINUED | OUTPATIENT
Start: 2023-01-24 | End: 2023-01-24 | Stop reason: ALTCHOICE

## 2023-01-24 RX ORDER — FENTANYL CITRATE 50 UG/ML
INJECTION, SOLUTION INTRAMUSCULAR; INTRAVENOUS PRN
Status: DISCONTINUED | OUTPATIENT
Start: 2023-01-24 | End: 2023-01-24 | Stop reason: SDUPTHER

## 2023-01-24 RX ORDER — SODIUM CHLORIDE 0.9 % (FLUSH) 0.9 %
5-40 SYRINGE (ML) INJECTION EVERY 12 HOURS SCHEDULED
Status: DISCONTINUED | OUTPATIENT
Start: 2023-01-24 | End: 2023-01-24 | Stop reason: HOSPADM

## 2023-01-24 RX ORDER — CLOPIDOGREL BISULFATE 75 MG/1
75 TABLET ORAL EVERY OTHER DAY
Qty: 30 TABLET | Refills: 0
Start: 2023-01-24

## 2023-01-24 RX ORDER — SODIUM CHLORIDE 0.9 % (FLUSH) 0.9 %
5-40 SYRINGE (ML) INJECTION PRN
Status: DISCONTINUED | OUTPATIENT
Start: 2023-01-24 | End: 2023-01-24 | Stop reason: HOSPADM

## 2023-01-24 RX ORDER — PROPOFOL 10 MG/ML
INJECTION, EMULSION INTRAVENOUS CONTINUOUS PRN
Status: DISCONTINUED | OUTPATIENT
Start: 2023-01-24 | End: 2023-01-24 | Stop reason: SDUPTHER

## 2023-01-24 RX ORDER — PROPOFOL 10 MG/ML
INJECTION, EMULSION INTRAVENOUS PRN
Status: DISCONTINUED | OUTPATIENT
Start: 2023-01-24 | End: 2023-01-24 | Stop reason: SDUPTHER

## 2023-01-24 RX ORDER — LIDOCAINE HYDROCHLORIDE 10 MG/ML
INJECTION, SOLUTION EPIDURAL; INFILTRATION; INTRACAUDAL; PERINEURAL PRN
Status: DISCONTINUED | OUTPATIENT
Start: 2023-01-24 | End: 2023-01-24 | Stop reason: SDUPTHER

## 2023-01-24 RX ORDER — SODIUM CHLORIDE, SODIUM LACTATE, POTASSIUM CHLORIDE, CALCIUM CHLORIDE 600; 310; 30; 20 MG/100ML; MG/100ML; MG/100ML; MG/100ML
INJECTION, SOLUTION INTRAVENOUS CONTINUOUS
Status: DISCONTINUED | OUTPATIENT
Start: 2023-01-24 | End: 2023-01-24 | Stop reason: HOSPADM

## 2023-01-24 RX ADMIN — PROPOFOL 100 MCG/KG/MIN: 10 INJECTION, EMULSION INTRAVENOUS at 07:20

## 2023-01-24 RX ADMIN — SODIUM CHLORIDE, POTASSIUM CHLORIDE, SODIUM LACTATE AND CALCIUM CHLORIDE: 600; 310; 30; 20 INJECTION, SOLUTION INTRAVENOUS at 06:43

## 2023-01-24 RX ADMIN — FENTANYL CITRATE 50 MCG: 50 INJECTION, SOLUTION INTRAMUSCULAR; INTRAVENOUS at 07:20

## 2023-01-24 RX ADMIN — FENTANYL CITRATE 50 MCG: 50 INJECTION, SOLUTION INTRAMUSCULAR; INTRAVENOUS at 07:15

## 2023-01-24 RX ADMIN — LIDOCAINE HYDROCHLORIDE 50 MG: 10 INJECTION, SOLUTION EPIDURAL; INFILTRATION; INTRACAUDAL; PERINEURAL at 07:20

## 2023-01-24 RX ADMIN — GLYCOPYRROLATE 0.4 MG: 0.2 INJECTION INTRAMUSCULAR; INTRAVENOUS at 07:20

## 2023-01-24 RX ADMIN — MIDAZOLAM 2 MG: 1 INJECTION INTRAMUSCULAR; INTRAVENOUS at 07:15

## 2023-01-24 RX ADMIN — PROPOFOL 90 MG: 10 INJECTION, EMULSION INTRAVENOUS at 07:20

## 2023-01-24 ASSESSMENT — PAIN - FUNCTIONAL ASSESSMENT: PAIN_FUNCTIONAL_ASSESSMENT: NONE - DENIES PAIN

## 2023-01-24 NOTE — DISCHARGE INSTRUCTIONS
Discharge Instructions    Admission Date:  1/24/2023  Discharge Date:  1/24/23    Disposition:  Home    Activity:  As tolerated    Diet:  Cardiac      Discharge Instructions:  Resume previous home medication. Follow Up: With your primary care physician (Dr. Pineda Love) in 2 weeks. Discharge Instructions for Colonoscopy     Colonoscopy is a visual exam of the lining of the large intestine, also called the bowel or colon, with a colonoscope. A colonoscope is a flexible tube with a light and a viewing device. It allows the doctor to view the inside of the colon through a tiny video camera. Colonoscopy is performed for many reasons: unexplained anemia , pain, diarrhea , bloody stools, cancer screening, among many other reasons. Complications from a colonoscopy are rare. Some possible serious complications include perforated bowel (which might require surgery) and bleeding (which could require blood transfusion ). Minor complications include bloating, gas, and cramping that can last for 1-2 days after the procedure. Because air is put into your colon during the procedure, it is normal to pass large amounts of air from your rectum. You may not have a bowel movement for 1-3 days after the procedure. What You Will Need    Someone to drive you home after the procedure   Steps to [address] when you get home. Because the sedative will make you drowsy, don't drive, operate machinery, or make important decisions the day of the procedure. Feelings of bloating, gas, or cramping may persist for 24 hours. Diet     Try sips of water first. If tolerated, resume regular diet or the diet recommended by your physician. Do not drink alcohol for 24 hours. Physical Activity     Ask your doctor when you will be able to return to work. Do not drive, operate heavy machinery, or do activities that require coordination or balance for 24 hours.      Otherwise, return to your normal routine as soon as you are comfortable to do so, which is usually the next day after the procedure. Medications    When taking medications, it's important to: Take your medication as directednot more, not less, not at a different time. Do not stop taking them without consulting your healthcare provider. Don't share them with anyone else. Know what effects and side effects to expect, and report them to your healthcare provider. If you are taking more than one drug, even if it is an over-the-counter medication, herb, or dietary supplement, be sure to check with a physician or pharmacist about drug interactions. Plan ahead for refills so you don't run out. Lifestyle Changes     The results of your colonoscopy will determine if any lifestyle changes are necessary. Follow-up    The doctor will usually give you a preliminary report after the medication wears off and you are more alert. The results from a biopsy can take as long as 1-2 weeks to be completed. Schedule a follow-up appointment as directed by your doctor. You should schedule a follow-up colonoscopy as recommended by your doctor. Call Your Doctor If Any of the Following Occurs   Monitor your recovery once you leave the hospital. As soon as you have a problem, alert your doctor. If any of the following occur, call your doctor:    Bleeding from your rectum; notify your doctor if you pass a teaspoonful or more of blood    Black, tarry stools    Severe abdominal pain    Hard, swollen abdomen    Signs of infection, including fever or chills    Inability to pass gas or stool    Coughing, shortness of breath, chest pain, severe nausea or vomiting  In case of an emergency, call 911 immediately.

## 2023-01-24 NOTE — OP NOTE
[place] [address]                                OPERATIVE REPORT    PATIENT NAME: Sean Garcia               :        1955  MED REC NO:   [id number]                              ROOM:  ACCOUNT NO:   [de-identified]                           ADMIT DATE: 2023  PROVIDER:     Jorgito Edwards    DATE OF PROCEDURE:  2023    VIDEO-ASSISTED COLONOSCOPY REPORT    ATTENDING PHYSICIAN:  Blaine Gorman MD    PRIMARY CARE PHYSICIAN:  Gulshan Rubio MD    PROCEDURES:  1.  Video-assisted colonoscopy to the cecum. 2.  Rectal polypectomy with cold cup biopsy forceps. 3.  Descending colon polypectomy x2 with cold cup biopsy forceps. 4.  Cecal polypectomy with cold cup biopsy forceps. PREOPERATIVE DIAGNOSIS:  Colon cancer screening. POSTOPERATIVE DIAGNOSES:  1. Colon cancer screening. 2.  Rectal polyp. 3.  Descending colon polyps x2.  4.  Cecal polyp. 5.  Mild sigmoid diverticulosis. ANESTHESIA:  Per Marti Moreno CRNA; MAC anesthetic. ASSISTANT:  Deanna Malin CST    PHYSICIAN:  Claude Edwards MD    COMPLICATIONS:  None. ESTIMATED BLOOD LOSS:  Less than 5 mL. PROCEDURE NOTE:  The patient was brought to the operating room where  procedure was explained along with possible complications and informed  consent was obtained. She was then taken to the minor procedure room  where continuous cardiopulmonary monitoring was placed along with O2 via  simple mask. The patient was placed flat in bed, positioned on her left  side, made comfortable and IV sedation was given per Marti Moreno CRNA. Once the patient was adequately sedated, the colonoscope was inserted  into the rectum with CO2 insufflation being performed. Scope was slowly  advanced up the sigmoid colon where occasional diverticula were noted,  none that looked to be actively inflamed or bleeding.   The scope was  then advanced up the descending colon past the splenic flexure into the  transverse colon. Upon first pass to the descending and transverse  colon, no abnormalities were noted. Scope was then advanced past the  hepatic flexure down the ascending colon into the cecum where  photodocumentation of the cecum was taken. Placement of the colonoscope  into the cecum was also verified with transillumination through the  abdominal wall. The scope was slowly withdrawn out of the cecum into  the distal ascending colon where a 6 mm sessile polyp was identified,  photodocumentation was taken. With use of cold cup biopsy forceps, this  polyp was removed with good hemostasis obtained postpolypectomy. Photodocumentation was taken postpolypectomy. Scope was slowly  withdrawn back up the ascending colon past the hepatic flexure into the  transverse colon. Upon second pass to the transverse colon, no  abnormalities were noted. Scope was withdrawn past the splenic flexure  into the descending colon where two sessile polyps were seen at the same  location at approximately 50 cm extraction. Photodocumentation was  taken. Each of these polyps were removed with cold cup biopsy forceps  with good hemostasis obtained postpolypectomy. Photodocumentation was  taken postpolypectomy. The colonoscope was then withdrawn back through  the sigmoid colon where again occasional diverticula were noted, none  that looked to actively inflamed or bleeding. Scope was withdrawn back  into the rectum where an 8 mm sessile polyp was identified,  photodocumentation was taken. With use of cold cup biopsy forceps, this  polyp was removed with three bites with good hemostasis obtained  postpolypectomy. Photodocumentation was taken postpolypectomy. While  in the rectum, the scope was retroflexed upon itself. In the  retroflexed position, no abnormalities were seen, photodocumentation was  taken.   Colonoscope was then straightened, suctioning was applied to  remove excess air and the scope was withdrawn. The patient tolerated  the procedure well without complications. She was taken to the recovery  room for further monitoring. She will be instructed to follow up with  Dr. Vanita Ruiz in 2 weeks to follow up with the pathology of the polyps.         Alida Alberto    D: 01/24/2023 8:02:52       T: 01/24/2023 9:39:38     [id number]  Job#: [id number]     Doc#: [id number]    CC:  Mercedez Currie

## 2023-01-24 NOTE — H&P
History and Physical       Keren Del Cid (:  1955) is a 79 y.o. female,Established patient, here for evaluation of the following chief complaint(s):  Surgical Consult (Colonoscopy consult. Scheduled for 23. Last scope 10 yrs ago, Dr Shereen Guerra. )        ASSESSMENT/PLAN:  1. Colon cancer screening  -     COLONOSCOPY W/ OR W/O BIOPSY; Future  -     sodium-potassium-mag sulfate (SUPREP BOWEL PREP KIT) 17.5-3.13-1.6 GM/177ML SOLN solution; Use as directed., Disp-1 each, R-0Normal     Plan:  1. Colon cancer screening  Set up for screening colonoscopy. Risk and benefits explained and informed consent obtained. The bowel prep was explained to [facility] and she was instructed to start the prep the day before the procedure (printed directions were given). Avoid corn 1 week prior to the procedure as this can cause suctioning difficulties during the procedure. Avoid eating or drinking at least 8 hours prior to the procedure. [facility] was encouraged to call the clinic if she has any questions prior to the procedure. - COLONOSCOPY W/ OR W/O BIOPSY; Future  - sodium-potassium-mag sulfate (SUPREP BOWEL PREP KIT) 17.5-3.13-1.6 GM/177ML SOLN solution; Use as directed. Dispense: 1 each; Refill: 0     Follow up with Dr. Paola Warner after the procedure. Subjective   SUBJECTIVE/OBJECTIVE:  [facility] a patient of Dr. Paola Warner presents to be evaluated for a colonoscopy. [facility] is 79 y.o. and has had a colonoscopy in the past.  There is not a history of colon polyps or colon cancer. Currently [facility] denies rectal bleeding, denies bowel habit changes, and denies abdominal pain. There is  a family history of colon cancer (father had colon cancer). No current facility-administered medications on file prior to encounter.      Current Outpatient Medications on File Prior to Encounter   Medication Sig Dispense Refill    hydroCHLOROthiazide (HYDRODIURIL) 25 MG tablet TAKE 1/2 TABLET BY MOUTH DAILY 45 tablet 1    UNABLE TO FIND Gabapentin-Ketoprofen amitriptyline HCL 10-10-2% neuroserum liquid  Apply small amt to affected area and rub for 10 minutes. Repeat 2-3 times daily 15 mL 11    traMADol (ULTRAM) 50 MG tablet       loratadine (CLARITIN) 10 MG tablet Take 10 mg by mouth daily Prn      vitamin D (ERGOCALCIFEROL) 75766 units CAPS capsule Take 50,000 Units by mouth Twice a Week       clopidogrel (PLAVIX) 75 MG tablet Take 1 tablet by mouth daily. (Patient taking differently: Take 75 mg by mouth every other day) 30 tablet 0    folic acid (FOLVITE) 1 MG tablet Take 1 mg by mouth daily         Allergies   Allergen Reactions    Celecoxib Other (See Comments)    Ciprofloxacin Other (See Comments)     Headaches    Flonase [Fluticasone Propionate]      Smelled smoke with medication. Iodine      Other reaction(s): AOF    Macrobid [Nitrofurantoin] Nausea Only    Other Hives     Contrast for a stress test    Oxycodone Hcl Other (See Comments)    Oxycodone Rash     hallucinations       Past Medical History:  No date: Cerebrovascular disease      Comment:  mini stroke may 2012, 2014  No date: Chronic back pain  No date: GERD (gastroesophageal reflux disease)  No date: Hypertension  No date: Hypothyroidism  No date: Osteoarthritis      Comment:  knee and back  01/04/2016: Osteopenia     Past Surgical History:  No date: APPENDECTOMY  No date: BREAST BIOPSY  No date: CHOLECYSTECTOMY  No date: HYSTERECTOMY (CERVIX STATUS UNKNOWN)      Comment:  still Rt ovary present  08/07/2018: JOINT REPLACEMENT      Comment:  Rt knee  8/16/22: KNEE SURGERY;  Left     Social History    Socioeconomic History      Marital status:       Spouse name: Not on file      Number of children: Not on file      Years of education: Not on file      Highest education level: Not on file    Occupational History      Not on file    Tobacco Use      Smoking status: Former        Packs/day: 1.00        Years: 15.00        Pack years: 15        Types: Cigarettes Quit date: 12        Years since quittin.0      Smokeless tobacco: Never    Vaping Use      Vaping Use: Never used    Substance and Sexual Activity      Alcohol use: No      Drug use: No      Sexual activity: Not Currently        Partners: Male    Other Topics      Concerns:        Not on file    Social History Narrative      Not on file     Social Determinants of Health  Financial Resource Strain: Low Risk       Difficulty of Paying Living Expenses: Not very hard  Food Insecurity: No Food Insecurity      Worried About Running Out of Food in the Last Year: Never true      Ran Out of Food in the Last Year: Never true  Transportation Needs: Not on file  Physical Activity: Inactive      Days of Exercise per Week: 0 days      Minutes of Exercise per Session: 0 min  Stress: Not on file  Social Connections: Not on file  Intimate Partner Violence: Not on file  Housing Stability: Not on file     Review of patient's family history indicates:  Problem: Diabetes      Relation: Mother          Age of Onset: (Not Specified)  Problem: COPD      Relation: Mother          Age of Onset: (Not Specified)  Problem: Heart Failure      Relation: Mother          Age of Onset: (Not Specified)  Problem: Breast Cancer      Relation: Mother          Age of Onset: (Not Specified)  Problem: Cancer      Relation: Father          Age of Onset: (Not Specified)          Comment: colon  Problem: Diabetes      Relation: Father          Age of Onset: (Not Specified)        Current Outpatient Medications on File Prior to Visit:  clotrimazole (LOTRIMIN) 1 % cream, APPLY TO AFFECTED AREA(S) DAILY TOPICALLY AS NEEDED, Disp: 45 g, Rfl: 1  levothyroxine (SYNTHROID) 100 MCG tablet, TAKE ONE TABLET BY MOUTH DAILY, Disp: 90 tablet, Rfl: 1  omeprazole (PRILOSEC) 20 MG delayed release capsule, TAKE ONE CAPSULE BY MOUTH EVERY MORNING BEFORE BREAKFAST, Disp: 90 capsule, Rfl: 1  losartan (COZAAR) 100 MG tablet, TAKE ONE TABLET BY MOUTH DAILY, Disp: 90 tablet, Rfl: 1  Estradiol (VAGIFEM) 10 MCG TABS vaginal tablet, Place 1 tablet vaginally Twice a Week, Disp: 8 tablet, Rfl: 12  hydroCHLOROthiazide (HYDRODIURIL) 25 MG tablet, TAKE 1/2 TABLET BY MOUTH DAILY, Disp: 45 tablet, Rfl: 1  UNABLE TO FIND, Gabapentin-Ketoprofen amitriptyline HCL 10-10-2% neuroserum liquidApply small amt to affected area and rub for 10 minutes. Repeat 2-3 times daily, Disp: 15 mL, Rfl: 11  traMADol (ULTRAM) 50 MG tablet, , Disp: , Rfl:   loratadine (CLARITIN) 10 MG tablet, Take 10 mg by mouth daily Prn, Disp: , Rfl:   vitamin D (ERGOCALCIFEROL) 60220 units CAPS capsule, Take 50,000 Units by mouth Twice a Week , Disp: , Rfl:   clopidogrel (PLAVIX) 75 MG tablet, Take 1 tablet by mouth daily. (Patient taking differently: Take 75 mg by mouth every other day), Disp: 30 tablet, Rfl: 0  folic acid (FOLVITE) 1 MG tablet, Take 1 mg by mouth daily  , Disp: , Rfl:      No current facility-administered medications on file prior to visit. -- Celecoxib -- Other (See Comments)   -- Ciprofloxacin -- Other (See Comments)    --  Headaches   -- Flonase [Fluticasone Propionate]     --  Smelled smoke with medication.    -- Iodine     --  Other reaction(s): AOF   -- Macrobid [Nitrofurantoin] -- Nausea Only   -- Other -- Hives    --  Contrast for a stress test   -- Oxycodone Hcl -- Other (See Comments)   -- Oxycodone -- Rash    --  hallucinations        Lab Results       Component                Value               Date                       NA                       138                 10/28/2022                 K                        4.3                 10/28/2022                 CL                       102                 10/28/2022                 CO2                      25                  10/28/2022                 BUN                      15                  10/28/2022                 CREATININE               1.01 (H)            10/28/2022                 GLUCOSE                  100 (H) 10/28/2022                 CALCIUM                  9.3                 10/28/2022                 PROT                     6.5                 11/24/2017                 LABALBU                  3.9                 11/24/2017                 BILITOT                  0.39                11/24/2017                 ALKPHOS                  65                  11/24/2017                 AST                      15                  11/24/2017                 ALT                      10                  11/24/2017                 LABGLOM                  >60                 10/28/2022                 GFRAA                    >60                 02/08/2022               Lab Results       Component                Value               Date                       LABA1C                   5.5                 05/12/2021            Lab Results       Component                Value               Date                       EAG                      114                 07/27/2020               Lab Results       Component                Value               Date                       CHOL                     158                 11/24/2017                 CHOL                     174                 02/27/2016                 CHOL                     135                 09/21/2013            Lab Results       Component                Value               Date                       TRIG                     99                  11/24/2017                 TRIG                     98                  02/27/2016                 TRIG                     103                 09/21/2013            Lab Results       Component                Value               Date                       HDL                      73                  11/24/2017                 HDL                      67                  02/27/2016                 HDL                      58                  09/21/2013            Lab Results       Component                Value Date                       LDLCHOLESTEROL           65                  11/24/2017                 LDLCHOLESTEROL           87                  02/27/2016                 LDLCHOLESTEROL           57                  09/21/2013            Lab Results       Component                Value               Date                       VLDL                     NOT REPORTED        11/24/2017                 VLDL                     20                  02/27/2016                 VLDL                     21                  09/21/2013            Lab Results       Component                Value               Date                       CHOLHDLRATIO             2.2                 11/24/2017                 CHOLHDLRATIO             2.6                 02/27/2016                 CHOLHDLRATIO             2.3                 09/21/2013                                             Review of Systems   Constitutional: Negative. HENT:  Negative for congestion, ear pain, rhinorrhea, sneezing and sore throat. Eyes:  Negative for visual disturbance. Respiratory:  Negative for cough, chest tightness and shortness of breath. Cardiovascular:  Negative for chest pain and palpitations. Gastrointestinal:  Negative for abdominal pain, blood in stool, constipation, diarrhea and nausea. Genitourinary:  Negative for difficulty urinating, dysuria, frequency, menstrual problem and urgency. Musculoskeletal:  Negative for arthralgias, joint swelling, myalgias and neck pain. Skin: Negative. Neurological:  Negative for syncope. Psychiatric/Behavioral: Negative. Objective   Physical Exam  Constitutional:       Appearance: She is well-developed. She is obese. HENT:      Head: Atraumatic. Eyes:      Conjunctiva/sclera: Conjunctivae normal.   Cardiovascular:      Rate and Rhythm: Normal rate and regular rhythm. Heart sounds: Normal heart sounds.    Pulmonary:      Effort: Pulmonary effort is normal.      Breath sounds: Normal breath sounds. Abdominal:      Palpations: Abdomen is soft. Tenderness: There is no abdominal tenderness. Musculoskeletal:         General: Normal range of motion. Cervical back: Normal range of motion and neck supple. Lymphadenopathy:      Cervical: No cervical adenopathy. Skin:     Findings: No rash. Neurological:      Mental Status: She is alert. Psychiatric:         Behavior: Behavior normal.         Thought Content: Thought content normal.      1/24/23: H&P reviewed, no changes need to be made.

## 2023-01-24 NOTE — ANESTHESIA POSTPROCEDURE EVALUATION
Department of Anesthesiology  Postprocedure Note    Patient: Negin Tello  MRN: [id number]  YOB: 1955  Date of evaluation: 1/24/2023      Procedure Summary     Date: 01/24/23 Room / Location: [facility]    Anesthesia Start: 0716 Anesthesia Stop: 6193    Procedure: COLONOSCOPY BIOPSY/STOMA (Abdomen) Diagnosis:       Colon cancer screening      (Colon cancer screening [Z12.11])    Surgeons: Rivera Lr MD Responsible Provider: JOSE Hernandez CRNA    Anesthesia Type: MAC ASA Status: 3          Anesthesia Type: No value filed.     Loc Phase I: Loc Score: 10    Loc Phase II: Loc Score: 9      Anesthesia Post Evaluation    Patient location during evaluation: PACU  Patient participation: complete - patient participated  Level of consciousness: awake and lethargic  Pain score: 0  Airway patency: patent  Nausea & Vomiting: no nausea and no vomiting  Complications: no  Cardiovascular status: blood pressure returned to baseline and hemodynamically stable  Respiratory status: acceptable, room air and spontaneous ventilation  Hydration status: euvolemic  Multimodal analgesia pain management approach

## 2023-01-24 NOTE — BRIEF OP NOTE
Brief Postoperative Note      Patient: Guille Hernandez  YOB: 1955  MRN: [id number]    Date of Procedure: 1/24/2023    Pre-Op Diagnosis: Colon cancer screening [Z12.11]    Post-Op Diagnosis:  Colon cancer screening     Rectal polyp     Descending colon polyps x 2     Cecal polyp     Mild sigmoid diverticulosis          Procedure(s):  COLONOSCOPY to the cecum. Rectal polypectomy with cold cup biopsy forceps. Descending colon polypectomy x 2 with cold cup biopsy forceps. Cecal polypectomy with cold cup biopsy forceps. Surgeon(s):  Tammi Lamar MD    Assistant:  Man Chatman CST    Anesthesia: Cate Infante CRNA. MAC anesthesia. Estimated Blood Loss (mL): < 5 ml    Complications: None    Specimens:   ID Type Source Tests Collected by Time Destination   A : A. Ascending colon polyp Tissue Colon-Ascending SURGICAL PATHOLOGY Tammi Lamar MD 1/24/2023 0734    B : B. Descending colon polyp x 2 Tissue Colon-Descending SURGICAL PATHOLOGY Tammi Lamar MD 1/24/2023 0743    C : C.  Rectal polyp Tissue Rectum SURGICAL PATHOLOGY Tammi Lamar MD 1/24/2023 1982        Implants:  * No implants in log *      Drains: * No LDAs found *      Electronically signed by Tammi Lamar MD on 1/24/2023 at 7:52 AM

## 2023-01-24 NOTE — PROGRESS NOTES

## 2023-01-25 LAB — SURGICAL PATHOLOGY REPORT: NORMAL

## 2023-01-27 RX ORDER — HYDROCHLOROTHIAZIDE 25 MG/1
TABLET ORAL
Qty: 45 TABLET | Refills: 1 | Status: SHIPPED | OUTPATIENT
Start: 2023-01-27

## 2023-01-27 NOTE — TELEPHONE ENCOUNTER
Last OV: 1/4/2023   10/28/22 chronic   Last RX:     Next scheduled apt: 1/30/2023    6 month f/u        Surescript requesting a refill

## 2023-01-30 ENCOUNTER — OFFICE VISIT (OUTPATIENT)
Dept: FAMILY MEDICINE CLINIC | Age: 68
End: 2023-01-30
Payer: MEDICARE

## 2023-01-30 VITALS
OXYGEN SATURATION: 98 % | SYSTOLIC BLOOD PRESSURE: 120 MMHG | DIASTOLIC BLOOD PRESSURE: 80 MMHG | BODY MASS INDEX: 37.28 KG/M2 | HEART RATE: 76 BPM | WEIGHT: 231 LBS

## 2023-01-30 DIAGNOSIS — F41.9 ANXIETY: ICD-10-CM

## 2023-01-30 DIAGNOSIS — I10 ESSENTIAL HYPERTENSION, BENIGN: Primary | ICD-10-CM

## 2023-01-30 DIAGNOSIS — E03.8 OTHER SPECIFIED HYPOTHYROIDISM: ICD-10-CM

## 2023-01-30 DIAGNOSIS — E66.01 SEVERE OBESITY (BMI 35.0-39.9) WITH COMORBIDITY (HCC): ICD-10-CM

## 2023-01-30 PROCEDURE — G8417 CALC BMI ABV UP PARAM F/U: HCPCS | Performed by: FAMILY MEDICINE

## 2023-01-30 PROCEDURE — 3017F COLORECTAL CA SCREEN DOC REV: CPT | Performed by: FAMILY MEDICINE

## 2023-01-30 PROCEDURE — 3074F SYST BP LT 130 MM HG: CPT | Performed by: FAMILY MEDICINE

## 2023-01-30 PROCEDURE — 3079F DIAST BP 80-89 MM HG: CPT | Performed by: FAMILY MEDICINE

## 2023-01-30 PROCEDURE — 1123F ACP DISCUSS/DSCN MKR DOCD: CPT | Performed by: FAMILY MEDICINE

## 2023-01-30 PROCEDURE — G8427 DOCREV CUR MEDS BY ELIG CLIN: HCPCS | Performed by: FAMILY MEDICINE

## 2023-01-30 PROCEDURE — G8484 FLU IMMUNIZE NO ADMIN: HCPCS | Performed by: FAMILY MEDICINE

## 2023-01-30 PROCEDURE — 1036F TOBACCO NON-USER: CPT | Performed by: FAMILY MEDICINE

## 2023-01-30 PROCEDURE — 1090F PRES/ABSN URINE INCON ASSESS: CPT | Performed by: FAMILY MEDICINE

## 2023-01-30 PROCEDURE — G8399 PT W/DXA RESULTS DOCUMENT: HCPCS | Performed by: FAMILY MEDICINE

## 2023-01-30 PROCEDURE — 99214 OFFICE O/P EST MOD 30 MIN: CPT | Performed by: FAMILY MEDICINE

## 2023-01-30 RX ORDER — LORAZEPAM 0.5 MG/1
0.5 TABLET ORAL EVERY 8 HOURS PRN
Qty: 30 TABLET | Refills: 0 | Status: SHIPPED | OUTPATIENT
Start: 2023-01-30 | End: 2023-03-01

## 2023-01-30 RX ORDER — HYDROCHLOROTHIAZIDE 25 MG/1
TABLET ORAL
Qty: 45 TABLET | Refills: 3 | Status: CANCELLED | OUTPATIENT
Start: 2023-01-30

## 2023-01-30 ASSESSMENT — ENCOUNTER SYMPTOMS
ABDOMINAL PAIN: 0
BLOOD IN STOOL: 0
VOMITING: 0
COUGH: 0
SHORTNESS OF BREATH: 0
BLURRED VISION: 0
SORE THROAT: 0
EYE DISCHARGE: 0
DIARRHEA: 0
CONSTIPATION: 0
EYE REDNESS: 0
HEARTBURN: 0
NAUSEA: 0

## 2023-01-30 NOTE — PROGRESS NOTES
HPI Notes    Name: Dana Johnson  : 1955        Chief Complaint:     Chief Complaint   Patient presents with    Hypertension    Gastroesophageal Reflux    Hypothyroidism     22  TSH - 2.02       History of Present Illness:     Dana Johnson is a 79 y.o.  female who presents with Hypertension, Gastroesophageal Reflux, and Hypothyroidism (22  TSH - 2.02)      Hypertension  This is a chronic problem. The current episode started more than 1 year ago. The problem is unchanged. The problem is controlled. Pertinent negatives include no blurred vision, chest pain, headaches, palpitations, peripheral edema or shortness of breath. There are no associated agents to hypertension. Risk factors for coronary artery disease include post-menopausal state. The current treatment provides significant improvement. Gastroesophageal Reflux  She reports no abdominal pain, no chest pain, no coughing, no dysphagia, no heartburn, no nausea or no sore throat. This is a chronic problem. The current episode started more than 1 year ago. The problem has been unchanged. Pertinent negatives include no fatigue, melena or weight loss. She has tried a PPI for the symptoms. The treatment provided significant relief. Hypothyroidism- Chronic/stable, Patient denies changes in weight,bowels,palpitations. Energy is good Last TSH  --22 -- TSH 2.02    Obesity -  pt has lost couple pounds. She has had some joint replacements so hard to walk and exercise.    Past Medical History:     Past Medical History:   Diagnosis Date    Cerebrovascular disease     mini stroke may 2012,     Chronic back pain     GERD (gastroesophageal reflux disease)     Hypertension     Hypothyroidism     Osteoarthritis     knee and back    Osteopenia 2016      Reviewed all health maintenance requirements and ordered appropriate tests  Health Maintenance Due   Topic Date Due    Hepatitis C screen  Never done    DTaP/Tdap/Td vaccine (1 - Tdap) Never done    Shingles vaccine (1 of 2) Never done    COVID-19 Vaccine (3 - Booster for Moderna series) 05/28/2021    Lipids  11/24/2022       Past Surgical History:     Past Surgical History:   Procedure Laterality Date    APPENDECTOMY      BREAST BIOPSY      CHOLECYSTECTOMY      COLONOSCOPY N/A 01/24/2023    Dr. Danyell Macias with 2 adenomatous polyps    HYSTERECTOMY (CERVIX STATUS UNKNOWN)      still Rt ovary present    JOINT REPLACEMENT  08/07/2018    Rt knee    KNEE SURGERY Left 8/16/22        Medications:       Prior to Admission medications    Medication Sig Start Date End Date Taking? Authorizing Provider   LORazepam (ATIVAN) 0.5 MG tablet Take 1 tablet by mouth every 8 hours as needed for Anxiety for up to 30 days. 1/30/23 3/1/23 Yes Greta Walker MD   hydroCHLOROthiazide (HYDRODIURIL) 25 MG tablet TAKE 1/2 TABLET BY MOUTH DAILY 1/27/23  Yes Greta Walker MD   clopidogrel (PLAVIX) 75 MG tablet Take 1 tablet by mouth every other day 1/24/23  Yes Claude Edwards MD   clotrimazole (LOTRIMIN) 1 % cream APPLY TO AFFECTED AREA(S) DAILY TOPICALLY AS NEEDED 1/2/23  Yes Greta Walker MD   levothyroxine (SYNTHROID) 100 MCG tablet TAKE ONE TABLET BY MOUTH DAILY 12/19/22  Yes Greta Walker MD   omeprazole (PRILOSEC) 20 MG delayed release capsule TAKE ONE CAPSULE BY MOUTH EVERY MORNING BEFORE BREAKFAST 11/15/22  Yes Greta Walker MD   losartan (COZAAR) 100 MG tablet TAKE ONE TABLET BY MOUTH DAILY 11/15/22  Yes Greta Walker MD   Estradiol (VAGIFEM) 10 MCG TABS vaginal tablet Place 1 tablet vaginally Twice a Week 9/8/22  Yes Shan Whiting PA-C   UNABLE TO FIND Gabapentin-Ketoprofen amitriptyline HCL 10-10-2% neuroserum liquid  Apply small amt to affected area and rub for 10 minutes.  Repeat 2-3 times daily 8/16/22  Yes Greta Walker MD   loratadine (CLARITIN) 10 MG tablet Take 10 mg by mouth daily Prn   Yes Historical Provider, MD   vitamin D (ERGOCALCIFEROL) 38004 units CAPS capsule Take 50,000 Units by mouth Twice a Week  1/24/19  Yes Historical Provider, MD   folic acid (FOLVITE) 1 MG tablet Take 1 mg by mouth daily     Yes Historical Provider, MD        Allergies:       Celecoxib, Ciprofloxacin, Flonase [fluticasone propionate], Iodine, Macrobid [nitrofurantoin], Other, Oxycodone hcl, and Oxycodone    Social History:     Tobacco:    reports that she quit smoking about 37 years ago. Her smoking use included cigarettes. She has a 15.00 pack-year smoking history. She has never used smokeless tobacco.  Alcohol:      reports no history of alcohol use. Drug Use:  reports no history of drug use. Family History:     Family History   Problem Relation Age of Onset    Diabetes Mother     COPD Mother     Heart Failure Mother     Breast Cancer Mother     Cancer Father         colon    Diabetes Father        Review of Systems:       Review of Systems   Constitutional:  Negative for chills, fatigue, fever, unexpected weight change and weight loss. HENT:  Negative for sore throat. Eyes:  Negative for blurred vision, discharge, redness and visual disturbance. Respiratory:  Negative for cough and shortness of breath. Cardiovascular:  Negative for chest pain and palpitations. Gastrointestinal:  Negative for abdominal pain, blood in stool, constipation, diarrhea, dysphagia, heartburn, melena, nausea and vomiting. Genitourinary:  Negative for dysuria and hematuria. Musculoskeletal:  Negative for joint swelling and neck stiffness. Skin:  Negative for rash. Neurological:  Negative for dizziness, facial asymmetry, light-headedness and headaches. Psychiatric/Behavioral:  Negative for sleep disturbance. Physical Exam:     Physical Exam  Vitals reviewed. Constitutional:       General: She is not in acute distress. Appearance: Normal appearance. She is well-developed. She is not ill-appearing. HENT:      Head: Normocephalic and atraumatic.       Ears:      Comments: Pt's TM intact and NOT dull just bilateral minimum effusion. Eyes:      General:         Right eye: No discharge. Left eye: No discharge. Conjunctiva/sclera: Conjunctivae normal.   Neck:      Thyroid: No thyromegaly. Vascular: No carotid bruit. Cardiovascular:      Rate and Rhythm: Normal rate and regular rhythm. Heart sounds: Normal heart sounds. No murmur heard. Pulmonary:      Effort: Pulmonary effort is normal.      Breath sounds: Normal breath sounds. Abdominal:      General: There is no distension. Palpations: Abdomen is soft. Tenderness: There is no abdominal tenderness. Musculoskeletal:      Cervical back: Neck supple. Right lower leg: No edema. Left lower leg: No edema. Lymphadenopathy:      Cervical: No cervical adenopathy. Skin:     Findings: No erythema or rash. Neurological:      General: No focal deficit present. Mental Status: She is alert.    Psychiatric:         Mood and Affect: Mood normal.         Behavior: Behavior normal.       Vitals:  /80   Pulse 76   Wt 231 lb (104.8 kg)   SpO2 98%   BMI 37.28 kg/m²       Data:     Lab Results   Component Value Date/Time     10/28/2022 10:05 AM    K 4.3 10/28/2022 10:05 AM     10/28/2022 10:05 AM    CO2 25 10/28/2022 10:05 AM    BUN 15 10/28/2022 10:05 AM    CREATININE 1.01 10/28/2022 10:05 AM    GLUCOSE 100 10/28/2022 10:05 AM    GLUCOSE 97 01/25/2012 08:00 AM    PROT 6.5 11/24/2017 07:58 AM    LABALBU 3.9 11/24/2017 07:58 AM    LABALBU 4.4 01/25/2012 08:00 AM    BILITOT 0.39 11/24/2017 07:58 AM    ALKPHOS 65 11/24/2017 07:58 AM    AST 15 11/24/2017 07:58 AM    ALT 10 11/24/2017 07:58 AM     Lab Results   Component Value Date/Time    WBC 6.1 10/28/2022 10:05 AM    RBC 4.81 10/28/2022 10:05 AM    RBC 4.92 01/25/2012 08:00 AM    HGB 13.4 10/28/2022 10:05 AM    HCT 40.1 10/28/2022 10:05 AM    MCV 83.4 10/28/2022 10:05 AM    MCH 27.9 10/28/2022 10:05 AM    MCHC 33.5 10/28/2022 10:05 AM    RDW 16.1 10/28/2022 10:05 AM     10/28/2022 10:05 AM     01/25/2012 08:00 AM    MPV NOT REPORTED 01/27/2020 09:07 AM     Lab Results   Component Value Date/Time    TSH 2.02 02/08/2022 09:05 AM     Lab Results   Component Value Date/Time    CHOL 158 11/24/2017 07:58 AM    HDL 73 11/24/2017 07:58 AM    LABA1C 5.5 05/12/2021 11:27 AM          Assessment/Plan:        1. Essential hypertension, benign  Stable on HCTZ and cozaar--- labs ordered for soone  - Lipid Panel; Future    2. Other specified hypothyroidism  Stable on the synthroid and TSH soon  - TSH; Future    3. Anxiety  Stable on PRN ativan   - LORazepam (ATIVAN) 0.5 MG tablet; Take 1 tablet by mouth every 8 hours as needed for Anxiety for up to 30 days. Dispense: 30 tablet; Refill: 0    4. Severe obesity (BMI 35.0-39. 9) with comorbidity ([address]  Pt to continue work on diet and exercise        López No received counseling on the following healthy behaviors: nutrition and exercise  Reviewed prior labs and health maintenance  Continue current medications, diet and exercise. Discussed use, benefit, and side effects of prescribed medications. Barriers to medication compliance addressed. Patient given educational materials - see patient instructions  Was a self-tracking handout given in paper form or via [employer]? Yes    Requested Prescriptions     Signed Prescriptions Disp Refills    LORazepam (ATIVAN) 0.5 MG tablet 30 tablet 0     Sig: Take 1 tablet by mouth every 8 hours as needed for Anxiety for up to 30 days. All patient questions answered. Patient voiced understanding. Quality Measures    Body mass index is 37.28 kg/m². Elevated. Weight control planned discussed Healthy diet and regular exercise. BP: 120/80. Blood pressure is Normal. Treatment plan consists of No treatment change needed.     Fall Risk 7/27/2022 5/26/2022 2/5/2021 1/20/2021   2 or more falls in past year? no no no no   Fall with injury in past year? no no no no     The patient does not have a history of falls. I did not - not indicated , complete a risk assessment for falls. A plan of care for falls No Treatment plan indicated    Lab Results   Component Value Date    LDLCHOLESTEROL 65 11/24/2017    (goal LDL reduction with dx if diabetes is 50% LDL reduction)    PHQ Scores 1/4/2023 7/27/2022 5/26/2022 2/5/2021 1/20/2021 1/27/2020 4/1/2019   PHQ2 Score 0 0 0 0 0 0 0   PHQ9 Score 0 0 0 0 0 0 0     Interpretation of Total Score Depression Severity: 1-4 = Minimal depression, 5-9 = Mild depression, 10-14 = Moderate depression, 15-19 = Moderately severe depression, 20-27 = Severe depression      Return in about 6 months (around 7/30/2023) for HTN, gerd.      Electronically signed by Tyesha Holliday MD on 1/30/2023 at 10:57 AM

## 2023-02-08 ENCOUNTER — HOSPITAL ENCOUNTER (OUTPATIENT)
Age: 68
Discharge: HOME | End: 2023-02-08
Payer: MEDICARE

## 2023-02-08 DIAGNOSIS — I10 ESSENTIAL HYPERTENSION, BENIGN: ICD-10-CM

## 2023-02-08 DIAGNOSIS — E03.8 OTHER SPECIFIED HYPOTHYROIDISM: ICD-10-CM

## 2023-02-08 LAB
CHOLEST SERPL-MCNC: 186 MG/DL
CHOLESTEROL/HDL RATIO: 3.3
HDLC SERPL-MCNC: 56 MG/DL
LDLC SERPL CALC-MCNC: 93 MG/DL (ref 0–130)
PATIENT FASTING?: YES
TRIGL SERPL-MCNC: 184 MG/DL
TSH SERPL-ACNC: 1.55 UIU/ML (ref 0.3–5)

## 2023-02-08 PROCEDURE — 80061 LIPID PANEL: CPT

## 2023-02-08 PROCEDURE — 84443 ASSAY THYROID STIM HORMONE: CPT

## 2023-02-08 PROCEDURE — 36415 COLL VENOUS BLD VENIPUNCTURE: CPT

## 2023-02-20 ENCOUNTER — HOSPITAL ENCOUNTER (OUTPATIENT)
Dept: ULTRASOUND IMAGING | Age: 68
Discharge: HOME | End: 2023-02-22
Payer: MEDICARE

## 2023-02-20 DIAGNOSIS — D17.9 ANGIOLIPOMA: ICD-10-CM

## 2023-02-20 PROCEDURE — 76775 US EXAM ABDO BACK WALL LIM: CPT

## 2023-03-21 ENCOUNTER — OFFICE VISIT (OUTPATIENT)
Dept: FAMILY MEDICINE CLINIC | Age: 68
End: 2023-03-21

## 2023-03-21 VITALS — DIASTOLIC BLOOD PRESSURE: 80 MMHG | SYSTOLIC BLOOD PRESSURE: 134 MMHG | HEART RATE: 82 BPM | OXYGEN SATURATION: 96 %

## 2023-03-21 DIAGNOSIS — H69.83 EUSTACHIAN TUBE DYSFUNCTION, BILATERAL: Primary | ICD-10-CM

## 2023-03-21 DIAGNOSIS — R42 VERTIGO: ICD-10-CM

## 2023-03-21 SDOH — ECONOMIC STABILITY: INCOME INSECURITY: HOW HARD IS IT FOR YOU TO PAY FOR THE VERY BASICS LIKE FOOD, HOUSING, MEDICAL CARE, AND HEATING?: NOT HARD AT ALL

## 2023-03-21 SDOH — ECONOMIC STABILITY: FOOD INSECURITY: WITHIN THE PAST 12 MONTHS, THE FOOD YOU BOUGHT JUST DIDN'T LAST AND YOU DIDN'T HAVE MONEY TO GET MORE.: NEVER TRUE

## 2023-03-21 SDOH — ECONOMIC STABILITY: HOUSING INSECURITY
IN THE LAST 12 MONTHS, WAS THERE A TIME WHEN YOU DID NOT HAVE A STEADY PLACE TO SLEEP OR SLEPT IN A SHELTER (INCLUDING NOW)?: NO

## 2023-03-21 SDOH — ECONOMIC STABILITY: FOOD INSECURITY: WITHIN THE PAST 12 MONTHS, YOU WORRIED THAT YOUR FOOD WOULD RUN OUT BEFORE YOU GOT MONEY TO BUY MORE.: NEVER TRUE

## 2023-03-21 ASSESSMENT — ENCOUNTER SYMPTOMS
VOMITING: 0
EYE REDNESS: 0
EYE DISCHARGE: 0
SHORTNESS OF BREATH: 0
NAUSEA: 0

## 2023-03-21 NOTE — PROGRESS NOTES
MG tablet Take 10 mg by mouth daily Prn   Yes Historical Provider, MD   vitamin D (ERGOCALCIFEROL) 71857 units CAPS capsule Take 50,000 Units by mouth Twice a Week  1/24/19  Yes Historical Provider, MD   folic acid (FOLVITE) 1 MG tablet Take 1 mg by mouth daily     Yes Historical Provider, MD        Allergies:       Celecoxib, Ciprofloxacin, Flonase [fluticasone propionate], Iodine, Macrobid [nitrofurantoin], Other, Oxycodone hcl, and Oxycodone    Social History:     Tobacco:    reports that she quit smoking about 37 years ago. Her smoking use included cigarettes. She has a 15.00 pack-year smoking history. She has never used smokeless tobacco.  Alcohol:      reports no history of alcohol use. Drug Use:  reports no history of drug use. Family History:     Family History   Problem Relation Age of Onset    Diabetes Mother     COPD Mother     Heart Failure Mother     Breast Cancer Mother     Cancer Father         colon    Diabetes Father        Review of Systems:       Review of Systems   Constitutional:  Negative for chills and fever. Eyes:  Negative for discharge, redness and visual disturbance. Respiratory:  Negative for shortness of breath. Cardiovascular:  Negative for chest pain, palpitations and leg swelling. Gastrointestinal:  Negative for nausea and vomiting. Skin:  Negative for rash. Neurological:  Positive for dizziness. Negative for speech difficulty, weakness, light-headedness, numbness and headaches. Physical Exam:     Physical Exam  Vitals reviewed. Constitutional:       General: She is not in acute distress. Appearance: Normal appearance. She is obese. She is not ill-appearing. HENT:      Head: Normocephalic and atraumatic. Right Ear: Ear canal normal. A middle ear effusion is present. There is no impacted cerumen. Tympanic membrane is not injected or erythematous. Left Ear: Ear canal normal. A middle ear effusion is present. There is no impacted cerumen.  Tympanic

## 2023-04-21 ENCOUNTER — OFFICE VISIT (OUTPATIENT)
Dept: FAMILY MEDICINE CLINIC | Age: 68
End: 2023-04-21

## 2023-04-21 VITALS
WEIGHT: 234 LBS | OXYGEN SATURATION: 100 % | BODY MASS INDEX: 37.77 KG/M2 | HEART RATE: 74 BPM | SYSTOLIC BLOOD PRESSURE: 120 MMHG | DIASTOLIC BLOOD PRESSURE: 80 MMHG

## 2023-04-21 DIAGNOSIS — H69.83 EUSTACHIAN TUBE DYSFUNCTION, BILATERAL: ICD-10-CM

## 2023-04-21 DIAGNOSIS — M99.00 SOMATIC DYSFUNCTION OF HEAD REGION: ICD-10-CM

## 2023-04-21 DIAGNOSIS — M99.07 SOMATIC DYSFUNCTION OF LEFT UPPER EXTREMITY: ICD-10-CM

## 2023-04-21 DIAGNOSIS — R42 VERTIGO: Primary | ICD-10-CM

## 2023-04-21 DIAGNOSIS — M99.01 SOMATIC DYSFUNCTION OF SPINE, CERVICAL: ICD-10-CM

## 2023-04-21 NOTE — PROGRESS NOTES
Interval history: dizziness continues to improve, only noting two nocturnal episodes while changing position in bed. She has complete resolution of ear pressure, pain as well. /80   Pulse 74   Wt 234 lb (106.1 kg)   SpO2 100%   BMI 37.77 kg/m²     Procedure Note:  After history taking and examination of patient, it was determined that a beneficial treatment modality for their complaint would be osteopathic manipulative therapy (OMT) the nature of OMT, treatment goals, mechanism of action, and side effects were extensively discussed with this patient, who did wish to proceed with the procedure. The following body systems were treated with osteopathy during our office visit today: head, cervical, upper extremity. Osteopathic findings include left cranial torsion, maxillary sinus dysfunction, left longus coli hypertonicity, left trapezius hypertonicity with tenderpoint. After treatment, the patient was reevaluated and the following physical exam findings were appreciated: resolution of somatic dysfunction. We determined that the next best time for the patient to return to the office for additional treatment would be PRN. At this point, her treatment was concluded, there were no complications, there were no further questions. Patient tolerated this procedure very well.     Yonis Workman DO  4/21/2023  9:23 AM

## 2023-05-04 DIAGNOSIS — I10 ESSENTIAL HYPERTENSION, BENIGN: ICD-10-CM

## 2023-05-04 RX ORDER — LOSARTAN POTASSIUM 100 MG/1
TABLET ORAL
Qty: 90 TABLET | Refills: 1 | Status: SHIPPED | OUTPATIENT
Start: 2023-05-04

## 2023-05-04 RX ORDER — OMEPRAZOLE 20 MG/1
CAPSULE, DELAYED RELEASE ORAL
Qty: 90 CAPSULE | Refills: 1 | Status: SHIPPED | OUTPATIENT
Start: 2023-05-04

## 2023-05-04 NOTE — TELEPHONE ENCOUNTER
Last OV: 4/21/2023 01/30/23 chronic   Last RX:    Next scheduled apt: Visit date not found            Surescript requesting a refill

## 2023-05-11 ENCOUNTER — HOSPITAL ENCOUNTER (OUTPATIENT)
Age: 68
Setting detail: SPECIMEN
Discharge: HOME | End: 2023-05-11
Payer: MEDICARE

## 2023-05-11 ENCOUNTER — OFFICE VISIT (OUTPATIENT)
Dept: FAMILY MEDICINE CLINIC | Age: 68
End: 2023-05-11
Payer: MEDICARE

## 2023-05-11 VITALS
HEIGHT: 66 IN | HEART RATE: 68 BPM | WEIGHT: 227 LBS | DIASTOLIC BLOOD PRESSURE: 68 MMHG | OXYGEN SATURATION: 98 % | SYSTOLIC BLOOD PRESSURE: 116 MMHG | BODY MASS INDEX: 36.48 KG/M2

## 2023-05-11 DIAGNOSIS — N89.8 VAGINAL ITCHING: ICD-10-CM

## 2023-05-11 DIAGNOSIS — N89.8 VAGINAL ITCHING: Primary | ICD-10-CM

## 2023-05-11 DIAGNOSIS — R82.89 ABNORMAL CELL COUNT OF URINE: ICD-10-CM

## 2023-05-11 DIAGNOSIS — E66.09 CLASS 2 OBESITY DUE TO EXCESS CALORIES WITHOUT SERIOUS COMORBIDITY WITH BODY MASS INDEX (BMI) OF 36.0 TO 36.9 IN ADULT: ICD-10-CM

## 2023-05-11 LAB
BILIRUBIN, POC: ABNORMAL
BLOOD URINE, POC: ABNORMAL
CLARITY, POC: CLEAR
COLOR, POC: YELLOW
GLUCOSE URINE, POC: ABNORMAL
KETONES, POC: ABNORMAL
LEUKOCYTE EST, POC: ABNORMAL
NITRITE, POC: ABNORMAL
PH, POC: 7.5
PROTEIN, POC: ABNORMAL
SPECIFIC GRAVITY, POC: 1.01
UROBILINOGEN, POC: 0.2

## 2023-05-11 PROCEDURE — 3017F COLORECTAL CA SCREEN DOC REV: CPT | Performed by: FAMILY MEDICINE

## 2023-05-11 PROCEDURE — G8399 PT W/DXA RESULTS DOCUMENT: HCPCS | Performed by: FAMILY MEDICINE

## 2023-05-11 PROCEDURE — 99213 OFFICE O/P EST LOW 20 MIN: CPT | Performed by: FAMILY MEDICINE

## 2023-05-11 PROCEDURE — G8417 CALC BMI ABV UP PARAM F/U: HCPCS | Performed by: FAMILY MEDICINE

## 2023-05-11 PROCEDURE — 87086 URINE CULTURE/COLONY COUNT: CPT

## 2023-05-11 PROCEDURE — 81003 URINALYSIS AUTO W/O SCOPE: CPT | Performed by: FAMILY MEDICINE

## 2023-05-11 PROCEDURE — 87077 CULTURE AEROBIC IDENTIFY: CPT

## 2023-05-11 PROCEDURE — 1036F TOBACCO NON-USER: CPT | Performed by: FAMILY MEDICINE

## 2023-05-11 PROCEDURE — 87186 SC STD MICRODIL/AGAR DIL: CPT

## 2023-05-11 PROCEDURE — G8427 DOCREV CUR MEDS BY ELIG CLIN: HCPCS | Performed by: FAMILY MEDICINE

## 2023-05-11 PROCEDURE — 3074F SYST BP LT 130 MM HG: CPT | Performed by: FAMILY MEDICINE

## 2023-05-11 PROCEDURE — 1123F ACP DISCUSS/DSCN MKR DOCD: CPT | Performed by: FAMILY MEDICINE

## 2023-05-11 PROCEDURE — 3078F DIAST BP <80 MM HG: CPT | Performed by: FAMILY MEDICINE

## 2023-05-11 PROCEDURE — 1090F PRES/ABSN URINE INCON ASSESS: CPT | Performed by: FAMILY MEDICINE

## 2023-05-11 ASSESSMENT — ENCOUNTER SYMPTOMS
DIARRHEA: 0
COUGH: 0
SHORTNESS OF BREATH: 0
VOMITING: 0
CONSTIPATION: 0

## 2023-05-11 NOTE — PATIENT INSTRUCTIONS
Press [facility] SURVEY:    You may be receiving a survey from [employer] regarding your visit today. You may get this in the mail, through your MyChart or in your email. Please complete the survey to enable us to provide the highest quality of care to you and your family. If you cannot score us as very good ( 5 Stars) on any question, please feel free to call the office to discuss how we could have made your experience exceptional.     Thank you.     Clinical Care Team:   MD Davie Carmona Dunn, [address] [place] [address]                                     Triage: Oli Peter, [address] Team:  Kobe Pickard

## 2023-05-11 NOTE — PROGRESS NOTES
HPI Notes    Name: Angelia Spangler  : 1955        Chief Complaint:     Chief Complaint   Patient presents with    Vaginal Itching     Pt presents today with vaginal itching off and on for about 1 week. Pt denies any discharge or UTI symptoms. Weight Management     Pt has joined [employer] and would like to know what a healthy weight is for her. History of Present Illness:     Angelia Spangler is a 79 y.o.  female who presents with Vaginal Itching (Pt presents today with vaginal itching off and on for about 1 week. Pt denies any discharge or UTI symptoms.) and Weight Management (Pt has joined [employer] and would like to know what a healthy weight is for her.)      Vaginal Itching  The patient's primary symptoms include genital itching. The patient's pertinent negatives include no genital lesions, genital odor, vaginal bleeding or vaginal discharge. This is a recurrent (Pt has had this off and on for years with the itching but NO vaginal discharge. Pt has been told and has used the estrodiol in the past. BUT pt has been using the cream for several months prior.) problem. The current episode started 1 to 4 weeks ago. The problem occurs daily. The problem has been unchanged. The patient is experiencing no pain. The problem affects both sides. Pertinent negatives include no chills, constipation, diarrhea, dysuria, fever, hematuria, urgency or vomiting. The treatment provided significant relief. She uses nothing for contraception. She is postmenopausal.   Pt has had the trace blood in the urine for years and due to Plavix. Weight loss - pt has been trying to lose weight. She has been doing Weight [employer] and has lost 7lbs in 1mos. Pt has a goal weight of 180Lbs which she needs a Dr Zamzam Mckeon stating this is fine.      Past Medical History:     Past Medical History:   Diagnosis Date    Cerebrovascular disease     mini stroke may 2012,     Chronic back pain     GERD

## 2023-05-13 LAB
MICROORGANISM SPEC CULT: ABNORMAL
SPECIMEN DESCRIPTION: ABNORMAL

## 2023-05-15 ENCOUNTER — TELEPHONE (OUTPATIENT)
Dept: FAMILY MEDICINE CLINIC | Age: 68
End: 2023-05-15

## 2023-05-15 RX ORDER — CEPHALEXIN 500 MG/1
500 CAPSULE ORAL 3 TIMES DAILY
Qty: 21 CAPSULE | Refills: 0 | Status: SHIPPED | OUTPATIENT
Start: 2023-05-15 | End: 2023-05-22

## 2023-05-15 NOTE — TELEPHONE ENCOUNTER
----- Message from Colby Paz MD sent at 5/14/2023 11:25 PM EDT -----  Tell pt her urine DID grow out a bacteria and she needs to take antibiotic. She could keflex 500mg on TID for 7d so please PEND medication for me to sign. Thanks.

## 2023-07-14 RX ORDER — CLOTRIMAZOLE 1 %
CREAM (GRAM) TOPICAL
Qty: 45 G | Refills: 1 | Status: SHIPPED | OUTPATIENT
Start: 2023-07-14

## 2023-07-14 NOTE — TELEPHONE ENCOUNTER
Last OV: 5/11/2023  Last RX:    Next scheduled apt: Visit date not found           Pt requesting a refill

## 2023-07-14 NOTE — TELEPHONE ENCOUNTER
Patient asking for a new script for her Lotrimin Cream - I did tell patient that she can get that over the counter - she said that was fine and not to bother doctor on her day off - I did let her know that I would ask Dr Cherelle Yang on Tuesday when she is back - patient is ok with that

## 2023-07-27 ENCOUNTER — TELEPHONE (OUTPATIENT)
Dept: FAMILY MEDICINE CLINIC | Age: 68
End: 2023-07-27

## 2023-07-27 NOTE — TELEPHONE ENCOUNTER
Yandy Marcano is in need of a new handicap placard. She will be in the office to  on Thursday.     Health Maintenance   Topic Date Due    Hepatitis C screen  Never done    DTaP/Tdap/Td vaccine (1 - Tdap) Never done    Shingles vaccine (1 of 2) Never done    COVID-19 Vaccine (3 - Booster for Moderna series) 05/28/2021    Annual Wellness Visit (AWV)  07/28/2023    Flu vaccine (1) 08/01/2023    Depression Screen  01/04/2024    Breast cancer screen  02/10/2024    Colorectal Cancer Screen  01/24/2028    Lipids  02/08/2028    DEXA (modify frequency per FRAX score)  Completed    Pneumococcal 65+ years Vaccine  Completed    Hepatitis A vaccine  Aged Out    Hib vaccine  Aged Out    Meningococcal (ACWY) vaccine  Aged Out    Pneumococcal 0-64 years Vaccine  Discontinued    Diabetes screen  Discontinued             (applicable per patient's age: Cancer Screenings, Depression Screening, Fall Risk Screening, Immunizations)    Hemoglobin A1C (%)   Date Value   05/12/2021 5.5   07/27/2020 5.6   07/31/2018 5.4     LDL Cholesterol (mg/dL)   Date Value   02/08/2023 93     AST (U/L)   Date Value   11/24/2017 15     ALT (U/L)   Date Value   11/24/2017 10     BUN (mg/dL)   Date Value   10/28/2022 15      (goal A1C is < 7)   (goal LDL is <100) need 30-50% reduction from baseline     BP Readings from Last 3 Encounters:   05/11/23 116/68   04/21/23 120/80   04/14/23 100/80    (goal /80)      All Future Testing planned in [facility]:  Lab Frequency Next Occurrence   Vitamin B12 Once 08/16/2022   COLONOSCOPY W/ OR W/O BIOPSY Once 02/17/2023       Next Visit Date:  Future Appointments   Date Time Provider [facility] [address]   9/13/2023  8:00 AM Anayeli Martin PA-C [facility]            Patient Active Problem List:     Anxiety state     Hypothyroidism     Essential hypertension, benign     Esophageal reflux     Carpal tunnel syndrome     TIA (transient ischemic attack)     Chronic back pain     Osteopenia     Cerebral infarction due

## 2023-07-27 NOTE — TELEPHONE ENCOUNTER
Ok to write and print for handicap letter--- use arthritis as reason can't walk well and make good 5yrs. They said she will be in office Thurs to ??? Today is Thursday so I hope that they mean next Thurs? ? You can put in my box to sign. I may be able to sign tomorrow AM if needed when I drive through [place]? ? Let me know. Navya Velasquez

## 2023-07-31 RX ORDER — HYDROCHLOROTHIAZIDE 25 MG/1
TABLET ORAL
Qty: 45 TABLET | Refills: 1 | Status: SHIPPED | OUTPATIENT
Start: 2023-07-31

## 2023-09-15 DIAGNOSIS — E03.8 OTHER SPECIFIED HYPOTHYROIDISM: ICD-10-CM

## 2023-09-15 RX ORDER — LEVOTHYROXINE SODIUM 0.1 MG/1
TABLET ORAL
Qty: 90 TABLET | Refills: 1 | Status: SHIPPED | OUTPATIENT
Start: 2023-09-15

## 2023-09-15 NOTE — TELEPHONE ENCOUNTER
Last OV: 5/11/2023 01/30/23 chronic   30/  Last RX:    Next scheduled apt: Visit date not found            Surescript requesting a refill

## 2023-09-22 ENCOUNTER — OFFICE VISIT (OUTPATIENT)
Dept: FAMILY MEDICINE CLINIC | Age: 68
End: 2023-09-22

## 2023-09-22 VITALS
WEIGHT: 210 LBS | OXYGEN SATURATION: 100 % | BODY MASS INDEX: 33.89 KG/M2 | HEART RATE: 83 BPM | DIASTOLIC BLOOD PRESSURE: 78 MMHG | SYSTOLIC BLOOD PRESSURE: 100 MMHG

## 2023-09-22 DIAGNOSIS — J34.89 SINUS PRESSURE: Primary | ICD-10-CM

## 2023-09-22 DIAGNOSIS — M99.00 SOMATIC DYSFUNCTION OF HEAD REGION: ICD-10-CM

## 2023-09-22 DIAGNOSIS — J01.00 ACUTE NON-RECURRENT MAXILLARY SINUSITIS: ICD-10-CM

## 2023-09-22 DIAGNOSIS — M99.01 SOMATIC DYSFUNCTION OF SPINE, CERVICAL: ICD-10-CM

## 2023-09-22 RX ORDER — AMOXICILLIN AND CLAVULANATE POTASSIUM 875; 125 MG/1; MG/1
1 TABLET, FILM COATED ORAL 2 TIMES DAILY
Qty: 14 TABLET | Refills: 0 | Status: SHIPPED | OUTPATIENT
Start: 2023-09-22 | End: 2023-09-22

## 2023-09-22 RX ORDER — AMOXICILLIN AND CLAVULANATE POTASSIUM 875; 125 MG/1; MG/1
1 TABLET, FILM COATED ORAL 2 TIMES DAILY
Qty: 14 TABLET | Refills: 0 | Status: SHIPPED | OUTPATIENT
Start: 2023-09-22 | End: 2023-09-29

## 2023-09-22 NOTE — PATIENT INSTRUCTIONS
SURVEY:    You may be receiving a survey from [employer] regarding your visit today. You may get this in the mail, through your MyChart or in your email. Please complete the survey to enable us to provide the highest quality of care to you and your family. If you cannot score us as very good ( 5 Stars) on any question, please feel free to call the office to discuss how we could have made your experience exceptional.     Thank you.     Clinical Care Team:  DO Tiff Sharif LPN    Triage:  Kiah Tripp, [address] [facility] Team:  Kiara Stevens

## 2023-09-28 ENCOUNTER — OFFICE VISIT (OUTPATIENT)
Dept: FAMILY MEDICINE CLINIC | Age: 68
End: 2023-09-28
Payer: MEDICARE

## 2023-09-28 VITALS
SYSTOLIC BLOOD PRESSURE: 120 MMHG | DIASTOLIC BLOOD PRESSURE: 78 MMHG | OXYGEN SATURATION: 100 % | WEIGHT: 210 LBS | BODY MASS INDEX: 33.89 KG/M2 | HEART RATE: 83 BPM

## 2023-09-28 DIAGNOSIS — M99.01 SOMATIC DYSFUNCTION OF SPINE, CERVICAL: ICD-10-CM

## 2023-09-28 DIAGNOSIS — M99.00 SOMATIC DYSFUNCTION OF HEAD REGION: ICD-10-CM

## 2023-09-28 DIAGNOSIS — J34.89 SINUS PRESSURE: Primary | ICD-10-CM

## 2023-09-28 PROCEDURE — 98925 OSTEOPATH MANJ 1-2 REGIONS: CPT | Performed by: STUDENT IN AN ORGANIZED HEALTH CARE EDUCATION/TRAINING PROGRAM

## 2023-09-28 NOTE — PATIENT INSTRUCTIONS
SURVEY:    You may be receiving a survey from [employer] regarding your visit today. You may get this in the mail, through your MyChart or in your email. Please complete the survey to enable us to provide the highest quality of care to you and your family. If you cannot score us as very good ( 5 Stars) on any question, please feel free to call the office to discuss how we could have made your experience exceptional.     Thank you.     Clinical Care Team:  Dr. Jonna Gowers, DO Leida Llanos, LPN    Triage:  Vito Pierre, [address] [facility] Team:  Ying Reyna

## 2023-10-05 ENCOUNTER — OFFICE VISIT (OUTPATIENT)
Dept: FAMILY MEDICINE CLINIC | Age: 68
End: 2023-10-05

## 2023-10-05 VITALS
SYSTOLIC BLOOD PRESSURE: 110 MMHG | WEIGHT: 210 LBS | DIASTOLIC BLOOD PRESSURE: 70 MMHG | OXYGEN SATURATION: 98 % | BODY MASS INDEX: 33.89 KG/M2 | HEART RATE: 73 BPM

## 2023-10-05 DIAGNOSIS — M99.00 SOMATIC DYSFUNCTION OF HEAD REGION: ICD-10-CM

## 2023-10-05 DIAGNOSIS — M99.01 SOMATIC DYSFUNCTION OF SPINE, CERVICAL: ICD-10-CM

## 2023-10-05 DIAGNOSIS — J34.89 SINUS PRESSURE: Primary | ICD-10-CM

## 2023-10-05 PROBLEM — Z86.73 HISTORY OF CVA (CEREBROVASCULAR ACCIDENT): Status: ACTIVE | Noted: 2022-01-26

## 2023-10-05 NOTE — PATIENT INSTRUCTIONS
SURVEY:    You may be receiving a survey from [employer] regarding your visit today. You may get this in the mail, through your MyChart or in your email. Please complete the survey to enable us to provide the highest quality of care to you and your family. If you cannot score us as very good ( 5 Stars) on any question, please feel free to call the office to discuss how we could have made your experience exceptional.     Thank you.     Clinical Care Team:  Dr. Sushil Morrison, DO                                           Page Daily, LPN    Triage:  Noreen Sykes, [address] [facility] Team:  Syeda Weaver

## 2023-10-29 DIAGNOSIS — I10 ESSENTIAL HYPERTENSION, BENIGN: ICD-10-CM

## 2023-10-30 RX ORDER — LOSARTAN POTASSIUM 100 MG/1
TABLET ORAL
Qty: 90 TABLET | Refills: 1 | Status: SHIPPED | OUTPATIENT
Start: 2023-10-30

## 2023-10-30 RX ORDER — OMEPRAZOLE 20 MG/1
CAPSULE, DELAYED RELEASE ORAL
Qty: 90 CAPSULE | Refills: 1 | Status: SHIPPED | OUTPATIENT
Start: 2023-10-30

## 2023-10-30 NOTE — TELEPHONE ENCOUNTER
Last OV: 10/5/2023  Last RX:    Next scheduled apt: Visit date not found          Surescript requesting a refill

## 2023-11-08 ENCOUNTER — HOSPITAL ENCOUNTER (OUTPATIENT)
Age: 68
Setting detail: SPECIMEN
Discharge: HOME | End: 2023-11-08
Payer: MEDICARE

## 2023-11-08 ENCOUNTER — OFFICE VISIT (OUTPATIENT)
Dept: OBGYN | Age: 68
End: 2023-11-08

## 2023-11-08 VITALS
HEIGHT: 66 IN | BODY MASS INDEX: 33.27 KG/M2 | WEIGHT: 207 LBS | DIASTOLIC BLOOD PRESSURE: 70 MMHG | SYSTOLIC BLOOD PRESSURE: 122 MMHG

## 2023-11-08 DIAGNOSIS — Z01.419 VISIT FOR GYNECOLOGIC EXAMINATION: Primary | ICD-10-CM

## 2023-11-08 DIAGNOSIS — Z12.31 ENCOUNTER FOR SCREENING MAMMOGRAM FOR MALIGNANT NEOPLASM OF BREAST: ICD-10-CM

## 2023-11-08 DIAGNOSIS — N76.0 ACUTE VAGINITIS: ICD-10-CM

## 2023-11-08 PROCEDURE — 87660 TRICHOMONAS VAGIN DIR PROBE: CPT

## 2023-11-08 PROCEDURE — 87510 GARDNER VAG DNA DIR PROBE: CPT

## 2023-11-08 PROCEDURE — 87480 CANDIDA DNA DIR PROBE: CPT

## 2023-11-08 RX ORDER — GABAPENTIN 300 MG/1
1 CAPSULE ORAL
COMMUNITY
Start: 2020-07-14

## 2023-11-08 ASSESSMENT — ENCOUNTER SYMPTOMS
SHORTNESS OF BREATH: 0
CONSTIPATION: 1
DIARRHEA: 0
ABDOMINAL PAIN: 0

## 2023-11-08 NOTE — PROGRESS NOTES
cream, APPLY TO AFFECTED AREA(S) DAILY TOPICALLY AS NEEDED, Disp: 45 g, Rfl: 1    Chlorpheniramine-Phenylephrine (SUDAFED PE SINUS/ALLERGY PO), Take by mouth One in the morning, Disp: , Rfl:     clopidogrel (PLAVIX) 75 MG tablet, Take 1 tablet by mouth every other day, Disp: 30 tablet, Rfl: 0    UNABLE TO FIND, Gabapentin-Ketoprofen amitriptyline HCL 10-10-2% neuroserum liquid Apply small amt to affected area and rub for 10 minutes.  Repeat 2-3 times daily, Disp: 15 mL, Rfl: 11    loratadine (CLARITIN) 10 MG tablet, Take 1 tablet by mouth daily Prn, Disp: , Rfl:     vitamin D (ERGOCALCIFEROL) 86409 units CAPS capsule, Take 1 capsule by mouth Twice a Week Indications: Monday and Thursday, Disp: , Rfl:     folic acid (FOLVITE) 1 MG tablet, Take 1 tablet by mouth daily, Disp: , Rfl:     Social History     Substance and Sexual Activity   Sexual Activity Not Currently    Partners: Male       Any bleeding or pain with intercourse: N/A    Last Yearly date:  7/24/12    Last pap date and results: 7/24/12(-)    Last HPV date and results: never    Last Mammogram date and results 2/10/22    Last Dexa scan date and results:1/27/21    Last colorectal screen- type:Colonoscopy 1/24/23      Do you do self breast exams: Yes    Past Medical History:   Diagnosis Date    Cerebrovascular disease     mini stroke may 2012, 2014    Chronic back pain     GERD (gastroesophageal reflux disease)     Hypertension     Hypothyroidism     Osteoarthritis     knee and back    Osteopenia 01/04/2016       Past Surgical History:   Procedure Laterality Date    APPENDECTOMY      BREAST BIOPSY      CHOLECYSTECTOMY      COLONOSCOPY N/A 01/24/2023    Dr. Vira Castro with 2 adenomatous polyps    HYSTERECTOMY (CERVIX STATUS UNKNOWN)      still Rt ovary present    JOINT REPLACEMENT  08/07/2018    Rt knee    KNEE SURGERY Left 8/16/22       Family History   Problem Relation Age of Onset    Diabetes Mother     COPD Mother     Heart Failure Mother     Breast Cancer Mother

## 2023-11-09 LAB
CANDIDA SPECIES: NEGATIVE
GARDNERELLA VAGINALIS: POSITIVE
SOURCE: ABNORMAL
TRICHOMONAS: NEGATIVE

## 2023-11-28 ENCOUNTER — TELEPHONE (OUTPATIENT)
Dept: FAMILY MEDICINE CLINIC | Age: 68
End: 2023-11-28

## 2023-11-28 NOTE — TELEPHONE ENCOUNTER
Tam Neely needs her serum called in for her. She said Dr. Noy Carey has called it in for her before. She said it was for her gabapentin, amitriptyline and something else.  She said the RX # is [id number]    [facility]       Health Maintenance   Topic Date Due    Hepatitis C screen  Never done    DTaP/Tdap/Td vaccine (1 - Tdap) Never done    Shingles vaccine (1 of 2) Never done    Respiratory Syncytial Virus (RSV) age 61 yrs+ (3 - 1-dose 60+ series) Never done    Pneumococcal 65+ years Vaccine (2 - PCV) 01/20/2022    Annual Wellness Visit (AWV)  07/28/2023    Flu vaccine (1) 08/01/2023    COVID-19 Vaccine (3 - 2023-24 season) 09/01/2023    Depression Screen  01/04/2024    Breast cancer screen  02/10/2024    Colorectal Cancer Screen  01/24/2028    Lipids  02/08/2028    DEXA (modify frequency per FRAX score)  Completed    Hepatitis A vaccine  Aged Out    Hepatitis B vaccine  Aged Out    Hib vaccine  Aged Out    Meningococcal (ACWY) vaccine  Aged Out    Pneumococcal 0-64 years Vaccine  Discontinued    Diabetes screen  Discontinued             (applicable per patient's age: Cancer Screenings, Depression Screening, Fall Risk Screening, Immunizations)    Hemoglobin A1C (%)   Date Value   05/12/2021 5.5   07/27/2020 5.6   07/31/2018 5.4     LDL Cholesterol (mg/dL)   Date Value   02/08/2023 93     AST (U/L)   Date Value   11/24/2017 15     ALT (U/L)   Date Value   11/24/2017 10     BUN (mg/dL)   Date Value   10/28/2022 15      (goal A1C is < 7)   (goal LDL is <100) need 30-50% reduction from baseline     BP Readings from Last 3 Encounters:   11/08/23 122/70   10/05/23 110/70   09/28/23 120/78    (goal /80)      All Future Testing planned in [facility]:  Lab Frequency Next Occurrence   COLONOSCOPY W/ OR W/O BIOPSY Once 02/17/2023   [facility] DIGITAL SCREEN W OR WO CAD BILATERAL Once 11/22/2023       Next Visit Date:  Future Appointments   Date Time Provider [address]   11/29/2023 11:00 AM Wale Champagne [facility] [address] [place]

## 2023-12-01 ENCOUNTER — OFFICE VISIT (OUTPATIENT)
Dept: FAMILY MEDICINE CLINIC | Age: 68
End: 2023-12-01

## 2023-12-01 ENCOUNTER — HOSPITAL ENCOUNTER (OUTPATIENT)
Age: 68
Setting detail: SPECIMEN
Discharge: HOME | End: 2023-12-01
Payer: MEDICARE

## 2023-12-01 VITALS
OXYGEN SATURATION: 96 % | DIASTOLIC BLOOD PRESSURE: 82 MMHG | HEART RATE: 84 BPM | TEMPERATURE: 97.5 F | SYSTOLIC BLOOD PRESSURE: 130 MMHG

## 2023-12-01 DIAGNOSIS — N39.0 RECURRENT UTI: ICD-10-CM

## 2023-12-01 DIAGNOSIS — N39.0 RECURRENT UTI: Primary | ICD-10-CM

## 2023-12-01 LAB
BILIRUBIN, POC: ABNORMAL
BLOOD URINE, POC: ABNORMAL
CLARITY, POC: ABNORMAL
COLOR, POC: YELLOW
GLUCOSE URINE, POC: ABNORMAL
KETONES, POC: ABNORMAL
LEUKOCYTE EST, POC: ABNORMAL
NITRITE, POC: ABNORMAL
PH, POC: 7
PROTEIN, POC: 30
SPECIFIC GRAVITY, POC: 1.01
UROBILINOGEN, POC: 0.2

## 2023-12-01 PROCEDURE — 87186 SC STD MICRODIL/AGAR DIL: CPT

## 2023-12-01 PROCEDURE — 87086 URINE CULTURE/COLONY COUNT: CPT

## 2023-12-01 PROCEDURE — 87088 URINE BACTERIA CULTURE: CPT

## 2023-12-01 RX ORDER — AMOXICILLIN AND CLAVULANATE POTASSIUM 875; 125 MG/1; MG/1
1 TABLET, FILM COATED ORAL 2 TIMES DAILY
Qty: 14 TABLET | Refills: 0 | Status: SHIPPED | OUTPATIENT
Start: 2023-12-01 | End: 2023-12-08

## 2023-12-01 ASSESSMENT — ENCOUNTER SYMPTOMS
SHORTNESS OF BREATH: 0
NAUSEA: 0
VOMITING: 0
COUGH: 0
DIARRHEA: 0

## 2023-12-01 NOTE — PATIENT INSTRUCTIONS
SURVEY:    You may be receiving a survey from [email or web address] regarding your visit today. Please complete the survey to enable us to provide the highest quality of care to you and your family. If you cannot score us a very good (5 Stars) on any question, please call the office to discuss how we could have made your experience a very good one. Thank you.     Clinical Care Team: JOSE Teixeira-RONALD Foster LPN    Clerical Team: 1 Roberto Salcedo

## 2023-12-01 NOTE — PROGRESS NOTES
HPI Notes    Name: Lacey Lindo  : 1955         Chief Complaint:     Chief Complaint   Patient presents with    Urinary Tract Infection     Patient here today for urinary pressure, frequency. Started this morning. She has been treated recently for bacterial vaginosis and UTI. History of Present Illness:        Urinary Tract Infection  This is a new problem. The current episode started in the past 7 days. The problem has been waxing and waning since onset. Associated symptoms include hematuria. Was on augmentin for UTI (culture + for e coli) x 5 days. Symptoms were improving, but then returned when the medication concluded. Past Medical History:     Past Medical History:   Diagnosis Date    Cerebrovascular disease     mini stroke may 2012,     Chronic back pain     GERD (gastroesophageal reflux disease)     Hypertension     Hypothyroidism     Osteoarthritis     knee and back    Osteopenia 2016      Reviewed all health maintenance requirements and ordered appropriate tests  Health Maintenance Due   Topic Date Due    Hepatitis C screen  Never done    DTaP/Tdap/Td vaccine (1 - Tdap) Never done    Shingles vaccine (1 of 2) Never done    Respiratory Syncytial Virus (RSV) age 61 yrs+ (3 - 1-dose 60+ series) Never done    Pneumococcal 65+ years Vaccine (2 - PCV) 2022    Annual Wellness Visit (AWV)  2023    Flu vaccine (1) 2023    COVID-19 Vaccine (3 - - season) 2023       Past Surgical History:     Past Surgical History:   Procedure Laterality Date    APPENDECTOMY      BREAST BIOPSY      CHOLECYSTECTOMY      COLONOSCOPY N/A 2023    Dr. Ute Kimbrough with 2 adenomatous polyps    HYSTERECTOMY (CERVIX STATUS UNKNOWN)      still Rt ovary present    JOINT REPLACEMENT  2018    Rt knee    KNEE SURGERY Left 22        Medications:       Prior to Admission medications    Medication Sig Start Date End Date Taking?  Authorizing Provider

## 2023-12-03 LAB
MICROORGANISM SPEC CULT: ABNORMAL
SPECIMEN DESCRIPTION: ABNORMAL

## 2023-12-13 ENCOUNTER — HOSPITAL ENCOUNTER (OUTPATIENT)
Dept: MAMMOGRAPHY | Age: 68
Discharge: HOME | End: 2023-12-15
Payer: MEDICARE

## 2023-12-13 DIAGNOSIS — Z12.31 ENCOUNTER FOR SCREENING MAMMOGRAM FOR MALIGNANT NEOPLASM OF BREAST: ICD-10-CM

## 2023-12-13 PROCEDURE — 77063 BREAST TOMOSYNTHESIS BI: CPT

## 2024-01-08 ENCOUNTER — OFFICE VISIT (OUTPATIENT)
Dept: FAMILY MEDICINE CLINIC | Age: 69
End: 2024-01-08
Payer: MEDICARE

## 2024-01-08 VITALS
OXYGEN SATURATION: 98 % | WEIGHT: 205.5 LBS | BODY MASS INDEX: 33.17 KG/M2 | DIASTOLIC BLOOD PRESSURE: 72 MMHG | HEART RATE: 93 BPM | SYSTOLIC BLOOD PRESSURE: 124 MMHG | TEMPERATURE: 98.2 F

## 2024-01-08 DIAGNOSIS — J01.00 ACUTE NON-RECURRENT MAXILLARY SINUSITIS: Primary | ICD-10-CM

## 2024-01-08 LAB
INFLUENZA A ANTIGEN, POC: NEGATIVE
INFLUENZA B ANTIGEN, POC: NEGATIVE
LOT NUMBER POC: NORMAL
SARS-COV-2 RNA POC - COV: NORMAL
VALID INTERNAL CONTROL, POC: PRESENT
VENDOR AND KIT NAME POC: NORMAL

## 2024-01-08 PROCEDURE — 3017F COLORECTAL CA SCREEN DOC REV: CPT | Performed by: NURSE PRACTITIONER

## 2024-01-08 PROCEDURE — 99213 OFFICE O/P EST LOW 20 MIN: CPT | Performed by: NURSE PRACTITIONER

## 2024-01-08 PROCEDURE — 3078F DIAST BP <80 MM HG: CPT | Performed by: NURSE PRACTITIONER

## 2024-01-08 PROCEDURE — 1123F ACP DISCUSS/DSCN MKR DOCD: CPT | Performed by: NURSE PRACTITIONER

## 2024-01-08 PROCEDURE — G8484 FLU IMMUNIZE NO ADMIN: HCPCS | Performed by: NURSE PRACTITIONER

## 2024-01-08 PROCEDURE — G8427 DOCREV CUR MEDS BY ELIG CLIN: HCPCS | Performed by: NURSE PRACTITIONER

## 2024-01-08 PROCEDURE — 1036F TOBACCO NON-USER: CPT | Performed by: NURSE PRACTITIONER

## 2024-01-08 PROCEDURE — G8417 CALC BMI ABV UP PARAM F/U: HCPCS | Performed by: NURSE PRACTITIONER

## 2024-01-08 PROCEDURE — 1090F PRES/ABSN URINE INCON ASSESS: CPT | Performed by: NURSE PRACTITIONER

## 2024-01-08 PROCEDURE — 3074F SYST BP LT 130 MM HG: CPT | Performed by: NURSE PRACTITIONER

## 2024-01-08 PROCEDURE — G8399 PT W/DXA RESULTS DOCUMENT: HCPCS | Performed by: NURSE PRACTITIONER

## 2024-01-08 RX ORDER — DOXYCYCLINE HYCLATE 100 MG
100 TABLET ORAL 2 TIMES DAILY
Qty: 20 TABLET | Refills: 0 | Status: SHIPPED | OUTPATIENT
Start: 2024-01-08 | End: 2024-01-18

## 2024-01-08 ASSESSMENT — ENCOUNTER SYMPTOMS
DIARRHEA: 0
COUGH: 1
VOMITING: 0
NAUSEA: 0
SINUS PAIN: 1
RHINORRHEA: 1
SHORTNESS OF BREATH: 0

## 2024-01-08 NOTE — PROGRESS NOTES
HPI Notes    Name: Venessa Ferrell  : 1955         Chief Complaint:     Chief Complaint   Patient presents with    URI     Patient complaining of sore throat, right ear/face pain, tiredness, and cough. Started 2 days ago.       History of Present Illness:        URI   This is a new problem. The current episode started in the past 7 days. The problem has been waxing and waning. There has been no fever. Associated symptoms include congestion, coughing, ear pain, headaches, rhinorrhea and sinus pain. Pertinent negatives include no chest pain, diarrhea, nausea or vomiting.       Past Medical History:     Past Medical History:   Diagnosis Date    Cerebrovascular disease     mini stroke may 2012,     Chronic back pain     GERD (gastroesophageal reflux disease)     Hypertension     Hypothyroidism     Osteoarthritis     knee and back    Osteopenia 2016      Reviewed all health maintenance requirements and ordered appropriate tests  Health Maintenance Due   Topic Date Due    Hepatitis C screen  Never done    DTaP/Tdap/Td vaccine (1 - Tdap) Never done    Shingles vaccine (1 of 2) Never done    Respiratory Syncytial Virus (RSV) Pregnant or age 60 yrs+ (1 - 1-dose 60+ series) Never done    Pneumococcal 65+ years Vaccine (2 - PCV) 2022    Flu vaccine (1) 2023    COVID-19 Vaccine (3 - - season) 2023    Annual Wellness Visit (Medicare)  2023    Depression Screen  2024       Past Surgical History:     Past Surgical History:   Procedure Laterality Date    APPENDECTOMY      BREAST BIOPSY      CHOLECYSTECTOMY      COLONOSCOPY N/A 2023    Dr. Edwards with 2 adenomatous polyps    HYSTERECTOMY (CERVIX STATUS UNKNOWN)      still Rt ovary present    JOINT REPLACEMENT  2018    Rt knee    KNEE SURGERY Left 22        Medications:       Prior to Admission medications    Medication Sig Start Date End Date Taking? Authorizing Provider   doxycycline hyclate (VIBRA-TABS) 100

## 2024-01-09 ENCOUNTER — TELEPHONE (OUTPATIENT)
Dept: FAMILY MEDICINE CLINIC | Age: 69
End: 2024-01-09

## 2024-01-09 RX ORDER — CEPHALEXIN 500 MG/1
500 CAPSULE ORAL 2 TIMES DAILY
Qty: 20 CAPSULE | Refills: 0 | Status: SHIPPED | OUTPATIENT
Start: 2024-01-09 | End: 2024-01-19

## 2024-01-19 DIAGNOSIS — F41.9 ANXIETY: ICD-10-CM

## 2024-01-19 RX ORDER — LORAZEPAM 0.5 MG/1
0.5 TABLET ORAL EVERY 8 HOURS PRN
Qty: 30 TABLET | Refills: 0 | Status: SHIPPED | OUTPATIENT
Start: 2024-01-19 | End: 2024-02-18

## 2024-01-19 NOTE — TELEPHONE ENCOUNTER
Lorazepam 0.5 mg    [facility]      Health Maintenance   Topic Date Due    Hepatitis C screen  Never done    DTaP/Tdap/Td vaccine (1 - Tdap) Never done    Shingles vaccine (1 of 2) Never done    Respiratory Syncytial Virus (RSV) Pregnant or age 60 yrs+ (1 - 1-dose 60+ series) Never done    Pneumococcal 65+ years Vaccine (2 - PCV) 01/20/2022    Flu vaccine (1) 08/01/2023    COVID-19 Vaccine (3 - 2023-24 season) 09/01/2023    Annual Wellness Visit (Medicare)  11/27/2023    Depression Screen  01/04/2024    Breast cancer screen  12/13/2025    Colorectal Cancer Screen  01/24/2028    Lipids  02/08/2028    DEXA (modify frequency per FRAX score)  Completed    Hepatitis A vaccine  Aged Out    Hepatitis B vaccine  Aged Out    Hib vaccine  Aged Out    Polio vaccine  Aged Out    Meningococcal (ACWY) vaccine  Aged Out    Pneumococcal 0-64 years Vaccine  Discontinued    Diabetes screen  Discontinued             (applicable per patient's age: Cancer Screenings, Depression Screening, Fall Risk Screening, Immunizations)    Hemoglobin A1C (%)   Date Value   05/12/2021 5.5   07/27/2020 5.6   07/31/2018 5.4     LDL Cholesterol (mg/dL)   Date Value   02/08/2023 93     AST (U/L)   Date Value   11/24/2017 15     ALT (U/L)   Date Value   11/24/2017 10     BUN (mg/dL)   Date Value   10/28/2022 15      (goal A1C is < 7)   (goal LDL is <100) need 30-50% reduction from baseline     BP Readings from Last 3 Encounters:   01/08/24 124/72   12/01/23 130/82   11/08/23 122/70    (goal /80)      All Future Testing planned in [facility]:  Lab Frequency Next Occurrence       Next Visit Date:  No future appointments.         Patient Active Problem List:     Anxiety state     Hypothyroidism     Essential hypertension, benign     Esophageal reflux     Carpal tunnel syndrome     TIA (transient ischemic attack)     Chronic back pain     Osteopenia     History of CVA (cerebrovascular accident)

## 2024-01-19 NOTE — TELEPHONE ENCOUNTER
Last OV: 1/8/2024 1/30/23 anxiety   Last RX:    Next scheduled apt: Visit date not found            Pt requesting a refill

## 2024-01-24 RX ORDER — HYDROCHLOROTHIAZIDE 25 MG/1
TABLET ORAL
Qty: 45 TABLET | Refills: 1 | Status: SHIPPED | OUTPATIENT
Start: 2024-01-24

## 2024-01-24 NOTE — TELEPHONE ENCOUNTER
Last OV: 1/8/2024 01/30/23 chronic   Last RX:    Next scheduled apt: Visit date not found          [facility]  requesting a refill

## 2024-02-08 ENCOUNTER — OFFICE VISIT (OUTPATIENT)
Dept: FAMILY MEDICINE CLINIC | Age: 69
End: 2024-02-08

## 2024-02-08 ENCOUNTER — HOSPITAL ENCOUNTER (OUTPATIENT)
Age: 69
Discharge: HOME | End: 2024-02-08
Payer: MEDICARE

## 2024-02-08 VITALS
OXYGEN SATURATION: 100 % | WEIGHT: 204.8 LBS | SYSTOLIC BLOOD PRESSURE: 118 MMHG | HEIGHT: 66 IN | HEART RATE: 77 BPM | DIASTOLIC BLOOD PRESSURE: 78 MMHG | BODY MASS INDEX: 32.92 KG/M2

## 2024-02-08 DIAGNOSIS — R25.2 LEG CRAMPING: ICD-10-CM

## 2024-02-08 DIAGNOSIS — R25.2 LEG CRAMPING: Primary | ICD-10-CM

## 2024-02-08 DIAGNOSIS — E03.8 OTHER SPECIFIED HYPOTHYROIDISM: ICD-10-CM

## 2024-02-08 LAB
ALBUMIN SERPL-MCNC: 4.2 G/DL (ref 3.5–5.2)
ALP SERPL-CCNC: 84 U/L (ref 35–104)
ALT SERPL-CCNC: 10 U/L (ref 5–33)
ANION GAP SERPL CALCULATED.3IONS-SCNC: 13 MMOL/L (ref 9–17)
AST SERPL-CCNC: 17 U/L
BASOPHILS # BLD: 0.04 K/UL (ref 0–0.2)
BASOPHILS NFR BLD: 1 % (ref 0–2)
BILIRUB SERPL-MCNC: 0.4 MG/DL (ref 0.3–1.2)
BUN SERPL-MCNC: 20 MG/DL (ref 8–23)
BUN/CREAT SERPL: 22 (ref 9–20)
CALCIUM SERPL-MCNC: 9.3 MG/DL (ref 8.6–10.4)
CHLORIDE SERPL-SCNC: 98 MMOL/L (ref 98–107)
CO2 SERPL-SCNC: 23 MMOL/L (ref 20–31)
CREAT SERPL-MCNC: 0.9 MG/DL (ref 0.5–0.9)
EOSINOPHIL # BLD: 0.3 K/UL (ref 0–0.4)
EOSINOPHILS RELATIVE PERCENT: 5 % (ref 0–5)
ERYTHROCYTE [DISTWIDTH] IN BLOOD BY AUTOMATED COUNT: 14.4 % (ref 12.1–15.2)
GFR SERPL CREATININE-BSD FRML MDRD: >60 ML/MIN/1.73M2
GLUCOSE SERPL-MCNC: 94 MG/DL (ref 70–99)
HCT VFR BLD AUTO: 39.1 % (ref 36–46)
HGB BLD-MCNC: 12.8 G/DL (ref 12–16)
IMM GRANULOCYTES # BLD AUTO: 0.03 K/UL (ref 0–0.3)
IMM GRANULOCYTES NFR BLD: 0 % (ref 0–5)
LYMPHOCYTES NFR BLD: 1.4 K/UL (ref 1–4.8)
LYMPHOCYTES RELATIVE PERCENT: 21 % (ref 15–40)
MAGNESIUM SERPL-MCNC: 1.7 MG/DL (ref 1.6–2.6)
MCH RBC QN AUTO: 28.7 PG (ref 26–34)
MCHC RBC AUTO-ENTMCNC: 32.7 G/DL (ref 31–37)
MCV RBC AUTO: 87.7 FL (ref 80–100)
MONOCYTES NFR BLD: 0.8 K/UL (ref 0–1)
MONOCYTES NFR BLD: 12 % (ref 4–8)
NEUTROPHILS NFR BLD: 61 % (ref 47–75)
NEUTS SEG NFR BLD: 4.12 K/UL (ref 2.5–7)
PLATELET # BLD AUTO: 242 K/UL (ref 140–450)
PMV BLD AUTO: 10.1 FL (ref 6–12)
POTASSIUM SERPL-SCNC: 4.4 MMOL/L (ref 3.7–5.3)
PROT SERPL-MCNC: 7.3 G/DL (ref 6.4–8.3)
RBC # BLD AUTO: 4.46 M/UL (ref 4–5.2)
SODIUM SERPL-SCNC: 134 MMOL/L (ref 135–144)
TSH SERPL DL<=0.05 MIU/L-ACNC: 2.41 UIU/ML (ref 0.3–5)
WBC OTHER # BLD: 6.7 K/UL (ref 3.5–11)

## 2024-02-08 PROCEDURE — 85025 COMPLETE CBC W/AUTO DIFF WBC: CPT

## 2024-02-08 PROCEDURE — 80053 COMPREHEN METABOLIC PANEL: CPT

## 2024-02-08 PROCEDURE — 36415 COLL VENOUS BLD VENIPUNCTURE: CPT

## 2024-02-08 PROCEDURE — 83735 ASSAY OF MAGNESIUM: CPT

## 2024-02-08 PROCEDURE — 84443 ASSAY THYROID STIM HORMONE: CPT

## 2024-02-08 ASSESSMENT — PATIENT HEALTH QUESTIONNAIRE - PHQ9
SUM OF ALL RESPONSES TO PHQ QUESTIONS 1-9: 0
2. FEELING DOWN, DEPRESSED OR HOPELESS: 0
SUM OF ALL RESPONSES TO PHQ QUESTIONS 1-9: 0
1. LITTLE INTEREST OR PLEASURE IN DOING THINGS: 0
SUM OF ALL RESPONSES TO PHQ9 QUESTIONS 1 & 2: 0

## 2024-02-08 ASSESSMENT — ENCOUNTER SYMPTOMS
VOMITING: 0
SHORTNESS OF BREATH: 0
COUGH: 0

## 2024-02-08 NOTE — PROGRESS NOTES
15 10/28/2022 10:05 AM    CREATININE 1.01 10/28/2022 10:05 AM    GLUCOSE 100 10/28/2022 10:05 AM    GLUCOSE 97 01/25/2012 08:00 AM    PROT 6.5 11/24/2017 07:58 AM    LABALBU 3.9 11/24/2017 07:58 AM    LABALBU 4.4 01/25/2012 08:00 AM    BILITOT 0.39 11/24/2017 07:58 AM    ALKPHOS 65 11/24/2017 07:58 AM    AST 15 11/24/2017 07:58 AM    ALT 10 11/24/2017 07:58 AM     Lab Results   Component Value Date/Time    WBC 6.1 10/28/2022 10:05 AM    RBC 4.81 10/28/2022 10:05 AM    RBC 4.92 01/25/2012 08:00 AM    HGB 13.4 10/28/2022 10:05 AM    HCT 40.1 10/28/2022 10:05 AM    MCV 83.4 10/28/2022 10:05 AM    MCH 27.9 10/28/2022 10:05 AM    MCHC 33.5 10/28/2022 10:05 AM    RDW 16.1 10/28/2022 10:05 AM     10/28/2022 10:05 AM     01/25/2012 08:00 AM    MPV NOT REPORTED 01/27/2020 09:07 AM     Lab Results   Component Value Date/Time    TSH 1.55 02/08/2023 08:05 AM     Lab Results   Component Value Date/Time    CHOL 186 02/08/2023 08:05 AM    HDL 56 02/08/2023 08:05 AM    LABA1C 5.5 05/12/2021 11:27 AM          Assessment/Plan:        1. Leg cramping  D/w pt checking labs ---- CMP , Mg, CBC and TSH   Also talked to pt about heat and stretches and based on labs may need Mg and or stopping HCTZ    2. Other specified hypothyroidism  Ck TSH today with the leg cramps       Return if symptoms worsen or fail to improve.      Electronically signed by Tyesha Holliday MD on 2/8/2024 at 1:38 PM

## 2024-03-12 DIAGNOSIS — E03.8 OTHER SPECIFIED HYPOTHYROIDISM: ICD-10-CM

## 2024-03-12 RX ORDER — LEVOTHYROXINE SODIUM 0.1 MG/1
TABLET ORAL
Qty: 90 TABLET | Refills: 1 | Status: SHIPPED | OUTPATIENT
Start: 2024-03-12

## 2024-03-12 NOTE — TELEPHONE ENCOUNTER
Rx refill request via [employer]  Levothyroxine 100mcg QD  Last ov 2/8/24 for leg pain  Next appt 7/31/24 for AWV

## 2024-04-25 DIAGNOSIS — I10 ESSENTIAL HYPERTENSION, BENIGN: ICD-10-CM

## 2024-04-25 RX ORDER — LOSARTAN POTASSIUM 100 MG/1
TABLET ORAL
Qty: 90 TABLET | Refills: 1 | Status: SHIPPED | OUTPATIENT
Start: 2024-04-25

## 2024-04-25 RX ORDER — OMEPRAZOLE 20 MG/1
CAPSULE, DELAYED RELEASE ORAL
Qty: 90 CAPSULE | Refills: 1 | Status: SHIPPED | OUTPATIENT
Start: 2024-04-25

## 2024-04-25 NOTE — TELEPHONE ENCOUNTER
Last OV 2/8/24     Next OV 7/31/24    Requesting refill on losartan and omeprazole thru [facility].  Rx's pending

## 2024-04-26 ENCOUNTER — OFFICE VISIT (OUTPATIENT)
Dept: FAMILY MEDICINE CLINIC | Age: 69
End: 2024-04-26

## 2024-04-26 VITALS — SYSTOLIC BLOOD PRESSURE: 118 MMHG | HEART RATE: 94 BPM | DIASTOLIC BLOOD PRESSURE: 70 MMHG | OXYGEN SATURATION: 99 %

## 2024-04-26 DIAGNOSIS — R05.9 COUGH IN ADULT: Primary | ICD-10-CM

## 2024-04-26 DIAGNOSIS — J20.9 ACUTE BRONCHITIS, UNSPECIFIED ORGANISM: ICD-10-CM

## 2024-04-26 DIAGNOSIS — Z86.73 HISTORY OF STROKE: ICD-10-CM

## 2024-04-26 DIAGNOSIS — H65.93 OME (OTITIS MEDIA WITH EFFUSION), BILATERAL: ICD-10-CM

## 2024-04-26 DIAGNOSIS — R09.82 POSTNASAL DRIP: ICD-10-CM

## 2024-04-26 RX ORDER — AZITHROMYCIN 250 MG/1
TABLET, FILM COATED ORAL
Qty: 6 TABLET | Refills: 0 | Status: SHIPPED | OUTPATIENT
Start: 2024-04-26 | End: 2024-05-06

## 2024-04-26 RX ORDER — BENZONATATE 100 MG/1
100-200 CAPSULE ORAL 3 TIMES DAILY PRN
Qty: 60 CAPSULE | Refills: 0 | Status: SHIPPED | OUTPATIENT
Start: 2024-04-26 | End: 2024-05-03

## 2024-04-26 SDOH — ECONOMIC STABILITY: FOOD INSECURITY: WITHIN THE PAST 12 MONTHS, YOU WORRIED THAT YOUR FOOD WOULD RUN OUT BEFORE YOU GOT MONEY TO BUY MORE.: NEVER TRUE

## 2024-04-26 SDOH — ECONOMIC STABILITY: INCOME INSECURITY: HOW HARD IS IT FOR YOU TO PAY FOR THE VERY BASICS LIKE FOOD, HOUSING, MEDICAL CARE, AND HEATING?: NOT HARD AT ALL

## 2024-04-26 SDOH — ECONOMIC STABILITY: FOOD INSECURITY: WITHIN THE PAST 12 MONTHS, THE FOOD YOU BOUGHT JUST DIDN'T LAST AND YOU DIDN'T HAVE MONEY TO GET MORE.: NEVER TRUE

## 2024-04-26 ASSESSMENT — ENCOUNTER SYMPTOMS
NAUSEA: 0
RHINORRHEA: 0
SINUS PAIN: 0
SINUS PRESSURE: 0
DIARRHEA: 0
ABDOMINAL PAIN: 0
WHEEZING: 0
SORE THROAT: 0
VOMITING: 0
BACK PAIN: 0
COUGH: 1

## 2024-04-26 NOTE — PATIENT INSTRUCTIONS
SURVEY:    You may be receiving a survey from [facility] regarding your visit today.    You may get this in the mail, through your MyChart or in your email.     Please complete the survey to enable us to provide the highest quality of care to you and your family.    If you cannot score us as very good ( 5 Stars) on any question, please feel free to call the office to discuss how we could have made your experience exceptional.     Thank you.    Clinical Care Team:  Dr. Barney Sosa, DO Mechelle Cage LPN    Triage:  Viri Panda CMA    Clerical Team:  Viri Jean Baptiste

## 2024-04-26 NOTE — PROGRESS NOTES
HPI Notes    Name: Venessa Ferrell  : 1955         Chief Complaint:     Chief Complaint   Patient presents with    Cough     Cough and headache started 5 days ago. She is currently taking coricidin to help manage symptoms.         History of Present Illness:        HPI    This is a 68-year-old woman presenting for evaluation of cough and headache beginning 5 days ago.  She is currently taking Coricidin to help improve her symptoms. This is mildly helpful. She is also complaining of PND, ear pain, but no fevers, chills, sneezing, rhinorrhea. No recent sick contacts.     Past Medical History:     Past Medical History:   Diagnosis Date    Cerebrovascular disease     mini stroke may 2012,     Chronic back pain     GERD (gastroesophageal reflux disease)     Hypertension     Hypothyroidism     Osteoarthritis     knee and back    Osteopenia 2016      Reviewed all health maintenance requirements and ordered appropriate tests  Health Maintenance Due   Topic Date Due    Hepatitis C screen  Never done    DTaP/Tdap/Td vaccine (1 - Tdap) Never done    Shingles vaccine (1 of 2) Never done    Respiratory Syncytial Virus (RSV) Pregnant or age 60 yrs+ (1 - 1-dose 60+ series) Never done    Pneumococcal 65+ years Vaccine (2 of 2 - PCV) 2022    COVID-19 Vaccine (3 - -24 season) 2023    Annual Wellness Visit (Medicare)  2023       Past Surgical History:     Past Surgical History:   Procedure Laterality Date    APPENDECTOMY      BREAST BIOPSY      CHOLECYSTECTOMY      COLONOSCOPY N/A 2023    Dr. Edwards with 2 adenomatous polyps    HYSTERECTOMY (CERVIX STATUS UNKNOWN)      still Rt ovary present    JOINT REPLACEMENT  2018    Rt knee    KNEE SURGERY Left 22        Medications:       Prior to Admission medications    Medication Sig Start Date End Date Taking? Authorizing Provider   azithromycin (ZITHROMAX) 250 MG tablet 500mg on day 1 followed by 250mg on days 2 - 5 24

## 2024-04-29 ENCOUNTER — TELEPHONE (OUTPATIENT)
Dept: FAMILY MEDICINE CLINIC | Age: 69
End: 2024-04-29

## 2024-04-29 ENCOUNTER — HOSPITAL ENCOUNTER (OUTPATIENT)
Dept: GENERAL RADIOLOGY | Age: 69
Discharge: HOME | End: 2024-05-01
Payer: MEDICARE

## 2024-04-29 ENCOUNTER — HOSPITAL ENCOUNTER (OUTPATIENT)
Age: 69
Discharge: HOME | End: 2024-04-29
Payer: MEDICARE

## 2024-04-29 ENCOUNTER — HOSPITAL ENCOUNTER (OUTPATIENT)
Age: 69
Discharge: HOME | End: 2024-05-01
Payer: MEDICARE

## 2024-04-29 DIAGNOSIS — R05.9 COUGH IN ADULT: ICD-10-CM

## 2024-04-29 DIAGNOSIS — R05.9 COUGH IN ADULT: Primary | ICD-10-CM

## 2024-04-29 LAB
ANION GAP SERPL CALCULATED.3IONS-SCNC: 11 MMOL/L (ref 9–17)
BASOPHILS # BLD: 0.04 K/UL (ref 0–0.2)
BASOPHILS NFR BLD: 1 % (ref 0–2)
BUN SERPL-MCNC: 12 MG/DL (ref 8–23)
BUN/CREAT SERPL: 15 (ref 9–20)
CALCIUM SERPL-MCNC: 8.9 MG/DL (ref 8.6–10.4)
CHLORIDE SERPL-SCNC: 101 MMOL/L (ref 98–107)
CO2 SERPL-SCNC: 23 MMOL/L (ref 20–31)
CREAT SERPL-MCNC: 0.8 MG/DL (ref 0.5–0.9)
EOSINOPHIL # BLD: 0.39 K/UL (ref 0–0.4)
EOSINOPHILS RELATIVE PERCENT: 7 % (ref 0–5)
ERYTHROCYTE [DISTWIDTH] IN BLOOD BY AUTOMATED COUNT: 14.2 % (ref 12.1–15.2)
GFR SERPL CREATININE-BSD FRML MDRD: 80 ML/MIN/1.73M2
GLUCOSE SERPL-MCNC: 125 MG/DL (ref 70–99)
HCT VFR BLD AUTO: 39.5 % (ref 36–46)
HGB BLD-MCNC: 13 G/DL (ref 12–16)
IMM GRANULOCYTES # BLD AUTO: 0.04 K/UL (ref 0–0.3)
IMM GRANULOCYTES NFR BLD: 1 % (ref 0–5)
LYMPHOCYTES NFR BLD: 0.93 K/UL (ref 1–4.8)
LYMPHOCYTES RELATIVE PERCENT: 17 % (ref 15–40)
MCH RBC QN AUTO: 28 PG (ref 26–34)
MCHC RBC AUTO-ENTMCNC: 32.9 G/DL (ref 31–37)
MCV RBC AUTO: 84.9 FL (ref 80–100)
MONOCYTES NFR BLD: 0.55 K/UL (ref 0–1)
MONOCYTES NFR BLD: 10 % (ref 4–8)
NEUTROPHILS NFR BLD: 64 % (ref 47–75)
NEUTS SEG NFR BLD: 3.52 K/UL (ref 2.5–7)
PLATELET # BLD AUTO: 266 K/UL (ref 140–450)
PMV BLD AUTO: 9.1 FL (ref 6–12)
POTASSIUM SERPL-SCNC: 3.9 MMOL/L (ref 3.7–5.3)
RBC # BLD AUTO: 4.65 M/UL (ref 4–5.2)
SODIUM SERPL-SCNC: 135 MMOL/L (ref 135–144)
WBC OTHER # BLD: 5.5 K/UL (ref 3.5–11)

## 2024-04-29 PROCEDURE — 36415 COLL VENOUS BLD VENIPUNCTURE: CPT

## 2024-04-29 PROCEDURE — 85025 COMPLETE CBC W/AUTO DIFF WBC: CPT

## 2024-04-29 PROCEDURE — 71046 X-RAY EXAM CHEST 2 VIEWS: CPT

## 2024-04-29 PROCEDURE — 80048 BASIC METABOLIC PNL TOTAL CA: CPT

## 2024-04-29 RX ORDER — PREDNISONE 20 MG/1
40 TABLET ORAL DAILY
Qty: 10 TABLET | Refills: 0 | Status: SHIPPED | OUTPATIENT
Start: 2024-04-29 | End: 2024-05-04

## 2024-04-29 RX ORDER — DOXYCYCLINE HYCLATE 100 MG
100 TABLET ORAL 2 TIMES DAILY
Qty: 14 TABLET | Refills: 0 | Status: SHIPPED | OUTPATIENT
Start: 2024-04-29 | End: 2024-04-30 | Stop reason: ALTCHOICE

## 2024-04-29 NOTE — TELEPHONE ENCOUNTER
Patient was seen on 4/26/24 with Dr. Sosa.  She states he told her to call back on Monday if she wasn't feeling any better.  She was given Zithromax 250 mg and Benzonatate 100 mg.  Patient would like to know if she needs re-checked or if her medication needs switched?  Please let patient know.    Health Maintenance   Topic Date Due    Hepatitis C screen  Never done    DTaP/Tdap/Td vaccine (1 - Tdap) Never done    Shingles vaccine (1 of 2) Never done    Respiratory Syncytial Virus (RSV) Pregnant or age 60 yrs+ (1 - 1-dose 60+ series) Never done    Pneumococcal 65+ years Vaccine (2 of 2 - PCV) 01/20/2022    COVID-19 Vaccine (3 - 2023-24 season) 09/01/2023    Annual Wellness Visit (Medicare)  11/27/2023    Flu vaccine (Season Ended) 08/01/2024    Depression Screen  02/08/2025    Breast cancer screen  12/13/2025    Colorectal Cancer Screen  01/24/2028    Lipids  02/08/2028    DEXA (modify frequency per FRAX score)  Completed    Hepatitis A vaccine  Aged Out    Hepatitis B vaccine  Aged Out    Hib vaccine  Aged Out    Polio vaccine  Aged Out    Meningococcal (ACWY) vaccine  Aged Out    Pneumococcal 0-64 years Vaccine  Discontinued    Diabetes screen  Discontinued             (applicable per patient's age: Cancer Screenings, Depression Screening, Fall Risk Screening, Immunizations)    Hemoglobin A1C (%)   Date Value   05/12/2021 5.5   07/27/2020 5.6   07/31/2018 5.4     LDL Cholesterol (mg/dL)   Date Value   02/08/2023 93     AST (U/L)   Date Value   02/08/2024 17     ALT (U/L)   Date Value   02/08/2024 10     BUN (mg/dL)   Date Value   02/08/2024 20      (goal A1C is < 7)   (goal LDL is <100) need 30-50% reduction from baseline     BP Readings from Last 3 Encounters:   04/26/24 118/70   02/08/24 118/78   01/08/24 124/72    (goal /80)      All Future Testing planned in [facility]:  Lab Frequency Next Occurrence       Next Visit Date:  Future Appointments   Date Time Provider Department Center   7/31/2024  8:20 AM

## 2024-04-30 ENCOUNTER — TELEPHONE (OUTPATIENT)
Dept: FAMILY MEDICINE CLINIC | Age: 69
End: 2024-04-30

## 2024-04-30 DIAGNOSIS — J20.9 ACUTE BRONCHITIS, UNSPECIFIED ORGANISM: Primary | ICD-10-CM

## 2024-04-30 RX ORDER — AMOXICILLIN AND CLAVULANATE POTASSIUM 875; 125 MG/1; MG/1
1 TABLET, FILM COATED ORAL 2 TIMES DAILY
Qty: 14 TABLET | Refills: 0 | Status: SHIPPED | OUTPATIENT
Start: 2024-04-30 | End: 2024-05-07

## 2024-04-30 NOTE — TELEPHONE ENCOUNTER
Changed to Augmentin  
Patient notified  
Problem List:     Anxiety state     Hypothyroidism     Essential hypertension, benign     Esophageal reflux     Carpal tunnel syndrome     Chronic back pain     Osteopenia     History of CVA (cerebrovascular accident)

## 2024-05-02 ENCOUNTER — OFFICE VISIT (OUTPATIENT)
Dept: FAMILY MEDICINE CLINIC | Age: 69
End: 2024-05-02

## 2024-05-02 VITALS
SYSTOLIC BLOOD PRESSURE: 120 MMHG | OXYGEN SATURATION: 99 % | BODY MASS INDEX: 33.59 KG/M2 | DIASTOLIC BLOOD PRESSURE: 70 MMHG | HEART RATE: 94 BPM | WEIGHT: 209 LBS | HEIGHT: 66 IN | TEMPERATURE: 98.1 F

## 2024-05-02 DIAGNOSIS — J18.9 PNEUMONIA OF RIGHT LOWER LOBE DUE TO INFECTIOUS ORGANISM: Primary | ICD-10-CM

## 2024-05-02 LAB
INFLUENZA A ANTIGEN, POC: NEGATIVE
INFLUENZA B ANTIGEN, POC: NEGATIVE
LOT NUMBER POC: NORMAL
SARS-COV-2 RNA POC - COV: NORMAL
VALID INTERNAL CONTROL, POC: NORMAL
VENDOR AND KIT NAME POC: NORMAL

## 2024-05-02 RX ORDER — AMOXICILLIN AND CLAVULANATE POTASSIUM 875; 125 MG/1; MG/1
1 TABLET, FILM COATED ORAL 2 TIMES DAILY
Qty: 6 TABLET | Refills: 0 | Status: SHIPPED | OUTPATIENT
Start: 2024-05-02 | End: 2024-05-05

## 2024-05-02 RX ORDER — AMOXICILLIN AND CLAVULANATE POTASSIUM 875; 125 MG/1; MG/1
1 TABLET, FILM COATED ORAL 2 TIMES DAILY
Qty: 6 TABLET | Refills: 0 | Status: SHIPPED | OUTPATIENT
Start: 2024-05-02 | End: 2024-05-02

## 2024-05-02 ASSESSMENT — ENCOUNTER SYMPTOMS
NAUSEA: 0
SINUS PAIN: 0
DIARRHEA: 0
VOMITING: 0
RHINORRHEA: 0
BACK PAIN: 0
ABDOMINAL PAIN: 0
WHEEZING: 0
COUGH: 1

## 2024-05-02 NOTE — PROGRESS NOTES
MCV 84.9 04/29/2024 01:30 PM    MCH 28.0 04/29/2024 01:30 PM    MCHC 32.9 04/29/2024 01:30 PM    RDW 14.2 04/29/2024 01:30 PM     04/29/2024 01:30 PM     01/25/2012 08:00 AM    MPV 9.1 04/29/2024 01:30 PM     Lab Results   Component Value Date/Time    TSH 2.41 02/08/2024 01:40 PM     Lab Results   Component Value Date/Time    CHOL 186 02/08/2023 08:05 AM    LDL 93 02/08/2023 08:05 AM    HDL 56 02/08/2023 08:05 AM    LABA1C 5.5 05/12/2021 11:27 AM          Assessment & Plan        Diagnosis Orders   1. Pneumonia of right lower lobe due to infectious organism  amoxicillin-clavulanate (AUGMENTIN) 875-125 MG per tablet    POC COVID-19, FLU A/B          Continue Agumentin; add Coricidin PRN for symptoms, discussed general treatment principles for CAP. Extend treatment with Augmentin by 3 additional days. Will exclude COVID with swab today.  It is negative.  Will recommend she add guaifenesin 600 to 1200 mg p.o. twice daily, patient to call Monday with update of her progress.      Completed Refills   Requested Prescriptions     Signed Prescriptions Disp Refills    amoxicillin-clavulanate (AUGMENTIN) 875-125 MG per tablet 6 tablet 0     Sig: Take 1 tablet by mouth 2 times daily for 3 days Add to her existing regimen of 7 day Augmentin for total 10 days     No follow-ups on file.  Orders Placed This Encounter   Medications    amoxicillin-clavulanate (AUGMENTIN) 875-125 MG per tablet     Sig: Take 1 tablet by mouth 2 times daily for 3 days Add to her existing regimen of 7 day Augmentin for total 10 days     Dispense:  6 tablet     Refill:  0     Orders Placed This Encounter   Procedures    POC COVID-19, FLU A/B       There are no Patient Instructions on file for this visit.    Electronically signed by Barney Sosa DO on 5/2/2024 at 1:29 PM     Completed Refills   Requested Prescriptions     Signed Prescriptions Disp Refills    amoxicillin-clavulanate (AUGMENTIN) 875-125 MG per tablet 6 tablet 0     Sig:

## 2024-05-06 ENCOUNTER — TELEPHONE (OUTPATIENT)
Dept: FAMILY MEDICINE CLINIC | Age: 69
End: 2024-05-06

## 2024-05-06 NOTE — TELEPHONE ENCOUNTER
Patient states she was supposed to call back today to let Dr. Sosa know how she is feeling.  Patient states she still feels pretty bad.  She is coughing and feels like it is in her throat and ears.  Please advise.    Health Maintenance   Topic Date Due    Hepatitis C screen  Never done    DTaP/Tdap/Td vaccine (1 - Tdap) Never done    Shingles vaccine (1 of 2) Never done    Respiratory Syncytial Virus (RSV) Pregnant or age 60 yrs+ (1 - 1-dose 60+ series) Never done    Pneumococcal 65+ years Vaccine (2 of 2 - PCV) 01/20/2022    COVID-19 Vaccine (3 - 2023-24 season) 09/01/2023    Annual Wellness Visit (Medicare)  11/27/2023    Diabetes screen  05/12/2024    Flu vaccine (Season Ended) 08/01/2024    Depression Screen  02/08/2025    Breast cancer screen  12/13/2025    Colorectal Cancer Screen  01/24/2028    Lipids  02/08/2028    DEXA (modify frequency per FRAX score)  Completed    Hepatitis A vaccine  Aged Out    Hepatitis B vaccine  Aged Out    Hib vaccine  Aged Out    Polio vaccine  Aged Out    Meningococcal (ACWY) vaccine  Aged Out    Pneumococcal 0-64 years Vaccine  Discontinued             (applicable per patient's age: Cancer Screenings, Depression Screening, Fall Risk Screening, Immunizations)    Hemoglobin A1C (%)   Date Value   05/12/2021 5.5   07/27/2020 5.6   07/31/2018 5.4     AST (U/L)   Date Value   02/08/2024 17     ALT (U/L)   Date Value   02/08/2024 10     BUN (mg/dL)   Date Value   04/29/2024 12      (goal A1C is < 7)   (goal LDL is <100) need 30-50% reduction from baseline     BP Readings from Last 3 Encounters:   05/02/24 120/70   04/26/24 118/70   02/08/24 118/78    (goal /80)      All Future Testing planned in [facility]:  Lab Frequency Next Occurrence       Next Visit Date:  Future Appointments   Date Time Provider Department Center   7/31/2024  8:20 AM Tyesha Holliday MD [facility]            Patient Active Problem List:     Anxiety state     Hypothyroidism     Essential

## 2024-05-07 ENCOUNTER — OFFICE VISIT (OUTPATIENT)
Dept: FAMILY MEDICINE CLINIC | Age: 69
End: 2024-05-07

## 2024-05-07 VITALS
TEMPERATURE: 97.9 F | WEIGHT: 211.4 LBS | SYSTOLIC BLOOD PRESSURE: 128 MMHG | HEART RATE: 85 BPM | BODY MASS INDEX: 34.12 KG/M2 | OXYGEN SATURATION: 97 % | DIASTOLIC BLOOD PRESSURE: 70 MMHG

## 2024-05-07 DIAGNOSIS — B37.0 ORAL THRUSH: ICD-10-CM

## 2024-05-07 DIAGNOSIS — K14.6 SORENESS OF TONGUE: Primary | ICD-10-CM

## 2024-05-07 DIAGNOSIS — H92.03 ACUTE EAR PAIN, BILATERAL: ICD-10-CM

## 2024-05-07 DIAGNOSIS — H65.92 OME (OTITIS MEDIA WITH EFFUSION), LEFT: ICD-10-CM

## 2024-05-07 ASSESSMENT — ENCOUNTER SYMPTOMS
NAUSEA: 0
ABDOMINAL PAIN: 0
SORE THROAT: 1
SINUS PAIN: 0
BACK PAIN: 0
VOMITING: 0
DIARRHEA: 0
COUGH: 1
RHINORRHEA: 0
WHEEZING: 0

## 2024-05-07 NOTE — PROGRESS NOTES
D (ERGOCALCIFEROL) 04403 units CAPS capsule Take 1 capsule by mouth Twice a Week Indications: Monday and Thursday  Patient not taking: Reported on 5/2/2024 1/24/19   Provider, MD Desi        Allergies:       Celecoxib, Ciprofloxacin, Flonase [fluticasone propionate], Iodinated contrast media, Iodine, Macrobid [nitrofurantoin], Other, Oxycodone hcl, and Oxycodone    Social History:     Tobacco:    reports that she quit smoking about 38 years ago. Her smoking use included cigarettes. She started smoking about 53 years ago. She has a 15.0 pack-year smoking history. She has never used smokeless tobacco.  Alcohol:      reports no history of alcohol use.  Drug Use:  reports no history of drug use.    Family History:     Family History   Problem Relation Age of Onset    Diabetes Mother     COPD Mother     Heart Failure Mother     Breast Cancer Mother     Cancer Father         colon    Diabetes Father     Deep Vein Thrombosis Brother        Review of Systems:       Review of Systems   Constitutional:  Negative for fever.   HENT:  Positive for ear pain and sore throat. Negative for ear discharge, rhinorrhea, sinus pain and sneezing.    Respiratory:  Positive for cough. Negative for wheezing.    Cardiovascular:  Negative for chest pain.   Gastrointestinal:  Negative for abdominal pain, diarrhea, nausea and vomiting.   Genitourinary:  Negative for menstrual problem and vaginal discharge.   Musculoskeletal:  Negative for back pain.   Skin:  Negative for rash.   Neurological:  Negative for headaches.   Psychiatric/Behavioral:  Negative for sleep disturbance.            Physical Exam:     Vitals:  /70   Pulse 85   Temp 97.9 °F (36.6 °C) (Oral)   Wt 95.9 kg (211 lb 6.4 oz)   SpO2 97%   BMI 34.12 kg/m²       Physical Exam  Vitals and nursing note reviewed.   Constitutional:       General: She is not in acute distress.     Appearance: Normal appearance. She is obese.   HENT:      Right Ear: Tympanic membrane, ear

## 2024-05-30 ENCOUNTER — TELEPHONE (OUTPATIENT)
Dept: FAMILY MEDICINE CLINIC | Age: 69
End: 2024-05-30

## 2024-05-30 NOTE — TELEPHONE ENCOUNTER
Please call pt and make sure she is on an antibx      Dr Holliday    Patient is being treated with antibiotic for UTI     notified.

## 2024-06-28 ENCOUNTER — OFFICE VISIT (OUTPATIENT)
Dept: FAMILY MEDICINE CLINIC | Age: 69
End: 2024-06-28

## 2024-06-28 VITALS
HEART RATE: 76 BPM | OXYGEN SATURATION: 99 % | TEMPERATURE: 97.5 F | BODY MASS INDEX: 33.27 KG/M2 | HEIGHT: 66 IN | SYSTOLIC BLOOD PRESSURE: 108 MMHG | WEIGHT: 207 LBS | DIASTOLIC BLOOD PRESSURE: 80 MMHG

## 2024-06-28 DIAGNOSIS — H90.12 CONDUCTIVE HEARING LOSS OF LEFT EAR WITH UNRESTRICTED HEARING OF RIGHT EAR: Primary | ICD-10-CM

## 2024-06-28 RX ORDER — MELOXICAM 15 MG/1
15 TABLET ORAL DAILY
Qty: 30 TABLET | Refills: 0 | Status: SHIPPED | OUTPATIENT
Start: 2024-06-28

## 2024-06-28 RX ORDER — ASPIRIN 81 MG/1
81 TABLET, CHEWABLE ORAL DAILY
COMMUNITY

## 2024-06-28 RX ORDER — ESTRADIOL 0.1 MG/G
CREAM VAGINAL DAILY
COMMUNITY
Start: 2024-06-21

## 2024-06-28 ASSESSMENT — ENCOUNTER SYMPTOMS
COUGH: 0
DIARRHEA: 0
NAUSEA: 0
VOMITING: 0
SHORTNESS OF BREATH: 0

## 2024-06-28 NOTE — PROGRESS NOTES
Instructions     SURVEY:    You may be receiving a survey from [employer] regarding your visit today.    Please complete the survey to enable us to provide the highest quality of care to you and your family.    If you cannot score us a very good on any question, please call the office to discuss how we could have made your experience a very good one.    Thank you.     Electronically signed by Omi Han DNP,APRN,CNP  on 6/28/2024 at 11:41 AM           Completed Refills   Requested Prescriptions     Signed Prescriptions Disp Refills    meloxicam (MOBIC) 15 MG tablet 30 tablet 0     Sig: Take 1 tablet by mouth daily

## 2024-07-01 ENCOUNTER — OFFICE VISIT (OUTPATIENT)
Dept: FAMILY MEDICINE CLINIC | Age: 69
End: 2024-07-01
Payer: MEDICARE

## 2024-07-01 VITALS
WEIGHT: 208 LBS | OXYGEN SATURATION: 100 % | HEART RATE: 73 BPM | BODY MASS INDEX: 33.57 KG/M2 | DIASTOLIC BLOOD PRESSURE: 80 MMHG | SYSTOLIC BLOOD PRESSURE: 136 MMHG

## 2024-07-01 DIAGNOSIS — J34.89 SINUS PRESSURE: ICD-10-CM

## 2024-07-01 DIAGNOSIS — B96.89 ACUTE BACTERIAL SINUSITIS: ICD-10-CM

## 2024-07-01 DIAGNOSIS — R09.82 PND (POST-NASAL DRIP): ICD-10-CM

## 2024-07-01 DIAGNOSIS — H92.02 ACUTE OTALGIA, LEFT: Primary | ICD-10-CM

## 2024-07-01 DIAGNOSIS — J01.90 ACUTE BACTERIAL SINUSITIS: ICD-10-CM

## 2024-07-01 DIAGNOSIS — R09.81 NASAL CONGESTION: ICD-10-CM

## 2024-07-01 DIAGNOSIS — R05.9 COUGH IN ADULT: ICD-10-CM

## 2024-07-01 DIAGNOSIS — H91.92 DECREASED HEARING OF LEFT EAR: ICD-10-CM

## 2024-07-01 PROCEDURE — 3079F DIAST BP 80-89 MM HG: CPT | Performed by: STUDENT IN AN ORGANIZED HEALTH CARE EDUCATION/TRAINING PROGRAM

## 2024-07-01 PROCEDURE — 1090F PRES/ABSN URINE INCON ASSESS: CPT | Performed by: STUDENT IN AN ORGANIZED HEALTH CARE EDUCATION/TRAINING PROGRAM

## 2024-07-01 PROCEDURE — G8399 PT W/DXA RESULTS DOCUMENT: HCPCS | Performed by: STUDENT IN AN ORGANIZED HEALTH CARE EDUCATION/TRAINING PROGRAM

## 2024-07-01 PROCEDURE — 1123F ACP DISCUSS/DSCN MKR DOCD: CPT | Performed by: STUDENT IN AN ORGANIZED HEALTH CARE EDUCATION/TRAINING PROGRAM

## 2024-07-01 PROCEDURE — G8417 CALC BMI ABV UP PARAM F/U: HCPCS | Performed by: STUDENT IN AN ORGANIZED HEALTH CARE EDUCATION/TRAINING PROGRAM

## 2024-07-01 PROCEDURE — 1036F TOBACCO NON-USER: CPT | Performed by: STUDENT IN AN ORGANIZED HEALTH CARE EDUCATION/TRAINING PROGRAM

## 2024-07-01 PROCEDURE — 99214 OFFICE O/P EST MOD 30 MIN: CPT | Performed by: STUDENT IN AN ORGANIZED HEALTH CARE EDUCATION/TRAINING PROGRAM

## 2024-07-01 PROCEDURE — 3075F SYST BP GE 130 - 139MM HG: CPT | Performed by: STUDENT IN AN ORGANIZED HEALTH CARE EDUCATION/TRAINING PROGRAM

## 2024-07-01 PROCEDURE — 3017F COLORECTAL CA SCREEN DOC REV: CPT | Performed by: STUDENT IN AN ORGANIZED HEALTH CARE EDUCATION/TRAINING PROGRAM

## 2024-07-01 PROCEDURE — G8427 DOCREV CUR MEDS BY ELIG CLIN: HCPCS | Performed by: STUDENT IN AN ORGANIZED HEALTH CARE EDUCATION/TRAINING PROGRAM

## 2024-07-01 RX ORDER — AMOXICILLIN AND CLAVULANATE POTASSIUM 875; 125 MG/1; MG/1
1 TABLET, FILM COATED ORAL 2 TIMES DAILY
Qty: 20 TABLET | Refills: 0 | Status: SHIPPED | OUTPATIENT
Start: 2024-07-01 | End: 2024-07-11

## 2024-07-01 ASSESSMENT — ENCOUNTER SYMPTOMS
SINUS PRESSURE: 1
COUGH: 1
DIARRHEA: 0
BACK PAIN: 0
RHINORRHEA: 0
NAUSEA: 0
ABDOMINAL PAIN: 0
SORE THROAT: 1
SINUS PAIN: 1
WHEEZING: 0

## 2024-07-01 NOTE — PATIENT INSTRUCTIONS
SURVEY:    You may be receiving a survey from [facility] regarding your visit today.    You may get this in the mail, through your MyChart or in your email.     Please complete the survey to enable us to provide the highest quality of care to you and your family.    If you cannot score us as very good ( 5 Stars) on any question, please feel free to call the office to discuss how we could have made your experience exceptional.     Thank you.    Clinical Care Team:  Dr. Barney Sosa, DO Mechelle Cage LPN    Triage:  Viri Panda CMA    Clerical Team:  Viri Jean Baptiste

## 2024-07-01 NOTE — PROGRESS NOTES
Cardiovascular:      Rate and Rhythm: Normal rate and regular rhythm.      Pulses: Normal pulses.      Heart sounds: Normal heart sounds. No murmur heard.  Pulmonary:      Effort: Pulmonary effort is normal. No respiratory distress.      Breath sounds: Normal breath sounds. No wheezing or rhonchi.      Comments: Harsh cough  Skin:     General: Skin is warm and dry.      Capillary Refill: Capillary refill takes less than 2 seconds.   Neurological:      General: No focal deficit present.      Mental Status: She is alert and oriented to person, place, and time. Mental status is at baseline.      Cranial Nerves: No cranial nerve deficit.   Psychiatric:         Mood and Affect: Mood normal.         Behavior: Behavior normal.         Thought Content: Thought content normal.             Data:     Lab Results   Component Value Date/Time     04/29/2024 01:30 PM    K 3.9 04/29/2024 01:30 PM     04/29/2024 01:30 PM    CO2 23 04/29/2024 01:30 PM    BUN 12 04/29/2024 01:30 PM    CREATININE 0.8 04/29/2024 01:30 PM    GLUCOSE 125 04/29/2024 01:30 PM    GLUCOSE 97 01/25/2012 08:00 AM    BILITOT 0.4 02/08/2024 01:40 PM    ALKPHOS 84 02/08/2024 01:40 PM    AST 17 02/08/2024 01:40 PM    ALT 10 02/08/2024 01:40 PM     Lab Results   Component Value Date/Time    WBC 5.5 04/29/2024 01:30 PM    RBC 4.65 04/29/2024 01:30 PM    RBC 4.92 01/25/2012 08:00 AM    HGB 13.0 04/29/2024 01:30 PM    HCT 39.5 04/29/2024 01:30 PM    MCV 84.9 04/29/2024 01:30 PM    MCH 28.0 04/29/2024 01:30 PM    MCHC 32.9 04/29/2024 01:30 PM    RDW 14.2 04/29/2024 01:30 PM     04/29/2024 01:30 PM     01/25/2012 08:00 AM    MPV 9.1 04/29/2024 01:30 PM     Lab Results   Component Value Date/Time    TSH 2.41 02/08/2024 01:40 PM     Lab Results   Component Value Date/Time    CHOL 186 02/08/2023 08:05 AM    LDL 93 02/08/2023 08:05 AM    HDL 56 02/08/2023 08:05 AM    LABA1C 5.5 05/12/2021 11:27 AM          Assessment & Plan        Diagnosis Orders

## 2024-07-05 ENCOUNTER — TELEPHONE (OUTPATIENT)
Dept: FAMILY MEDICINE CLINIC | Age: 69
End: 2024-07-05

## 2024-07-05 DIAGNOSIS — H92.03 ACUTE OTALGIA, BILATERAL: Primary | ICD-10-CM

## 2024-07-05 RX ORDER — ACETIC ACID 20.65 MG/ML
4 SOLUTION AURICULAR (OTIC) 3 TIMES DAILY
Qty: 15 ML | Refills: 0 | Status: SHIPPED | OUTPATIENT
Start: 2024-07-05 | End: 2024-07-12

## 2024-07-05 RX ORDER — HYDROCORTISONE AND ACETIC ACID 20.75; 10.375 MG/ML; MG/ML
3 SOLUTION AURICULAR (OTIC) 2 TIMES DAILY
Qty: 10 ML | Refills: 0 | Status: SHIPPED | OUTPATIENT
Start: 2024-07-05 | End: 2024-07-05 | Stop reason: ALTCHOICE

## 2024-07-05 NOTE — TELEPHONE ENCOUNTER
Last OV: 7/1/2024    Next scheduled apt: 7/11/2024        Patient called requesting ear drops to help manage ear fullness.   She is currently taking an antibiotic but states symptoms are not improving.

## 2024-07-11 ENCOUNTER — OFFICE VISIT (OUTPATIENT)
Dept: FAMILY MEDICINE CLINIC | Age: 69
End: 2024-07-11

## 2024-07-11 VITALS — SYSTOLIC BLOOD PRESSURE: 110 MMHG | DIASTOLIC BLOOD PRESSURE: 80 MMHG | OXYGEN SATURATION: 94 % | HEART RATE: 86 BPM

## 2024-07-11 DIAGNOSIS — H69.93 EUSTACHIAN TUBE DYSFUNCTION, BILATERAL: Primary | ICD-10-CM

## 2024-07-11 RX ORDER — UREA 10 %
1 LOTION (ML) TOPICAL NIGHTLY PRN
COMMUNITY

## 2024-07-11 ASSESSMENT — ENCOUNTER SYMPTOMS
VOMITING: 0
DIARRHEA: 0
SORE THROAT: 0
COUGH: 0
RHINORRHEA: 0
EYE REDNESS: 0
EYE DISCHARGE: 0
FACIAL SWELLING: 0

## 2024-07-11 NOTE — PROGRESS NOTES
AFFECTED AREA(S) DAILY TOPICALLY AS NEEDED 7/14/23  Yes Tyesha Holliday MD   UNABLE TO FIND Gabapentin-Ketoprofen amitriptyline HCL 10-10-2% neuroserum liquid  Apply small amt to affected area and rub for 10 minutes. Repeat 2-3 times daily 8/16/22  Yes Tyesha Holliday MD   folic acid (FOLVITE) 1 MG tablet Take 1 tablet by mouth daily   Yes Desi Hernandez MD   estradiol (ESTRACE) 0.1 MG/GM vaginal cream Place vaginally daily  Patient not taking: Reported on 7/11/2024 6/21/24   Desi Hernandez MD   loratadine (CLARITIN) 10 MG tablet Take 1 tablet by mouth daily Prn  Patient not taking: Reported on 7/11/2024    Desi Hernandez MD        Allergies:       Celecoxib, Ciprofloxacin, Flonase [fluticasone propionate], Iodinated contrast media, Iodine, Macrobid [nitrofurantoin], Other, Oxycodone hcl, and Oxycodone    Social History:     Tobacco:    reports that she quit smoking about 38 years ago. Her smoking use included cigarettes. She started smoking about 53 years ago. She has a 15.0 pack-year smoking history. She has never used smokeless tobacco.  Alcohol:      reports no history of alcohol use.  Drug Use:  reports no history of drug use.    Family History:     Family History   Problem Relation Age of Onset    Diabetes Mother     COPD Mother     Heart Failure Mother     Breast Cancer Mother     Cancer Father         colon    Diabetes Father     Deep Vein Thrombosis Brother        Review of Systems:       Review of Systems   Constitutional:  Negative for chills and fever.   HENT:  Positive for ear pain and hearing loss. Negative for ear discharge, facial swelling, rhinorrhea and sore throat.    Eyes:  Negative for discharge and redness.   Respiratory:  Negative for cough.    Gastrointestinal:  Negative for diarrhea and vomiting.   Skin:  Negative for rash.   Neurological:  Positive for headaches.         Physical Exam:     Physical Exam  Vitals reviewed.   Constitutional:       General: She is not

## 2024-07-17 ENCOUNTER — OFFICE VISIT (OUTPATIENT)
Dept: FAMILY MEDICINE CLINIC | Age: 69
End: 2024-07-17

## 2024-07-17 ENCOUNTER — HOSPITAL ENCOUNTER (OUTPATIENT)
Age: 69
Setting detail: SPECIMEN
Discharge: HOME | End: 2024-07-17
Payer: MEDICARE

## 2024-07-17 VITALS
HEART RATE: 77 BPM | OXYGEN SATURATION: 99 % | WEIGHT: 208 LBS | DIASTOLIC BLOOD PRESSURE: 80 MMHG | SYSTOLIC BLOOD PRESSURE: 122 MMHG | BODY MASS INDEX: 33.57 KG/M2

## 2024-07-17 DIAGNOSIS — R39.15 URINARY URGENCY: Primary | ICD-10-CM

## 2024-07-17 DIAGNOSIS — R39.15 URINARY URGENCY: ICD-10-CM

## 2024-07-17 DIAGNOSIS — N30.01 ACUTE CYSTITIS WITH HEMATURIA: ICD-10-CM

## 2024-07-17 LAB
-: ABNORMAL
BACTERIA URNS QL MICRO: ABNORMAL
BILIRUB UR QL STRIP: NEGATIVE
BILIRUBIN, POC: NEGATIVE
BLOOD URINE, POC: NORMAL
CLARITY UR: CLEAR
CLARITY, POC: NORMAL
COLOR UR: YELLOW
COLOR, POC: YELLOW
COMMENT: ABNORMAL
EPI CELLS #/AREA URNS HPF: ABNORMAL /HPF
GLUCOSE UR STRIP-MCNC: NEGATIVE MG/DL
GLUCOSE URINE, POC: NEGATIVE
HGB UR QL STRIP.AUTO: ABNORMAL
KETONES UR STRIP-MCNC: NEGATIVE MG/DL
KETONES, POC: NEGATIVE
LEUKOCYTE EST, POC: NORMAL
LEUKOCYTE ESTERASE UR QL STRIP: ABNORMAL
NITRITE UR QL STRIP: NEGATIVE
NITRITE, POC: NEGATIVE
PH UR STRIP: 6 [PH] (ref 5–8)
PH, POC: 6.5
PROT UR STRIP-MCNC: NEGATIVE MG/DL
PROTEIN, POC: NEGATIVE
RBC #/AREA URNS HPF: ABNORMAL /HPF (ref 0–2)
SP GR UR STRIP: 1.01 (ref 1–1.03)
SPECIFIC GRAVITY, POC: 1.02
UROBILINOGEN UR STRIP-ACNC: NORMAL EU/DL (ref 0–1)
UROBILINOGEN, POC: 0.2
WBC #/AREA URNS HPF: ABNORMAL /HPF

## 2024-07-17 PROCEDURE — 87086 URINE CULTURE/COLONY COUNT: CPT

## 2024-07-17 PROCEDURE — 87186 SC STD MICRODIL/AGAR DIL: CPT

## 2024-07-17 PROCEDURE — 87088 URINE BACTERIA CULTURE: CPT

## 2024-07-17 PROCEDURE — 81001 URINALYSIS AUTO W/SCOPE: CPT

## 2024-07-17 RX ORDER — CEPHALEXIN 500 MG/1
500 CAPSULE ORAL 2 TIMES DAILY
Qty: 14 CAPSULE | Refills: 0 | Status: SHIPPED | OUTPATIENT
Start: 2024-07-17 | End: 2024-07-24

## 2024-07-17 ASSESSMENT — ENCOUNTER SYMPTOMS
NAUSEA: 0
BACK PAIN: 0
WHEEZING: 0
COUGH: 0
VOMITING: 0
ABDOMINAL PAIN: 0
SINUS PAIN: 0
RHINORRHEA: 0
DIARRHEA: 0

## 2024-07-17 NOTE — PATIENT INSTRUCTIONS
SURVEY:    You may be receiving a survey from [facility] regarding your visit today.    You may get this in the mail, through your MyChart or in your email.     Please complete the survey to enable us to provide the highest quality of care to you and your family.    If you cannot score us as very good ( 5 Stars) on any question, please feel free to call the office to discuss how we could have made your experience exceptional.     Thank you.    Clinical Care Team:  Dr. Barney Sosa, DO Mechelle Cage LPN    Triage:  Viri Panda CMA    Clerical Team:  Viri Jean Baptiste

## 2024-07-17 NOTE — PROGRESS NOTES
MD Desi   aspirin 81 MG chewable tablet Take 1 tablet by mouth daily   Yes Desi Hernandez MD   losartan (COZAAR) 100 MG tablet TAKE 1 TABLET BY MOUTH DAILY 4/25/24  Yes Tyesha Holliday MD   omeprazole (PRILOSEC) 20 MG delayed release capsule TAKE ONE CAPSULE BY MOUTH EVERY MORNING BEFORE BREAKFAST 4/25/24  Yes Tyesha Holliday MD   levothyroxine (SYNTHROID) 100 MCG tablet TAKE 1 TABLET BY MOUTH DAILY 3/12/24  Yes Tyesha Holliday MD   hydroCHLOROthiazide (HYDRODIURIL) 25 MG tablet TAKE 1/2 TABLET BY MOUTH DAILY 1/24/24  Yes Tyesha Holliday MD   clobetasol (TEMOVATE) 0.05 % cream Apply topically 2 times daily. 11/9/23  Yes Vickie Jeter PA-C   vitamin D (CHOLECALCIFEROL) 03887 UNIT CAPS Take 2,000 Units by mouth Twice a Week   Yes Desi Hernandez MD   clotrimazole (LOTRIMIN) 1 % cream APPLY TO AFFECTED AREA(S) DAILY TOPICALLY AS NEEDED 7/14/23  Yes Tyesha Holliday MD   UNABLE TO FIND Gabapentin-Ketoprofen amitriptyline HCL 10-10-2% neuroserum liquid  Apply small amt to affected area and rub for 10 minutes. Repeat 2-3 times daily 8/16/22  Yes Tyesha Holliday MD   folic acid (FOLVITE) 1 MG tablet Take 1 tablet by mouth daily   Yes Desi Hernandez MD   estradiol (ESTRACE) 0.1 MG/GM vaginal cream Place vaginally daily  Patient not taking: Reported on 7/11/2024 6/21/24   Desi Hernandez MD   loratadine (CLARITIN) 10 MG tablet Take 1 tablet by mouth daily Prn  Patient not taking: Reported on 7/11/2024    Desi Hernandez MD        Allergies:       Celecoxib, Ciprofloxacin, Flonase [fluticasone propionate], Iodinated contrast media, Iodine, Macrobid [nitrofurantoin], Other, Oxycodone hcl, and Oxycodone    Social History:     Tobacco:    reports that she quit smoking about 38 years ago. Her smoking use included cigarettes. She started smoking about 53 years ago. She has a 15.0 pack-year smoking history. She has never used smokeless tobacco.  Alcohol:      reports no

## 2024-07-19 LAB
MICROORGANISM SPEC CULT: ABNORMAL
SPECIMEN DESCRIPTION: ABNORMAL

## 2024-07-24 RX ORDER — HYDROCHLOROTHIAZIDE 25 MG/1
TABLET ORAL
Qty: 45 TABLET | Refills: 1 | Status: SHIPPED | OUTPATIENT
Start: 2024-07-24

## 2024-07-29 SDOH — HEALTH STABILITY: PHYSICAL HEALTH: ON AVERAGE, HOW MANY DAYS PER WEEK DO YOU ENGAGE IN MODERATE TO STRENUOUS EXERCISE (LIKE A BRISK WALK)?: 3 DAYS

## 2024-07-29 ASSESSMENT — LIFESTYLE VARIABLES
HOW MANY STANDARD DRINKS CONTAINING ALCOHOL DO YOU HAVE ON A TYPICAL DAY: 1
HOW OFTEN DO YOU HAVE A DRINK CONTAINING ALCOHOL: 2
HOW MANY STANDARD DRINKS CONTAINING ALCOHOL DO YOU HAVE ON A TYPICAL DAY: 1 OR 2
HOW OFTEN DO YOU HAVE A DRINK CONTAINING ALCOHOL: MONTHLY OR LESS
HOW OFTEN DO YOU HAVE SIX OR MORE DRINKS ON ONE OCCASION: 1

## 2024-07-29 ASSESSMENT — PATIENT HEALTH QUESTIONNAIRE - PHQ9
1. LITTLE INTEREST OR PLEASURE IN DOING THINGS: NOT AT ALL
SUM OF ALL RESPONSES TO PHQ9 QUESTIONS 1 & 2: 0
SUM OF ALL RESPONSES TO PHQ QUESTIONS 1-9: 0
2. FEELING DOWN, DEPRESSED OR HOPELESS: NOT AT ALL

## 2024-07-31 ENCOUNTER — OFFICE VISIT (OUTPATIENT)
Dept: FAMILY MEDICINE CLINIC | Age: 69
End: 2024-07-31

## 2024-07-31 ENCOUNTER — HOSPITAL ENCOUNTER (OUTPATIENT)
Age: 69
Setting detail: SPECIMEN
Discharge: HOME | End: 2024-07-31
Payer: MEDICARE

## 2024-07-31 VITALS
HEART RATE: 68 BPM | SYSTOLIC BLOOD PRESSURE: 136 MMHG | DIASTOLIC BLOOD PRESSURE: 84 MMHG | HEIGHT: 66 IN | OXYGEN SATURATION: 95 % | BODY MASS INDEX: 34.55 KG/M2 | WEIGHT: 215 LBS

## 2024-07-31 DIAGNOSIS — F41.9 ANXIETY: ICD-10-CM

## 2024-07-31 DIAGNOSIS — R39.15 URINARY URGENCY: ICD-10-CM

## 2024-07-31 DIAGNOSIS — I10 ESSENTIAL HYPERTENSION, BENIGN: ICD-10-CM

## 2024-07-31 DIAGNOSIS — E03.8 OTHER SPECIFIED HYPOTHYROIDISM: ICD-10-CM

## 2024-07-31 DIAGNOSIS — K21.9 GASTROESOPHAGEAL REFLUX DISEASE WITHOUT ESOPHAGITIS: ICD-10-CM

## 2024-07-31 DIAGNOSIS — N30.01 ACUTE CYSTITIS WITH HEMATURIA: ICD-10-CM

## 2024-07-31 DIAGNOSIS — Z00.00 MEDICARE ANNUAL WELLNESS VISIT, SUBSEQUENT: Primary | ICD-10-CM

## 2024-07-31 LAB
BILIRUBIN, POC: ABNORMAL
BLOOD URINE, POC: ABNORMAL
CLARITY, POC: ABNORMAL
COLOR, POC: YELLOW
GLUCOSE URINE, POC: ABNORMAL
KETONES, POC: ABNORMAL
LEUKOCYTE EST, POC: ABNORMAL
NITRITE, POC: ABNORMAL
PH, POC: 6.5
PROTEIN, POC: 11
SPECIFIC GRAVITY, POC: 1.02
UROBILINOGEN, POC: 0.2

## 2024-07-31 PROCEDURE — 87086 URINE CULTURE/COLONY COUNT: CPT

## 2024-07-31 PROCEDURE — 87186 SC STD MICRODIL/AGAR DIL: CPT

## 2024-07-31 PROCEDURE — 87088 URINE BACTERIA CULTURE: CPT

## 2024-07-31 RX ORDER — CLOBETASOL PROPIONATE 0.5 MG/G
CREAM TOPICAL
Qty: 45 G | Refills: 0 | Status: SHIPPED | OUTPATIENT
Start: 2024-07-31

## 2024-07-31 RX ORDER — LORAZEPAM 0.5 MG/1
0.5 TABLET ORAL EVERY 8 HOURS PRN
Qty: 30 TABLET | Refills: 0 | Status: SHIPPED | OUTPATIENT
Start: 2024-07-31 | End: 2024-08-30

## 2024-07-31 RX ORDER — CEPHALEXIN 500 MG/1
500 CAPSULE ORAL 2 TIMES DAILY
Qty: 14 CAPSULE | Refills: 0 | Status: SHIPPED | OUTPATIENT
Start: 2024-07-31 | End: 2024-08-07

## 2024-07-31 ASSESSMENT — ENCOUNTER SYMPTOMS
VOMITING: 0
HEARTBURN: 0
COUGH: 0
NAUSEA: 0
ABDOMINAL PAIN: 0
DIARRHEA: 0
BLOOD IN STOOL: 0
SHORTNESS OF BREATH: 0
CONSTIPATION: 0
EYE DISCHARGE: 0
EYE REDNESS: 0

## 2024-07-31 NOTE — PROGRESS NOTES
HPI Notes    Name: Venessa eFrrell  : 1955        Chief Complaint:     Chief Complaint   Patient presents with    Medicare AWV     Patient here today for AWV    Urinary Tract Infection     Patient complains of urinary frequent, feels pressure.     Hypertension    Hypothyroidism       History of Present Illness:     Venessa Ferrell is a 68 y.o.  female who presents with Medicare AWV (Patient here today for AWV), Urinary Tract Infection (Patient complains of urinary frequent, feels pressure. ), Hypertension, and Hypothyroidism      Urinary Tract Infection  This is a recurrent problem. The current episode started more than 1 month ago (pt has had 3 UTIs in the past 3 months.  Pt is treated and the symptoms go away for a few days but come back. Pt has NO fever/chills. Pt has N/V. Just the lower abdominal pressure. no burning). The problem is unchanged (pt has been drinking a little more caffiene. Pt has been on the cranberry pill, waiting to empty more but has the estrogen cream but has not tried it yet.). Associated symptoms include pain. Pertinent negatives include no hematuria. The pain is present in the suprapubic region.   Hypertension  This is a chronic problem. The current episode started more than 1 year ago. The problem is unchanged. The problem is controlled. Pertinent negatives include no chest pain, headaches, malaise/fatigue, neck pain, palpitations, peripheral edema or shortness of breath. There are no associated agents to hypertension. Risk factors for coronary artery disease include post-menopausal state. The current treatment provides significant improvement.   Gastroesophageal Reflux  She reports no abdominal pain, no chest pain, no coughing, no heartburn or no nausea. This is a chronic problem. The current episode started more than 1 year ago. The problem has been unchanged. Pertinent negatives include no fatigue, melena or weight loss. She has tried a PPI for the symptoms.

## 2024-07-31 NOTE — PATIENT INSTRUCTIONS
smoke too.     Stay at a weight that's healthy for you. Talk to your doctor if you need help losing weight.     Try to get 7 to 9 hours of sleep each night.     Limit alcohol to 2 drinks a day for men and 1 drink a day for women. Too much alcohol can cause health problems.     Manage other health problems such as diabetes, high blood pressure, and high cholesterol. If you think you may have a problem with alcohol or drug use, talk to your doctor.   Medicines    Take your medicines exactly as prescribed. Call your doctor if you think you are having a problem with your medicine.     If your doctor recommends aspirin, take the amount directed each day. Make sure you take aspirin and not another kind of pain reliever, such as acetaminophen (Tylenol).   When should you call for help?   Call 911 if you have symptoms of a heart attack. These may include:    Chest pain or pressure, or a strange feeling in the chest.     Sweating.     Shortness of breath.     Pain, pressure, or a strange feeling in the back, neck, jaw, or upper belly or in one or both shoulders or arms.     Lightheadedness or sudden weakness.     A fast or irregular heartbeat.   After you call 911, the  may tell you to chew 1 adult-strength or 2 to 4 low-dose aspirin. Wait for an ambulance. Do not try to drive yourself.  Watch closely for changes in your health, and be sure to contact your doctor if you have any problems.  Where can you learn more?  Go to https://www.[employer].net/patientEd and enter F075 to learn more about \"A Healthy Heart: Care Instructions.\"  Current as of: June 24, 2023  Content Version: 14.1  © 2006-2024 [employer].   Care instructions adapted under license by [employer]. If you have questions about a medical condition or this instruction, always ask your healthcare professional. [employer] disclaims any warranty or liability for your use of this information.      Personalized Preventive Plan for

## 2024-07-31 NOTE — TELEPHONE ENCOUNTER
Last OV today for chronics and AWV  Requesting refill on compound cream  Rx pending for print to [facility]

## 2024-08-02 LAB
MICROORGANISM SPEC CULT: ABNORMAL
SERVICE CMNT-IMP: ABNORMAL
SPECIMEN DESCRIPTION: ABNORMAL

## 2024-08-20 ENCOUNTER — OFFICE VISIT (OUTPATIENT)
Dept: FAMILY MEDICINE CLINIC | Age: 69
End: 2024-08-20

## 2024-08-20 ENCOUNTER — HOSPITAL ENCOUNTER (OUTPATIENT)
Age: 69
Setting detail: SPECIMEN
Discharge: HOME | End: 2024-08-20
Payer: MEDICARE

## 2024-08-20 VITALS — OXYGEN SATURATION: 95 % | DIASTOLIC BLOOD PRESSURE: 72 MMHG | HEART RATE: 64 BPM | SYSTOLIC BLOOD PRESSURE: 112 MMHG

## 2024-08-20 DIAGNOSIS — R30.0 DYSURIA: ICD-10-CM

## 2024-08-20 DIAGNOSIS — N30.01 ACUTE CYSTITIS WITH HEMATURIA: Primary | ICD-10-CM

## 2024-08-20 LAB
BILIRUBIN, POC: ABNORMAL
BLOOD URINE, POC: ABNORMAL
CLARITY, POC: CLEAR
COLOR, POC: YELLOW
GLUCOSE URINE, POC: ABNORMAL
KETONES, POC: ABNORMAL
LEUKOCYTE EST, POC: ABNORMAL
NITRITE, POC: POSITIVE
PH, POC: 6.5
PROTEIN, POC: 100
SPECIFIC GRAVITY, POC: 1.02
UROBILINOGEN, POC: 0.2

## 2024-08-20 PROCEDURE — 87086 URINE CULTURE/COLONY COUNT: CPT

## 2024-08-20 PROCEDURE — 87186 SC STD MICRODIL/AGAR DIL: CPT

## 2024-08-20 PROCEDURE — 87088 URINE BACTERIA CULTURE: CPT

## 2024-08-20 RX ORDER — CEPHALEXIN 500 MG/1
500 CAPSULE ORAL 2 TIMES DAILY
Qty: 14 CAPSULE | Refills: 0 | Status: SHIPPED | OUTPATIENT
Start: 2024-08-20 | End: 2024-08-27

## 2024-08-20 ASSESSMENT — ENCOUNTER SYMPTOMS
DIARRHEA: 0
ABDOMINAL PAIN: 0
BACK PAIN: 0
VOMITING: 0

## 2024-08-20 NOTE — PATIENT INSTRUCTIONS
SURVEY:    You may be receiving a survey from [facility] regarding your visit today.    Please complete the survey to enable us to provide the highest quality of care to you and your family.    If you cannot score us a very good (5 Stars) on any question, please call the office to discuss how we could have made your experience a very good one.    Thank you.    Clinical Care Team: MD Jean Ku LPN              Triage: Viri Panda CMA              Clerical Team: Viri Jean Baptiste

## 2024-08-20 NOTE — PROGRESS NOTES
HPI Notes    Name: Venessa Ferrell  : 1955        Chief Complaint:     Chief Complaint   Patient presents with    Urinary Tract Infection     Patient complains of dysuria, feels like pressure, urgency. Started 2 days ago.        History of Present Illness:     Venessa Ferrell is a 68 y.o.  female who presents with Urinary Tract Infection (Patient complains of dysuria, feels like pressure, urgency. Started 2 days ago. )      Urinary Tract Infection  This is a recurrent problem. Episode onset: started 3d ago with pressure with urination. Pt also feels much more tired. Pt takes cranberry pill, she does estradiol the cream and has urology following the kidney tumor. SO has not seen urology for the last 5mos of repet UTIs. The problem is unchanged (this is her 5th UTI in the past 5months. Pt takes the antibioitics as urine sent to cx and does get better for few weeks then returns.). Associated symptoms include hematuria. Pertinent negatives include no pain.       Past Medical History:     Past Medical History:   Diagnosis Date    Cerebrovascular disease     mini stroke may 2012,     Chronic back pain     GERD (gastroesophageal reflux disease)     Hypertension     Hypothyroidism     Osteoarthritis     knee and back    Osteopenia 2016      Reviewed all health maintenance requirements and ordered appropriate tests  Health Maintenance Due   Topic Date Due    Hepatitis C screen  Never done    DTaP/Tdap/Td vaccine (1 - Tdap) Never done    Shingles vaccine (1 of 2) Never done    Respiratory Syncytial Virus (RSV) Pregnant or age 60 yrs+ (1 - 1-dose 60+ series) Never done    Pneumococcal 65+ years Vaccine (2 of 2 - PCV) 2022    COVID-19 Vaccine (3 - - season) 2023    Diabetes screen  2024    Flu vaccine (1) 2024       Past Surgical History:     Past Surgical History:   Procedure Laterality Date    APPENDECTOMY      BREAST BIOPSY      CHOLECYSTECTOMY      COLONOSCOPY

## 2024-08-22 LAB
MICROORGANISM SPEC CULT: ABNORMAL
SERVICE CMNT-IMP: ABNORMAL
SPECIMEN DESCRIPTION: ABNORMAL

## 2024-08-30 ENCOUNTER — TELEPHONE (OUTPATIENT)
Dept: FAMILY MEDICINE CLINIC | Age: 69
End: 2024-08-30

## 2024-08-30 NOTE — TELEPHONE ENCOUNTER
Patient is calling to see if there is anything Dr. Holliday would suggest for constipation?  Patient has tried OTC colace and senna with no relief.   She states it has been 6 days without a BM.  Patient feels miserable and bloated.   Please advise.     Health Maintenance   Topic Date Due    Hepatitis C screen  Never done    DTaP/Tdap/Td vaccine (1 - Tdap) Never done    Shingles vaccine (1 of 2) Never done    Respiratory Syncytial Virus (RSV) Pregnant or age 60 yrs+ (1 - 1-dose 60+ series) Never done    Pneumococcal 65+ years Vaccine (2 of 2 - PCV) 01/20/2022    COVID-19 Vaccine (3 - 2023-24 season) 09/01/2023    Diabetes screen  05/12/2024    Flu vaccine (1) 08/01/2024    Depression Screen  07/29/2025    Annual Wellness Visit (Medicare)  08/01/2025    Breast cancer screen  12/13/2025    Colorectal Cancer Screen  01/24/2028    Lipids  02/08/2028    DEXA (modify frequency per FRAX score)  Completed    Hepatitis A vaccine  Aged Out    Hepatitis B vaccine  Aged Out    Hib vaccine  Aged Out    Polio vaccine  Aged Out    Meningococcal (ACWY) vaccine  Aged Out    Pneumococcal 0-64 years Vaccine  Discontinued             (applicable per patient's age: Cancer Screenings, Depression Screening, Fall Risk Screening, Immunizations)    Hemoglobin A1C (%)   Date Value   05/12/2021 5.5   07/27/2020 5.6   07/31/2018 5.4     AST (U/L)   Date Value   02/08/2024 17     ALT (U/L)   Date Value   02/08/2024 10     BUN (mg/dL)   Date Value   04/29/2024 12      (goal A1C is < 7)   (goal LDL is <100) need 30-50% reduction from baseline     BP Readings from Last 3 Encounters:   08/20/24 112/72   07/31/24 136/84   07/17/24 122/80    (goal /80)      All Future Testing planned in [facility]:  Lab Frequency Next Occurrence       Next Visit Date:  Future Appointments   Date Time Provider Department Center   1/30/2025  9:00 AM Tyesha Holliday MD [facility] DE[facility]            Patient Active Problem List:     Anxiety state      Hypothyroidism     Essential hypertension, benign     Esophageal reflux     Carpal tunnel syndrome     Chronic back pain     Osteopenia     History of CVA (cerebrovascular accident)

## 2024-08-30 NOTE — TELEPHONE ENCOUNTER
Yes tell pt she may get the OTC Miralax and take a does today and then also in AM if no BM tonight.  ALso keep pushing water and keep walking and moving to help bowels move  She may keep taking the colace once a day and the senna too unless bowels get too loose.

## 2024-09-06 DIAGNOSIS — E03.8 OTHER SPECIFIED HYPOTHYROIDISM: ICD-10-CM

## 2024-09-06 RX ORDER — LEVOTHYROXINE SODIUM 100 UG/1
TABLET ORAL
Qty: 90 TABLET | Refills: 1 | Status: SHIPPED | OUTPATIENT
Start: 2024-09-06

## 2024-09-06 NOTE — TELEPHONE ENCOUNTER
Last OV: 8/20/2024 07/31/24 AWV and chronic   Last RX:    Next scheduled apt: 1/30/2025   6 months chronic         Surescript requesting a refill

## 2024-09-17 ENCOUNTER — OFFICE VISIT (OUTPATIENT)
Dept: FAMILY MEDICINE CLINIC | Age: 69
End: 2024-09-17

## 2024-09-17 VITALS
WEIGHT: 215 LBS | BODY MASS INDEX: 34.7 KG/M2 | HEART RATE: 68 BPM | OXYGEN SATURATION: 98 % | DIASTOLIC BLOOD PRESSURE: 86 MMHG | SYSTOLIC BLOOD PRESSURE: 134 MMHG

## 2024-09-17 DIAGNOSIS — S39.012A LOW BACK STRAIN, INITIAL ENCOUNTER: Primary | ICD-10-CM

## 2024-09-17 RX ORDER — PREDNISONE 20 MG/1
TABLET ORAL
Qty: 11 TABLET | Refills: 0 | Status: SHIPPED | OUTPATIENT
Start: 2024-09-17

## 2024-09-17 ASSESSMENT — ENCOUNTER SYMPTOMS
BOWEL INCONTINENCE: 0
BACK PAIN: 1

## 2024-10-20 DIAGNOSIS — I10 ESSENTIAL HYPERTENSION, BENIGN: ICD-10-CM

## 2024-10-21 RX ORDER — LOSARTAN POTASSIUM 100 MG/1
TABLET ORAL
Qty: 90 TABLET | Refills: 1 | Status: SHIPPED | OUTPATIENT
Start: 2024-10-21

## 2024-10-21 NOTE — TELEPHONE ENCOUNTER
Last OV 9/17/24     Next OV 1/30/25    Requesting refills on omeprazole and losartan thru [facility]   Rx's pending

## 2024-10-28 ENCOUNTER — HOSPITAL ENCOUNTER (OUTPATIENT)
Age: 69
Discharge: HOME | End: 2024-10-28
Payer: MEDICARE

## 2024-10-28 ENCOUNTER — OFFICE VISIT (OUTPATIENT)
Dept: FAMILY MEDICINE CLINIC | Age: 69
End: 2024-10-28

## 2024-10-28 VITALS
WEIGHT: 215 LBS | OXYGEN SATURATION: 100 % | DIASTOLIC BLOOD PRESSURE: 84 MMHG | HEART RATE: 91 BPM | BODY MASS INDEX: 34.7 KG/M2 | SYSTOLIC BLOOD PRESSURE: 138 MMHG

## 2024-10-28 DIAGNOSIS — R42 DIZZINESS: ICD-10-CM

## 2024-10-28 DIAGNOSIS — K30 INDIGESTION: ICD-10-CM

## 2024-10-28 DIAGNOSIS — M79.602 LEFT ARM PAIN: ICD-10-CM

## 2024-10-28 DIAGNOSIS — R42 DIZZINESS: Primary | ICD-10-CM

## 2024-10-28 LAB
ALBUMIN SERPL-MCNC: 4 G/DL (ref 3.5–5.2)
ALP SERPL-CCNC: 88 U/L (ref 35–104)
ALT SERPL-CCNC: 11 U/L (ref 5–33)
ANION GAP SERPL CALCULATED.3IONS-SCNC: 10 MMOL/L (ref 9–17)
AST SERPL-CCNC: 17 U/L
BASOPHILS # BLD: 0.04 K/UL (ref 0–0.2)
BASOPHILS NFR BLD: 1 % (ref 0–2)
BILIRUB SERPL-MCNC: 0.3 MG/DL (ref 0.3–1.2)
BUN SERPL-MCNC: 18 MG/DL (ref 8–23)
BUN/CREAT SERPL: 18 (ref 9–20)
CALCIUM SERPL-MCNC: 9.4 MG/DL (ref 8.6–10.4)
CHLORIDE SERPL-SCNC: 106 MMOL/L (ref 98–107)
CO2 SERPL-SCNC: 24 MMOL/L (ref 20–31)
CREAT SERPL-MCNC: 1 MG/DL (ref 0.5–0.9)
EOSINOPHIL # BLD: 0.21 K/UL (ref 0–0.4)
EOSINOPHILS RELATIVE PERCENT: 3 % (ref 0–5)
ERYTHROCYTE [DISTWIDTH] IN BLOOD BY AUTOMATED COUNT: 14.4 % (ref 12.1–15.2)
GFR, ESTIMATED: 61 ML/MIN/1.73M2
GLUCOSE SERPL-MCNC: 96 MG/DL (ref 70–99)
HCT VFR BLD AUTO: 40 % (ref 36–46)
HGB BLD-MCNC: 13 G/DL (ref 12–16)
IMM GRANULOCYTES # BLD AUTO: 0.02 K/UL (ref 0–0.3)
IMM GRANULOCYTES NFR BLD: 0 % (ref 0–5)
LYMPHOCYTES NFR BLD: 1.51 K/UL (ref 1–4.8)
LYMPHOCYTES RELATIVE PERCENT: 23 % (ref 15–40)
MCH RBC QN AUTO: 28.4 PG (ref 26–34)
MCHC RBC AUTO-ENTMCNC: 32.5 G/DL (ref 31–37)
MCV RBC AUTO: 87.5 FL (ref 80–100)
MONOCYTES NFR BLD: 0.63 K/UL (ref 0–1)
MONOCYTES NFR BLD: 10 % (ref 4–8)
NEUTROPHILS NFR BLD: 63 % (ref 47–75)
NEUTS SEG NFR BLD: 4.07 K/UL (ref 2.5–7)
PLATELET # BLD AUTO: 237 K/UL (ref 140–450)
PMV BLD AUTO: 9.5 FL (ref 6–12)
POTASSIUM SERPL-SCNC: 4.1 MMOL/L (ref 3.7–5.3)
PROT SERPL-MCNC: 6.9 G/DL (ref 6.4–8.3)
RBC # BLD AUTO: 4.57 M/UL (ref 4–5.2)
SODIUM SERPL-SCNC: 140 MMOL/L (ref 135–144)
TROPONIN I SERPL HS-MCNC: 7 NG/L (ref 0–14)
WBC OTHER # BLD: 6.5 K/UL (ref 3.5–11)

## 2024-10-28 PROCEDURE — 85025 COMPLETE CBC W/AUTO DIFF WBC: CPT

## 2024-10-28 PROCEDURE — 80053 COMPREHEN METABOLIC PANEL: CPT

## 2024-10-28 PROCEDURE — 36415 COLL VENOUS BLD VENIPUNCTURE: CPT

## 2024-10-28 PROCEDURE — 93005 ELECTROCARDIOGRAM TRACING: CPT

## 2024-10-28 PROCEDURE — 84484 ASSAY OF TROPONIN QUANT: CPT

## 2024-10-28 ASSESSMENT — ENCOUNTER SYMPTOMS
WHEEZING: 0
NAUSEA: 0
DIARRHEA: 0
COUGH: 0
ABDOMINAL PAIN: 0
BACK PAIN: 0
RHINORRHEA: 0
VOMITING: 0
SINUS PAIN: 0

## 2024-10-28 NOTE — PROGRESS NOTES
HPI Notes    Name: Venessa Ferrell  : 1955         Chief Complaint:     Chief Complaint   Patient presents with    Dizziness     Dizziness started 2 days ago. She feels light headed, unbalanced, has left arm pain and indigestion as well.        History of Present Illness:      HPI    This is a 68-year-old woman with medical history significant for essential hypertension presenting for evaluation of lightheadedness and left arm pain as well as indigestion.  Symptoms began around 2 days ago; and are waxing and waning. The entire left arm becomes numb and achy from fingers to shoulder but does not seem to be exacerbated by activity. Symptoms were first noted after waking Friday morning. Symptom of indigestion was noted yesterday and of a retrosternal burning quality which abated after a few minutes. Denies visual changes.     Past Medical History:     Past Medical History:   Diagnosis Date    Cerebrovascular disease     mini stroke may 2012,     Chronic back pain     GERD (gastroesophageal reflux disease)     Hypertension     Hypothyroidism     Osteoarthritis     knee and back    Osteopenia 2016      Reviewed all health maintenance requirements and ordered appropriate tests  Health Maintenance Due   Topic Date Due    Hepatitis C screen  Never done    DTaP/Tdap/Td vaccine (1 - Tdap) Never done    Shingles vaccine (1 of 2) Never done    Respiratory Syncytial Virus (RSV) Pregnant or age 60 yrs+ (1 - 1-dose 60+ series) Never done    Pneumococcal 65+ years Vaccine (2 of 2 - PCV) 2022    Diabetes screen  2024    Flu vaccine (1) 2024    COVID-19 Vaccine (3 - - season) 2024       Past Surgical History:     Past Surgical History:   Procedure Laterality Date    APPENDECTOMY      BREAST BIOPSY      CHOLECYSTECTOMY      COLONOSCOPY N/A 2023    Dr. Edwards with 2 adenomatous polyps    HYSTERECTOMY (CERVIX STATUS UNKNOWN)      still Rt ovary present    JOINT REPLACEMENT

## 2024-10-29 ENCOUNTER — OFFICE VISIT (OUTPATIENT)
Dept: FAMILY MEDICINE CLINIC | Age: 69
End: 2024-10-29

## 2024-10-29 VITALS
SYSTOLIC BLOOD PRESSURE: 132 MMHG | WEIGHT: 215 LBS | BODY MASS INDEX: 34.7 KG/M2 | OXYGEN SATURATION: 92 % | HEART RATE: 78 BPM | DIASTOLIC BLOOD PRESSURE: 80 MMHG

## 2024-10-29 DIAGNOSIS — J34.89 SINUS PAIN: ICD-10-CM

## 2024-10-29 DIAGNOSIS — M25.512 ACUTE PAIN OF LEFT SHOULDER: ICD-10-CM

## 2024-10-29 DIAGNOSIS — M99.00 SOMATIC DYSFUNCTION OF HEAD REGION: ICD-10-CM

## 2024-10-29 DIAGNOSIS — M67.912 DYSFUNCTION OF LEFT ROTATOR CUFF: ICD-10-CM

## 2024-10-29 DIAGNOSIS — R20.0 LEFT ARM NUMBNESS: Primary | ICD-10-CM

## 2024-10-29 DIAGNOSIS — M15.0 PRIMARY OSTEOARTHRITIS INVOLVING MULTIPLE JOINTS: ICD-10-CM

## 2024-10-29 LAB
EKG ATRIAL RATE: 83 BPM
EKG P AXIS: 85 DEGREES
EKG P-R INTERVAL: 144 MS
EKG Q-T INTERVAL: 356 MS
EKG QRS DURATION: 82 MS
EKG QTC CALCULATION (BAZETT): 418 MS
EKG R AXIS: 87 DEGREES
EKG T AXIS: 52 DEGREES
EKG VENTRICULAR RATE: 83 BPM

## 2024-10-29 RX ORDER — NAPROXEN 500 MG/1
500 TABLET ORAL 2 TIMES DAILY PRN
Qty: 28 TABLET | Refills: 0 | Status: SHIPPED | OUTPATIENT
Start: 2024-10-29 | End: 2024-11-12

## 2024-10-29 ASSESSMENT — ENCOUNTER SYMPTOMS
DIARRHEA: 0
WHEEZING: 0
SINUS PRESSURE: 1
COUGH: 0
VOMITING: 0
BACK PAIN: 0
NAUSEA: 0
SINUS PAIN: 0
ABDOMINAL PAIN: 0
RHINORRHEA: 0

## 2024-10-29 NOTE — PROGRESS NOTES
starting naproxen 500 mg PO BID.  We discussed its mechanism of action, intended goals, adverse effects, as well as common side effects.  They were able to verbalize understanding, and repeat plan back to me.  Follow up in 2 weeks if not improving.     After history taking and examination of patient, it was determined that a beneficial treatment modality for their complaint would be osteopathic manipulative therapy (OMT) the nature of OMT, treatment goals, mechanism of action, and side effects were extensively discussed with this patient, who did wish to proceed with the procedure.  The following body systems were treated with osteopathy during our office visit today: head region.  Osteopathic findings include externally rotated temporal bones, boggy maxillary sinuses.  After treatment, the patient was reevaluated and the following physical exam findings were appreciated: sinus effleurage techniques.  We determined that the next best time for the patient to return to the office for additional treatment would be PRN.    At this point, her treatment was concluded, there were no complications, there were no further questions.  Patient tolerated this procedure very well.            Completed Refills   Requested Prescriptions     Signed Prescriptions Disp Refills    naproxen (NAPROSYN) 500 MG tablet 28 tablet 0     Sig: Take 1 tablet by mouth 2 times daily as needed for Pain     Return in about 2 weeks (around 11/12/2024), or if symptoms worsen or fail to improve.  Orders Placed This Encounter   Medications    naproxen (NAPROSYN) 500 MG tablet     Sig: Take 1 tablet by mouth 2 times daily as needed for Pain     Dispense:  28 tablet     Refill:  0     Orders Placed This Encounter   Procedures    OSTEOPATHIC MANIP,1-2 BODY REGN       Patient Instructions   SURVEY:    You may be receiving a survey from [employer] regarding your visit today.    You may get this in the mail, through your MyChart or in your email.     Please

## 2024-10-29 NOTE — PATIENT INSTRUCTIONS
SURVEY:    You may be receiving a survey from [facility] regarding your visit today.    You may get this in the mail, through your MyChart or in your email.     Please complete the survey to enable us to provide the highest quality of care to you and your family.    If you cannot score us as very good ( 5 Stars) on any question, please feel free to call the office to discuss how we could have made your experience exceptional.     Thank you.    Clinical Care Team:  Dr. Barney Sosa, DO Mechelle Cage LPN    Triage:  Viri Panda CMA    Clerical Team:  Viri Jean Baptiste

## 2024-12-17 DIAGNOSIS — E03.8 OTHER SPECIFIED HYPOTHYROIDISM: ICD-10-CM

## 2024-12-17 RX ORDER — CLOBETASOL PROPIONATE 0.5 MG/G
CREAM TOPICAL
Qty: 45 G | Refills: 0 | Status: SHIPPED | OUTPATIENT
Start: 2024-12-17

## 2024-12-17 NOTE — TELEPHONE ENCOUNTER
Clobetasol 0.05%    [facility]      Health Maintenance   Topic Date Due    Hepatitis C screen  Never done    DTaP/Tdap/Td vaccine (1 - Tdap) Never done    Shingles vaccine (1 of 2) Never done    Respiratory Syncytial Virus (RSV) Pregnant or age 60 yrs+ (1 - Risk 60-74 years 1-dose series) Never done    Pneumococcal 65+ years Vaccine (2 of 2 - PCV) 01/20/2022    Flu vaccine (1) 08/01/2024    COVID-19 Vaccine (3 - 2023-24 season) 09/01/2024    Depression Screen  07/29/2025    Annual Wellness Visit (Medicare)  08/01/2025    Breast cancer screen  12/13/2025    Colorectal Cancer Screen  01/24/2028    Lipids  02/08/2028    DEXA (modify frequency per FRAX score)  Completed    Hepatitis A vaccine  Aged Out    Hepatitis B vaccine  Aged Out    Hib vaccine  Aged Out    Polio vaccine  Aged Out    Meningococcal (ACWY) vaccine  Aged Out    Pneumococcal 0-64 years Vaccine  Discontinued    Diabetes screen  Discontinued             (applicable per patient's age: Cancer Screenings, Depression Screening, Fall Risk Screening, Immunizations)    Hemoglobin A1C (%)   Date Value   05/12/2021 5.5   07/27/2020 5.6   07/31/2018 5.4     AST (U/L)   Date Value   10/28/2024 17     ALT (U/L)   Date Value   10/28/2024 11     BUN (mg/dL)   Date Value   10/28/2024 18      (goal A1C is < 7)   (goal LDL is <100) need 30-50% reduction from baseline     BP Readings from Last 3 Encounters:   10/29/24 132/80   10/28/24 138/84   09/17/24 134/86    (goal /80)      All Future Testing planned in [facility]:  Lab Frequency Next Occurrence   US RENAL COMPLETE Once 12/12/2024       Next Visit Date:  Future Appointments   Date Time Provider Department Center   1/30/2025  9:00 AM Tyesha Holliday MD [facility] DEP            Patient Active Problem List:     Anxiety state     Hypothyroidism     Essential hypertension, benign     Esophageal reflux     Carpal tunnel syndrome     Chronic back pain     Osteopenia     History of CVA (cerebrovascular

## 2024-12-17 NOTE — TELEPHONE ENCOUNTER
Last OV: 10/29/2024  lrft shoulder pain   Last RX:    Next scheduled apt: 1/30/2025   6 months         Pt requesting a refill

## 2025-01-08 SDOH — ECONOMIC STABILITY: INCOME INSECURITY: IN THE LAST 12 MONTHS, WAS THERE A TIME WHEN YOU WERE NOT ABLE TO PAY THE MORTGAGE OR RENT ON TIME?: NO

## 2025-01-08 SDOH — ECONOMIC STABILITY: FOOD INSECURITY: WITHIN THE PAST 12 MONTHS, YOU WORRIED THAT YOUR FOOD WOULD RUN OUT BEFORE YOU GOT MONEY TO BUY MORE.: NEVER TRUE

## 2025-01-08 SDOH — ECONOMIC STABILITY: FOOD INSECURITY: WITHIN THE PAST 12 MONTHS, THE FOOD YOU BOUGHT JUST DIDN'T LAST AND YOU DIDN'T HAVE MONEY TO GET MORE.: NEVER TRUE

## 2025-01-08 SDOH — ECONOMIC STABILITY: TRANSPORTATION INSECURITY
IN THE PAST 12 MONTHS, HAS THE LACK OF TRANSPORTATION KEPT YOU FROM MEDICAL APPOINTMENTS OR FROM GETTING MEDICATIONS?: NO

## 2025-01-08 SDOH — ECONOMIC STABILITY: TRANSPORTATION INSECURITY
IN THE PAST 12 MONTHS, HAS LACK OF TRANSPORTATION KEPT YOU FROM MEETINGS, WORK, OR FROM GETTING THINGS NEEDED FOR DAILY LIVING?: NO

## 2025-01-08 ASSESSMENT — PATIENT HEALTH QUESTIONNAIRE - PHQ9
1. LITTLE INTEREST OR PLEASURE IN DOING THINGS: NOT AT ALL
2. FEELING DOWN, DEPRESSED OR HOPELESS: NOT AT ALL
SUM OF ALL RESPONSES TO PHQ QUESTIONS 1-9: 0
2. FEELING DOWN, DEPRESSED OR HOPELESS: NOT AT ALL
SUM OF ALL RESPONSES TO PHQ QUESTIONS 1-9: 0
1. LITTLE INTEREST OR PLEASURE IN DOING THINGS: NOT AT ALL
SUM OF ALL RESPONSES TO PHQ9 QUESTIONS 1 & 2: 0
SUM OF ALL RESPONSES TO PHQ9 QUESTIONS 1 & 2: 0

## 2025-01-09 ENCOUNTER — OFFICE VISIT (OUTPATIENT)
Dept: FAMILY MEDICINE CLINIC | Age: 70
End: 2025-01-09

## 2025-01-09 ENCOUNTER — HOSPITAL ENCOUNTER (OUTPATIENT)
Age: 70
Setting detail: SPECIMEN
Discharge: HOME | End: 2025-01-09
Payer: MEDICARE

## 2025-01-09 VITALS — DIASTOLIC BLOOD PRESSURE: 68 MMHG | HEART RATE: 74 BPM | OXYGEN SATURATION: 97 % | SYSTOLIC BLOOD PRESSURE: 114 MMHG

## 2025-01-09 DIAGNOSIS — Z23 NEED FOR INFLUENZA VACCINATION: ICD-10-CM

## 2025-01-09 DIAGNOSIS — N89.8 VAGINAL ITCHING: ICD-10-CM

## 2025-01-09 DIAGNOSIS — R30.0 DYSURIA: ICD-10-CM

## 2025-01-09 DIAGNOSIS — R10.2 VAGINAL PAIN: ICD-10-CM

## 2025-01-09 DIAGNOSIS — R30.0 DYSURIA: Primary | ICD-10-CM

## 2025-01-09 LAB
BILIRUBIN, POC: ABNORMAL
BLOOD URINE, POC: ABNORMAL
CLARITY, POC: CLEAR
COLOR, POC: YELLOW
GLUCOSE URINE, POC: ABNORMAL MG/DL
KETONES, POC: ABNORMAL MG/DL
LEUKOCYTE EST, POC: ABNORMAL
NITRITE, POC: ABNORMAL
PH, POC: 6.5
PROTEIN, POC: ABNORMAL MG/DL
SPECIFIC GRAVITY, POC: 1.01
UROBILINOGEN, POC: 0.2 MG/DL

## 2025-01-09 PROCEDURE — 87086 URINE CULTURE/COLONY COUNT: CPT

## 2025-01-09 PROCEDURE — 87186 SC STD MICRODIL/AGAR DIL: CPT

## 2025-01-09 PROCEDURE — 87088 URINE BACTERIA CULTURE: CPT

## 2025-01-09 RX ORDER — MELOXICAM 15 MG/1
15 TABLET ORAL DAILY PRN
COMMUNITY
Start: 2024-11-11

## 2025-01-09 RX ORDER — TRAMADOL HYDROCHLORIDE 50 MG/1
50 TABLET ORAL EVERY 8 HOURS PRN
COMMUNITY
Start: 2025-01-07

## 2025-01-09 RX ORDER — FLUCONAZOLE 150 MG/1
TABLET ORAL
Qty: 2 TABLET | Refills: 1 | Status: SHIPPED | OUTPATIENT
Start: 2025-01-09

## 2025-01-09 SDOH — ECONOMIC STABILITY: FOOD INSECURITY: WITHIN THE PAST 12 MONTHS, THE FOOD YOU BOUGHT JUST DIDN'T LAST AND YOU DIDN'T HAVE MONEY TO GET MORE.: NEVER TRUE

## 2025-01-09 SDOH — ECONOMIC STABILITY: FOOD INSECURITY: WITHIN THE PAST 12 MONTHS, YOU WORRIED THAT YOUR FOOD WOULD RUN OUT BEFORE YOU GOT MONEY TO BUY MORE.: NEVER TRUE

## 2025-01-09 ASSESSMENT — ENCOUNTER SYMPTOMS
EYE REDNESS: 0
DIARRHEA: 0
EYE DISCHARGE: 0
NAUSEA: 0
VOMITING: 0

## 2025-01-09 NOTE — PROGRESS NOTES
After obtaining consent, and per orders of  injection of flcelvax given in Right deltoid by Krystle Del Toro LPN. Patient instructed to remain in clinic for 20 minutes afterwards, and to report any adverse reaction to me immediately.    Immunizations Administered       Name Date Dose Route    Influenza, FLUCELVAX, (age 6 mo+) IM, Trivalent PF, 0.5mL 1/9/2025 0.5 mL Intramuscular    Site: Deltoid- Right    Lot: [id number]    NDC: 03466-616-36            Vaccine Information Sheet, \"Influenza - Inactivated\"  given to Venessa Ferrell, or parent/legal guardian of  Venessa Ferrell and verbalized understanding.    Patient responses:    Have you ever had a reaction to a flu vaccine? No  Are you able to eat eggs without adverse effects?  Yes  Do you have any current illness?  No  Have you ever had Guillian [place] Syndrome?  No    Flu vaccine given per order. Please see immunization tab.

## 2025-01-09 NOTE — PROGRESS NOTES
HPI Notes    Name: Venessa Ferrell  : 1955        Chief Complaint:     Chief Complaint   Patient presents with    Vaginal Itching     Patient complains of vaginal itching, vaginal burning.        History of Present Illness:     Venessa Ferrell is a 69 y.o.  female who presents with Vaginal Itching (Patient complains of vaginal itching, vaginal burning. )      Vaginal Itching  The patient's primary symptoms include a genital odor. The patient's pertinent negatives include no genital itching, genital rash or vaginal discharge. Primary symptoms comment: vaginal itching and burning. This is a recurrent problem. The current episode started in the past 7 days. The problem has been unchanged. She is not pregnant. Associated symptoms include dysuria. Pertinent negatives include no chills, diarrhea, fever, hematuria, nausea, rash or vomiting. Associated symptoms comments: Genital itching . Treatments tried: clobetasol cream.       Past Medical History:     Past Medical History:   Diagnosis Date    Cerebrovascular disease     mini stroke may 2012,     Chronic back pain     GERD (gastroesophageal reflux disease)     Hypertension     Hypothyroidism     Osteoarthritis     knee and back    Osteopenia 2016      Reviewed all health maintenance requirements and ordered appropriate tests  Health Maintenance Due   Topic Date Due    Hepatitis C screen  Never done    DTaP/Tdap/Td vaccine (1 - Tdap) Never done    Shingles vaccine (1 of 2) Never done    Respiratory Syncytial Virus (RSV) Pregnant or age 60 yrs+ (1 - Risk 60-74 years 1-dose series) Never done    Pneumococcal 65+ years Vaccine (2 of 2 - PCV) 2022    Flu vaccine (1) 2024    COVID-19 Vaccine (3 - - season) 2024       Past Surgical History:     Past Surgical History:   Procedure Laterality Date    APPENDECTOMY      BREAST BIOPSY      CHOLECYSTECTOMY      COLONOSCOPY N/A 2023    Dr. Edwards with 2 adenomatous polyps  Eliptical Excision Additional Text (Leave Blank If You Do Not Want): The margin was drawn around the clinically apparent lesion.  An elliptical shape was then drawn on the skin incorporating the lesion and margins.  Incisions were then made along these lines to the appropriate tissue plane and the lesion was extirpated.

## 2025-01-11 LAB
MICROORGANISM SPEC CULT: ABNORMAL
SERVICE CMNT-IMP: ABNORMAL
SPECIMEN DESCRIPTION: ABNORMAL

## 2025-01-13 ENCOUNTER — TELEPHONE (OUTPATIENT)
Dept: FAMILY MEDICINE CLINIC | Age: 70
End: 2025-01-13

## 2025-01-13 RX ORDER — NITROFURANTOIN 25; 75 MG/1; MG/1
100 CAPSULE ORAL 2 TIMES DAILY
Qty: 10 CAPSULE | Refills: 0 | Status: SHIPPED | OUTPATIENT
Start: 2025-01-13 | End: 2025-01-18

## 2025-01-13 RX ORDER — ONDANSETRON 4 MG/1
4 TABLET, FILM COATED ORAL EVERY 8 HOURS PRN
Qty: 15 TABLET | Refills: 0 | Status: SHIPPED | OUTPATIENT
Start: 2025-01-13 | End: 2025-01-18

## 2025-01-13 NOTE — TELEPHONE ENCOUNTER
Patient notified of lab results and recommendations.  Patient is asking if you will send if zofran for her. She said the macrobid makes her nauseated but she can take it if she takes a zofran    Rx pending for macrobid to [facility].

## 2025-01-13 NOTE — TELEPHONE ENCOUNTER
----- Message from Dr. Tyesha Holliday MD sent at 1/13/2025  8:52 AM EST -----  Tell pt she did have bacteria grow some in urine too so needs to take antibx macrobid 100mg one BID for 5d with her and then may take repeat diflucan couple days after Antibiotic if needed.

## 2025-01-15 ENCOUNTER — TELEPHONE (OUTPATIENT)
Dept: FAMILY MEDICINE CLINIC | Age: 70
End: 2025-01-15

## 2025-01-15 NOTE — TELEPHONE ENCOUNTER
Pt stated she took Macrobid and the Zofran and ate a sandwich, pt was still sick from the Medication. Pt stated she has Keflex 500  mg at home, asking if she can take that medication instead for UTI. Also pt has a bad yeast infection. Spoke with provider, she stated pt could take the Keflex and when finished take the Diflucan that the PCP wrote a Rx for. In the mean time pt can use OTC Monistat. Advised pt, pt voiced understanding

## 2025-01-17 NOTE — TELEPHONE ENCOUNTER
Advised pt of of provider's notes,voiced understanding   Pt would like to try there keflex . Please send to [facility]   
This is difficult because she is allergic or has an intolerance to nearly every antibiotic. The last one to try would be keflex, but like all antibiotics it comes with the same risks (GI upset, diarrhea etc). So her choices are 1. I can change her to keflex or 2. She can take NO antibiotic. Continue with her symptom treatment using claritin, saline nose spray. Let me know what she'd like to do.   
Venessa was in the office yesterday and saw Karthik for an URI. She was prescribed doxycycline. She said she took 2 pills yesterday and 1 today and it is making her sick to her stomach. She said she feels shaking and has diarrhea. She is not going to take anymore. Wanted to know if something else could be called in for her. Please let Johanna know.    DM-caryn      Health Maintenance   Topic Date Due    Hepatitis C screen  Never done    DTaP/Tdap/Td vaccine (1 - Tdap) Never done    Shingles vaccine (1 of 2) Never done    Respiratory Syncytial Virus (RSV) Pregnant or age 60 yrs+ (1 - 1-dose 60+ series) Never done    Pneumococcal 65+ years Vaccine (2 - PCV) 01/20/2022    Flu vaccine (1) 08/01/2023    COVID-19 Vaccine (3 - 2023-24 season) 09/01/2023    Annual Wellness Visit (Medicare)  11/27/2023    Depression Screen  01/04/2024    Breast cancer screen  12/13/2025    Colorectal Cancer Screen  01/24/2028    Lipids  02/08/2028    DEXA (modify frequency per FRAX score)  Completed    Hepatitis A vaccine  Aged Out    Hepatitis B vaccine  Aged Out    Hib vaccine  Aged Out    Polio vaccine  Aged Out    Meningococcal (ACWY) vaccine  Aged Out    Pneumococcal 0-64 years Vaccine  Discontinued    Diabetes screen  Discontinued             (applicable per patient's age: Cancer Screenings, Depression Screening, Fall Risk Screening, Immunizations)    Hemoglobin A1C (%)   Date Value   05/12/2021 5.5   07/27/2020 5.6   07/31/2018 5.4     LDL Cholesterol (mg/dL)   Date Value   02/08/2023 93     AST (U/L)   Date Value   11/24/2017 15     ALT (U/L)   Date Value   11/24/2017 10     BUN (mg/dL)   Date Value   10/28/2022 15      (goal A1C is < 7)   (goal LDL is <100) need 30-50% reduction from baseline     BP Readings from Last 3 Encounters:   01/08/24 124/72   12/01/23 130/82   11/08/23 122/70    (goal /80)      All Future Testing planned in [facility]:  Lab Frequency Next Occurrence   COLONOSCOPY W/ OR W/O BIOPSY Once 02/17/2023       Next 
No

## 2025-01-20 RX ORDER — HYDROCHLOROTHIAZIDE 25 MG/1
TABLET ORAL
Qty: 45 TABLET | Refills: 1 | Status: SHIPPED | OUTPATIENT
Start: 2025-01-20

## 2025-01-30 ENCOUNTER — OFFICE VISIT (OUTPATIENT)
Dept: FAMILY MEDICINE CLINIC | Age: 70
End: 2025-01-30

## 2025-01-30 VITALS
WEIGHT: 219 LBS | BODY MASS INDEX: 35.35 KG/M2 | HEART RATE: 74 BPM | DIASTOLIC BLOOD PRESSURE: 68 MMHG | OXYGEN SATURATION: 97 % | SYSTOLIC BLOOD PRESSURE: 112 MMHG

## 2025-01-30 DIAGNOSIS — K21.9 GASTROESOPHAGEAL REFLUX DISEASE WITHOUT ESOPHAGITIS: ICD-10-CM

## 2025-01-30 DIAGNOSIS — E03.8 OTHER SPECIFIED HYPOTHYROIDISM: ICD-10-CM

## 2025-01-30 DIAGNOSIS — N89.8 VAGINAL ITCHING: ICD-10-CM

## 2025-01-30 DIAGNOSIS — I10 ESSENTIAL HYPERTENSION, BENIGN: Primary | ICD-10-CM

## 2025-01-30 RX ORDER — LOSARTAN POTASSIUM 100 MG/1
TABLET ORAL
Qty: 90 TABLET | Refills: 1 | Status: SHIPPED | OUTPATIENT
Start: 2025-01-30

## 2025-01-30 RX ORDER — LEVOTHYROXINE SODIUM 100 UG/1
TABLET ORAL
Qty: 90 TABLET | Refills: 1 | Status: SHIPPED | OUTPATIENT
Start: 2025-01-30

## 2025-01-30 ASSESSMENT — ENCOUNTER SYMPTOMS
EYE DISCHARGE: 0
ABDOMINAL PAIN: 0
FACIAL SWELLING: 0
EYE REDNESS: 0
DIARRHEA: 0
VOMITING: 0
SHORTNESS OF BREATH: 0

## 2025-01-30 NOTE — PATIENT INSTRUCTIONS
SURVEY:    You may be receiving a survey from [facility] [employer] regarding your visit today.    Please complete the survey to enable us to provide the highest quality of care to you and your family.    If you cannot score us a very good (5 Stars) on any question, please call the office to discuss how we could have made your experience a very good one.    Thank you.    Clinical Care Team: MD Jean Ku LPN              Triage: Viri Panda CMA              Clerical Team: Viri Jean Baptiste

## 2025-01-30 NOTE — PROGRESS NOTES
HPI Notes    Name: Venessa Ferrell  : 1955        Chief Complaint:     Chief Complaint   Patient presents with    Hypertension    Gastroesophageal Reflux    Hypothyroidism    Vaginal Itching     Patient complains of vaginal itching. Started today       History of Present Illness:     Venessa Ferrell is a 69 y.o.  female who presents with Hypertension, Gastroesophageal Reflux, Hypothyroidism, and Vaginal Itching (Patient complains of vaginal itching. Started today)      Hypertension  This is a chronic problem. The current episode started more than 1 year ago. The problem is unchanged. The problem is controlled. Pertinent negatives include no chest pain, malaise/fatigue, palpitations, peripheral edema or shortness of breath. There are no associated agents to hypertension. Risk factors for coronary artery disease include post-menopausal state. The current treatment provides significant improvement.   Gastroesophageal Reflux  She reports no abdominal pain or no chest pain. This is a chronic problem. The current episode started more than 1 year ago. The problem has been unchanged. Pertinent negatives include no melena or weight loss. She has tried a PPI for the symptoms. The treatment provided significant relief.   Vaginal Itching  The patient's primary symptoms include genital itching and a genital odor. The patient's pertinent negatives include no genital rash, missed menses, vaginal bleeding or vaginal discharge. This is a recurrent problem. The current episode started today (pt finished the antibiotic back by  and was better. In the past few days the genital itching and some odor back.). The problem has been unchanged. She is not pregnant. Pertinent negatives include no abdominal pain, chills, diarrhea, dysuria, fever, hematuria, rash or vomiting. She has tried nothing (just got symptoms today but has one diflucan and antifungal cream to take too.) for the symptoms.

## 2025-02-18 ENCOUNTER — HOSPITAL ENCOUNTER (OUTPATIENT)
Age: 70
Discharge: HOME | End: 2025-02-18
Payer: MEDICARE

## 2025-02-18 ENCOUNTER — TELEPHONE (OUTPATIENT)
Dept: FAMILY MEDICINE CLINIC | Age: 70
End: 2025-02-18

## 2025-02-18 ENCOUNTER — OFFICE VISIT (OUTPATIENT)
Dept: FAMILY MEDICINE CLINIC | Age: 70
End: 2025-02-18

## 2025-02-18 VITALS — DIASTOLIC BLOOD PRESSURE: 80 MMHG | OXYGEN SATURATION: 99 % | HEART RATE: 84 BPM | SYSTOLIC BLOOD PRESSURE: 128 MMHG

## 2025-02-18 DIAGNOSIS — R09.81 NASAL CONGESTION: ICD-10-CM

## 2025-02-18 DIAGNOSIS — R53.83 OTHER FATIGUE: Primary | ICD-10-CM

## 2025-02-18 DIAGNOSIS — R39.15 URGENCY OF URINATION: ICD-10-CM

## 2025-02-18 DIAGNOSIS — G47.30 SLEEP APNEA IN ADULT: ICD-10-CM

## 2025-02-18 DIAGNOSIS — R06.83 SNORING: ICD-10-CM

## 2025-02-18 DIAGNOSIS — R07.89 ATYPICAL CHEST PAIN: ICD-10-CM

## 2025-02-18 LAB
ALBUMIN SERPL-MCNC: 4 G/DL (ref 3.5–5.2)
ALBUMIN/GLOB SERPL: 1.6 {RATIO} (ref 1–2.5)
ALP SERPL-CCNC: 93 U/L (ref 35–104)
ALT SERPL-CCNC: 7 U/L (ref 5–33)
ANION GAP SERPL CALCULATED.3IONS-SCNC: 11 MMOL/L (ref 9–17)
AST SERPL-CCNC: 18 U/L
BILIRUB SERPL-MCNC: 0.4 MG/DL (ref 0.3–1.2)
BILIRUBIN, POC: NORMAL
BLOOD URINE, POC: NORMAL
BUN SERPL-MCNC: 21 MG/DL (ref 8–23)
CALCIUM SERPL-MCNC: 8.7 MG/DL (ref 8.6–10.4)
CHLORIDE SERPL-SCNC: 105 MMOL/L (ref 98–107)
CHOLEST SERPL-MCNC: 160 MG/DL (ref 0–199)
CHOLESTEROL/HDL RATIO: 2.5
CLARITY, POC: CLEAR
CO2 SERPL-SCNC: 25 MMOL/L (ref 20–31)
COLOR, POC: YELLOW
CREAT SERPL-MCNC: 1 MG/DL (ref 0.5–0.9)
GFR, ESTIMATED: 61 ML/MIN/1.73M2
GLUCOSE SERPL-MCNC: 96 MG/DL (ref 70–99)
GLUCOSE URINE, POC: NORMAL MG/DL
HDLC SERPL-MCNC: 65 MG/DL
INFLUENZA A ANTIGEN, POC: NEGATIVE
INFLUENZA B ANTIGEN, POC: NEGATIVE
KETONES, POC: NORMAL MG/DL
LDLC SERPL CALC-MCNC: 70 MG/DL (ref 0–100)
LEUKOCYTE EST, POC: NORMAL
LOT NUMBER POC: NORMAL
NITRITE, POC: NORMAL
PH, POC: 6.5
POTASSIUM SERPL-SCNC: 4.5 MMOL/L (ref 3.7–5.3)
PROT SERPL-MCNC: 6.5 G/DL (ref 6.4–8.3)
PROTEIN, POC: NORMAL MG/DL
SARS-COV-2 RNA POC - COV: NORMAL
SODIUM SERPL-SCNC: 141 MMOL/L (ref 135–144)
SPECIFIC GRAVITY, POC: 1.01
TRIGL SERPL-MCNC: 125 MG/DL
TSH SERPL DL<=0.05 MIU/L-ACNC: 1.25 UIU/ML (ref 0.27–4.2)
UROBILINOGEN, POC: 0.2 MG/DL
VALID INTERNAL CONTROL, POC: PRESENT
VENDOR AND KIT NAME POC: NORMAL
VLDLC SERPL CALC-MCNC: 25 MG/DL (ref 1–30)

## 2025-02-18 PROCEDURE — 80053 COMPREHEN METABOLIC PANEL: CPT

## 2025-02-18 PROCEDURE — 84443 ASSAY THYROID STIM HORMONE: CPT

## 2025-02-18 PROCEDURE — 36415 COLL VENOUS BLD VENIPUNCTURE: CPT

## 2025-02-18 PROCEDURE — 80061 LIPID PANEL: CPT

## 2025-02-18 ASSESSMENT — ENCOUNTER SYMPTOMS
ABDOMINAL PAIN: 0
HEMOPTYSIS: 0
DIARRHEA: 0
COUGH: 0
EYE DISCHARGE: 0
NAUSEA: 0
EYE REDNESS: 0
SORE THROAT: 0
CHANGE IN BOWEL HABIT: 0
VOMITING: 0
SHORTNESS OF BREATH: 0

## 2025-02-18 NOTE — TELEPHONE ENCOUNTER
Patient notified of lab results and recommendations.  Patient voices understanding  CBC with diff ordered.

## 2025-02-18 NOTE — PATIENT INSTRUCTIONS
SURVEY:    You may be receiving a survey from [facility] [employer] regarding your visit today.    Please complete the survey to enable us to provide the highest quality of care to you and your family.    If you cannot score us a very good (5 Stars) on any question, please call the office to discuss how we could have made your experience a very good one.    Thank you.    Clinical Care Team: MD Jean Ku LPN              Triage: Viri Panda CMA              Clerical Team: Viri Jean Baptiste

## 2025-02-18 NOTE — TELEPHONE ENCOUNTER
----- Message from Dr. Tyesha Holliday MD sent at 2/18/2025  5:03 PM EST -----  Tell pt her electrolytes, sugar, liver and kidney ALL normal   Her TSH is also normal  Her cholesterol 160, HDL 65, LDL 70 and .  So all good but there was no CBC on there so I would like her to come back tomorrow non fasting to ck the CBC.   Then also get sleep study scheduled.

## 2025-02-18 NOTE — PROGRESS NOTES
HPI Notes    Name: Venessa Ferrell  : 1955        Chief Complaint:     Chief Complaint   Patient presents with    Fatigue     Patient complains of episode of low blood pressure at home over the weekend. 97/53,96/57. Better today. Feels exhausted.        History of Present Illness:     Venessa Ferrell is a 69 y.o.  female who presents with Fatigue (Patient complains of episode of low blood pressure at home over the weekend. 97/53,96/57. Better today. Feels exhausted. )      Fatigue  This is a new problem. Episode onset: started over the past 4d with being so tired but pt states \"she goes through that\" as few wks ago in [place] she has one day of pure exhaustion. The problem has been gradually worsening. Associated symptoms include chest pain, congestion and fatigue. Pertinent negatives include no abdominal pain, change in bowel habit, coughing, diaphoresis, fever, nausea, numbness, rash, sore throat or vomiting. Associated symptoms comments: Ears popping.   Cold Symptoms   This is a new problem. The current episode started in the past 7 days. The problem has been unchanged. There has been no fever. Associated symptoms include chest pain, congestion and a plugged ear sensation. Pertinent negatives include no abdominal pain, coughing, diarrhea, ear pain, nausea, rash, sore throat or vomiting. Associated symptoms comments: Ear popping . She has tried nothing for the symptoms.   Chest Pain   This is a recurrent problem. The current episode started more than 1 month ago. The onset quality is gradual. The problem occurs intermittently. The problem has been unchanged. The pain is present in the lateral region. The pain is moderate. The quality of the pain is described as pressure (pt has pain with pressure or movement on the Lt side. NO SOB.). The pain does not radiate. Pertinent negatives include no abdominal pain, cough, diaphoresis, fever, hemoptysis, irregular heartbeat, lower extremity edema,

## 2025-02-19 ENCOUNTER — HOSPITAL ENCOUNTER (OUTPATIENT)
Age: 70
Discharge: HOME | End: 2025-02-19
Payer: MEDICARE

## 2025-02-19 DIAGNOSIS — R53.83 OTHER FATIGUE: ICD-10-CM

## 2025-02-19 LAB
BASOPHILS # BLD: 0.04 K/UL (ref 0–0.2)
BASOPHILS NFR BLD: 1 % (ref 0–2)
EOSINOPHIL # BLD: 0.33 K/UL (ref 0–0.4)
EOSINOPHILS RELATIVE PERCENT: 7 % (ref 0–5)
ERYTHROCYTE [DISTWIDTH] IN BLOOD BY AUTOMATED COUNT: 14 % (ref 12.1–15.2)
HCT VFR BLD AUTO: 38.5 % (ref 36–46)
HGB BLD-MCNC: 12.5 G/DL (ref 12–16)
IMM GRANULOCYTES # BLD AUTO: 0.03 K/UL (ref 0–0.3)
IMM GRANULOCYTES NFR BLD: 1 % (ref 0–5)
LYMPHOCYTES NFR BLD: 1.26 K/UL (ref 1–4.8)
LYMPHOCYTES RELATIVE PERCENT: 27 % (ref 15–40)
MCH RBC QN AUTO: 28.5 PG (ref 26–34)
MCHC RBC AUTO-ENTMCNC: 32.5 G/DL (ref 31–37)
MCV RBC AUTO: 87.7 FL (ref 80–100)
MONOCYTES NFR BLD: 0.54 K/UL (ref 0–1)
MONOCYTES NFR BLD: 12 % (ref 4–8)
NEUTROPHILS NFR BLD: 52 % (ref 47–75)
NEUTS SEG NFR BLD: 2.51 K/UL (ref 2.5–7)
PLATELET # BLD AUTO: 221 K/UL (ref 140–450)
PMV BLD AUTO: 9.8 FL (ref 6–12)
RBC # BLD AUTO: 4.39 M/UL (ref 4–5.2)
WBC OTHER # BLD: 4.7 K/UL (ref 3.5–11)

## 2025-02-19 PROCEDURE — 85025 COMPLETE CBC W/AUTO DIFF WBC: CPT

## 2025-02-19 PROCEDURE — 36415 COLL VENOUS BLD VENIPUNCTURE: CPT

## 2025-04-01 ENCOUNTER — HOSPITAL ENCOUNTER (OUTPATIENT)
Age: 70
Discharge: HOME | End: 2025-04-01
Payer: MEDICARE

## 2025-04-01 ENCOUNTER — OFFICE VISIT (OUTPATIENT)
Dept: FAMILY MEDICINE CLINIC | Age: 70
End: 2025-04-01

## 2025-04-01 ENCOUNTER — RESULTS FOLLOW-UP (OUTPATIENT)
Dept: FAMILY MEDICINE CLINIC | Age: 70
End: 2025-04-01

## 2025-04-01 VITALS
OXYGEN SATURATION: 97 % | BODY MASS INDEX: 34.86 KG/M2 | DIASTOLIC BLOOD PRESSURE: 70 MMHG | WEIGHT: 216 LBS | SYSTOLIC BLOOD PRESSURE: 100 MMHG | HEART RATE: 78 BPM

## 2025-04-01 DIAGNOSIS — G44.89 OTHER HEADACHE SYNDROME: ICD-10-CM

## 2025-04-01 DIAGNOSIS — R53.83 OTHER FATIGUE: ICD-10-CM

## 2025-04-01 DIAGNOSIS — R09.81 NASAL CONGESTION: ICD-10-CM

## 2025-04-01 DIAGNOSIS — R05.8 PRODUCTIVE COUGH: Primary | ICD-10-CM

## 2025-04-01 DIAGNOSIS — R05.8 PRODUCTIVE COUGH: ICD-10-CM

## 2025-04-01 LAB
BASOPHILS # BLD: 0.04 K/UL (ref 0–0.2)
BASOPHILS NFR BLD: 1 % (ref 0–2)
EOSINOPHIL # BLD: 0.25 K/UL (ref 0–0.4)
EOSINOPHILS RELATIVE PERCENT: 3 % (ref 0–5)
ERYTHROCYTE [DISTWIDTH] IN BLOOD BY AUTOMATED COUNT: 14.1 % (ref 12.1–15.2)
HCT VFR BLD AUTO: 40 % (ref 36–46)
HGB BLD-MCNC: 13.2 G/DL (ref 12–16)
IMM GRANULOCYTES # BLD AUTO: 0.04 K/UL (ref 0–0.3)
IMM GRANULOCYTES NFR BLD: 1 % (ref 0–5)
LYMPHOCYTES NFR BLD: 1.1 K/UL (ref 1–4.8)
LYMPHOCYTES RELATIVE PERCENT: 14 % (ref 15–40)
MCH RBC QN AUTO: 28.6 PG (ref 26–34)
MCHC RBC AUTO-ENTMCNC: 33 G/DL (ref 31–37)
MCV RBC AUTO: 86.8 FL (ref 80–100)
MONOCYTES NFR BLD: 0.66 K/UL (ref 0–1)
MONOCYTES NFR BLD: 8 % (ref 4–8)
NEUTROPHILS NFR BLD: 73 % (ref 47–75)
NEUTS SEG NFR BLD: 5.86 K/UL (ref 2.5–7)
PLATELET # BLD AUTO: 257 K/UL (ref 140–450)
PMV BLD AUTO: 10 FL (ref 6–12)
RBC # BLD AUTO: 4.61 M/UL (ref 4–5.2)
WBC OTHER # BLD: 8 K/UL (ref 3.5–11)

## 2025-04-01 PROCEDURE — 36415 COLL VENOUS BLD VENIPUNCTURE: CPT

## 2025-04-01 PROCEDURE — 85025 COMPLETE CBC W/AUTO DIFF WBC: CPT

## 2025-04-01 RX ORDER — BENZONATATE 100 MG/1
100-200 CAPSULE ORAL 3 TIMES DAILY PRN
Qty: 60 CAPSULE | Refills: 0 | Status: SHIPPED | OUTPATIENT
Start: 2025-04-01 | End: 2025-04-08

## 2025-04-01 ASSESSMENT — ENCOUNTER SYMPTOMS
SINUS PAIN: 0
SORE THROAT: 0
WHEEZING: 1
NAUSEA: 0
BACK PAIN: 0
SHORTNESS OF BREATH: 1
ABDOMINAL PAIN: 0
DIARRHEA: 0
COUGH: 1
VOMITING: 0
RHINORRHEA: 0

## 2025-04-01 NOTE — PATIENT INSTRUCTIONS
SURVEY:    You may be receiving a survey from [facility] regarding your visit today.    You may get this in the mail, through your MyChart or in your email.     Please complete the survey to enable us to provide the highest quality of care to you and your family.    If you cannot score us as very good ( 5 Stars) on any question, please feel free to call the office to discuss how we could have made your experience exceptional.     Thank you.    Clinical Care Team:  Dr. Barney Sosa, DO Mechelle Cage LPN    Triage:  Viri Panda CMA    Clerical Team:  Viri Jean Baptiste

## 2025-04-01 NOTE — PROGRESS NOTES
HPI Notes    Name: Venessa Ferrell  : 1955         Chief Complaint:     Chief Complaint   Patient presents with    Cold Symptoms     Cough, congestion, headache and wheezing starting 8 days ago. No fever or other symptoms at this time. Patient is currently taking Coricidin and tylenol to manage symptoms.         History of Present Illness:      HPI    This is a 69-year-old woman presenting for an acute illness with symptoms entailing productive mucopurulent cough, fatigue, nasal congestion, headache and wheezing.  These symptoms began about 8 days ago and she denies fever, chills, body aches, nausea, vomiting, diarrhea.  She is currently taking Coricidin and Tylenol to help improve her symptoms. She is improving a bit by this time.     Past Medical History:     Past Medical History:   Diagnosis Date    Cerebrovascular disease     mini stroke may 2012,     Chronic back pain     GERD (gastroesophageal reflux disease)     Hypertension     Hypothyroidism     Osteoarthritis     knee and back    Osteopenia 2016      Reviewed all health maintenance requirements and ordered appropriate tests  Health Maintenance Due   Topic Date Due    Hepatitis C screen  Never done    DTaP/Tdap/Td vaccine (1 - Tdap) Never done    Shingles vaccine (1 of 2) Never done    Respiratory Syncytial Virus (RSV) Pregnant or age 60 yrs+ (1 - Risk 60-74 years 1-dose series) Never done    Pneumococcal 50+ years Vaccine (2 of 2 - PCV) 2022    COVID-19 Vaccine (3 - - season) 2024       Past Surgical History:     Past Surgical History:   Procedure Laterality Date    APPENDECTOMY      BREAST BIOPSY      CHOLECYSTECTOMY      COLONOSCOPY N/A 2023    Dr. Edwards with 2 adenomatous polyps    HYSTERECTOMY (CERVIX STATUS UNKNOWN)      still Rt ovary present    JOINT REPLACEMENT  2018    Rt knee    KNEE SURGERY Left 22        Medications:       Prior to Admission medications    Medication Sig Start Date End

## 2025-04-01 NOTE — TELEPHONE ENCOUNTER
Called and discussed CBC results; no ABX warranted at this time. Will recommend supportive therapy; call back in 3 days if not improving.

## 2025-05-06 NOTE — PROGRESS NOTES
HPI Notes    Name: Tawnya Khanna  : 1955         Chief Complaint:     Chief Complaint   Patient presents with    Blood Pressure Check     Has been checking her BP at home running 115- 90's on top     Dizziness     States she feels dizzy when she standing or walking she feels light headed  onset 1 week        History of Present Illness:      HPI    This is a 44-year-old woman with medical history significant for essential hypertension presenting to discuss the same, as well as dizziness. She has been checking her blood pressure at home and has been noticing the systolic pressure to be between 90 and 115 mmHg. She also reports that she feels occasionally dizzy when standing or walking, specifying that it is a \"lightheaded feeling/dysequilibrium\" over the past week. She has not been ill or febrile over this time. She has felt nauseated a few times from the dizziness. Over the past week she began to have a sensation of pressure in her chest and back, while at rest followed by arm discomfort. Her symptoms improved after taking lorazepam as she suspected it was due to anxiety. She then had leg cramps for one night which have not recurred. She also endorses several isolated instances of sweating. Since this morning, she has had worsening headaches, and pressure sensation in her head.      Past Medical History:     Past Medical History:   Diagnosis Date    Cerebrovascular disease     mini stroke may 2012    Chronic back pain     GERD (gastroesophageal reflux disease)     Hypertension     Hypothyroidism     Osteoarthritis     knee and back    Osteopenia 2016      Reviewed all health maintenance requirements and ordered appropriate tests  Health Maintenance Due   Topic Date Due    Hepatitis C screen  Never done    DTaP/Tdap/Td vaccine (1 - Tdap) Never done    Shingles vaccine (1 of 2) Never done    COVID-19 Vaccine (3 - Booster for Moderna series) 2021    Colorectal Cancer Screen  2021 Flu vaccine (1) 08/01/2022    Lipids  11/24/2022       Past Surgical History:     Past Surgical History:   Procedure Laterality Date    APPENDECTOMY      BREAST BIOPSY      CHOLECYSTECTOMY      HYSTERECTOMY (CERVIX STATUS UNKNOWN)      still Rt ovary present    JOINT REPLACEMENT  08/07/2018    Rt knee    KNEE SURGERY Left 8/16/22        Medications:       Prior to Admission medications    Medication Sig Start Date End Date Taking? Authorizing Provider   levothyroxine (SYNTHROID) 100 MCG tablet TAKE ONE TABLET BY MOUTH DAILY 10/3/22   Pete Pavon MD   Estradiol (VAGIFEM) 10 MCG TABS vaginal tablet Place 1 tablet vaginally Twice a Week 9/8/22   Ying Huerta PA-C   hydroCHLOROthiazide (HYDRODIURIL) 25 MG tablet TAKE 1/2 TABLET BY MOUTH DAILY 8/16/22   Pete Pavon MD   UNABLE TO FIND Gabapentin-Ketoprofen amitriptyline HCL 10-10-2% neuroserum liquid  Apply small amt to affected area and rub for 10 minutes. Repeat 2-3 times daily 8/16/22   Pete Pavon MD   vitamin B-12 (CYANOCOBALAMIN) 1000 MCG tablet Take 1,000 mcg by mouth in the morning.     Historical Provider, MD   clotrimazole (LOTRIMIN) 1 % cream Use daily as needed 7/1/22   Frank Sosa,    losartan (COZAAR) 100 MG tablet TAKE ONE TABLET BY MOUTH DAILY 5/4/22   Justino Antu, DO   omeprazole (PRILOSEC) 20 MG delayed release capsule TAKE ONE CAPSULE BY MOUTH EVERY MORNING BEFORE BREAKFAST 5/4/22   Justino Antu, DO   traMADol Lorriane Dial) 50 MG tablet  9/14/21   Historical Provider, MD   Ascorbic Acid (VITAMIN C) 250 MG tablet Take 250 mg by mouth daily    Historical Provider, MD   famotidine (PEPCID) 20 MG tablet TAKE 1 TABLET DAILY WITH SUPPER 7/27/20   Pete Pavon MD   loratadine (CLARITIN) 10 MG tablet Take 10 mg by mouth daily Prn    Historical Provider, MD   vitamin D (ERGOCALCIFEROL) 07281 units CAPS capsule Take 50,000 Units by mouth Twice a Week  1/24/19   Historical Provider, MD   Vitamin Mixture (VITAMIN E COMPLETE PO) Po qd    Historical Provider, MD   clopidogrel (PLAVIX) 75 MG tablet Take 1 tablet by mouth daily. Patient taking differently: Take 75 mg by mouth every other day 2/21/15   Brandi Bermudez MD   folic acid (FOLVITE) 1 MG tablet Take 1 mg by mouth daily      Historical Provider, MD        Allergies:       Celecoxib, Ciprofloxacin, Flonase [fluticasone propionate], Iodine, Macrobid [nitrofurantoin], Other, Oxycodone hcl, and Oxycodone    Social History:     Tobacco:    reports that she quit smoking about 36 years ago. Her smoking use included cigarettes. She has a 15.00 pack-year smoking history. She has never used smokeless tobacco.  Alcohol:      reports no history of alcohol use. Drug Use:  reports no history of drug use. Family History:     Family History   Problem Relation Age of Onset    Diabetes Mother     COPD Mother     Heart Failure Mother     Breast Cancer Mother     Cancer Father         colon    Diabetes Father        Review of Systems:         Review of Systems   Constitutional:  Positive for fatigue. Negative for fever. HENT:  Positive for sinus pressure. Negative for rhinorrhea, sinus pain and sneezing. Respiratory:  Negative for cough and wheezing. Cardiovascular:  Negative for chest pain. Gastrointestinal:  Negative for abdominal pain, diarrhea, nausea and vomiting. Musculoskeletal:  Negative for back pain. Skin:  Negative for rash. Neurological:  Positive for dizziness and headaches. Psychiatric/Behavioral:  Negative for sleep disturbance. Physical Exam:     Vitals:  /82 (Site: Right Upper Arm, Position: Standing, Cuff Size: Large Adult)   Pulse 92   Resp 18   Ht 5' 6\" (1.676 m)   Wt 196 lb 9.6 oz (89.2 kg)   SpO2 93%   BMI 31.73 kg/m²       Physical Exam  Vitals and nursing note reviewed. Constitutional:       General: She is not in acute distress. Appearance: Normal appearance. She is obese.    HENT:      Right Ear: Ear canal and external ear [FreeTextEntry1] : 5/6/25  Experiencing persistent vertigo issue past few weeks, imaging planned tomorrow to investigate MR ordered for tomorrow  - has been prescribed Meclizine to use if needed but hasn't used.  REpeated attempts at Epley procedure effective in past have not been as effective now...  Says her jaw hurts "almost all the time" Also dealing with infection past week, coughing during visit, productive yellow mucus-> not clear which came first but cough/ malaise also associated with loss appetite, early satiety.. and 9 lb wt loss.. since 4/30/25...  Denies sharona dyspnea.. chest pain better with low dose meloxicam 7.5 mg daily.. but now with daily GID.. mild nausea as noted above and diarrhea.... Routine use of PPI low dose 20 mg omeprazole   Joint pains same - AMS 2hrs (says normal) but no overt joint inflammation..  Pains (lateral hip pain) are preventing her from staying asleep, can fall back asleep but wakes up 3-4 times per night- fairly restorative Takes Mg (unsure quantity) before bed 50mg Pregabalin (PRN but trying to avoid 2/2 memory issues)  and  5-10mg Cyclobenzaprine taking as needed (rarely used, too sedating, but effective when necessary.  Reserves for acute lower or upper back myofascial pain).  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 has not had PT for this specifically   Taking 40mg Atorvastatin - tolerated well and effectively controlling cholesterol Receives eye exam every 6mo Last josefina last year (normal), last colonoscopy 2022 2/12/25 CC:  Still #1 issue chest wall pain.. worse with deep insp.. and with steroids upon monthly infusion absolutely notes improvement.. has not routinely tried NSAIDs..  oral dryness is worse with cyclobenzaprine, has not tried cevimiline.. using topical agents with some minimial  Continues with Benlysta and HCQ tolerating both without issues. No longer strange sensation with infusions.. but has had to continue medrol.. to minimize  ++ response  No recent UTI now using estradial few time/s wk Continues wellbutrin 300 mgand pregabalin low dose usually 50 mg at HS (#120 lasted nearly 6 mg) usually at HS tolerated fairly well Most recent labs 1/7/25 with ESR 33 (stable), CRP 7, nl CBC- WBC stable at 2.27, CMP, nl UA/ UPCR (max  0.3), now C 3 nl (from low 80) and C4 and dsDNA excellent now- had been very high.  - trigger point [place] last visit 11/24 ++ response - not active issue    1) SLE/ Inflammatory arthritis- RA dx years ago- seronegative 2) AI Thyroid disease 3) Secondary FM 4) Osteopenia ____________________________________________________________________________  Initial HPI 1/26/22 61 yo  Dx with SLE 30 ys ago.. presented at 28 with severe joint symmetrical inflammation x 6 m initially seen by Dr. Mock tx w/  mg x many ys (intermittently stops few mnths.. then recurrence)  Presented with widespread lymphadenopathy.. intermittently with flares... ?Protein initially  Raynauds x many ys  Intermittent need for steroids.. 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 No bleeding / clotting dyscrasias  Updated Labs: 1/22 low + DILLON 1:320H/N w/ low + dsDNA 13, C3 81 in past nl nl / C4 nl neg CCP...  TFTs intermittently elevated on replacement   Dry eyes natural once daily / mouth- worse w/ medication Night sweats:  drenching recently past few wks.. no lymphadenopathy but  Hair thinning- did ys ago patch alopecia.. Joints:  recurrent spontaneous joint inflammation in symmetrical (classic RA pattern - polyarticular severe) and asymmetrical pattern intraarticular synovitis and diffuse pauciarticular enthesopathies ( recurrent plantar fascitis, bursitis shoulders, lateral epicondylitis and patella tendinopathy)..  Mild pleuritic R sided x 15 intermittent.. pleural effusion.. more uncomfortable when flaring.. (chronic) No hx splenomegaly despite hx ITP.. (US this year - "sludgy).. no formal gallstones No renal dysfunction and nl UA no recurrent UTIs.. no hx protein Rash:  mild + photosensitive joint pain NO rash, no malar lesions or other.  Paresthesias:  fingers any time of 1st 2 fingers / and ball of foot..   Neck pain and stiffness intermittently.. w/ limited ROM intermittent  NO hx of inflammatory eye/ bowel disease  Knee pain chronically with OA noted on imaging moderate DJD    Secondary FM  IBS-D (last colonoscopy > 5 ys ago- next wk), mild GERD..   Depression: better, nearly nl and "happy", had been irritability currently well tried  Celexa in past x 6   Sleep: APAP PM.. Ambien in past .. rarely stays asleep 2/2 pain.. 3-4 hs rarely restorative..   Thyroid ca:  and AI thyroid disease, multinodular lesions, removed total resection and radiation x 1 .. now on titrating doses.  Working w/ Endo (Dr. KHAN).   Age appropriate malignancy screening:   colonoscopy > 5 ys / endoscopy -ys ago , mammogram- annually ok, pap smear 3 ys neg , CXR   c/o dry eyes, back pain, and N/T, hypothyroidism TSH 5.55 1/22  referred for + DILLON 1:320 H/R w/ low + dsDNA 13 and low C 3 x 1 81 (now nl) neg SSA/B, STAN no RF/  neg CCP, Hep B/C   + FH  IBD UC mother Thyroid dz  normal. There is no impacted cerumen. Left Ear: Ear canal and external ear normal. There is no impacted cerumen. Ears:      Comments: Bilateral Serous OME     Nose: Nose normal.      Mouth/Throat:      Mouth: Mucous membranes are moist.      Pharynx: Oropharynx is clear. No posterior oropharyngeal erythema. Skin:     Capillary Refill: Capillary refill takes less than 2 seconds. Neurological:      General: No focal deficit present. Mental Status: She is alert and oriented to person, place, and time. Mental status is at baseline. Cranial Nerves: No cranial nerve deficit. Psychiatric:         Mood and Affect: Mood normal.         Behavior: Behavior normal.         Thought Content:  Thought content normal.       Osteopathic: tender to palpation over maxillary sinuses; restriction of cervical paraspinal mm        Data:     Lab Results   Component Value Date/Time     02/08/2022 09:05 AM    K 4.4 02/08/2022 09:05 AM     02/08/2022 09:05 AM    CO2 23 02/08/2022 09:05 AM    BUN 21 02/08/2022 09:05 AM    CREATININE 0.89 02/08/2022 09:05 AM    GLUCOSE 102 02/08/2022 09:05 AM    GLUCOSE 97 01/25/2012 08:00 AM    PROT 6.5 11/24/2017 07:58 AM    LABALBU 3.9 11/24/2017 07:58 AM    LABALBU 4.4 01/25/2012 08:00 AM    BILITOT 0.39 11/24/2017 07:58 AM    ALKPHOS 65 11/24/2017 07:58 AM    AST 15 11/24/2017 07:58 AM    ALT 10 11/24/2017 07:58 AM     Lab Results   Component Value Date/Time    WBC 6.7 07/20/2022 02:49 PM    RBC 4.62 07/20/2022 02:49 PM    RBC 4.92 01/25/2012 08:00 AM    HGB 12.7 07/20/2022 02:49 PM    HCT 38.2 07/20/2022 02:49 PM    MCV 82.7 07/20/2022 02:49 PM    MCH 27.5 07/20/2022 02:49 PM    MCHC 33.2 07/20/2022 02:49 PM    RDW 15.1 07/20/2022 02:49 PM     07/20/2022 02:49 PM     01/25/2012 08:00 AM    MPV NOT REPORTED 01/27/2020 09:07 AM     Lab Results   Component Value Date/Time    TSH 2.02 02/08/2022 09:05 AM     Lab Results   Component Value Date/Time    CHOL 158 11/24/2017 07:58 AM    HDL 73 11/24/2017 07:58 AM    LABA1C 5.5 05/12/2021 11:27 AM          Assessment & Plan        Diagnosis Orders   1. Essential hypertension, benign  Basic Metabolic Panel    CBC with Auto Differential      2. Dizziness  MN OSTEOPATHIC MANIP,1-2 BODY REGN      3. Sinus pressure  MN OSTEOPATHIC MANIP,1-2 BODY REGN      4. OME (otitis media with effusion), bilateral  MN OSTEOPATHIC MANIP,1-2 BODY REGN      5. Somatic dysfunction of head region  MN OSTEOPATHIC MANIP,1-2 BODY REGN      6. Somatic dysfunction of spine, cervical  MN OSTEOPATHIC MANIP,1-2 BODY REGN          BP well controlled, no concern for orthostatic hypotension. CCT.    2.--6. Likely due to serous otitis media combined with appreciable s/d. Will recommend OTC Zyrtec D daily for up to 14 days and treat with OMT today. The patient I had a long discussion about starting Zyrtec_D. We discussed its mechanism of action, intended goals, adverse effects, as well as common side effects. They were able to verbalize understanding, and repeat plan back to me. After history taking and examination of patient, it was determined that a beneficial treatment modality for their complaint would be osteopathic manipulative therapy (OMT) the nature of OMT, treatment goals, mechanism of action, and side effects were extensively discussed with this patient, who did wish to proceed with the procedure. The following body systems were treated with osteopathy during our office visit today: head, cervical.  Osteopathic findings tender to palpation over maxillary sinuses; restriction of cervical paraspinal mm. After treatment, the patient was reevaluated and the following physical exam findings were appreciated: resolution of s/d. We determined that the next best time for the patient to return to the office for additional treatment would be PRN. At this point, her treatment was concluded, there were no complications, there were no further questions. Patient tolerated this procedure very well. Follow up PRN        Completed Refills   Requested Prescriptions      No prescriptions requested or ordered in this encounter     No follow-ups on file. No orders of the defined types were placed in this encounter. Orders Placed This Encounter   Procedures    Basic Metabolic Panel     Standing Status:   Future     Standing Expiration Date:   10/28/2023    CBC with Auto Differential     Standing Status:   Future     Standing Expiration Date:   10/28/2023    IA OSTEOPATHIC MANIP,1-2 BODY REGN         Patient Instructions     SURVEY:    You may be receiving a survey from [employer] regarding your visit today. Please complete the survey to enable us to provide the highest quality of care to you and your family. If you cannot score us a very good on any question, please call the office to discuss how we could of made your experience a very good one. Thank you.       Clinical Care Team:     Dr. Chanda Sandy, CARMINA      ClericalTeam:     Fer Huntley   Electronically signed by Radha Field DO on 10/28/2022 at 9:36 AM           Completed Refills   Requested Prescriptions      No prescriptions requested or ordered in this encounter

## 2025-06-17 ENCOUNTER — OFFICE VISIT (OUTPATIENT)
Dept: FAMILY MEDICINE CLINIC | Age: 70
End: 2025-06-17

## 2025-06-17 VITALS
WEIGHT: 221.9 LBS | HEIGHT: 66 IN | BODY MASS INDEX: 35.66 KG/M2 | HEART RATE: 74 BPM | DIASTOLIC BLOOD PRESSURE: 68 MMHG | SYSTOLIC BLOOD PRESSURE: 98 MMHG | OXYGEN SATURATION: 100 %

## 2025-06-17 DIAGNOSIS — E66.09 CLASS 2 OBESITY DUE TO EXCESS CALORIES WITHOUT SERIOUS COMORBIDITY WITH BODY MASS INDEX (BMI) OF 35.0 TO 35.9 IN ADULT: Primary | ICD-10-CM

## 2025-06-17 DIAGNOSIS — E66.812 CLASS 2 OBESITY DUE TO EXCESS CALORIES WITHOUT SERIOUS COMORBIDITY WITH BODY MASS INDEX (BMI) OF 35.0 TO 35.9 IN ADULT: Primary | ICD-10-CM

## 2025-06-17 RX ORDER — PHENTERMINE HYDROCHLORIDE 37.5 MG/1
37.5 TABLET ORAL
Qty: 30 TABLET | Refills: 0 | Status: SHIPPED | OUTPATIENT
Start: 2025-06-17 | End: 2025-07-17

## 2025-06-17 ASSESSMENT — ENCOUNTER SYMPTOMS
NAUSEA: 0
VOMITING: 0
BLOOD IN STOOL: 0
CHANGE IN BOWEL HABIT: 0
SHORTNESS OF BREATH: 0

## 2025-06-17 NOTE — PROGRESS NOTES
HPI Notes    Name: Venessa Ferrell  : 1955        Chief Complaint:     Chief Complaint   Patient presents with    Weight Management     Pt concerned she has gained 20lbs in a year        History of Present Illness:     Venessa Ferrell is a 69 y.o.  female who presents with Weight Management (Pt concerned she has gained 20lbs in a year )      Weight Management  This is a chronic problem. The current episode started more than 1 year ago (Pt is frustrated as down to 198lbs in 2024 but has put like 20 lbs back. Pt has also been doing Weight Watchers this past year and gaining weight. Pt has tried Go Lo too.). The problem has been unchanged. Pertinent negatives include no change in bowel habit, chest pain, chills, fever, nausea, rash or vomiting. Treatments tried: pt has been doing Weight Watchers and Go Lo.   Pt never had the sleep study as knows she could never wear the mask at night.     Past Medical History:     Past Medical History:   Diagnosis Date    Cerebrovascular disease     mini stroke may 2012,     Chronic back pain     GERD (gastroesophageal reflux disease)     Hypertension     Hypothyroidism     Osteoarthritis     knee and back    Osteopenia 2016      Reviewed all health maintenance requirements and ordered appropriate tests  Health Maintenance Due   Topic Date Due    Hepatitis C screen  Never done    DTaP/Tdap/Td vaccine (1 - Tdap) Never done    Shingles vaccine (1 of 2) Never done    Pneumococcal 50+ years Vaccine (2 of 2 - PCV) 2022    COVID-19 Vaccine (3 - - season) 2024       Past Surgical History:     Past Surgical History:   Procedure Laterality Date    APPENDECTOMY      BREAST BIOPSY      CHOLECYSTECTOMY      COLONOSCOPY N/A 2023    Dr. Edwards with 2 adenomatous polyps    HYSTERECTOMY (CERVIX STATUS UNKNOWN)      still Rt ovary present    JOINT REPLACEMENT  2018    Rt knee    KNEE SURGERY Left 22        Medications:       Prior

## 2025-06-23 ENCOUNTER — TELEPHONE (OUTPATIENT)
Dept: FAMILY MEDICINE CLINIC | Age: 70
End: 2025-06-23

## 2025-06-23 NOTE — TELEPHONE ENCOUNTER
Notified pt of PCP note and recommendations, pt voiced understanding    no Loop recorder in however/no

## 2025-06-23 NOTE — TELEPHONE ENCOUNTER
Johanna started adipex today. She said it has 'wounded' her up. She said she is real hyper since starting. She is wanting to know if she can take half a pill until she gest used to this medication.  Please let Johanna know.    Health Maintenance   Topic Date Due    Hepatitis C screen  Never done    DTaP/Tdap/Td vaccine (1 - Tdap) Never done    Shingles vaccine (1 of 2) Never done    Pneumococcal 50+ years Vaccine (2 of 2 - PCV) 01/20/2022    COVID-19 Vaccine (3 - 2024-25 season) 09/01/2024    Annual Wellness Visit (Medicare)  08/01/2025    Breast cancer screen  12/13/2025    Depression Screen  01/08/2026    Colorectal Cancer Screen  01/24/2028    Lipids  02/18/2030    Respiratory Syncytial Virus (RSV) Pregnant or age 60 yrs+ (1 - 1-dose 75+ series) 11/30/2030    DEXA (modify frequency per FRAX score)  Completed    Flu vaccine  Completed    Hepatitis A vaccine  Aged Out    Hepatitis B vaccine  Aged Out    Hib vaccine  Aged Out    Polio vaccine  Aged Out    Meningococcal (ACWY) vaccine  Aged Out    Meningococcal B vaccine  Aged Out    Pneumococcal 0-49 years Vaccine  Discontinued    Diabetes screen  Discontinued             (applicable per patient's age: Cancer Screenings, Depression Screening, Fall Risk Screening, Immunizations)    Hemoglobin A1C (%)   Date Value   05/12/2021 5.5   07/27/2020 5.6   07/31/2018 5.4     AST (U/L)   Date Value   02/18/2025 18     ALT (U/L)   Date Value   02/18/2025 7     BUN (mg/dL)   Date Value   02/18/2025 21      (goal A1C is < 7)   (goal LDL is <100) need 30-50% reduction from baseline     BP Readings from Last 3 Encounters:   06/17/25 98/68   04/01/25 100/70   02/18/25 128/80    (goal /80)      All Future Testing planned in [facility]:  Lab Frequency Next Occurrence   US RENAL COMPLETE Once 12/12/2024   Lipid Panel Once 02/18/2025   Comprehensive Metabolic Panel Once 02/18/2025   TSH Once 02/18/2025       Next Visit Date:  Future Appointments   Date Time Provider Department Center  [Least Pain Intensity: 1/10] : 1/10 [OTC] : OTC [Date: ____________] : Patient's last distress assessment performed on [unfilled]. [3 - Distress Level] : Distress Level: 3 [Maximal Pain Intensity: 3/10] : 3/10 [90: Able to carry normal activity; minor signs or symptoms of disease.] : 90: Able to carry normal activity; minor signs or symptoms of disease.

## 2025-06-23 NOTE — TELEPHONE ENCOUNTER
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 bl), PEG, recent PNA, penile infxn (?abscess), sacral wound on abx via PICC (cefepime 2gq8h 8/20-9/28, doxy 8/20 - ?), a/w cough found to have PNA presumed aspiration), c/f intracranial hemorrhage vs malignancy, and hyponatremia, admit to MICU    #Neuro  - CVA s/p PEG 2/2021, previously speaking, eating, walking w/ assistance up until previous admit 8/2021 after which mental status declined to non-verbal, bedbound iso recent infxn prior to admit  - CTH 9/29 R sided intraparenchymal hemorrhage w/ surrounding edema, 2.5x2.8 cm hyperdensity crossing midline iso ruptured bleeding cavernoma vs malignancy  - CTH 9/30 persistent mass effect R lat ventricle w/ shift 1cm, Acute hemorrhage L frontal lobe and presence of extra axial hemorrhage, edema R frontal lobe > L  - CTH w/ IV contrast 9/30 findings suspicious for neoplasm ddx includes lymphoma, mets, glioblastoma multiforme  - Neurosg c/s, in discussion w/ daughter re offer of biopsy/partial resection of mass despite belief will likely not tolerate procedure or cognitively improve. Neurosg to update today re further d/w daughter  - extensive GOC conversation w/ daughter in chart 10/3, palliative consulted to assist in conversation  - prop dc'd, add precedex as necessary  - per neurosg c/w dexamethasone 4q6 iso mass  - c/w keppra 500 BID    #Resp  AHRF 2/2 aspiration PNA iso worsening mental status  - Intubated for airway protection  - C/w Zosyn for PNA end date 10/5, obtaining rpt sputum Cx; d/c'ed AZT for neg legionella    #CV  Hypertension:  - SBPs elevated  - adding hydralazine po 50mg TID    #GI  PEG in place  - Tolerating feeds  - Occult blood (+) in gastric lavage, but no overt GIB; c/w PPI  - c/w senna and Miralax    #Renal/   Hyponatremia:  - Multifactorial i/s/o brain mass, chronic hyponatremia, decreased PO, and ?N/V/D prior to admission  - s/p hypertonic saline for Na 110 on admit, c/b overcorrection, s/p DDAVP and free water  - per nephro will restart 3% NS 30cc/hr x 6 hrs  - BMP q4h, monitor I/O, obtain rpt ulytes and uosm  - figueroa dc'd    #ID  - Recent penile infection (?abscess) & sacral wound on antibiotic with PICC line (cefepime 2gq8 8/20-9/28; doxycycline 8/20---)  - Aspiration event leading up to admission; obtain sputum Cx, c/w Zosyn plan end date 10/5  - Blood Cx including PICC Cx ngtd (9/30)  - Hx of COVID infection, spike ab positive    #Endo  - TSH wnl  - F/u cortisol level  - No known hx of DM, possible steroid-induced hyperglycemia; continue to monitor FS with ISS; will uptitrate NPH given neurosg recs to c/w dex    #Heme  Hgb of 10 on admission  - Holding AC i/s/o intracranial hemorrhage  - Hgb slowly downtrending without overt bleeding source  - (+) occult blood from gastric lavage, c/w PPI  - Trend CBC and keep active T & S    #PPx:  - PPI  - DVT: SCDs i/s/o ICH    #Ethics  - Ongoing extensive GOC with daughter: FULL CODE  - Palliative consulted 82M PMH gallstones (30-40 yrs ago), BPH, HLD, Spinal stenosis, CVA (nonverbal, A&Ox0 bl), PEG, recent PNA, penile infxn (?abscess), sacral wound on abx via PICC (cefepime 2gq8h 8/20-9/28, doxy 8/20 - ?), a/w cough found to have PNA presumed aspiration), c/f intracranial hemorrhage vs malignancy, and hyponatremia, admit to MICU    #Neuro  - CVA s/p PEG 2/2021, previously speaking, eating, walking w/ assistance up until previous admit 8/2021 after which mental status declined to non-verbal, bedbound iso recent infxn prior to admit  - CTH 9/29 R sided intraparenchymal hemorrhage w/ surrounding edema, 2.5x2.8 cm hyperdensity crossing midline iso ruptured bleeding cavernoma vs malignancy  - CTH 9/30 persistent mass effect R lat ventricle w/ shift 1cm, Acute hemorrhage L frontal lobe and presence of extra axial hemorrhage, edema R frontal lobe > L  - CTH w/ IV contrast 9/30 findings suspicious for neoplasm ddx includes lymphoma, mets, glioblastoma multiforme  - Neurosg c/s, in discussion w/ daughter re offer of biopsy/partial resection of mass despite belief will likely not tolerate procedure or cognitively improve  - extensive GOC conversation w/ daughter in chart 10/3, palliative on board  - neurosurg recommending neuro-onc c/s, team to see pt after MRI brain w/ and w/o contrast obtained  - per neurosg c/w dexamethasone 4q6 iso mass  - c/w keppra 500 BID    #Resp  AHRF 2/2 aspiration PNA iso worsening mental status  - Intubated for airway protection  - C/w Zosyn for PNA end date 10/5, obtaining rpt sputum Cx; d/c'ed AZT for neg legionella    #CV  Hypertension:  - SBPs elevated  - c/w hydralazine po 50mg TID    #GI  PEG in place  - Tolerating feeds  - Occult blood (+) in gastric lavage, but no overt GIB; c/w PPI  - c/w senna and Miralax    #Renal/   Hyponatremia:  - Multifactorial i/s/o brain mass, chronic hyponatremia, decreased PO, and ?N/V/D prior to admission  - s/p hypertonic saline for Na 110 on admit, c/b overcorrection, s/p DDAVP and free water  - c/w 3% NS, Na improved to 141  - BMP q4h, monitor I/O, obtain rpt ulytes and uosm  - figueroa dc'd    #ID  - Recent penile infection (?abscess) & sacral wound on antibiotic with PICC line (cefepime 2gq8 8/20-9/28; doxycycline 8/20---)  - Aspiration event leading up to admission; obtain sputum Cx, c/w Zosyn plan end date 10/5  - Blood Cx including PICC Cx ngtd (9/30)  - Hx of COVID infection, spike ab positive    #Endo  - TSH wnl  - F/u cortisol level  - No known hx of DM, possible steroid-induced hyperglycemia; continue to monitor FS with ISS; will uptitrate NPH given neurosg recs to c/w dex    #Heme  Hgb of 10 on admission  - Holding AC i/s/o intracranial hemorrhage  - Hgb slowly downtrending without overt bleeding source  - (+) occult blood from gastric lavage, c/w PPI  - Trend CBC and keep active T & S    #PPx:  - PPI  - DVT: SCDs i/s/o ICH    #Ethics  - Ongoing extensive GOC with daughter: FULL CODE  - Palliative consulted 82M PMH gallstones (30-40 yrs ago), BPH, HLD, Spinal stenosis, CVA (nonverbal, A&Ox0 bl), PEG, recent PNA, penile infxn (?abscess), sacral wound on abx via PICC (cefepime 2gq8h 8/20-9/28, doxy 8/20 - ?), a/w cough found to have PNA presumed aspiration), c/f intracranial hemorrhage vs malignancy, and hyponatremia, admit to MICU    #Neuro  - CVA s/p PEG 2/2021, previously speaking, eating, walking w/ assistance up until previous admit 8/2021 after which mental status declined to non-verbal, bedbound iso recent infxn prior to admit  - CTH 9/29 R sided intraparenchymal hemorrhage w/ surrounding edema, 2.5x2.8 cm hyperdensity crossing midline iso ruptured bleeding cavernoma vs malignancy  - CTH 9/30 persistent mass effect R lat ventricle w/ shift 1cm, Acute hemorrhage L frontal lobe and presence of extra axial hemorrhage, edema R frontal lobe > L  - CTH w/ IV contrast 9/30 findings suspicious for neoplasm ddx includes lymphoma, mets, glioblastoma multiforme  - Neurosg c/s, in discussion w/ daughter re offer of biopsy/partial resection of mass despite belief will likely not tolerate procedure or cognitively improve  - extensive GOC conversation w/ daughter in chart 10/3, palliative on board  - neurosurg recommending neuro-onc c/s, team to see pt after MRI brain w/ and w/o contrast obtained  - per neurosg c/w dexamethasone 4q6 iso mass  - c/w keppra 500 BID    #Resp  AHRF 2/2 aspiration PNA iso worsening mental status  - Intubated for airway protection  - C/w Zosyn for PNA end date 10/5, obtaining rpt sputum Cx; d/c'ed AZT for neg legionella    #CV  Hypertension:  - SBPs elevated  - c/w hydralazine po 50mg TID    #GI  PEG in place  - Tolerating feeds  - Occult blood (+) in gastric lavage, but no overt GIB; c/w PPI  - c/w senna and Miralax    #Renal/   Hyponatremia:  - Multifactorial i/s/o brain mass, chronic hyponatremia, decreased PO, and ?N/V/D prior to admission  - s/p hypertonic saline for Na 110 on admit, c/b overcorrection, s/p DDAVP and free water  - c/w 3% NS, Na improved to 141  - BMP q4h, monitor I/O, obtain rpt ulytes and uosm  - figueroa dc'd    #ID  - Recent penile infection (?abscess) & sacral wound on antibiotic with PICC line (cefepime 2gq8 8/20-9/28; doxycycline 8/20---)  - Aspiration event leading up to admission; obtain sputum Cx, c/w Zosyn plan end date 10/5  - Blood Cx including PICC Cx ngtd (9/30)  - Hx of COVID infection, spike ab positive    #Endo  - TSH wnl  - F/u cortisol level  - No known hx of DM, possible steroid-induced hyperglycemia; continue to monitor FS with ISS; will continue to titrate NPH to FS while on steroids, currently NPH 13u q6h w/ mod ISS    #Heme  Hgb of 10 on admission  - Holding AC i/s/o intracranial hemorrhage  - Hgb slowly downtrending without overt bleeding source  - (+) occult blood from gastric lavage, c/w PPI  - Trend CBC and keep active T & S    #PPx:  - PPI  - DVT: SCDs i/s/o ICH    #Ethics  - Ongoing extensive GOC with daughter: FULL CODE  - Palliative consulted 82M PMH gallstones (30-40 yrs ago), BPH, HLD, Spinal stenosis, CVA (nonverbal, A&Ox0 bl), PEG, recent PNA, penile infxn (?abscess), sacral wound on abx via PICC (cefepime 2gq8h 8/20-9/28, doxy 8/20 - ?), a/w cough found to have PNA presumed aspiration), c/f intracranial hemorrhage vs malignancy, and hyponatremia, admit to MICU    #Neuro  - CVA s/p PEG 2/2021, previously speaking, eating, walking w/ assistance up until previous admit 8/2021 after which mental status declined to non-verbal, bedbound iso recent infxn prior to admit  - CTH 9/29 R sided intraparenchymal hemorrhage w/ surrounding edema, 2.5x2.8 cm hyperdensity crossing midline iso ruptured bleeding cavernoma vs malignancy  - CTH 9/30 persistent mass effect R lat ventricle w/ shift 1cm, Acute hemorrhage L frontal lobe and presence of extra axial hemorrhage, edema R frontal lobe > L  - CTH w/ IV contrast 9/30 findings suspicious for neoplasm ddx includes lymphoma, mets, glioblastoma multiforme  - Neurosg c/s, in discussion w/ daughter re offer of biopsy/partial resection of mass despite belief will likely not tolerate procedure or cognitively improve  - extensive GOC conversation w/ daughter in chart 10/3, palliative on board  - neurosurg recommending neuro-onc c/s, team to see pt after MRI brain w/ and w/o contrast obtained  - dexamethasone 4 IV frequency decreased from q6h to q8h, neurosg aware  - c/w keppra 500 BID    #Resp  AHRF 2/2 aspiration PNA iso worsening mental status  - Intubated for airway protection  - C/w Zosyn for PNA end date today, obtaining rpt sputum Cx; d/c'ed AZT for neg legionella    #CV  Hypertension:  - SBPs elevated  - c/w hydralazine po 50mg TID    #GI  PEG in place  - Tolerating feeds  - Occult blood (+) in gastric lavage, but no overt GIB; c/w PPI  - c/w senna and Miralax    #Renal/   Hyponatremia:  - Multifactorial i/s/o brain mass, chronic hyponatremia, decreased PO, and ?N/V/D prior to admission  - s/p hypertonic saline for Na 110 on admit, c/b overcorrection, s/p DDAVP and free water  - c/w 3% NS, Na improved to 141 will monitor BMP and titrate fluids  - BMP q6h, monitor I/O  - appreciate nephro recs   - henry dc'd    #ID  - Recent penile infection (?abscess) & sacral wound on antibiotic with PICC line (cefepime 2gq8 8/20-9/28; doxycycline 8/20---)  - Aspiration event leading up to admission; obtain sputum Cx, c/w Zosyn plan end date today  - Blood Cx including PICC Cx ngtd (9/30)  - Hx of COVID infection, spike ab positive    #Endo  - TSH wnl  - F/u cortisol level  - No known hx of DM, possible steroid-induced hyperglycemia; continue to monitor FS with ISS; will continue to titrate NPH to FS while on steroids, currently NPH 13u q6h w/ mod ISS will monitor while decreased frequency of dexamethasone as above     #Heme  Hgb of 10 on admission  - Holding AC i/s/o intracranial hemorrhage  - Hgb slowly downtrending without overt bleeding source, now stable   - (+) occult blood from gastric lavage, c/w PPI  - Trend CBC and keep active T & S    #PPx:  - PPI  - DVT: SCDs i/s/o ICH    #Ethics  - Ongoing extensive GOC with daughter: FULL CODE  - Palliative consulted 82M PMH gallstones (30-40 yrs ago), BPH, HLD, Spinal stenosis, CVA (nonverbal, A&Ox0 bl), PEG, recent PNA, penile infxn (?abscess), sacral wound on abx via PICC (cefepime 2gq8h 8/20-9/28, doxy 8/20 - ?), a/w cough found to have PNA presumed aspiration), c/f intracranial hemorrhage vs malignancy, and hyponatremia, admit to MICU    #Neuro  - CVA s/p PEG 2/2021, previously speaking, eating, walking w/ assistance up until previous admit 8/2021 after which mental status declined to non-verbal, bedbound iso recent infxn prior to admit  - CTH 9/29 R sided intraparenchymal hemorrhage w/ surrounding edema, 2.5x2.8 cm hyperdensity crossing midline iso ruptured bleeding cavernoma vs malignancy  - CTH 9/30 persistent mass effect R lat ventricle w/ shift 1cm, Acute hemorrhage L frontal lobe and presence of extra axial hemorrhage, edema R frontal lobe > L  - CTH w/ IV contrast 9/30 findings suspicious for neoplasm ddx includes lymphoma, mets, glioblastoma multiforme  - Neurosg c/s, in discussion w/ daughter re offer of biopsy/partial resection of mass despite belief will likely not tolerate procedure or cognitively improve  - extensive GOC conversation w/ daughter in chart 10/3, palliative on board  - neurosurg recommending neuro-onc c/s, team to see pt after MRI brain w/ and w/o contrast obtained  - dexamethasone 4 IV frequency decreased from q6h to q8h, neurosg aware  - c/w keppra 500 BID    #Resp  AHRF 2/2 aspiration PNA iso worsening mental status  - Intubated for airway protection  - C/w Zosyn for PNA end date today, obtaining rpt sputum Cx; d/c'ed AZT for neg legionella    #CV  Hypertension:  - SBPs elevated  - c/w hydralazine po 50mg TID    #GI  PEG in place  - Tolerating feeds  - Occult blood (+) in gastric lavage, but no overt GIB; c/w PPI  - c/w senna and Miralax    #Renal/   Hyponatremia:  - Multifactorial i/s/o brain mass, chronic hyponatremia, decreased PO, and ?N/V/D prior to admission  - s/p hypertonic saline for Na 110 on admit, c/b overcorrection, s/p DDAVP and free water  - c/w 3% NS, Na improved to 141 will monitor BMP and titrate fluids  - BMP q6h, monitor I/O  - appreciate nephro recs   - henry dc'd    #ID  - Recent penile infection (?abscess) & sacral wound on antibiotic with PICC line (cefepime 2gq8 8/20-9/28; doxycycline 8/20---)  - Aspiration event leading up to admission; obtain sputum Cx, c/w Zosyn plan end date today  - Blood Cx including PICC Cx ngtd (9/30)  - Hx of COVID infection, spike ab positive    #Endo  - TSH wnl  - F/u cortisol level  - No known hx of DM, possible steroid-induced hyperglycemia; continue to monitor FS with ISS; will continue to titrate NPH to FS while on steroids, currently NPH 13u q6h w/ mod ISS will monitor while decreased frequency of dexamethasone as above     #Heme  Hgb of 10 on admission  - Holding AC i/s/o intracranial hemorrhage  - Hgb slowly downtrending without overt bleeding source, now stable   - (+) occult blood from gastric lavage, c/w PPI  - Trend CBC and keep active T & S    #PPx:  - PPI  - DVT: SCDs i/s/o ICH    #Ethics  - Ongoing extensive GOC with daughter: FULL CODE  - Palliative consulted  - Updated family at approx 11:25 10/5

## 2025-07-15 ENCOUNTER — OFFICE VISIT (OUTPATIENT)
Dept: FAMILY MEDICINE CLINIC | Age: 70
End: 2025-07-15

## 2025-07-15 VITALS
HEART RATE: 76 BPM | DIASTOLIC BLOOD PRESSURE: 70 MMHG | BODY MASS INDEX: 35.02 KG/M2 | WEIGHT: 217 LBS | OXYGEN SATURATION: 98 % | SYSTOLIC BLOOD PRESSURE: 102 MMHG

## 2025-07-15 DIAGNOSIS — F41.9 ANXIETY: ICD-10-CM

## 2025-07-15 DIAGNOSIS — E66.09 CLASS 2 OBESITY DUE TO EXCESS CALORIES WITHOUT SERIOUS COMORBIDITY WITH BODY MASS INDEX (BMI) OF 35.0 TO 35.9 IN ADULT: Primary | ICD-10-CM

## 2025-07-15 DIAGNOSIS — E66.812 CLASS 2 OBESITY DUE TO EXCESS CALORIES WITHOUT SERIOUS COMORBIDITY WITH BODY MASS INDEX (BMI) OF 35.0 TO 35.9 IN ADULT: Primary | ICD-10-CM

## 2025-07-15 RX ORDER — LORAZEPAM 0.5 MG/1
0.5 TABLET ORAL EVERY 8 HOURS PRN
Qty: 30 TABLET | Refills: 0 | Status: SHIPPED | OUTPATIENT
Start: 2025-07-15 | End: 2025-08-14

## 2025-07-15 ASSESSMENT — ENCOUNTER SYMPTOMS
EYE REDNESS: 0
DIARRHEA: 0
ABDOMINAL PAIN: 0
EYE DISCHARGE: 0
VOMITING: 0
NAUSEA: 0
COUGH: 0
SHORTNESS OF BREATH: 0
SORE THROAT: 0

## 2025-07-15 NOTE — PATIENT INSTRUCTIONS
SURVEY:    You may be receiving a survey from [employer] regarding your visit today.    Please complete the survey to enable us to provide the highest quality of care to you and your family.      Thank you.    Clinical Care Team: MD Jean Ku LPN              Triage: Viri Panda CMA              Clerical Team: Viri Jean Baptiste

## 2025-07-15 NOTE — PROGRESS NOTES
HPI Notes    Name: Venessa Ferrell  : 1955        Chief Complaint:     Chief Complaint   Patient presents with    Weight Management     Patient here today for weight management. She is not taking the adipex everyday , she does not like some of the side effects. Last weight was 221 lbs, today's weight is 217 lbs.       History of Present Illness:     Venessa Ferrell is a 69 y.o.  female who presents with Weight Management (Patient here today for weight management. She is not taking the adipex everyday , she does not like some of the side effects. Last weight was 221 lbs, today's weight is 217 lbs.)      Weight Management  This is a chronic problem. The current episode started more than 1 year ago. The problem occurs daily. Progression since onset: pt tried the adipex one pill and was just so jittery and bouncing off the walls. Then took the HALF pill for 5d but then couldn't sleep as well. So, Pt has not take for about 2-3wks due to side effects. Pt lost 5lbs. Pertinent negatives include no abdominal pain, anorexia, chest pain, chills, coughing, fatigue, fever, nausea, rash, sore throat or vomiting. Treatments tried: tried the adipex.   Weight down 221lbs to 217lbs.    Past Medical History:     Past Medical History:   Diagnosis Date    Cerebrovascular disease     mini stroke may 2012,     Chronic back pain     GERD (gastroesophageal reflux disease)     Hypertension     Hypothyroidism     Osteoarthritis     knee and back    Osteopenia 2016      Reviewed all health maintenance requirements and ordered appropriate tests  Health Maintenance Due   Topic Date Due    Hepatitis C screen  Never done    DTaP/Tdap/Td vaccine (1 - Tdap) Never done    Shingles vaccine (1 of 2) Never done    Pneumococcal 50+ years Vaccine (2 of 2 - PCV) 2022    COVID-19 Vaccine (3 - - season) 2024       Past Surgical History:     Past Surgical History:   Procedure Laterality Date    APPENDECTOMY

## 2025-07-18 RX ORDER — HYDROCHLOROTHIAZIDE 25 MG/1
TABLET ORAL
Qty: 45 TABLET | Refills: 1 | Status: SHIPPED | OUTPATIENT
Start: 2025-07-18

## 2025-07-18 NOTE — TELEPHONE ENCOUNTER
Last visit:  7/15/2025  Next Visit Date:    Future Appointments   Date Time Provider Department Center   8/6/2025  8:00 AM Tyesha Holliday MD [facility] DEP         Medication List:  Prior to Admission medications    Medication Sig Start Date End Date Taking? Authorizing Provider   LORazepam (ATIVAN) 0.5 MG tablet Take 1 tablet by mouth every 8 hours as needed for Anxiety for up to 30 days. 7/15/25 8/14/25  Tyesha Holliday MD   losartan (COZAAR) 100 MG tablet TAKE 1 TABLET BY MOUTH DAILY 1/30/25   Tyesha Holliday MD   levothyroxine (SYNTHROID) 100 MCG tablet TAKE 1 TABLET BY MOUTH DAILY 1/30/25   Tyesha Holliday MD   omeprazole (PRILOSEC) 20 MG delayed release capsule TAKE ONE CAPSULE BY MOUTH EVERY MORNING BEFORE BREAKFAST 1/30/25   Tyesha Holliday MD   hydroCHLOROthiazide (HYDRODIURIL) 25 MG tablet TAKE 1/2 TABLET BY MOUTH DAILY 1/20/25   Tyesha Holliday MD   meloxicam (MOBIC) 15 MG tablet Take 1 tablet by mouth daily as needed 11/11/24   Desi Hernandez MD   traMADol (ULTRAM) 50 MG tablet Take 1 tablet by mouth every 8 hours as needed. 1/7/25   Desi Hernandez MD   clobetasol (TEMOVATE) 0.05 % cream Apply topically 2 times daily. 12/17/24   Tyesha Holliday MD   UNABLE TO FIND Gabapentin-Ketoprofen amitriptyline HCL 10-10-2% neuroserum liquid  Apply small amt to affected area and rub for 10 minutes. Repeat 2-3 times daily 7/31/24   Tyesha Holliday MD   melatonin 1 MG tablet Take 1 tablet by mouth nightly as needed for Sleep    Desi Hernandez MD   aspirin 81 MG chewable tablet Take 1 tablet by mouth daily    Desi Hernandez MD   vitamin D (CHOLECALCIFEROL) 98486 UNIT CAPS Take 2,000 Units by mouth Twice a Week    Desi Hernandez MD   clotrimazole (LOTRIMIN) 1 % cream APPLY TO AFFECTED AREA(S) DAILY TOPICALLY AS NEEDED 7/14/23   Tyesha Holliday MD   loratadine (CLARITIN) 10 MG tablet Take 1 tablet by mouth daily Prn    Provider, MD Desi   folic

## 2025-08-05 SDOH — HEALTH STABILITY: PHYSICAL HEALTH: ON AVERAGE, HOW MANY MINUTES DO YOU ENGAGE IN EXERCISE AT THIS LEVEL?: 60 MIN

## 2025-08-05 SDOH — HEALTH STABILITY: PHYSICAL HEALTH: ON AVERAGE, HOW MANY DAYS PER WEEK DO YOU ENGAGE IN MODERATE TO STRENUOUS EXERCISE (LIKE A BRISK WALK)?: 3 DAYS

## 2025-08-05 ASSESSMENT — PATIENT HEALTH QUESTIONNAIRE - PHQ9
SUM OF ALL RESPONSES TO PHQ QUESTIONS 1-9: 0
2. FEELING DOWN, DEPRESSED OR HOPELESS: NOT AT ALL
1. LITTLE INTEREST OR PLEASURE IN DOING THINGS: NOT AT ALL

## 2025-08-05 ASSESSMENT — LIFESTYLE VARIABLES
HOW MANY STANDARD DRINKS CONTAINING ALCOHOL DO YOU HAVE ON A TYPICAL DAY: 1 OR 2
HOW OFTEN DO YOU HAVE A DRINK CONTAINING ALCOHOL: 3
HOW OFTEN DO YOU HAVE SIX OR MORE DRINKS ON ONE OCCASION: 1
HOW MANY STANDARD DRINKS CONTAINING ALCOHOL DO YOU HAVE ON A TYPICAL DAY: 1
HOW OFTEN DO YOU HAVE A DRINK CONTAINING ALCOHOL: 2-4 TIMES A MONTH

## 2025-08-06 ENCOUNTER — OFFICE VISIT (OUTPATIENT)
Dept: FAMILY MEDICINE CLINIC | Age: 70
End: 2025-08-06

## 2025-08-06 VITALS
SYSTOLIC BLOOD PRESSURE: 122 MMHG | BODY MASS INDEX: 35.2 KG/M2 | HEIGHT: 66 IN | DIASTOLIC BLOOD PRESSURE: 64 MMHG | HEART RATE: 62 BPM | OXYGEN SATURATION: 94 % | WEIGHT: 219 LBS

## 2025-08-06 DIAGNOSIS — F41.9 ANXIETY: ICD-10-CM

## 2025-08-06 DIAGNOSIS — E03.8 OTHER SPECIFIED HYPOTHYROIDISM: ICD-10-CM

## 2025-08-06 DIAGNOSIS — I10 ESSENTIAL HYPERTENSION, BENIGN: Primary | ICD-10-CM

## 2025-08-06 DIAGNOSIS — M19.90 ARTHRITIS: ICD-10-CM

## 2025-08-06 DIAGNOSIS — K21.9 GASTROESOPHAGEAL REFLUX DISEASE WITHOUT ESOPHAGITIS: ICD-10-CM

## 2025-08-06 DIAGNOSIS — I73.00 RAYNAUD'S PHENOMENON WITHOUT GANGRENE: ICD-10-CM

## 2025-08-06 RX ORDER — LOSARTAN POTASSIUM 100 MG/1
TABLET ORAL
Qty: 90 TABLET | Refills: 1 | Status: SHIPPED | OUTPATIENT
Start: 2025-08-06

## 2025-08-06 RX ORDER — LEVOTHYROXINE SODIUM 100 UG/1
TABLET ORAL
Qty: 90 TABLET | Refills: 1 | Status: SHIPPED | OUTPATIENT
Start: 2025-08-06

## 2025-08-06 RX ORDER — OMEPRAZOLE 20 MG/1
CAPSULE, DELAYED RELEASE ORAL
Qty: 90 CAPSULE | Refills: 1 | Status: SHIPPED | OUTPATIENT
Start: 2025-08-06

## 2025-08-06 ASSESSMENT — ENCOUNTER SYMPTOMS
ABDOMINAL PAIN: 0
EYE DISCHARGE: 0
HEARTBURN: 1
DIARRHEA: 0
BLOOD IN STOOL: 0
EYE REDNESS: 0
NAUSEA: 0
VOMITING: 0
CHOKING: 0
SHORTNESS OF BREATH: 0
CONSTIPATION: 0
COUGH: 0

## (undated) DEVICE — Z INACTIVE NO SUPPLIER IDENTIFIED JELLY LUBRICATING 4OZ FLIP TOP TB E Z

## (undated) DEVICE — Device: Brand: DEFENDO VALVE AND CONNECTOR KIT

## (undated) DEVICE — FORCEPS BX L L240CM DIA2.4MM RAD JAW 4 HOT FOR POLYP DISP

## (undated) DEVICE — 4-PORT MANIFOLD: Brand: NEPTUNE 2

## (undated) DEVICE — ENDO KIT W/SYRINGE: Brand: MEDLINE INDUSTRIES, INC.